# Patient Record
Sex: MALE | Race: WHITE | NOT HISPANIC OR LATINO | Employment: OTHER | URBAN - METROPOLITAN AREA
[De-identification: names, ages, dates, MRNs, and addresses within clinical notes are randomized per-mention and may not be internally consistent; named-entity substitution may affect disease eponyms.]

---

## 2017-02-22 ENCOUNTER — APPOINTMENT (OUTPATIENT)
Dept: LAB | Facility: CLINIC | Age: 77
End: 2017-02-22
Payer: MEDICARE

## 2017-02-22 DIAGNOSIS — D47.3 ESSENTIAL HEMORRHAGIC THROMBOCYTHEMIA (HCC): ICD-10-CM

## 2017-02-22 LAB
ALBUMIN SERPL BCP-MCNC: 3.8 G/DL (ref 3.5–5)
ALP SERPL-CCNC: 66 U/L (ref 46–116)
ALT SERPL W P-5'-P-CCNC: 18 U/L (ref 12–78)
ANION GAP SERPL CALCULATED.3IONS-SCNC: 6 MMOL/L (ref 4–13)
AST SERPL W P-5'-P-CCNC: 13 U/L (ref 5–45)
BASOPHILS # BLD AUTO: 0.1 THOUSANDS/ΜL (ref 0–0.1)
BASOPHILS NFR BLD AUTO: 2 % (ref 0–1)
BILIRUB SERPL-MCNC: 0.58 MG/DL (ref 0.2–1)
BUN SERPL-MCNC: 21 MG/DL (ref 5–25)
CALCIUM SERPL-MCNC: 9.1 MG/DL (ref 8.3–10.1)
CHLORIDE SERPL-SCNC: 104 MMOL/L (ref 100–108)
CO2 SERPL-SCNC: 28 MMOL/L (ref 21–32)
CREAT SERPL-MCNC: 0.95 MG/DL (ref 0.6–1.3)
EOSINOPHIL # BLD AUTO: 0.27 THOUSAND/ΜL (ref 0–0.61)
EOSINOPHIL NFR BLD AUTO: 5 % (ref 0–6)
ERYTHROCYTE [DISTWIDTH] IN BLOOD BY AUTOMATED COUNT: 13.4 % (ref 11.6–15.1)
GFR SERPL CREATININE-BSD FRML MDRD: >60 ML/MIN/1.73SQ M
GLUCOSE SERPL-MCNC: 166 MG/DL (ref 65–140)
HCT VFR BLD AUTO: 43 % (ref 36.5–49.3)
HGB BLD-MCNC: 14.8 G/DL (ref 12–17)
LDH SERPL-CCNC: 138 U/L (ref 81–234)
LYMPHOCYTES # BLD AUTO: 1.42 THOUSANDS/ΜL (ref 0.6–4.47)
LYMPHOCYTES NFR BLD AUTO: 24 % (ref 14–44)
MCH RBC QN AUTO: 37.5 PG (ref 26.8–34.3)
MCHC RBC AUTO-ENTMCNC: 34.4 G/DL (ref 31.4–37.4)
MCV RBC AUTO: 109 FL (ref 82–98)
MONOCYTES # BLD AUTO: 0.53 THOUSAND/ΜL (ref 0.17–1.22)
MONOCYTES NFR BLD AUTO: 9 % (ref 4–12)
NEUTROPHILS # BLD AUTO: 3.53 THOUSANDS/ΜL (ref 1.85–7.62)
NEUTS SEG NFR BLD AUTO: 60 % (ref 43–75)
NRBC BLD AUTO-RTO: 0 /100 WBCS
PLATELET # BLD AUTO: 335 THOUSANDS/UL (ref 149–390)
PMV BLD AUTO: 10.3 FL (ref 8.9–12.7)
POTASSIUM SERPL-SCNC: 4.4 MMOL/L (ref 3.5–5.3)
PROT SERPL-MCNC: 6.9 G/DL (ref 6.4–8.2)
RBC # BLD AUTO: 3.95 MILLION/UL (ref 3.88–5.62)
SODIUM SERPL-SCNC: 138 MMOL/L (ref 136–145)
WBC # BLD AUTO: 5.87 THOUSAND/UL (ref 4.31–10.16)

## 2017-02-22 PROCEDURE — 80053 COMPREHEN METABOLIC PANEL: CPT

## 2017-02-22 PROCEDURE — 36415 COLL VENOUS BLD VENIPUNCTURE: CPT

## 2017-02-22 PROCEDURE — 83615 LACTATE (LD) (LDH) ENZYME: CPT

## 2017-02-22 PROCEDURE — 85025 COMPLETE CBC W/AUTO DIFF WBC: CPT

## 2017-02-27 ENCOUNTER — GENERIC CONVERSION - ENCOUNTER (OUTPATIENT)
Dept: OTHER | Facility: OTHER | Age: 77
End: 2017-02-27

## 2017-02-27 ENCOUNTER — TRANSCRIBE ORDERS (OUTPATIENT)
Dept: LAB | Facility: CLINIC | Age: 77
End: 2017-02-27

## 2017-02-27 ENCOUNTER — APPOINTMENT (OUTPATIENT)
Dept: LAB | Facility: CLINIC | Age: 77
End: 2017-02-27
Payer: MEDICARE

## 2017-02-27 ENCOUNTER — ALLSCRIPTS OFFICE VISIT (OUTPATIENT)
Dept: OTHER | Facility: OTHER | Age: 77
End: 2017-02-27

## 2017-02-27 DIAGNOSIS — F32.A VASCULAR DEMENTIA WITH DEPRESSED MOOD (HCC): Primary | ICD-10-CM

## 2017-02-27 DIAGNOSIS — F01.50 VASCULAR DEMENTIA WITH DEPRESSED MOOD (HCC): Primary | ICD-10-CM

## 2017-02-27 LAB — TSH SERPL DL<=0.05 MIU/L-ACNC: 7.22 UIU/ML (ref 0.36–3.74)

## 2017-02-27 PROCEDURE — 84443 ASSAY THYROID STIM HORMONE: CPT

## 2017-02-27 PROCEDURE — 36415 COLL VENOUS BLD VENIPUNCTURE: CPT

## 2017-03-06 ENCOUNTER — APPOINTMENT (OUTPATIENT)
Dept: LAB | Facility: CLINIC | Age: 77
End: 2017-03-06
Payer: MEDICARE

## 2017-03-06 ENCOUNTER — TRANSCRIBE ORDERS (OUTPATIENT)
Dept: LAB | Facility: CLINIC | Age: 77
End: 2017-03-06

## 2017-03-06 DIAGNOSIS — Z13.29 SCREENING FOR THYROID DISORDER: Primary | ICD-10-CM

## 2017-03-06 DIAGNOSIS — E78.00 PURE HYPERCHOLESTEROLEMIA: ICD-10-CM

## 2017-03-06 LAB
CHOLEST SERPL-MCNC: 204 MG/DL (ref 50–200)
EST. AVERAGE GLUCOSE BLD GHB EST-MCNC: 105 MG/DL
HBA1C MFR BLD: 5.3 % (ref 4.2–6.3)
HDLC SERPL-MCNC: 55 MG/DL (ref 40–60)
LDLC SERPL CALC-MCNC: 118 MG/DL (ref 0–100)
TRIGL SERPL-MCNC: 157 MG/DL

## 2017-03-06 PROCEDURE — 36415 COLL VENOUS BLD VENIPUNCTURE: CPT

## 2017-03-06 PROCEDURE — 80061 LIPID PANEL: CPT

## 2017-03-06 PROCEDURE — 83036 HEMOGLOBIN GLYCOSYLATED A1C: CPT

## 2017-04-28 DIAGNOSIS — D47.3 ESSENTIAL HEMORRHAGIC THROMBOCYTHEMIA (HCC): ICD-10-CM

## 2017-05-15 ENCOUNTER — LAB (OUTPATIENT)
Dept: LAB | Facility: CLINIC | Age: 77
End: 2017-05-15
Payer: MEDICARE

## 2017-05-15 DIAGNOSIS — R94.6 NONSPECIFIC ABNORMAL RESULTS OF THYROID FUNCTION STUDY: Primary | ICD-10-CM

## 2017-05-15 DIAGNOSIS — D47.3 ESSENTIAL HEMORRHAGIC THROMBOCYTHEMIA (HCC): ICD-10-CM

## 2017-05-15 LAB
ALBUMIN SERPL BCP-MCNC: 3.7 G/DL (ref 3.5–5)
ALP SERPL-CCNC: 57 U/L (ref 46–116)
ALT SERPL W P-5'-P-CCNC: 23 U/L (ref 12–78)
ANION GAP SERPL CALCULATED.3IONS-SCNC: 6 MMOL/L (ref 4–13)
AST SERPL W P-5'-P-CCNC: 18 U/L (ref 5–45)
BASOPHILS # BLD AUTO: 0.07 THOUSANDS/ΜL (ref 0–0.1)
BASOPHILS NFR BLD AUTO: 1 % (ref 0–1)
BILIRUB SERPL-MCNC: 0.82 MG/DL (ref 0.2–1)
BUN SERPL-MCNC: 23 MG/DL (ref 5–25)
CALCIUM SERPL-MCNC: 9.4 MG/DL (ref 8.3–10.1)
CHLORIDE SERPL-SCNC: 107 MMOL/L (ref 100–108)
CO2 SERPL-SCNC: 29 MMOL/L (ref 21–32)
CREAT SERPL-MCNC: 0.89 MG/DL (ref 0.6–1.3)
EOSINOPHIL # BLD AUTO: 0.28 THOUSAND/ΜL (ref 0–0.61)
EOSINOPHIL NFR BLD AUTO: 4 % (ref 0–6)
ERYTHROCYTE [DISTWIDTH] IN BLOOD BY AUTOMATED COUNT: 13.6 % (ref 11.6–15.1)
GFR SERPL CREATININE-BSD FRML MDRD: >60 ML/MIN/1.73SQ M
GLUCOSE SERPL-MCNC: 134 MG/DL (ref 65–140)
HCT VFR BLD AUTO: 45.1 % (ref 36.5–49.3)
HGB BLD-MCNC: 15.4 G/DL (ref 12–17)
LDH SERPL-CCNC: 162 U/L (ref 81–234)
LYMPHOCYTES # BLD AUTO: 1.61 THOUSANDS/ΜL (ref 0.6–4.47)
LYMPHOCYTES NFR BLD AUTO: 25 % (ref 14–44)
MCH RBC QN AUTO: 36.4 PG (ref 26.8–34.3)
MCHC RBC AUTO-ENTMCNC: 34.1 G/DL (ref 31.4–37.4)
MCV RBC AUTO: 107 FL (ref 82–98)
MONOCYTES # BLD AUTO: 0.41 THOUSAND/ΜL (ref 0.17–1.22)
MONOCYTES NFR BLD AUTO: 6 % (ref 4–12)
NEUTROPHILS # BLD AUTO: 4.06 THOUSANDS/ΜL (ref 1.85–7.62)
NEUTS SEG NFR BLD AUTO: 64 % (ref 43–75)
NRBC BLD AUTO-RTO: 0 /100 WBCS
PLATELET # BLD AUTO: 367 THOUSANDS/UL (ref 149–390)
PMV BLD AUTO: 10.5 FL (ref 8.9–12.7)
POTASSIUM SERPL-SCNC: 4.2 MMOL/L (ref 3.5–5.3)
PROT SERPL-MCNC: 7 G/DL (ref 6.4–8.2)
RBC # BLD AUTO: 4.23 MILLION/UL (ref 3.88–5.62)
SODIUM SERPL-SCNC: 142 MMOL/L (ref 136–145)
T4 FREE SERPL-MCNC: 0.94 NG/DL (ref 0.76–1.46)
TSH SERPL DL<=0.05 MIU/L-ACNC: 2.72 UIU/ML (ref 0.36–3.74)
WBC # BLD AUTO: 6.45 THOUSAND/UL (ref 4.31–10.16)

## 2017-05-15 PROCEDURE — 83615 LACTATE (LD) (LDH) ENZYME: CPT

## 2017-05-15 PROCEDURE — 85025 COMPLETE CBC W/AUTO DIFF WBC: CPT

## 2017-05-15 PROCEDURE — 84443 ASSAY THYROID STIM HORMONE: CPT

## 2017-05-15 PROCEDURE — 84439 ASSAY OF FREE THYROXINE: CPT

## 2017-05-15 PROCEDURE — 36415 COLL VENOUS BLD VENIPUNCTURE: CPT

## 2017-05-15 PROCEDURE — 80053 COMPREHEN METABOLIC PANEL: CPT

## 2017-06-27 DIAGNOSIS — D47.3 ESSENTIAL HEMORRHAGIC THROMBOCYTHEMIA (HCC): ICD-10-CM

## 2017-07-24 ENCOUNTER — APPOINTMENT (OUTPATIENT)
Dept: LAB | Facility: CLINIC | Age: 77
End: 2017-07-24
Payer: MEDICARE

## 2017-07-24 DIAGNOSIS — D47.3 ESSENTIAL HEMORRHAGIC THROMBOCYTHEMIA (HCC): ICD-10-CM

## 2017-07-24 LAB
ALBUMIN SERPL BCP-MCNC: 3.6 G/DL (ref 3.5–5)
ALP SERPL-CCNC: 65 U/L (ref 46–116)
ALT SERPL W P-5'-P-CCNC: 18 U/L (ref 12–78)
ANION GAP SERPL CALCULATED.3IONS-SCNC: 6 MMOL/L (ref 4–13)
AST SERPL W P-5'-P-CCNC: 32 U/L (ref 5–45)
BASOPHILS # BLD AUTO: 0.09 THOUSANDS/ΜL (ref 0–0.1)
BASOPHILS NFR BLD AUTO: 1 % (ref 0–1)
BILIRUB SERPL-MCNC: 0.77 MG/DL (ref 0.2–1)
BUN SERPL-MCNC: 18 MG/DL (ref 5–25)
CALCIUM SERPL-MCNC: 9.1 MG/DL (ref 8.3–10.1)
CHLORIDE SERPL-SCNC: 109 MMOL/L (ref 100–108)
CO2 SERPL-SCNC: 28 MMOL/L (ref 21–32)
CREAT SERPL-MCNC: 0.81 MG/DL (ref 0.6–1.3)
EOSINOPHIL # BLD AUTO: 0.31 THOUSAND/ΜL (ref 0–0.61)
EOSINOPHIL NFR BLD AUTO: 4 % (ref 0–6)
ERYTHROCYTE [DISTWIDTH] IN BLOOD BY AUTOMATED COUNT: 13.8 % (ref 11.6–15.1)
GFR SERPL CREATININE-BSD FRML MDRD: 85 ML/MIN/1.73SQ M
GLUCOSE P FAST SERPL-MCNC: 93 MG/DL (ref 65–99)
HCT VFR BLD AUTO: 45.8 % (ref 36.5–49.3)
HGB BLD-MCNC: 15.4 G/DL (ref 12–17)
LDH SERPL-CCNC: 278 U/L (ref 81–234)
LYMPHOCYTES # BLD AUTO: 1.65 THOUSANDS/ΜL (ref 0.6–4.47)
LYMPHOCYTES NFR BLD AUTO: 21 % (ref 14–44)
MCH RBC QN AUTO: 35.7 PG (ref 26.8–34.3)
MCHC RBC AUTO-ENTMCNC: 33.6 G/DL (ref 31.4–37.4)
MCV RBC AUTO: 106 FL (ref 82–98)
MONOCYTES # BLD AUTO: 0.48 THOUSAND/ΜL (ref 0.17–1.22)
MONOCYTES NFR BLD AUTO: 6 % (ref 4–12)
NEUTROPHILS # BLD AUTO: 5.4 THOUSANDS/ΜL (ref 1.85–7.62)
NEUTS SEG NFR BLD AUTO: 68 % (ref 43–75)
NRBC BLD AUTO-RTO: 0 /100 WBCS
PLATELET # BLD AUTO: 406 THOUSANDS/UL (ref 149–390)
PMV BLD AUTO: 10.9 FL (ref 8.9–12.7)
POTASSIUM SERPL-SCNC: 4.7 MMOL/L (ref 3.5–5.3)
PROT SERPL-MCNC: 7 G/DL (ref 6.4–8.2)
RBC # BLD AUTO: 4.31 MILLION/UL (ref 3.88–5.62)
SODIUM SERPL-SCNC: 143 MMOL/L (ref 136–145)
WBC # BLD AUTO: 7.95 THOUSAND/UL (ref 4.31–10.16)

## 2017-07-24 PROCEDURE — 85025 COMPLETE CBC W/AUTO DIFF WBC: CPT

## 2017-07-24 PROCEDURE — 83615 LACTATE (LD) (LDH) ENZYME: CPT

## 2017-07-24 PROCEDURE — 80053 COMPREHEN METABOLIC PANEL: CPT

## 2017-07-24 PROCEDURE — 36415 COLL VENOUS BLD VENIPUNCTURE: CPT

## 2017-08-26 DIAGNOSIS — D47.3 ESSENTIAL HEMORRHAGIC THROMBOCYTHEMIA (HCC): ICD-10-CM

## 2017-10-10 ENCOUNTER — APPOINTMENT (OUTPATIENT)
Dept: LAB | Facility: CLINIC | Age: 77
End: 2017-10-10
Payer: MEDICARE

## 2017-10-10 DIAGNOSIS — D47.3 ESSENTIAL HEMORRHAGIC THROMBOCYTHEMIA (HCC): ICD-10-CM

## 2017-10-10 LAB
ALBUMIN SERPL BCP-MCNC: 3.4 G/DL (ref 3.5–5)
ALP SERPL-CCNC: 64 U/L (ref 46–116)
ALT SERPL W P-5'-P-CCNC: 19 U/L (ref 12–78)
ANION GAP SERPL CALCULATED.3IONS-SCNC: 8 MMOL/L (ref 4–13)
AST SERPL W P-5'-P-CCNC: 20 U/L (ref 5–45)
BASOPHILS # BLD AUTO: 0.06 THOUSANDS/ΜL (ref 0–0.1)
BASOPHILS NFR BLD AUTO: 1 % (ref 0–1)
BILIRUB SERPL-MCNC: 0.48 MG/DL (ref 0.2–1)
BUN SERPL-MCNC: 18 MG/DL (ref 5–25)
CALCIUM SERPL-MCNC: 8.7 MG/DL (ref 8.3–10.1)
CHLORIDE SERPL-SCNC: 107 MMOL/L (ref 100–108)
CO2 SERPL-SCNC: 27 MMOL/L (ref 21–32)
CREAT SERPL-MCNC: 0.75 MG/DL (ref 0.6–1.3)
EOSINOPHIL # BLD AUTO: 0.16 THOUSAND/ΜL (ref 0–0.61)
EOSINOPHIL NFR BLD AUTO: 3 % (ref 0–6)
ERYTHROCYTE [DISTWIDTH] IN BLOOD BY AUTOMATED COUNT: 14.6 % (ref 11.6–15.1)
GFR SERPL CREATININE-BSD FRML MDRD: 87 ML/MIN/1.73SQ M
GLUCOSE SERPL-MCNC: 92 MG/DL (ref 65–140)
HCT VFR BLD AUTO: 44.4 % (ref 36.5–49.3)
HGB BLD-MCNC: 15.5 G/DL (ref 12–17)
LDH SERPL-CCNC: 174 U/L (ref 81–234)
LYMPHOCYTES # BLD AUTO: 1.42 THOUSANDS/ΜL (ref 0.6–4.47)
LYMPHOCYTES NFR BLD AUTO: 26 % (ref 14–44)
MCH RBC QN AUTO: 35 PG (ref 26.8–34.3)
MCHC RBC AUTO-ENTMCNC: 34.9 G/DL (ref 31.4–37.4)
MCV RBC AUTO: 100 FL (ref 82–98)
MONOCYTES # BLD AUTO: 0.55 THOUSAND/ΜL (ref 0.17–1.22)
MONOCYTES NFR BLD AUTO: 10 % (ref 4–12)
NEUTROPHILS # BLD AUTO: 3.31 THOUSANDS/ΜL (ref 1.85–7.62)
NEUTS SEG NFR BLD AUTO: 60 % (ref 43–75)
NRBC BLD AUTO-RTO: 0 /100 WBCS
PLATELET # BLD AUTO: 384 THOUSANDS/UL (ref 149–390)
PMV BLD AUTO: 10.3 FL (ref 8.9–12.7)
POTASSIUM SERPL-SCNC: 4.3 MMOL/L (ref 3.5–5.3)
PROT SERPL-MCNC: 6.8 G/DL (ref 6.4–8.2)
RBC # BLD AUTO: 4.43 MILLION/UL (ref 3.88–5.62)
SODIUM SERPL-SCNC: 142 MMOL/L (ref 136–145)
WBC # BLD AUTO: 5.51 THOUSAND/UL (ref 4.31–10.16)

## 2017-10-10 PROCEDURE — 80053 COMPREHEN METABOLIC PANEL: CPT

## 2017-10-10 PROCEDURE — 85025 COMPLETE CBC W/AUTO DIFF WBC: CPT

## 2017-10-10 PROCEDURE — 83615 LACTATE (LD) (LDH) ENZYME: CPT

## 2017-10-10 PROCEDURE — 36415 COLL VENOUS BLD VENIPUNCTURE: CPT

## 2017-11-01 ENCOUNTER — ALLSCRIPTS OFFICE VISIT (OUTPATIENT)
Dept: OTHER | Facility: OTHER | Age: 77
End: 2017-11-01

## 2017-12-19 ENCOUNTER — APPOINTMENT (OUTPATIENT)
Dept: LAB | Facility: CLINIC | Age: 77
End: 2017-12-19
Payer: MEDICARE

## 2017-12-19 ENCOUNTER — TRANSCRIBE ORDERS (OUTPATIENT)
Dept: LAB | Facility: CLINIC | Age: 77
End: 2017-12-19

## 2017-12-19 DIAGNOSIS — D47.3 THROMBOCYTHEMIA, ESSENTIAL (HCC): Primary | ICD-10-CM

## 2017-12-19 LAB
ALBUMIN SERPL BCP-MCNC: 3.6 G/DL (ref 3.5–5)
ALP SERPL-CCNC: 73 U/L (ref 46–116)
ALT SERPL W P-5'-P-CCNC: 17 U/L (ref 12–78)
ANION GAP SERPL CALCULATED.3IONS-SCNC: 5 MMOL/L (ref 4–13)
AST SERPL W P-5'-P-CCNC: 15 U/L (ref 5–45)
BASOPHILS # BLD AUTO: 0.09 THOUSANDS/ΜL (ref 0–0.1)
BASOPHILS NFR BLD AUTO: 1 % (ref 0–1)
BILIRUB SERPL-MCNC: 0.69 MG/DL (ref 0.2–1)
BUN SERPL-MCNC: 18 MG/DL (ref 5–25)
CALCIUM SERPL-MCNC: 9.3 MG/DL (ref 8.3–10.1)
CHLORIDE SERPL-SCNC: 107 MMOL/L (ref 100–108)
CO2 SERPL-SCNC: 28 MMOL/L (ref 21–32)
CREAT SERPL-MCNC: 0.78 MG/DL (ref 0.6–1.3)
EOSINOPHIL # BLD AUTO: 0.28 THOUSAND/ΜL (ref 0–0.61)
EOSINOPHIL NFR BLD AUTO: 4 % (ref 0–6)
ERYTHROCYTE [DISTWIDTH] IN BLOOD BY AUTOMATED COUNT: 15 % (ref 11.6–15.1)
GFR SERPL CREATININE-BSD FRML MDRD: 86 ML/MIN/1.73SQ M
GLUCOSE P FAST SERPL-MCNC: 115 MG/DL (ref 65–99)
HCT VFR BLD AUTO: 44.7 % (ref 36.5–49.3)
HGB BLD-MCNC: 15.2 G/DL (ref 12–17)
LYMPHOCYTES # BLD AUTO: 1.43 THOUSANDS/ΜL (ref 0.6–4.47)
LYMPHOCYTES NFR BLD AUTO: 22 % (ref 14–44)
MCH RBC QN AUTO: 35.4 PG (ref 26.8–34.3)
MCHC RBC AUTO-ENTMCNC: 34 G/DL (ref 31.4–37.4)
MCV RBC AUTO: 104 FL (ref 82–98)
MONOCYTES # BLD AUTO: 0.6 THOUSAND/ΜL (ref 0.17–1.22)
MONOCYTES NFR BLD AUTO: 9 % (ref 4–12)
NEUTROPHILS # BLD AUTO: 4.1 THOUSANDS/ΜL (ref 1.85–7.62)
NEUTS SEG NFR BLD AUTO: 64 % (ref 43–75)
NRBC BLD AUTO-RTO: 0 /100 WBCS
PLATELET # BLD AUTO: 479 THOUSANDS/UL (ref 149–390)
PMV BLD AUTO: 10.4 FL (ref 8.9–12.7)
POTASSIUM SERPL-SCNC: 4.2 MMOL/L (ref 3.5–5.3)
PROT SERPL-MCNC: 7 G/DL (ref 6.4–8.2)
RBC # BLD AUTO: 4.29 MILLION/UL (ref 3.88–5.62)
SODIUM SERPL-SCNC: 140 MMOL/L (ref 136–145)
WBC # BLD AUTO: 6.52 THOUSAND/UL (ref 4.31–10.16)

## 2017-12-19 PROCEDURE — 80053 COMPREHEN METABOLIC PANEL: CPT

## 2017-12-19 PROCEDURE — 83615 LACTATE (LD) (LDH) ENZYME: CPT

## 2017-12-19 PROCEDURE — 85025 COMPLETE CBC W/AUTO DIFF WBC: CPT

## 2017-12-19 PROCEDURE — 83625 ASSAY OF LDH ENZYMES: CPT

## 2017-12-19 PROCEDURE — 36415 COLL VENOUS BLD VENIPUNCTURE: CPT

## 2017-12-21 LAB
LDH SERPL-CCNC: 148 IU/L (ref 121–224)
LDH1 CFR SERPL ELPH: 26 % (ref 17–32)
LDH2 CFR SERPL ELPH: 35 % (ref 25–40)
LDH3 CFR SERPL ELPH: 24 % (ref 17–27)
LDH4 CFR SERPL ELPH: 9 % (ref 5–13)
LDH5 CFR SERPL ELPH: 6 % (ref 4–20)

## 2017-12-22 ENCOUNTER — GENERIC CONVERSION - ENCOUNTER (OUTPATIENT)
Dept: OTHER | Facility: OTHER | Age: 77
End: 2017-12-22

## 2017-12-22 ENCOUNTER — APPOINTMENT (OUTPATIENT)
Dept: LAB | Facility: CLINIC | Age: 77
End: 2017-12-22
Payer: MEDICARE

## 2017-12-22 DIAGNOSIS — C61 MALIGNANT NEOPLASM OF PROSTATE (HCC): ICD-10-CM

## 2017-12-22 DIAGNOSIS — D47.3 ESSENTIAL HEMORRHAGIC THROMBOCYTHEMIA (HCC): ICD-10-CM

## 2017-12-22 LAB — PSA SERPL-MCNC: 0.4 NG/ML (ref 0–4)

## 2017-12-22 PROCEDURE — 84153 ASSAY OF PSA TOTAL: CPT

## 2017-12-22 PROCEDURE — 36415 COLL VENOUS BLD VENIPUNCTURE: CPT

## 2018-01-05 ENCOUNTER — ALLSCRIPTS OFFICE VISIT (OUTPATIENT)
Dept: OTHER | Facility: OTHER | Age: 78
End: 2018-01-05

## 2018-01-05 LAB
BILIRUB UR QL STRIP: NEGATIVE
CLARITY UR: NORMAL
COLOR UR: YELLOW
GLUCOSE (HISTORICAL): NORMAL
HGB UR QL STRIP.AUTO: NEGATIVE
KETONES UR STRIP-MCNC: NEGATIVE MG/DL
LEUKOCYTE ESTERASE UR QL STRIP: NEGATIVE
NITRITE UR QL STRIP: NEGATIVE
PH UR STRIP.AUTO: 5 [PH]
PROT UR STRIP-MCNC: NEGATIVE MG/DL
SP GR UR STRIP.AUTO: 1.02
UROBILINOGEN UR QL STRIP.AUTO: 0.2

## 2018-01-06 ENCOUNTER — HOSPITAL ENCOUNTER (EMERGENCY)
Facility: HOSPITAL | Age: 78
Discharge: HOME | End: 2018-01-06
Attending: EMERGENCY MEDICINE
Payer: MEDICARE

## 2018-01-06 ENCOUNTER — APPOINTMENT (EMERGENCY)
Dept: RADIOLOGY | Facility: HOSPITAL | Age: 78
End: 2018-01-06
Payer: MEDICARE

## 2018-01-06 VITALS — BODY MASS INDEX: 31.07 KG/M2 | WEIGHT: 217 LBS | HEIGHT: 70 IN

## 2018-01-06 DIAGNOSIS — S09.90XA CLOSED HEAD INJURY, INITIAL ENCOUNTER: Primary | ICD-10-CM

## 2018-01-06 DIAGNOSIS — S01.81XA LACERATION OF FOREHEAD, INITIAL ENCOUNTER: ICD-10-CM

## 2018-01-06 PROCEDURE — 70450 CT HEAD/BRAIN W/O DYE: CPT

## 2018-01-06 PROCEDURE — 99283 EMERGENCY DEPT VISIT LOW MDM: CPT

## 2018-01-06 RX ORDER — SERTRALINE HYDROCHLORIDE 100 MG/1
100 TABLET, FILM COATED ORAL
COMMUNITY
End: 2019-01-21 | Stop reason: ALTCHOICE

## 2018-01-06 RX ORDER — FOLIC ACID 1 MG/1
1 TABLET ORAL
COMMUNITY
End: 2022-03-11 | Stop reason: HOSPADM

## 2018-01-06 RX ORDER — LIDOCAINE HYDROCHLORIDE AND EPINEPHRINE 10; 10 MG/ML; UG/ML
5 INJECTION, SOLUTION INFILTRATION; PERINEURAL ONCE
Status: COMPLETED | OUTPATIENT
Start: 2018-01-06 | End: 2018-01-06

## 2018-01-06 RX ORDER — ATORVASTATIN CALCIUM 40 MG/1
40 TABLET, FILM COATED ORAL
COMMUNITY
End: 2019-01-21 | Stop reason: ALTCHOICE

## 2018-01-06 RX ORDER — LISINOPRIL 10 MG/1
10 TABLET ORAL
COMMUNITY

## 2018-01-06 RX ADMIN — LIDOCAINE HYDROCHLORIDE,EPINEPHRINE BITARTRATE 5 ML: 10; .01 INJECTION, SOLUTION INFILTRATION; PERINEURAL at 11:16

## 2018-01-06 NOTE — ED ATTENDING ATTESTATION
Rebecca Gonzalez MD, saw and evaluated the patient  I have discussed the patient with the resident/non-physician practitioner and agree with the resident's/non-physician practitioner's findings, Plan of Care, and MDM as documented in the resident's/non-physician practitioner's note, except where noted  All available labs and Radiology studies were reviewed  At this point I agree with the current assessment done in the Emergency Department  I have conducted an independent evaluation of this patient a history and physical is as follows:Fell at [employer] hit head on granite steps  No LOC  No other injury  Takes one ASA a day  1cm laceration forehead  GCS 15   Neuro non focal  Head CT negative, wound closed by resident under my direct supervision      Critical Care Time  CritCare Time    Procedures

## 2018-01-06 NOTE — ED PROVIDER NOTES
History  Chief Complaint   Patient presents with    Fall     Pt states had two large barrells in hand and slipped down 5 granite stairs while at [employer]  Pt denies LOC or blood thinners except ASA     76-year-old male presenting to the ER today status post fall  Patient was carrying to duffle bag down a flight of 5 stairs  Patient slipped on ice and fell forward onto his head  Did not lose consciousness and was able to ambulate afterward  Patient noticed a laceration above his nasal bridge and came to the ER for further evaluation and treatment  Currently patient denies headache, nausea, vomiting, visual changes, numbness tingling or weakness in the extremities  Takes aspirin daily  Assessment:  - Laceration  - closed head injury  - mechanical fall   Plan:  - CT head  - update tetanus  - closed  Laceration with sutures        History provided by:  Patient   used: No        Prior to Admission Medications   Prescriptions Last Dose Informant Patient Reported? Taking? Fluticasone Propionate HFA (FLOVENT HFA IN)   Yes No   Sig: Inhale   aspirin 1 mg/mL SUSP   Yes No   Sig: Take 325 mg by mouth   atorvastatin (LIPITOR) 40 mg tablet   Yes No   Sig: Take 40 mg by mouth   folic acid (FOLVITE) 1 mg tablet   Yes No   Sig: Take 1 mg by mouth   lisinopril (ZESTRIL) 10 mg tablet   Yes No   Sig: Take 10 mg by mouth   sertraline (ZOLOFT) 100 mg tablet   Yes No   Sig: Take 100 mg by mouth      Facility-Administered Medications: None       Past Medical History:   Diagnosis Date    Cancer ([facility] [address] )     prostate    Hypercholesterolemia     Hypertension     Stroke [facility])        Past Surgical History:   Procedure Laterality Date    PROSTATECTOMY      TOTAL SHOULDER REPLACEMENT         History reviewed  No pertinent family history  I have reviewed and agree with the history as documented      Social History   Substance Use Topics    Smoking status: Never Smoker    Smokeless tobacco: Never Used    Alcohol use No        Review of Systems   Constitutional: Negative for activity change, appetite change, chills, fatigue and fever  HENT: Negative  Eyes: Negative  Respiratory: Negative  Cardiovascular: Negative  Gastrointestinal: Negative for abdominal distention, abdominal pain, blood in stool, constipation, diarrhea, nausea and vomiting  Endocrine: Negative  Genitourinary: Negative for decreased urine volume, difficulty urinating, dysuria, enuresis, flank pain, frequency, hematuria, penile swelling, scrotal swelling, testicular pain and urgency  Musculoskeletal: Negative  Skin: Positive for wound  Allergic/Immunologic: Negative  Neurological: Negative  Hematological: Negative  Psychiatric/Behavioral: Negative  All other systems reviewed and are negative  Physical Exam  ED Triage Vitals [01/06/18 1037]   Temp Pulse Resp BP SpO2   -- -- -- -- --      Temp src Heart Rate Source Patient Position - Orthostatic VS BP Location FiO2 (%)   -- -- -- -- --      Pain Score       No Pain           Orthostatic Vital Signs  There were no vitals filed for this visit  Physical Exam   Constitutional: He is oriented to person, place, and time  He appears well-developed and well-nourished  HENT:   Head: Normocephalic and atraumatic  Right Ear: External ear normal    Left Ear: External ear normal    Nose: Nose normal    Mouth/Throat: Oropharynx is clear and moist    Eyes: Conjunctivae and EOM are normal  Pupils are equal, round, and reactive to light  Neck: Normal range of motion  Neck supple  No JVD present  No tracheal deviation present  No thyromegaly present  Cardiovascular: Normal rate, regular rhythm, normal heart sounds and intact distal pulses  Exam reveals no gallop and no friction rub  No murmur heard  Pulmonary/Chest: Effort normal and breath sounds normal  No stridor  No respiratory distress  He has no wheezes  He has no rales  He exhibits no tenderness     Abdominal: Soft  Bowel sounds are normal  He exhibits no distension and no mass  There is no tenderness  There is no rebound and no guarding  No hernia  Musculoskeletal: Normal range of motion  He exhibits no edema, tenderness or deformity  Lymphadenopathy:     He has no cervical adenopathy  Neurological: He is alert and oriented to person, place, and time  He has normal reflexes  He displays normal reflexes  No cranial nerve deficit  He exhibits normal muscle tone  Coordination normal    Skin: Skin is warm  No rash noted  No erythema  No pallor  Psychiatric: He has a normal mood and affect  His behavior is normal  Judgment and thought content normal    Nursing note and vitals reviewed  ED Medications  Medications   lidocaine-epinephrine (XYLOCAINE/EPINEPHRINE) 1 %-1:100,000 injection 5 mL (5 mL Infiltration Given 1/6/18 1116)       Diagnostic Studies  Results Reviewed     None                 CT head without contrast   Final Result by Sreedhar Garvey MD (01/06 1131)      No acute intracranial abnormality  Microangiopathic changes  Workstation performed: [id number]               Procedures  Lac Repair  Date/Time: 1/6/2018 11:39 AM  Performed by: Domenica Ferris  Authorized by: Wili Solomon   Consent: Verbal consent obtained  Body area: head/neck  Location details: nose  Foreign bodies: no foreign bodies  Tendon involvement: none  Nerve involvement: none  Vascular damage: no  Anesthesia: local infiltration    Anesthesia:  Local Anesthetic: lidocaine 1% with epinephrine    Sedation:  Patient sedated: no    Wound Dehiscence:  Superficial Wound Dehiscence: simple closure      Procedure Details:  Preparation: Patient was prepped and draped in the usual sterile fashion    Irrigation solution: saline  Irrigation method: syringe  Amount of cleaning: extensive  Debridement: none  Degree of undermining: none  Skin closure: 6-0 nylon  Number of sutures: 3  Technique: simple  Approximation: close  Approximation difficulty: simple  Patient tolerance: Patient tolerated the procedure well with no immediate complications            Phone Consults  ED Phone Contact    ED Course  ED Course            Identification of Seniors at [facility] Most Recent Value   (ISAR) Identification of Seniors at Risk   Before the illness or injury that brought you to the Emergency, did you need someone to help you on a regular basis? 0 Filed at: 01/06/2018 1042   In the last 24 hours, have you needed more help than usual?  0 Filed at: 01/06/2018 1042   Have you been hospitalized for one or more nights during the past 6 months? 1 Filed at: 01/06/2018 1042   In general, do you see well?  0 Filed at: 01/06/2018 1042   In general, do you have serious problems with your memory? 0 Filed at: 01/06/2018 1042   Do you take more than three different medications every day?   1 Filed at: 01/06/2018 1042   ISAR Score  2 Filed at: 01/06/2018 1042                          MDM  Number of Diagnoses or Management Options  Closed head injury, initial encounter: new and requires workup  Laceration of forehead, initial encounter: new and requires workup     Amount and/or Complexity of Data Reviewed  Clinical lab tests: ordered and reviewed  Tests in the radiology section of CPT®: ordered and reviewed  Tests in the medicine section of CPT®: reviewed and ordered  Decide to obtain previous medical records or to obtain history from someone other than the patient: yes  Independent visualization of images, tracings, or specimens: yes    Patient Progress  Patient progress: stable    CritCare Time    Disposition  Final diagnoses:   Closed head injury, initial encounter   Laceration of forehead, initial encounter     Time reflects when diagnosis was documented in both MDM as applicable and the Disposition within this note     Time User Action Codes Description Comment    1/6/2018 11:40 AM Anshul Glass Add [S09 90XA] Closed head injury, initial encounter     1/6/2018 11:41 AM Aretha Collins Can Add [S01 81XA] Laceration of forehead, initial encounter       ED Disposition     ED Disposition Condition Comment    Discharge  Ricardo Barrazalem discharge to home/self care  Condition at discharge: Good        Follow-up Information     Follow up With Specialties Details Why Contact Info    Naa Mckenzie MD Internal Medicine Schedule an appointment as soon as possible for a visit  [address] [place] [address]  [phone]          There are no discharge medications for this patient  No discharge procedures on file  ED Provider  Attending physically available and evaluated Ricardo Wade  I managed the patient along with the ED Attending      Electronically Signed by         Alia De Santiago DO  Resident  01/06/18 0838

## 2018-01-06 NOTE — DISCHARGE INSTRUCTIONS

## 2018-01-13 VITALS
TEMPERATURE: 97.1 F | BODY MASS INDEX: 34.4 KG/M2 | SYSTOLIC BLOOD PRESSURE: 138 MMHG | DIASTOLIC BLOOD PRESSURE: 70 MMHG | HEART RATE: 97 BPM | WEIGHT: 227 LBS | RESPIRATION RATE: 18 BRPM | OXYGEN SATURATION: 97 % | HEIGHT: 68 IN

## 2018-01-14 VITALS
HEART RATE: 99 BPM | SYSTOLIC BLOOD PRESSURE: 132 MMHG | BODY MASS INDEX: 34.06 KG/M2 | HEIGHT: 67 IN | WEIGHT: 217 LBS | RESPIRATION RATE: 16 BRPM | TEMPERATURE: 97.8 F | DIASTOLIC BLOOD PRESSURE: 76 MMHG | OXYGEN SATURATION: 96 %

## 2018-01-15 ENCOUNTER — HOSPITAL ENCOUNTER (EMERGENCY)
Facility: HOSPITAL | Age: 78
Discharge: HOME | End: 2018-01-15
Attending: EMERGENCY MEDICINE | Admitting: EMERGENCY MEDICINE
Payer: MEDICARE

## 2018-01-15 VITALS
OXYGEN SATURATION: 95 % | HEART RATE: 88 BPM | TEMPERATURE: 97.4 F | DIASTOLIC BLOOD PRESSURE: 65 MMHG | SYSTOLIC BLOOD PRESSURE: 133 MMHG | RESPIRATION RATE: 18 BRPM

## 2018-01-15 DIAGNOSIS — Z48.02 VISIT FOR SUTURE REMOVAL: Primary | ICD-10-CM

## 2018-01-15 PROCEDURE — 99281 EMR DPT VST MAYX REQ PHY/QHP: CPT

## 2018-01-15 NOTE — DISCHARGE INSTRUCTIONS
Stitches Removal   WHAT YOU NEED TO KNOW:   Stitches may need to be removed in 3 to 14 days depending on the location of your wound  Your healthcare provider will use sterile forceps or tweezers to  the knot of each stitch  He will cut the stitch with scissors and pull the stitch out  You may feel a slight tug as the stitch comes out  He may place small steristrips across your wound after the stitches have been removed  These pieces of tape will peel and fall of on their own  Do not pull them off  DISCHARGE INSTRUCTIONS:   Return to the emergency department if:   · Your wound splits open  · You suddenly cannot move your injured joint  · You have sudden numbness around your wound  · You see red streaks coming from your wound  Contact your healthcare provider if:   · You have a fever and chills  · Your wound is red, warm, swollen, or leaking pus  · There is a bad smell coming from your wound  · You have increased pain in the wound area  · You have questions or concerns about your condition or care  Care for your wound:   · Clean your wound as directed  Carefully wash your wound with soap and water  Pat the area dry with a clean towel  · Protect your wound  Your wound can swell, bleed, or split open if it is stretched or bumped  You may need to wear a bandage that supports your wound until it is completely healed  · Minimize your scar  Use sunblock if your wound is exposed to the sun  Apply it every day after the stitches are removed  This will help prevent skin discoloration  Follow up with your healthcare provider as directed: You may need to return in 3 to 5 days if the stitches are on your face  Stitches on your scalp need to be removed after 7 to 14 days  Stitches over joints may remain in place up to 14 days  Write down your questions so you remember to ask them during your visits     © 2017 [address] [employer] [employer] Information is for End User's use only and may not be sold, redistributed or otherwise used for commercial purposes  All illustrations and images included in CareNotes® are the copyrighted property of [employer]  or [employer]  The above information is an  only  It is not intended as medical advice for individual conditions or treatments  Talk to your doctor, nurse or pharmacist before following any medical regimen to see if it is safe and effective for you

## 2018-01-15 NOTE — ED PROVIDER NOTES
History  Chief Complaint   Patient presents with    Suture / Staple Removal     pt with 3 sutures in his forehead and needs the sutures removed      This is a 76 y o  old male who presents to the ED for evaluation of suture removal  Placed on 1/6  Healing well  No pain, swelling discharge  Midline scalp  Prior to Admission Medications   Prescriptions Last Dose Informant Patient Reported? Taking? Fluticasone Propionate HFA (FLOVENT HFA IN)   Yes No   Sig: Inhale   aspirin 1 mg/mL SUSP   Yes No   Sig: Take 325 mg by mouth   atorvastatin (LIPITOR) 40 mg tablet   Yes No   Sig: Take 40 mg by mouth   folic acid (FOLVITE) 1 mg tablet   Yes No   Sig: Take 1 mg by mouth   lisinopril (ZESTRIL) 10 mg tablet   Yes No   Sig: Take 10 mg by mouth   sertraline (ZOLOFT) 100 mg tablet   Yes No   Sig: Take 100 mg by mouth      Facility-Administered Medications: None     Past Medical History:   Diagnosis Date    Cancer ([facility] [address] )     prostate    Hypercholesterolemia     Hypertension     Stroke [facility])      Past Surgical History:   Procedure Laterality Date    PROSTATECTOMY      TOTAL SHOULDER REPLACEMENT       History reviewed  No pertinent family history  I have reviewed and agree with the history as documented  Social History   Substance Use Topics    Smoking status: Never Smoker    Smokeless tobacco: Never Used    Alcohol use No      Review of Systems   Constitutional: Negative for fatigue and fever  HENT: Negative for congestion and rhinorrhea  Respiratory: Negative for cough  Cardiovascular: Negative for chest pain  Gastrointestinal: Negative for abdominal pain, nausea and vomiting  Genitourinary: Negative for dysuria and frequency  Musculoskeletal: Negative for neck pain and neck stiffness  Neurological: Negative for dizziness and syncope  All other systems reviewed and are negative      Physical Exam  ED Triage Vitals [01/15/18 1610]   Temperature Pulse Respirations Blood Pressure SpO2   (!) 97 4 °F (36 3 °C) 88 18 133/65 95 %      Temp Source Heart Rate Source Patient Position - Orthostatic VS BP Location FiO2 (%)   Oral -- -- -- --      Pain Score       No Pain         Physical Exam   Constitutional: He is oriented to person, place, and time  He appears well-developed and well-nourished  No distress  HENT:   Head: Normocephalic  Well healing midline head laceration   Neck: Normal range of motion  Pulmonary/Chest: Effort normal  No stridor  No respiratory distress  Musculoskeletal: Normal range of motion  Neurological: He is alert and oriented to person, place, and time  Moving all extremities equally   Skin: Skin is warm and dry  No rash noted  He is not diaphoretic  Psychiatric: He has a normal mood and affect  Nursing note and vitals reviewed  ED Medications  Medications - No data to display    Diagnostic Studies  Results Reviewed     None        No orders to display     Procedures  Suture Removal  Date/Time: 1/15/2018 4:30 PM  Performed by: Monisha Ludwig  Authorized by: Pastor Farnsworth     Patient location:  ED  Niland protocol:     Patient identity confirmed:  Verbally with patient  Location:     Laterality:  Median    Location:  [address] location:  Forehead  Procedure details: Tools used:  Suture removal kit    Wound appearance:  No sign(s) of infection    Number of sutures removed:  3  Post-procedure details:     Post-removal:  No dressing applied    Patient tolerance of procedure: Tolerated well, no immediate complications      Phone Consults  ED Phone Contact    ED Course    A/P: This is a 76 y o  male who presents to the ED for evaluation of suture removal   No signs of infection  Sutures removed without incident  I personally discussed return precautions with this patient   I provided the patient with written discharge instructions and particularly highlighted specific areas of interest to this patient, including but not limited to: medications for symptom managment, follow up recommendations, and return precautions  Patient is in agreement with this plan as outlined above  MDM  CritCare Time    Disposition  Final diagnoses:   Visit for suture removal     Time reflects when diagnosis was documented in both MDM as applicable and the Disposition within this note     Time User Action Codes Description Comment    1/15/2018  4:29 PM Jim Hanna Add [Z48 02] Visit for suture removal       ED Disposition     ED Disposition Condition Comment    Discharge  Wilberto Tay discharge to home/self care  Condition at discharge: Stable        Follow-up Information     Follow up With Specialties Details Why Contact Info Additional Information    Jerri Loving MD Internal Medicine Schedule an appointment as soon as possible for a visit As needed [address] [place] [address] Emergency Department Emergency Medicine  As needed [address] [place] [address], [address] [place] [address], [facility]        Discharge Medication List as of 1/15/2018  4:30 PM      CONTINUE these medications which have NOT CHANGED    Details   aspirin 1 mg/mL SUSP Take 325 mg by mouth, Historical Med      atorvastatin (LIPITOR) 40 mg tablet Take 40 mg by mouth, Historical Med      Fluticasone Propionate HFA (FLOVENT HFA IN) Inhale, Historical Med      folic acid (FOLVITE) 1 mg tablet Take 1 mg by mouth, Historical Med      lisinopril (ZESTRIL) 10 mg tablet Take 10 mg by mouth, Historical Med      sertraline (ZOLOFT) 100 mg tablet Take 100 mg by mouth, Historical Med           No discharge procedures on file  ED Provider  Attending physically available and evaluated Wilberto Tay I managed the patient along with the ED Attending      Electronically Signed by         Joe Greenberg MD  01/16/18 Kathi Jauregui

## 2018-01-15 NOTE — ED ATTENDING ATTESTATION
Eugene Barahona DO, saw and evaluated the patient  I have discussed the patient with the resident/non-physician practitioner and agree with the resident's/non-physician practitioner's findings, Plan of Care, and MDM as documented in the resident's/non-physician practitioner's note, except where noted  All available labs and Radiology studies were reviewed  At this point I agree with the current assessment done in the Emergency Department  I have conducted an independent evaluation of this patient including a focused history and a physical exam     ED Note - Canelo Narayanan 76 y o  male MRN: [id number]  Unit/Bed#: [facility] Encounter: [id number]    History of Present Illness   HPI  Canelo Narayanan is a 76 y o  male who presents for suture removal from forehead  Wound healed  REVIEW OF SYSTEMS  See HPI for further details  12 systems reviewed and otherwise negative except as noted  Historical Information     PAST MEDICAL HISTORY  Past Medical History:   Diagnosis Date    Cancer [facility])     prostate    Hypercholesterolemia     Hypertension     Stroke [facility])        FAMILY HISTORY  History reviewed  No pertinent family history  SOCIAL HISTORY  Social History     Social History    Marital status: /Civil Union     Spouse name: N/A    Number of children: N/A    Years of education: N/A     Social History Main Topics    Smoking status: Never Smoker    Smokeless tobacco: Never Used    Alcohol use No    Drug use: No    Sexual activity: Not Asked     Other Topics Concern    None     Social History Narrative    None       SURGICAL HISTORY  Past Surgical History:   Procedure Laterality Date    PROSTATECTOMY      TOTAL SHOULDER REPLACEMENT       Meds/Allergies     CURRENT MEDICATIONS  No current facility-administered medications for this encounter       Current Outpatient Prescriptions:     aspirin 1 mg/mL SUSP, Take 325 mg by mouth, Disp: , Rfl:     atorvastatin (LIPITOR) 40 mg tablet, Take 40 mg by mouth, Disp: , Rfl:     Fluticasone Propionate HFA (FLOVENT HFA IN), Inhale, Disp: , Rfl:     folic acid (FOLVITE) 1 mg tablet, Take 1 mg by mouth, Disp: , Rfl:     lisinopril (ZESTRIL) 10 mg tablet, Take 10 mg by mouth, Disp: , Rfl:     sertraline (ZOLOFT) 100 mg tablet, Take 100 mg by mouth, Disp: , Rfl:     (Not in a hospital admission)    ALLERGIES  Allergies   Allergen Reactions    Sulfa Antibiotics Rash     Objective     PHYSICAL EXAM    VITAL SIGNS: Blood pressure 133/65, pulse 88, temperature (!) 97 4 °F (36 3 °C), temperature source Oral, resp  rate 18, SpO2 95 %  Constitutional:  Well developed, well nourished, no acute distress, non-toxic appearance     HENT:  Normocephalic/Atraumatic, forehead wound healed, no rhinorrhea, mucous membranes moist        ED COURSE and MDM:    Assessment/Plan   Assessment:  Dino Valentine is a 76 y o  male presents for suture removal     Plan:  Suture removal            Portions of the record may have been created with voice recognition software  Occasional wrong word or "sound a like" substitutions may have occurred due to the inherent limitations of voice recognition software       ED Provider  Electronically Signed by

## 2018-01-23 VITALS
BODY MASS INDEX: 34.12 KG/M2 | WEIGHT: 217.38 LBS | SYSTOLIC BLOOD PRESSURE: 154 MMHG | HEIGHT: 67 IN | DIASTOLIC BLOOD PRESSURE: 86 MMHG

## 2018-01-23 NOTE — MISCELLANEOUS
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)   3  Stroke syndrome (434 91) (I63 9)   4  Thrombocytosis (238 71) (D47 3)    Current Meds   1  ALPRAZolam 0 5 MG Oral Tablet; Therapy: [id number] to Recorded   2  Aspirin 325 MG Oral Tablet; Therapy: (Recorded:03Mar2015) to Recorded   3  Atorvastatin Calcium 40 MG Oral Tablet; Therapy: [id number] to Recorded   4  Folic Acid 1 MG Oral Tablet; Therapy: (Ioana Iha) to Recorded   5  Hydroxyurea 500 MG Oral Capsule (Hydrea); TAKE 1 CAPSULE TWICE DAILY; Therapy: 30GTT0755 to (Jun Antoine)  Requested for: [id number]; Last   Rx:[id number] Ordered   6  Lisinopril 10 MG Oral Tablet; Therapy: 97MGH0555 to Recorded   7  Multiple Vitamin TABS; Take 1 tablet daily Recorded   8  Omeprazole 20 MG Oral Capsule Delayed Release; Therapy: 56KUH9967 to Recorded   9  Sertraline HCl - 100 MG Oral Tablet; Therapy: 96DDA5750 to Recorded    Allergies    1  sulfa    Plan  Adenocarcinoma of prostate    · (1) PSA, DIAGNOSTIC (FOLLOW-UP); Status:Active - Retrospective Authorization;   Requested for:[id number];     Signatures   Electronically signed by : Ricki Flores, ; Dec 22 2017  1:55PM EST                       (Author)

## 2018-01-23 NOTE — MISCELLANEOUS
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) (I63 9)   4  Thrombocytosis (238 71) (D47 3)    Current Meds   1  ALPRAZolam 0 5 MG Oral Tablet; Therapy: [id number] to Recorded   2  Aspirin 325 MG Oral Tablet; Therapy: (Recorded:03Mar2015) to Recorded   3  Atorvastatin Calcium 40 MG Oral Tablet; Therapy: 26YTC4072 to Recorded   4  Folic Acid 1 MG Oral Tablet; Therapy: (476 1626) to Recorded   5  Hydroxyurea 500 MG Oral Capsule (Hydrea); TAKE 1 CAPSULE TWICE DAILY; Therapy: 94BGR4527 to (Francoise Saenz)  Requested for: 69KOC9824; Last   Rx:[id number] Ordered   6  Lisinopril 10 MG Oral Tablet; Therapy: 95QSX9254 to Recorded   7  Multiple Vitamin TABS; Take 1 tablet daily Recorded   8  Omeprazole 20 MG Oral Capsule Delayed Release; Therapy: 70BFY6593 to Recorded   9  Sertraline HCl - 100 MG Oral Tablet; Therapy: 26VFL2281 to Recorded    Allergies    1  sulfa    Plan  Adenocarcinoma of prostate    · (1) PSA, DIAGNOSTIC (FOLLOW-UP); Status:Active - Retrospective Authorization;   Requested for:[id number];     Signatures   Electronically signed by : Romeo Fuller, ; Dec 22 2017 12:27PM EST                       (Author)

## 2018-03-14 ENCOUNTER — APPOINTMENT (OUTPATIENT)
Dept: LAB | Facility: CLINIC | Age: 78
End: 2018-03-14
Payer: MEDICARE

## 2018-03-14 DIAGNOSIS — D47.3 ESSENTIAL HEMORRHAGIC THROMBOCYTHEMIA (HCC): ICD-10-CM

## 2018-03-14 LAB
ALBUMIN SERPL BCP-MCNC: 3.7 G/DL (ref 3.5–5)
ALP SERPL-CCNC: 67 U/L (ref 46–116)
ALT SERPL W P-5'-P-CCNC: 18 U/L (ref 12–78)
ANION GAP SERPL CALCULATED.3IONS-SCNC: 6 MMOL/L (ref 4–13)
AST SERPL W P-5'-P-CCNC: 15 U/L (ref 5–45)
BASOPHILS # BLD AUTO: 0.11 THOUSANDS/ΜL (ref 0–0.1)
BASOPHILS NFR BLD AUTO: 2 % (ref 0–1)
BILIRUB SERPL-MCNC: 0.64 MG/DL (ref 0.2–1)
BUN SERPL-MCNC: 19 MG/DL (ref 5–25)
CALCIUM SERPL-MCNC: 8.9 MG/DL (ref 8.3–10.1)
CHLORIDE SERPL-SCNC: 107 MMOL/L (ref 100–108)
CO2 SERPL-SCNC: 28 MMOL/L (ref 21–32)
CREAT SERPL-MCNC: 0.83 MG/DL (ref 0.6–1.3)
EOSINOPHIL # BLD AUTO: 0.37 THOUSAND/ΜL (ref 0–0.61)
EOSINOPHIL NFR BLD AUTO: 6 % (ref 0–6)
ERYTHROCYTE [DISTWIDTH] IN BLOOD BY AUTOMATED COUNT: 15.1 % (ref 11.6–15.1)
GFR SERPL CREATININE-BSD FRML MDRD: 84 ML/MIN/1.73SQ M
GLUCOSE SERPL-MCNC: 101 MG/DL (ref 65–140)
HCT VFR BLD AUTO: 45.7 % (ref 36.5–49.3)
HGB BLD-MCNC: 15.5 G/DL (ref 12–17)
LDH SERPL-CCNC: 159 U/L (ref 81–234)
LYMPHOCYTES # BLD AUTO: 1.85 THOUSANDS/ΜL (ref 0.6–4.47)
LYMPHOCYTES NFR BLD AUTO: 30 % (ref 14–44)
MCH RBC QN AUTO: 35 PG (ref 26.8–34.3)
MCHC RBC AUTO-ENTMCNC: 33.9 G/DL (ref 31.4–37.4)
MCV RBC AUTO: 103 FL (ref 82–98)
MONOCYTES # BLD AUTO: 0.57 THOUSAND/ΜL (ref 0.17–1.22)
MONOCYTES NFR BLD AUTO: 9 % (ref 4–12)
NEUTROPHILS # BLD AUTO: 3.37 THOUSANDS/ΜL (ref 1.85–7.62)
NEUTS SEG NFR BLD AUTO: 53 % (ref 43–75)
NRBC BLD AUTO-RTO: 0 /100 WBCS
PLATELET # BLD AUTO: 423 THOUSANDS/UL (ref 149–390)
PMV BLD AUTO: 10.1 FL (ref 8.9–12.7)
POTASSIUM SERPL-SCNC: 4.4 MMOL/L (ref 3.5–5.3)
PROT SERPL-MCNC: 7 G/DL (ref 6.4–8.2)
RBC # BLD AUTO: 4.43 MILLION/UL (ref 3.88–5.62)
SODIUM SERPL-SCNC: 141 MMOL/L (ref 136–145)
WBC # BLD AUTO: 6.28 THOUSAND/UL (ref 4.31–10.16)

## 2018-03-14 PROCEDURE — 83615 LACTATE (LD) (LDH) ENZYME: CPT

## 2018-03-14 PROCEDURE — 85025 COMPLETE CBC W/AUTO DIFF WBC: CPT

## 2018-03-14 PROCEDURE — 36415 COLL VENOUS BLD VENIPUNCTURE: CPT

## 2018-03-14 PROCEDURE — 80053 COMPREHEN METABOLIC PANEL: CPT

## 2018-03-19 ENCOUNTER — OFFICE VISIT (OUTPATIENT)
Dept: HEMATOLOGY ONCOLOGY | Facility: HOSPITAL | Age: 78
End: 2018-03-19
Payer: MEDICARE

## 2018-03-19 VITALS
RESPIRATION RATE: 16 BRPM | WEIGHT: 220 LBS | TEMPERATURE: 96.9 F | DIASTOLIC BLOOD PRESSURE: 82 MMHG | SYSTOLIC BLOOD PRESSURE: 128 MMHG | BODY MASS INDEX: 33.34 KG/M2 | HEIGHT: 68 IN | HEART RATE: 103 BPM

## 2018-03-19 DIAGNOSIS — D75.839 THROMBOCYTOSIS: Primary | ICD-10-CM

## 2018-03-19 PROCEDURE — 99214 OFFICE O/P EST MOD 30 MIN: CPT | Performed by: INTERNAL MEDICINE

## 2018-03-19 RX ORDER — MULTIVITAMIN
1 TABLET ORAL DAILY
COMMUNITY

## 2018-03-19 RX ORDER — OMEPRAZOLE 20 MG/1
CAPSULE, DELAYED RELEASE ORAL
COMMUNITY
Start: 2015-02-03 | End: 2019-01-21 | Stop reason: ALTCHOICE

## 2018-03-19 RX ORDER — CLONAZEPAM 0.5 MG/1
0.5 TABLET ORAL
COMMUNITY
End: 2022-03-11 | Stop reason: HOSPADM

## 2018-03-19 RX ORDER — ACETAMINOPHEN 500 MG
500 TABLET ORAL EVERY 4 HOURS
COMMUNITY
Start: 2017-11-17

## 2018-03-19 RX ORDER — DULOXETIN HYDROCHLORIDE 30 MG/1
30 CAPSULE, DELAYED RELEASE ORAL
COMMUNITY
Start: 2017-10-10 | End: 2019-01-21 | Stop reason: ALTCHOICE

## 2018-03-19 RX ORDER — DULOXETIN HYDROCHLORIDE 60 MG/1
60 CAPSULE, DELAYED RELEASE ORAL
COMMUNITY
Start: 2017-10-10 | End: 2018-10-10

## 2018-03-19 RX ORDER — HYDROXYUREA 500 MG/1
CAPSULE ORAL
COMMUNITY
Start: 2016-08-02 | End: 2018-05-22 | Stop reason: SDUPTHER

## 2018-03-19 NOTE — PROGRESS NOTES
Hematology/Oncology Outpatient Follow- up Note  Mikaela Riley 76 y o  male MRN: @ Encounter: [id number]        Date:  3/19/2018    Presenting Complaint/Diagnosis : Elevated platelet count with a DARIO-2 positive myeloproliferative disorder  Previous Hematologic/ Oncologic History:      Workup    Current Hematologic/ Oncologic Treatment:      Hydrea 500 mg 2 times a day  Interval History:      The patient returns for a follow up visit  He states he is doing well  He had seen me for an elevated platelet count after having a neurological event and was found to have an elevated platelet count  I checked for a JAK2 mutation and it was positive  The rest of his myeloproliferative work up was negative  I started him on Hydrea and he is currently on 2 times a day  His platelet count is now running in the normal range  Apart from mild fatigue He is asymptomatic  Denies any nausea, denies any vomiting denies any other complaints and his 14 point review of systems was negative  He does have pain in his shoulder where he had surgery  His platelet count had spiked around that time and is coming down  It is still below 450  Test Results:    Imaging: No results found      Labs:   Lab Results   Component Value Date    WBC 6 28 03/14/2018    HGB 15 5 03/14/2018    HCT 45 7 03/14/2018     (H) 03/14/2018     (H) 03/14/2018     Lab Results   Component Value Date     03/14/2018    K 4 4 03/14/2018     03/14/2018    CO2 28 03/14/2018    ANIONGAP 6 03/14/2018    BUN 19 03/14/2018    CREATININE 0 83 03/14/2018    GLUCOSE 101 03/14/2018    GLUF 115 (H) 12/19/2017    CALCIUM 8 9 03/14/2018    AST 15 03/14/2018    ALT 18 03/14/2018    ALKPHOS 67 03/14/2018    PROT 7 0 03/14/2018    BILITOT 0 64 03/14/2018    EGFR 84 03/14/2018             Lab Results   Component Value Date    PSA 0 4 12/22/2017    PSA 0 3 11/28/2016    PSA 0 4 11/23/2015             ROS: As stated in the history of present illness otherwise his 14 point review of systems today was negative  Active Problems: There is no problem list on file for this patient  Past Medical History:   Past Medical History:   Diagnosis Date    Cancer [facility])     prostate    Hypercholesterolemia     Hypertension     Stroke [facility])        Surgical History:   Past Surgical History:   Procedure Laterality Date    PROSTATECTOMY      TOTAL SHOULDER REPLACEMENT         Family History:  No family history on file  Cancer-related family history is not on file  Social History:   Social History     Social History    Marital status: /Civil Union     Spouse name: N/A    Number of children: N/A    Years of education: N/A     Occupational History    Not on file       Social History Main Topics    Smoking status: Never Smoker    Smokeless tobacco: Never Used    Alcohol use No    Drug use: No    Sexual activity: Not on file     Other Topics Concern    Not on file     Social History Narrative    No narrative on file       Current Medications:   Current Outpatient Prescriptions   Medication Sig Dispense Refill    acetaminophen (TYLENOL) 500 mg tablet Take 500 mg by mouth every 4 (four) hours      aspirin 81 MG tablet Take by mouth      atorvastatin (LIPITOR) 40 mg tablet Take 40 mg by mouth      clonazePAM (KlonoPIN) 0 5 mg tablet Take 0 5 mg by mouth      DULoxetine (CYMBALTA) 30 mg delayed release capsule Take 30 mg by mouth      DULoxetine (CYMBALTA) 60 mg delayed release capsule Take 60 mg by mouth      Fluticasone Propionate HFA (FLOVENT HFA IN) Inhale      folic acid (FOLVITE) 1 mg tablet Take 1 mg by mouth      hydroxyurea (HYDREA) 500 mg capsule 500 bid      lisinopril (ZESTRIL) 10 mg tablet Take 10 mg by mouth      Multiple Vitamin tablet Take 1 tablet by mouth daily      omeprazole (PriLOSEC) 20 mg delayed release capsule Take by mouth      sertraline (ZOLOFT) 100 mg tablet Take 100 mg by mouth       No current facility-administered medications for this visit  Allergies: Allergies   Allergen Reactions    Penicillins Rash    Sulfa Antibiotics Rash       Physical Exam:    Body surface area is 2 12 meters squared  Wt Readings from Last 3 Encounters:   03/19/18 99 8 kg (220 lb)   01/06/18 98 4 kg (217 lb)   01/05/18 98 6 kg (217 lb 6 oz)        Temp Readings from Last 3 Encounters:   03/19/18 (!) 96 9 °F (36 1 °C) (Tympanic)   01/15/18 (!) 97 4 °F (36 3 °C) (Oral)   11/01/17 97 8 °F (36 6 °C)        BP Readings from Last 3 Encounters:   03/19/18 128/82   01/15/18 133/65   01/05/18 154/86         Pulse Readings from Last 3 Encounters:   03/19/18 103   01/15/18 88   11/01/17 99       Physical Exam     Constitutional   General appearance: No acute distress, well appearing and well nourished  Eyes   Conjunctiva and lids: No swelling, erythema or discharge  Pupils and irises: Equal, round and reactive to light  Ears, Nose, Mouth, and Throat   External inspection of ears and nose: Normal     Nasal mucosa, septum, and turbinates: Normal without edema or erythema  Oropharynx: Normal with no erythema, edema, exudate or lesions  Pulmonary   Respiratory effort: No increased work of breathing or signs of respiratory distress  Auscultation of lungs: Clear to auscultation  Cardiovascular   Palpation of heart: Normal PMI, no thrills  Auscultation of heart: Normal rate and rhythm, normal S1 and S2, without murmurs  Examination of extremities for edema and/or varicosities: Normal     Carotid pulses: Normal     Abdomen   Abdomen: Non-tender, no masses  Liver and spleen: No hepatomegaly or splenomegaly  Lymphatic   Palpation of lymph nodes in neck: No lymphadenopathy  Musculoskeletal   Gait and station: Normal     Digits and nails: Normal without clubbing or cyanosis      Inspection/palpation of joints, bones, and muscles: Normal     Skin   Skin and subcutaneous tissue: Normal without rashes or lesions  Neurologic   Cranial nerves: Cranial nerves 2-12 intact  Sensation: No sensory loss  Psychiatric   Orientation to person, place, and time: Normal     Mood and affect: Normal         Assessment / Plan: This is a pleasant 49-year-old male who was in the hospital for a TIA  His symptoms resolved  His platelet count had been fluctuating but mostly running high  His hemoglobin was also slightly high on occasion  DARIO 2 mutation was positive  Since he had an event we started him on Hydrea once a day but his platelet count was running in the 600 range so I increased it to twice a day and then 3 times a day  Most recently he is on twice a day  His platelet count is now 423  I will See him back in 4 months  He'll get blood work every 2 months  The patient will continue to get physical therapy for his shoulder which is healing  Goals and Barriers:  Current Goal:  Prolong Survival from Thrombocytosis  Barriers: None  Patient's Capacity to Self Care:  Patient  able to self care

## 2018-05-18 ENCOUNTER — TELEPHONE (OUTPATIENT)
Dept: HEMATOLOGY ONCOLOGY | Facility: CLINIC | Age: 78
End: 2018-05-18

## 2018-05-18 NOTE — TELEPHONE ENCOUNTER
req a refill of the hydroxyrea sent to the [facility] pharmacy on file in [place]

## 2018-05-21 ENCOUNTER — TELEPHONE (OUTPATIENT)
Dept: HEMATOLOGY ONCOLOGY | Facility: CLINIC | Age: 78
End: 2018-05-21

## 2018-05-21 NOTE — TELEPHONE ENCOUNTER
Pt returned call, he stated that he has not had BW done since [facility] and he was planning to go next week but per Dannis Loop, pt should go today (5/21) because medication can not be filled until we have recent BW results  Pt was informed and stated that he would go to a [facility]'s lab first thing tomorrow morning, he also stated that he had one days worth left of Hydroxyrea

## 2018-05-22 ENCOUNTER — TELEPHONE (OUTPATIENT)
Dept: HEMATOLOGY ONCOLOGY | Facility: CLINIC | Age: 78
End: 2018-05-22

## 2018-05-22 ENCOUNTER — APPOINTMENT (OUTPATIENT)
Dept: LAB | Facility: CLINIC | Age: 78
End: 2018-05-22
Payer: MEDICARE

## 2018-05-22 DIAGNOSIS — Z15.89 JAK-2 GENE MUTATION: Primary | ICD-10-CM

## 2018-05-22 RX ORDER — HYDROXYUREA 500 MG/1
500 CAPSULE ORAL 2 TIMES DAILY
Qty: 60 CAPSULE | Refills: 6 | Status: SHIPPED | OUTPATIENT
Start: 2018-05-22 | End: 2019-01-10 | Stop reason: SDUPTHER

## 2018-09-10 ENCOUNTER — OFFICE VISIT (OUTPATIENT)
Dept: HEMATOLOGY ONCOLOGY | Facility: HOSPITAL | Age: 78
End: 2018-09-10
Payer: MEDICARE

## 2018-09-10 ENCOUNTER — APPOINTMENT (OUTPATIENT)
Dept: LAB | Facility: CLINIC | Age: 78
End: 2018-09-10
Payer: MEDICARE

## 2018-09-10 VITALS
TEMPERATURE: 96.9 F | OXYGEN SATURATION: 97 % | SYSTOLIC BLOOD PRESSURE: 172 MMHG | DIASTOLIC BLOOD PRESSURE: 92 MMHG | BODY MASS INDEX: 34.06 KG/M2 | HEART RATE: 84 BPM | HEIGHT: 67 IN | WEIGHT: 217 LBS | RESPIRATION RATE: 16 BRPM

## 2018-09-10 DIAGNOSIS — Z15.89 JAK-2 GENE MUTATION: Primary | ICD-10-CM

## 2018-09-10 DIAGNOSIS — D75.839 THROMBOCYTOSIS: ICD-10-CM

## 2018-09-10 PROCEDURE — 99214 OFFICE O/P EST MOD 30 MIN: CPT | Performed by: INTERNAL MEDICINE

## 2018-09-10 RX ORDER — BUPROPION HYDROCHLORIDE 150 MG/1
150 TABLET ORAL
COMMUNITY
Start: 2018-03-22 | End: 2019-01-21 | Stop reason: ALTCHOICE

## 2018-09-10 NOTE — PROGRESS NOTES
Hematology/Oncology Outpatient Follow- up Note  Audrey Casey [de-identified] y o  male MRN: @ Encounter: [id number]        Date:  9/10/2018    Presenting Complaint/Diagnosis : Elevated platelet count with a DARIO-2 positive myeloproliferative disorder  Previous Hematologic/ Oncologic History:    Workup    Current Hematologic/ Oncologic Treatment:    Hydrea 500 mg 2 times a day    Interval History:      The patient returns for a follow up visit  He states he is doing well  He had seen me for an elevated platelet count after having a neurological event and was found to have an elevated platelet count  I checked for a JAK2 mutation and it was positive  The rest of his myeloproliferative work up was negative  I started him on Hydrea and he is currently on 2 times a day  His platelet count is now running in the normal range  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 [place]  He is otherwise asymptomatic  Denies any nausea, denies any vomiting denies any other complaints and his 14 point review of systems was negative  His platelet count is still running below 500  Test Results:    Imaging: No results found      Labs:   Lab Results   Component Value Date    WBC 6 76 09/10/2018    HGB 16 2 09/10/2018    HCT 48 3 09/10/2018     (H) 09/10/2018     (H) 09/10/2018     Lab Results   Component Value Date     09/10/2018    K 4 2 09/10/2018     (H) 09/10/2018    CO2 26 09/10/2018    ANIONGAP 6 10/01/2015    BUN 17 09/10/2018    CREATININE 0 75 09/10/2018    GLUCOSE 103 10/01/2015    GLUF 95 09/10/2018    CALCIUM 9 0 09/10/2018    AST 18 09/10/2018    ALT 20 09/10/2018    ALKPHOS 62 09/10/2018    PROT 6 9 10/01/2015    BILITOT 0 70 10/01/2015    EGFR 87 09/10/2018         Lab Results   Component Value Date    PSA 0 4 12/22/2017    PSA 0 3 11/28/2016    PSA 0 4 11/23/2015         ROS: As stated in the history of present illness otherwise his 14 point review of systems today was negative  Active Problems: There is no problem list on file for this patient  Past Medical History:   Past Medical History:   Diagnosis Date    Cancer [facility])     prostate    Hypercholesterolemia     Hypertension     Stroke [facility])        Surgical History:   Past Surgical History:   Procedure Laterality Date    PROSTATECTOMY      TOTAL SHOULDER REPLACEMENT         Family History:  No family history on file  Cancer-related family history is not on file  Social History:   Social History     Social History    Marital status: /Civil Union     Spouse name: N/A    Number of children: N/A    Years of education: N/A     Occupational History    Not on file       Social History Main Topics    Smoking status: Never Smoker    Smokeless tobacco: Never Used    Alcohol use No    Drug use: No    Sexual activity: Not on file     Other Topics Concern    Not on file     Social History Narrative    No narrative on file       Current Medications:   Current Outpatient Prescriptions   Medication Sig Dispense Refill    acetaminophen (TYLENOL) 500 mg tablet Take 500 mg by mouth every 4 (four) hours      aspirin 81 MG tablet Take by mouth      atorvastatin (LIPITOR) 40 mg tablet Take 40 mg by mouth      buPROPion (WELLBUTRIN XL) 150 mg 24 hr tablet Take 150 mg by mouth      clonazePAM (KlonoPIN) 0 5 mg tablet Take 0 5 mg by mouth      DULoxetine (CYMBALTA) 30 mg delayed release capsule Take 30 mg by mouth      DULoxetine (CYMBALTA) 60 mg delayed release capsule Take 60 mg by mouth      Fluticasone Propionate HFA (FLOVENT HFA IN) Inhale      folic acid (FOLVITE) 1 mg tablet Take 1 mg by mouth      hydroxyurea (HYDREA) 500 mg capsule Take 1 capsule (500 mg total) by mouth 2 (two) times a day 60 capsule 6    lisinopril (ZESTRIL) 10 mg tablet Take 10 mg by mouth      Multiple Vitamin tablet Take 1 tablet by mouth daily      omeprazole (PriLOSEC) 20 mg delayed release capsule Take by mouth      sertraline (ZOLOFT) 100 mg tablet Take 100 mg by mouth       No current facility-administered medications for this visit  Allergies: Allergies   Allergen Reactions    Penicillins Rash    Sulfa Antibiotics Rash       Physical Exam:    Body surface area is 2 09 meters squared  Wt Readings from Last 3 Encounters:   09/10/18 98 4 kg (217 lb)   03/19/18 99 8 kg (220 lb)   01/06/18 98 4 kg (217 lb)        Temp Readings from Last 3 Encounters:   09/10/18 (!) 96 9 °F (36 1 °C) (Tympanic)   03/19/18 (!) 96 9 °F (36 1 °C) (Tympanic)   01/15/18 (!) 97 4 °F (36 3 °C) (Oral)        BP Readings from Last 3 Encounters:   09/10/18 (!) 172/92   03/19/18 128/82   01/15/18 133/65         Pulse Readings from Last 3 Encounters:   09/10/18 84   03/19/18 103   01/15/18 88       Physical Exam     Constitutional   General appearance: No acute distress, well appearing and well nourished  Eyes   Conjunctiva and lids: No swelling, erythema or discharge  Pupils and irises: Equal, round and reactive to light  Ears, Nose, Mouth, and Throat   External inspection of ears and nose: Normal     Nasal mucosa, septum, and turbinates: Normal without edema or erythema  Oropharynx: Normal with no erythema, edema, exudate or lesions  Pulmonary   Respiratory effort: No increased work of breathing or signs of respiratory distress  Auscultation of lungs: Clear to auscultation  Cardiovascular   Palpation of heart: Normal PMI, no thrills  Auscultation of heart: Normal rate and rhythm, normal S1 and S2, without murmurs  Examination of extremities for edema and/or varicosities: Normal     Carotid pulses: Normal     Abdomen   Abdomen: Non-tender, no masses  Liver and spleen: No hepatomegaly or splenomegaly  Lymphatic   Palpation of lymph nodes in neck: No lymphadenopathy  Musculoskeletal   Gait and station: Normal     Digits and nails: Normal without clubbing or cyanosis      Inspection/palpation of joints, bones, and muscles: Normal     Skin   Skin and subcutaneous tissue: Normal without rashes or lesions  Neurologic   Cranial nerves: Cranial nerves 2-12 intact  Sensation: No sensory loss  Psychiatric   Orientation to person, place, and time: Normal     Mood and affect: Normal         Assessment / Plan: This is a pleasant 58-year-old male who was in the hospital for a TIA  His symptoms resolved  His platelet count had been fluctuating but mostly running high  His hemoglobin was also slightly high on occasion  DARIO 2 mutation was positive  Since he had an event we started him on Hydrea once a day but his platelet count was running in the 600 range so I increased it to twice a day and then 3 times a day  Most recently he is on twice a day  His platelet count is now 495  I will See him back in 4 months  He'll get blood work every 2 months  Goals and Barriers:  Current Goal:  Prolong Survival from Thrombocytosis  Barriers: None  Patient's Capacity to Self Care:  Patient able to self care  Portions of the record may have been created with voice recognition software   Occasional wrong word or "sound a like" substitutions may have occurred due to the inherent limitations of voice recognition software   Read the chart carefully and recognize, using context, where substitutions have occurred

## 2019-01-05 DIAGNOSIS — C61 MALIGNANT NEOPLASM OF PROSTATE (HCC): ICD-10-CM

## 2019-01-10 DIAGNOSIS — Z15.89 JAK-2 GENE MUTATION: ICD-10-CM

## 2019-01-10 RX ORDER — HYDROXYUREA 500 MG/1
CAPSULE ORAL
Qty: 60 CAPSULE | Refills: 4 | Status: SHIPPED | OUTPATIENT
Start: 2019-01-10 | End: 2019-05-08 | Stop reason: SDUPTHER

## 2019-01-21 ENCOUNTER — OFFICE VISIT (OUTPATIENT)
Dept: HEMATOLOGY ONCOLOGY | Facility: HOSPITAL | Age: 79
End: 2019-01-21
Payer: COMMERCIAL

## 2019-01-21 ENCOUNTER — APPOINTMENT (OUTPATIENT)
Dept: LAB | Facility: CLINIC | Age: 79
End: 2019-01-21
Payer: COMMERCIAL

## 2019-01-21 VITALS
BODY MASS INDEX: 34.71 KG/M2 | HEART RATE: 80 BPM | TEMPERATURE: 95.3 F | RESPIRATION RATE: 18 BRPM | HEIGHT: 68 IN | DIASTOLIC BLOOD PRESSURE: 68 MMHG | SYSTOLIC BLOOD PRESSURE: 110 MMHG | WEIGHT: 229 LBS

## 2019-01-21 DIAGNOSIS — D75.839 THROMBOCYTOSIS: ICD-10-CM

## 2019-01-21 DIAGNOSIS — Z15.89 JAK-2 GENE MUTATION: Primary | ICD-10-CM

## 2019-01-21 PROCEDURE — 99214 OFFICE O/P EST MOD 30 MIN: CPT | Performed by: INTERNAL MEDICINE

## 2019-01-21 RX ORDER — ACETAMINOPHEN 500 MG
500 TABLET ORAL EVERY 4 HOURS PRN
COMMUNITY
Start: 2017-11-17 | End: 2019-01-21 | Stop reason: SDUPTHER

## 2019-01-21 RX ORDER — CLONAZEPAM 0.5 MG/1
0.5 TABLET ORAL DAILY PRN
COMMUNITY
Start: 2018-11-27 | End: 2019-01-21 | Stop reason: SDUPTHER

## 2019-01-21 RX ORDER — FLUOROURACIL 50 MG/G
CREAM TOPICAL
Refills: 0 | COMMUNITY
Start: 2018-12-13

## 2019-01-21 RX ORDER — PAROXETINE 10 MG/1
10 TABLET, FILM COATED ORAL
COMMUNITY
Start: 2019-01-09 | End: 2022-03-11

## 2019-01-21 NOTE — PROGRESS NOTES
Hematology/Oncology Outpatient Follow- up Note  Kirkwood Dandy [de-identified] y o  male MRN: @ Encounter: [id number]        Date:  1/21/2019    Presenting Complaint/Diagnosis :   Elevated platelet count with a DARIO-2 positive myeloproliferative disorder  Previous Hematologic/ Oncologic History:    Workup    Current Hematologic/ Oncologic Treatment:    Hydrea 500 mg 2 times a day    Interval History:      The patient returns for a follow up visit  He states he is doing well  He had seen me for an elevated platelet count after having a neurological event and was found to have an elevated platelet count  I checked for a JAK2 mutation and it was positive  The rest of his myeloproliferative work up was negative  I started him on Hydrea and he is currently on 2 times a day  His platelet count is still running under 500  He is asymptomatic  Denies any nausea denies any vomiting there is any diarrhea  The rest of his 14 point review of systems today was negative  Test Results:    Imaging: No results found  Labs:   Lab Results   Component Value Date    WBC 7 07 01/21/2019    HGB 15 8 01/21/2019    HCT 46 1 01/21/2019     (H) 01/21/2019     (H) 01/21/2019     Lab Results   Component Value Date     10/01/2015    K 4 2 01/21/2019     01/21/2019    CO2 27 01/21/2019    ANIONGAP 6 10/01/2015    BUN 20 01/21/2019    CREATININE 0 85 01/21/2019    GLUCOSE 103 10/01/2015    GLUF 102 (H) 01/21/2019    CALCIUM 9 0 01/21/2019    AST 20 01/21/2019    ALT 21 01/21/2019    ALKPHOS 58 01/21/2019    PROT 6 9 10/01/2015    BILITOT 0 70 10/01/2015    EGFR 82 01/21/2019         Lab Results   Component Value Date    PSA 0 4 12/22/2017    PSA 0 3 11/28/2016    PSA 0 4 11/23/2015         ROS: As stated in the history of present illness otherwise his 14 point review of systems today was negative  Active Problems: There is no problem list on file for this patient        Past Medical History:   Past Medical History: Diagnosis Date    Cancer [facility])     prostate    Hypercholesterolemia     Hypertension     Stroke [facility])        Surgical History:   Past Surgical History:   Procedure Laterality Date    PROSTATECTOMY      TOTAL SHOULDER REPLACEMENT         Family History:  Noncontributory    Cancer-related family history is not on file  Social History:   Social History     Social History    Marital status: /Civil Union     Spouse name: N/A    Number of children: N/A    Years of education: N/A     Occupational History    Not on file  Social History Main Topics    Smoking status: Never Smoker    Smokeless tobacco: Never Used    Alcohol use No    Drug use: No    Sexual activity: Not on file     Other Topics Concern    Not on file     Social History Narrative    No narrative on file       Current Medications:   Current Outpatient Prescriptions   Medication Sig Dispense Refill    acetaminophen (TYLENOL) 500 mg tablet Take 500 mg by mouth every 4 (four) hours      aspirin 81 MG tablet Take by mouth      clonazePAM (KlonoPIN) 0 5 mg tablet Take 0 5 mg by mouth      fluorouracil (EFUDEX) 5 % cream APPLY TO THE SCALP TWICE A DAY X2 WEEKS  0    folic acid (FOLVITE) 1 mg tablet Take 1 mg by mouth      hydroxyurea (HYDREA) 500 mg capsule TAKE ONE CAPSULE TWICE A DAY 60 capsule 4    lisinopril (ZESTRIL) 10 mg tablet Take 10 mg by mouth      Multiple Vitamin tablet Take 1 tablet by mouth daily      PARoxetine (PAXIL) 10 mg tablet Take 10 mg by mouth      DULoxetine (CYMBALTA) 60 mg delayed release capsule Take 60 mg by mouth       No current facility-administered medications for this visit  Allergies: Allergies   Allergen Reactions    Penicillins Rash    Sulfa Antibiotics Rash       Physical Exam:    Body surface area is 2 16 meters squared      Wt Readings from Last 3 Encounters:   01/21/19 104 kg (229 lb)   09/10/18 98 4 kg (217 lb)   03/19/18 99 8 kg (220 lb)        Temp Readings from Last 3 Encounters:   01/21/19 (!) 95 3 °F (35 2 °C) (Tympanic)   09/10/18 (!) 96 9 °F (36 1 °C) (Tympanic)   03/19/18 (!) 96 9 °F (36 1 °C) (Tympanic)        BP Readings from Last 3 Encounters:   01/21/19 110/68   09/10/18 (!) 172/92   03/19/18 128/82         Pulse Readings from Last 3 Encounters:   01/21/19 80   09/10/18 84   03/19/18 103         Physical Exam     Constitutional   General appearance: No acute distress, well appearing and well nourished  Eyes   Conjunctiva and lids: No swelling, erythema or discharge  Pupils and irises: Equal, round and reactive to light  Ears, Nose, Mouth, and Throat   External inspection of ears and nose: Normal     Nasal mucosa, septum, and turbinates: Normal without edema or erythema  Oropharynx: Normal with no erythema, edema, exudate or lesions  Pulmonary   Respiratory effort: No increased work of breathing or signs of respiratory distress  Auscultation of lungs: Clear to auscultation  Cardiovascular   Palpation of heart: Normal PMI, no thrills  Auscultation of heart: Normal rate and rhythm, normal S1 and S2, without murmurs  Examination of extremities for edema and/or varicosities: Normal     Carotid pulses: Normal     Abdomen   Abdomen: Non-tender, no masses  Liver and spleen: No hepatomegaly or splenomegaly  Lymphatic   Palpation of lymph nodes in neck: No lymphadenopathy  Musculoskeletal   Gait and station: Normal     Digits and nails: Normal without clubbing or cyanosis  Inspection/palpation of joints, bones, and muscles: Normal     Skin   Skin and subcutaneous tissue: Normal without rashes or lesions  Neurologic   Cranial nerves: Cranial nerves 2-12 intact  Sensation: No sensory loss  Psychiatric   Orientation to person, place, and time: Normal     Mood and affect: Normal         Assessment / Plan: This is a pleasant 57-year-old male who was in the hospital for a TIA  His symptoms resolved   His platelet count had been fluctuating but mostly running high  His hemoglobin was also slightly high on occasion  DARIO 2 mutation was positive  Since he had an event we started him on Hydrea once a day but his platelet count was running in the 600 range so I increased it to twice a day and then 3 times a day  Most recently he is on twice a day  His platelet count is now 424  I will See him back in 4 months  He'll get blood work every 2 months  Goals and Barriers:  Current Goal:  Prolong Survival from Thrombocytosis  Barriers: None  Patient's Capacity to Self Care:  Patient able to self care  Portions of the record may have been created with voice recognition software   Occasional wrong word or "sound a like" substitutions may have occurred due to the inherent limitations of voice recognition software   Read the chart carefully and recognize, using context, where substitutions have occurred

## 2019-04-22 DIAGNOSIS — Z15.89 JAK-2 GENE MUTATION: ICD-10-CM

## 2019-04-22 RX ORDER — HYDROXYUREA 500 MG/1
CAPSULE ORAL
Qty: 60 CAPSULE | Refills: 3 | OUTPATIENT
Start: 2019-04-22

## 2019-04-26 DIAGNOSIS — Z15.89 JAK-2 GENE MUTATION: ICD-10-CM

## 2019-04-26 RX ORDER — HYDROXYUREA 500 MG/1
CAPSULE ORAL
Qty: 60 CAPSULE | Refills: 2 | OUTPATIENT
Start: 2019-04-26

## 2019-05-08 ENCOUNTER — TELEPHONE (OUTPATIENT)
Dept: HEMATOLOGY ONCOLOGY | Facility: CLINIC | Age: 79
End: 2019-05-08

## 2019-05-08 DIAGNOSIS — Z15.89 JAK-2 GENE MUTATION: ICD-10-CM

## 2019-05-08 RX ORDER — HYDROXYUREA 500 MG/1
500 CAPSULE ORAL 2 TIMES DAILY
Qty: 60 CAPSULE | Refills: 4 | Status: SHIPPED | OUTPATIENT
Start: 2019-05-08 | End: 2019-09-17 | Stop reason: SDUPTHER

## 2019-05-16 ENCOUNTER — APPOINTMENT (OUTPATIENT)
Dept: LAB | Facility: CLINIC | Age: 79
End: 2019-05-16
Payer: COMMERCIAL

## 2019-05-16 DIAGNOSIS — Z15.89 JAK-2 GENE MUTATION: ICD-10-CM

## 2019-05-16 DIAGNOSIS — D75.839 THROMBOCYTOSIS: ICD-10-CM

## 2019-05-16 DIAGNOSIS — C61 MALIGNANT NEOPLASM OF PROSTATE (HCC): ICD-10-CM

## 2019-05-16 LAB — PSA SERPL-MCNC: 0.4 NG/ML (ref 0–4)

## 2019-05-16 PROCEDURE — 84153 ASSAY OF PSA TOTAL: CPT

## 2019-05-17 ENCOUNTER — TELEPHONE (OUTPATIENT)
Dept: UROLOGY | Facility: MEDICAL CENTER | Age: 79
End: 2019-05-17

## 2019-05-22 ENCOUNTER — OFFICE VISIT (OUTPATIENT)
Dept: HEMATOLOGY ONCOLOGY | Facility: HOSPITAL | Age: 79
End: 2019-05-22
Payer: COMMERCIAL

## 2019-05-22 VITALS
OXYGEN SATURATION: 95 % | DIASTOLIC BLOOD PRESSURE: 78 MMHG | BODY MASS INDEX: 34.84 KG/M2 | WEIGHT: 222 LBS | HEIGHT: 67 IN | HEART RATE: 92 BPM | SYSTOLIC BLOOD PRESSURE: 136 MMHG | TEMPERATURE: 96 F | RESPIRATION RATE: 16 BRPM

## 2019-05-22 DIAGNOSIS — Z15.89 JAK-2 GENE MUTATION: Primary | ICD-10-CM

## 2019-05-22 DIAGNOSIS — D75.839 THROMBOCYTOSIS: ICD-10-CM

## 2019-05-22 DIAGNOSIS — D47.3 THROMBOCYTHEMIA, ESSENTIAL (HCC): Primary | ICD-10-CM

## 2019-05-22 PROCEDURE — 99214 OFFICE O/P EST MOD 30 MIN: CPT | Performed by: INTERNAL MEDICINE

## 2019-09-17 DIAGNOSIS — Z15.89 JAK-2 GENE MUTATION: ICD-10-CM

## 2019-09-17 RX ORDER — HYDROXYUREA 500 MG/1
CAPSULE ORAL
Qty: 180 CAPSULE | Refills: 1 | Status: SHIPPED | OUTPATIENT
Start: 2019-09-17 | End: 2020-04-20 | Stop reason: SDUPTHER

## 2019-10-01 ENCOUNTER — TELEPHONE (OUTPATIENT)
Dept: HEMATOLOGY ONCOLOGY | Facility: CLINIC | Age: 79
End: 2019-10-01

## 2019-10-01 ENCOUNTER — APPOINTMENT (OUTPATIENT)
Dept: LAB | Facility: CLINIC | Age: 79
End: 2019-10-01
Payer: COMMERCIAL

## 2019-10-01 LAB
ALBUMIN SERPL BCP-MCNC: 4.1 G/DL (ref 3.5–5)
ALP SERPL-CCNC: 73 U/L (ref 46–116)
ALT SERPL W P-5'-P-CCNC: 21 U/L (ref 12–78)
ANION GAP SERPL CALCULATED.3IONS-SCNC: 7 MMOL/L (ref 4–13)
AST SERPL W P-5'-P-CCNC: 17 U/L (ref 5–45)
BASOPHILS # BLD AUTO: 0.09 THOUSANDS/ΜL (ref 0–0.1)
BASOPHILS NFR BLD AUTO: 1 % (ref 0–1)
BILIRUB SERPL-MCNC: 0.64 MG/DL (ref 0.2–1)
BUN SERPL-MCNC: 19 MG/DL (ref 5–25)
CALCIUM SERPL-MCNC: 9.8 MG/DL (ref 8.3–10.1)
CHLORIDE SERPL-SCNC: 108 MMOL/L (ref 100–108)
CO2 SERPL-SCNC: 27 MMOL/L (ref 21–32)
CREAT SERPL-MCNC: 0.86 MG/DL (ref 0.6–1.3)
EOSINOPHIL # BLD AUTO: 0.26 THOUSAND/ΜL (ref 0–0.61)
EOSINOPHIL NFR BLD AUTO: 4 % (ref 0–6)
ERYTHROCYTE [DISTWIDTH] IN BLOOD BY AUTOMATED COUNT: 14.1 % (ref 11.6–15.1)
GFR SERPL CREATININE-BSD FRML MDRD: 81 ML/MIN/1.73SQ M
GLUCOSE SERPL-MCNC: 129 MG/DL (ref 65–140)
HCT VFR BLD AUTO: 45.3 % (ref 36.5–49.3)
HGB BLD-MCNC: 15.3 G/DL (ref 12–17)
IMM GRANULOCYTES # BLD AUTO: 0.03 THOUSAND/UL (ref 0–0.2)
IMM GRANULOCYTES NFR BLD AUTO: 0 % (ref 0–2)
LYMPHOCYTES # BLD AUTO: 1.53 THOUSANDS/ΜL (ref 0.6–4.47)
LYMPHOCYTES NFR BLD AUTO: 22 % (ref 14–44)
MCH RBC QN AUTO: 38.1 PG (ref 26.8–34.3)
MCHC RBC AUTO-ENTMCNC: 33.8 G/DL (ref 31.4–37.4)
MCV RBC AUTO: 113 FL (ref 82–98)
MONOCYTES # BLD AUTO: 0.53 THOUSAND/ΜL (ref 0.17–1.22)
MONOCYTES NFR BLD AUTO: 8 % (ref 4–12)
NEUTROPHILS # BLD AUTO: 4.48 THOUSANDS/ΜL (ref 1.85–7.62)
NEUTS SEG NFR BLD AUTO: 65 % (ref 43–75)
NRBC BLD AUTO-RTO: 0 /100 WBCS
PLATELET # BLD AUTO: 390 THOUSANDS/UL (ref 149–390)
PMV BLD AUTO: 10 FL (ref 8.9–12.7)
POTASSIUM SERPL-SCNC: 4.2 MMOL/L (ref 3.5–5.3)
PROT SERPL-MCNC: 7.4 G/DL (ref 6.4–8.2)
RBC # BLD AUTO: 4.02 MILLION/UL (ref 3.88–5.62)
SODIUM SERPL-SCNC: 142 MMOL/L (ref 136–145)
WBC # BLD AUTO: 6.92 THOUSAND/UL (ref 4.31–10.16)

## 2019-10-01 PROCEDURE — 36415 COLL VENOUS BLD VENIPUNCTURE: CPT | Performed by: INTERNAL MEDICINE

## 2019-10-01 PROCEDURE — 85025 COMPLETE CBC W/AUTO DIFF WBC: CPT | Performed by: INTERNAL MEDICINE

## 2019-10-01 PROCEDURE — 80053 COMPREHEN METABOLIC PANEL: CPT | Performed by: INTERNAL MEDICINE

## 2019-10-01 NOTE — TELEPHONE ENCOUNTER
Patient called and R/S his appt due to a  he has to attend  Patient new appt is 10/7/19 at 1:00 pm with Bruno Santiago NP at the [facility] [place] [facility]

## 2019-10-07 ENCOUNTER — OFFICE VISIT (OUTPATIENT)
Dept: HEMATOLOGY ONCOLOGY | Facility: HOSPITAL | Age: 79
End: 2019-10-07
Payer: COMMERCIAL

## 2019-10-07 VITALS
TEMPERATURE: 96 F | HEART RATE: 83 BPM | OXYGEN SATURATION: 96 % | BODY MASS INDEX: 33.9 KG/M2 | DIASTOLIC BLOOD PRESSURE: 78 MMHG | RESPIRATION RATE: 16 BRPM | WEIGHT: 216 LBS | HEIGHT: 67 IN | SYSTOLIC BLOOD PRESSURE: 132 MMHG

## 2019-10-07 DIAGNOSIS — D47.3 THROMBOCYTHEMIA, ESSENTIAL (HCC): ICD-10-CM

## 2019-10-07 DIAGNOSIS — Z15.89 JAK-2 GENE MUTATION: Primary | ICD-10-CM

## 2019-10-07 PROCEDURE — 99213 OFFICE O/P EST LOW 20 MIN: CPT | Performed by: NURSE PRACTITIONER

## 2019-10-07 RX ORDER — ALPRAZOLAM 0.25 MG/1
TABLET ORAL
COMMUNITY
Start: 2019-10-06

## 2019-10-07 NOTE — PROGRESS NOTES
Hematology/Oncology Outpatient Follow- up Note  Donovan Live 78 y o  male MRN: @ Encounter: [id number]        Date:  10/7/2019    Presenting Complaint/Diagnosis :  Elevated platelet count with a DARIO-2 positive myeloproliferative disorder      Previous Hematologic/ Oncologic History:    Workup    Current Hematologic/ Oncologic Treatment:    Hydrea 500 mg twice a day    Interval History:    The patient returns for follow-up visit  He is currently on Hydrea 500 twice daily and is tolerating it very well with no side effects  His platelet count is responding nicely, his platelets now are 556  He really denies any complaints today  Denies chest pain, shortness of breath, nausea, vomiting, diarrhea, bleeding/bruising, and fevers/infection  The remainder of his blood counts are within acceptable limits  Test Results:    Imaging: No results found  Labs:   Lab Results   Component Value Date    WBC 6 92 10/01/2019    HGB 15 3 10/01/2019    HCT 45 3 10/01/2019     (H) 10/01/2019     10/01/2019     Lab Results   Component Value Date     10/01/2015    K 4 2 10/01/2019     10/01/2019    CO2 27 10/01/2019    ANIONGAP 6 10/01/2015    BUN 19 10/01/2019    CREATININE 0 86 10/01/2019    GLUCOSE 103 10/01/2015    GLUF 102 (H) 01/21/2019    CALCIUM 9 8 10/01/2019    AST 17 10/01/2019    ALT 21 10/01/2019    ALKPHOS 73 10/01/2019    PROT 6 9 10/01/2015    BILITOT 0 70 10/01/2015    EGFR 81 10/01/2019         No results found for: SPEP, UPEP    Lab Results   Component Value Date    PSA 0 4 05/16/2019    PSA 0 4 12/22/2017    PSA 0 3 11/28/2016       No results found for: CEA    No results found for:     No results found for: AFP    No results found for: IRON, TIBC, FERRITIN    No results found for: [id number]      ROS: As stated in the history of present illness otherwise his 14 point review of systems today was negative  Active Problems:  There is no problem list on file for this patient  Past Medical History:   Past Medical History:   Diagnosis Date    Cancer [facility])     prostate    Hypercholesterolemia     Hypertension     Stroke [facility])        Surgical History:   Past Surgical History:   Procedure Laterality Date    PROSTATECTOMY      TOTAL SHOULDER REPLACEMENT         Family History:  No family history on file  Cancer-related family history is not on file      Social History:   Social History     Socioeconomic History    Marital status: /Civil Union     Spouse name: Not on file    Number of children: Not on file    Years of education: Not on file    Highest education level: Not on file   Occupational History    Not on file   Social Needs    Financial resource strain: Not on file    Food insecurity:     Worry: Not on file     Inability: Not on file    Transportation needs:     Medical: Not on file     Non-medical: Not on file   Tobacco Use    Smoking status: Never Smoker    Smokeless tobacco: Never Used   Substance and Sexual Activity    Alcohol use: No    Drug use: No    Sexual activity: Not on file   Lifestyle    Physical activity:     Days per week: Not on file     Minutes per session: Not on file    Stress: Not on file   Relationships    Social connections:     Talks on phone: Not on file     Gets together: Not on file     Attends Religion service: Not on file     Active member of club or organization: Not on file     Attends meetings of clubs or organizations: Not on file     Relationship status: Not on file    Intimate partner violence:     Fear of current or ex partner: Not on file     Emotionally abused: Not on file     Physically abused: Not on file     Forced sexual activity: Not on file   Other Topics Concern    Not on file   Social History Narrative    Not on file       Current Medications:   Current Outpatient Medications   Medication Sig Dispense Refill    acetaminophen (TYLENOL) 500 mg tablet Take 500 mg by mouth every 4 (four) hours  ALPRAZolam (XANAX) 0 25 mg tablet       aspirin 81 MG tablet Take by mouth      folic acid (FOLVITE) 1 mg tablet Take 1 mg by mouth      hydroxyurea (HYDREA) 500 mg capsule TAKE 1 CAPSULE BY MOUTH TWICE A  capsule 1    lisinopril (ZESTRIL) 10 mg tablet Take 10 mg by mouth      Multiple Vitamin tablet Take 1 tablet by mouth daily      PARoxetine (PAXIL) 10 mg tablet Take 10 mg by mouth      clonazePAM (KlonoPIN) 0 5 mg tablet Take 0 5 mg by mouth      DULoxetine (CYMBALTA) 60 mg delayed release capsule Take 60 mg by mouth      fluorouracil (EFUDEX) 5 % cream APPLY TO THE SCALP TWICE A DAY X2 WEEKS  0     No current facility-administered medications for this visit  Allergies: Allergies   Allergen Reactions    Penicillins Rash    Sulfa Antibiotics Rash       Physical Exam:    Body surface area is 2 09 meters squared  Wt Readings from Last 3 Encounters:   10/07/19 98 kg (216 lb)   05/22/19 101 kg (222 lb)   01/21/19 104 kg (229 lb)        Temp Readings from Last 3 Encounters:   10/07/19 (!) 96 °F (35 6 °C) (Tympanic)   05/22/19 (!) 96 °F (35 6 °C) (Tympanic)   01/21/19 (!) 95 3 °F (35 2 °C) (Tympanic)        BP Readings from Last 3 Encounters:   10/07/19 132/78   05/22/19 136/78   01/21/19 110/68         Pulse Readings from Last 3 Encounters:   10/07/19 83   05/22/19 92   01/21/19 80     @LASTSAO2(3)@      Physical Exam     Constitutional   General appearance: No acute distress, well appearing and well nourished  Eyes   Conjunctiva and lids: No swelling, erythema or discharge  Pupils and irises: Equal, round and reactive to light  Ears, Nose, Mouth, and Throat   External inspection of ears and nose: Normal     Nasal mucosa, septum, and turbinates: Normal without edema or erythema  Oropharynx: Normal with no erythema, edema, exudate or lesions  Pulmonary   Respiratory effort: No increased work of breathing or signs of respiratory distress      Auscultation of lungs: Clear to auscultation  Cardiovascular   Palpation of heart: Normal PMI, no thrills  Auscultation of heart: Normal rate and rhythm, normal S1 and S2, without murmurs  Examination of extremities for edema and/or varicosities: Normal     Carotid pulses: Normal     Abdomen   Abdomen: Non-tender, no masses  Liver and spleen: No hepatomegaly or splenomegaly  Lymphatic   Palpation of lymph nodes in neck: No lymphadenopathy  Musculoskeletal   Gait and station: Normal     Digits and nails: Normal without clubbing or cyanosis  Inspection/palpation of joints, bones, and muscles: Normal     Skin   Skin and subcutaneous tissue: Normal without rashes or lesions  Neurologic   Cranial nerves: Cranial nerves 2-12 intact  Sensation: No sensory loss  Psychiatric   Orientation to person, place, and time: Normal     Mood and affect: Normal         Assessment / Plan:    The patient is an 78-year-old male with JAK2 positive thrombocytosis  He was initially hospitalized for a TIA  His symptoms are resolved but his platelet count continue fluctuating mostly running high also with a slightly high hemoglobin on occasion  Myeloproliferative testing was completed and found to have a positive JAK2 mutation  He was then started on Hydrea  He is now on Hydrea 500 mg p o  B i d  And tolerating it very well  His platelet count has now come down to 390  We will plan to continue him on Hydrea 500 twice daily  Will see him back in 4 months with a CBC and CMP  The patient is in agreement with the plan  Goals and Barriers:  Current Goal:  Prolong Survival from essential thrombocytosis  Barriers: None  Patient's Capacity to Self Care:  Patient able to self care    ECOG 0    Portions of the record may have been created with voice recognition software   Occasional wrong word or "sound a like" substitutions may have occurred due to the inherent limitations of voice recognition software   Read the chart carefully and recognize, using context, where substitutions have occurred

## 2020-02-07 ENCOUNTER — TRANSCRIBE ORDERS (OUTPATIENT)
Dept: LAB | Facility: CLINIC | Age: 80
End: 2020-02-07

## 2020-02-07 ENCOUNTER — APPOINTMENT (OUTPATIENT)
Dept: LAB | Facility: CLINIC | Age: 80
End: 2020-02-07
Payer: COMMERCIAL

## 2020-02-07 DIAGNOSIS — Z15.89 JAK-2 GENE MUTATION: ICD-10-CM

## 2020-02-07 DIAGNOSIS — E78.00 PURE HYPERCHOLESTEROLEMIA: Primary | ICD-10-CM

## 2020-02-07 DIAGNOSIS — Z15.89 JAK-2 GENE MUTATION: Primary | ICD-10-CM

## 2020-02-07 DIAGNOSIS — E78.00 PURE HYPERCHOLESTEROLEMIA: ICD-10-CM

## 2020-02-07 DIAGNOSIS — D47.3 THROMBOCYTHEMIA, ESSENTIAL (HCC): ICD-10-CM

## 2020-02-07 LAB
CHOLEST SERPL-MCNC: 226 MG/DL (ref 50–200)
HDLC SERPL-MCNC: 48 MG/DL
LDLC SERPL CALC-MCNC: 142 MG/DL (ref 0–100)
NONHDLC SERPL-MCNC: 178 MG/DL
TRIGL SERPL-MCNC: 179 MG/DL

## 2020-02-07 PROCEDURE — 80061 LIPID PANEL: CPT

## 2020-02-10 ENCOUNTER — OFFICE VISIT (OUTPATIENT)
Dept: HEMATOLOGY ONCOLOGY | Facility: HOSPITAL | Age: 80
End: 2020-02-10
Payer: COMMERCIAL

## 2020-02-10 VITALS
HEIGHT: 67 IN | RESPIRATION RATE: 18 BRPM | DIASTOLIC BLOOD PRESSURE: 78 MMHG | HEART RATE: 93 BPM | SYSTOLIC BLOOD PRESSURE: 124 MMHG | OXYGEN SATURATION: 93 % | BODY MASS INDEX: 35.22 KG/M2 | WEIGHT: 224.4 LBS | TEMPERATURE: 97.9 F

## 2020-02-10 DIAGNOSIS — Z15.89 JAK-2 GENE MUTATION: Primary | ICD-10-CM

## 2020-02-10 DIAGNOSIS — D75.839 THROMBOCYTOSIS: ICD-10-CM

## 2020-02-10 PROCEDURE — 99214 OFFICE O/P EST MOD 30 MIN: CPT | Performed by: NURSE PRACTITIONER

## 2020-02-10 NOTE — PROGRESS NOTES
Hematology/Oncology Outpatient Follow- up Note  Shilpi Noe 78 y o  male MRN: @ Encounter: [id number]        Date:  2/10/2020    Presenting Complaint/Diagnosis : Elevated platelet count with a DARIO-2 positive myeloproliferative disorder      Previous Hematologic/ Oncologic History:    Workup    Current Hematologic/ Oncologic Treatment:    Hydrea 500 mg twice a day  Baby aspirin daily    Interval History:    The patient returns for follow up visit  Overall he is doing well   He is somewhat stressed as his 79-year-old daughter just had a MI and he is now taking care of her  Otherwise denies chest pain , shortness of breath, nausea, vomiting , diarrhea , bleeding/bruising , and fevers/infection  Platelets have increased from 390 to 444  Otherwise his blood counts remain within normal range  Test Results:    Imaging: No results found  Labs:   Lab Results   Component Value Date    WBC 7 32 02/07/2020    HGB 15 8 02/07/2020    HCT 46 5 02/07/2020     (H) 02/07/2020     (H) 02/07/2020     Lab Results   Component Value Date     10/01/2015    K 4 3 02/07/2020     02/07/2020    CO2 27 02/07/2020    ANIONGAP 6 10/01/2015    BUN 21 02/07/2020    CREATININE 0 95 02/07/2020    GLUCOSE 103 10/01/2015    GLUF 96 02/07/2020    CALCIUM 9 7 02/07/2020    AST 16 02/07/2020    ALT 22 02/07/2020    ALKPHOS 63 02/07/2020    PROT 6 9 10/01/2015    BILITOT 0 70 10/01/2015    EGFR 75 02/07/2020         No results found for: SPEP, UPEP    Lab Results   Component Value Date    PSA 0 4 05/16/2019    PSA 0 4 12/22/2017    PSA 0 3 11/28/2016       No results found for: CEA    No results found for:     No results found for: AFP    No results found for: IRON, TIBC, FERRITIN    No results found for: [id number]      ROS: As stated in the history of present illness otherwise his 14 point review of systems today was negative  Active Problems:  There is no problem list on file for this patient  Past Medical History:   Past Medical History:   Diagnosis Date    Cancer [facility])     prostate    Hypercholesterolemia     Hypertension     Stroke [facility])        Surgical History:   Past Surgical History:   Procedure Laterality Date    PROSTATECTOMY      TOTAL SHOULDER REPLACEMENT         Family History:  No family history on file  Cancer-related family history is not on file      Social History:   Social History     Socioeconomic History    Marital status: /Civil Union     Spouse name: Not on file    Number of children: Not on file    Years of education: Not on file    Highest education level: Not on file   Occupational History    Not on file   Social Needs    Financial resource strain: Not on file    Food insecurity:     Worry: Not on file     Inability: Not on file    Transportation needs:     Medical: Not on file     Non-medical: Not on file   Tobacco Use    Smoking status: Never Smoker    Smokeless tobacco: Never Used   Substance and Sexual Activity    Alcohol use: No    Drug use: No    Sexual activity: Not on file   Lifestyle    Physical activity:     Days per week: Not on file     Minutes per session: Not on file    Stress: Not on file   Relationships    Social connections:     Talks on phone: Not on file     Gets together: Not on file     Attends Confucianism service: Not on file     Active member of club or organization: Not on file     Attends meetings of clubs or organizations: Not on file     Relationship status: Not on file    Intimate partner violence:     Fear of current or ex partner: Not on file     Emotionally abused: Not on file     Physically abused: Not on file     Forced sexual activity: Not on file   Other Topics Concern    Not on file   Social History Narrative    Not on file       Current Medications:   Current Outpatient Medications   Medication Sig Dispense Refill    acetaminophen (TYLENOL) 500 mg tablet Take 500 mg by mouth every 4 (four) hours  ALPRAZolam (XANAX) 0 25 mg tablet       aspirin 81 MG tablet Take by mouth      clonazePAM (KlonoPIN) 0 5 mg tablet Take 0 5 mg by mouth      fluorouracil (EFUDEX) 5 % cream APPLY TO THE SCALP TWICE A DAY X2 WEEKS  0    folic acid (FOLVITE) 1 mg tablet Take 1 mg by mouth      hydroxyurea (HYDREA) 500 mg capsule TAKE 1 CAPSULE BY MOUTH TWICE A  capsule 1    lisinopril (ZESTRIL) 10 mg tablet Take 10 mg by mouth      Multiple Vitamin tablet Take 1 tablet by mouth daily      PARoxetine (PAXIL) 10 mg tablet Take 10 mg by mouth       No current facility-administered medications for this visit  Allergies: Allergies   Allergen Reactions    Penicillins Rash    Sulfa Antibiotics Rash       Physical Exam:    There is no height or weight on file to calculate BSA  Wt Readings from Last 3 Encounters:   10/07/19 98 kg (216 lb)   05/22/19 101 kg (222 lb)   01/21/19 104 kg (229 lb)        Temp Readings from Last 3 Encounters:   10/07/19 (!) 96 °F (35 6 °C) (Tympanic)   05/22/19 (!) 96 °F (35 6 °C) (Tympanic)   01/21/19 (!) 95 3 °F (35 2 °C) (Tympanic)        BP Readings from Last 3 Encounters:   10/07/19 132/78   05/22/19 136/78   01/21/19 110/68         Pulse Readings from Last 3 Encounters:   10/07/19 83   05/22/19 92   01/21/19 80     @LASTSAO2(3)@      Physical Exam     Constitutional   General appearance: No acute distress, well appearing and well nourished  Eyes   Conjunctiva and lids: No swelling, erythema or discharge  Pupils and irises: Equal, round and reactive to light  Ears, Nose, Mouth, and Throat   External inspection of ears and nose: Normal     Nasal mucosa, septum, and turbinates: Normal without edema or erythema  Oropharynx: Normal with no erythema, edema, exudate or lesions  Pulmonary   Respiratory effort: No increased work of breathing or signs of respiratory distress  Auscultation of lungs: Clear to auscultation      Cardiovascular   Palpation of heart: Normal PMI, no thrills  Auscultation of heart: Normal rate and rhythm, normal S1 and S2, without murmurs  Examination of extremities for edema and/or varicosities: Normal     Carotid pulses: Normal     Abdomen   Abdomen: Non-tender, no masses  Liver and spleen: No hepatomegaly or splenomegaly  Lymphatic   Palpation of lymph nodes in neck: No lymphadenopathy  Musculoskeletal   Gait and station: Normal     Digits and nails: Normal without clubbing or cyanosis  Inspection/palpation of joints, bones, and muscles: Normal     Skin   Skin and subcutaneous tissue: Normal without rashes or lesions  Neurologic   Cranial nerves: Cranial nerves 2-12 intact  Sensation: No sensory loss  Psychiatric   Orientation to person, place, and time: Normal     Mood and affect: Normal         Assessment / Plan:    The patient is an 66-year-old male with JAK2 positive thrombocytosis  He was initially hospitalized for a TIA  His symptoms are resolved but his platelet count continue fluctuating mostly running high also with a slightly high hemoglobin on occasion  Myeloproliferative testing was completed and found to have a positive JAK2 mutation  He was then started on Hydrea  He is now on Hydrea 500 mg p o  B i d  And tolerating it very well  He continues to take his daily baby aspirin  His platelet count is 677  We will plan to continue him on Hydrea 500 twice daily with his daily baby aspirin  Will see him back in 4 months with a CBC and CMP  If his platelets increased to the 500-600 range we will plan to increase his Hydrea  The patient is in agreement with the plan and will call with any questions or concerns  Goals and Barriers:  Current Goal:  Prolong Survival from JAK2 positive thrombocytosis  Barriers: None  Patient's Capacity to Self Care:  Patient able to self care      Portions of the record may have been created with voice recognition software   Occasional wrong word or "sound a like" substitutions may have occurred due to the inherent limitations of voice recognition software   Read the chart carefully and recognize, using context, where substitutions have occurred

## 2020-04-20 DIAGNOSIS — Z15.89 JAK-2 GENE MUTATION: ICD-10-CM

## 2020-04-20 RX ORDER — HYDROXYUREA 500 MG/1
500 CAPSULE ORAL 2 TIMES DAILY
Qty: 180 CAPSULE | Refills: 3 | Status: SHIPPED | OUTPATIENT
Start: 2020-04-20 | End: 2021-04-05 | Stop reason: SDUPTHER

## 2020-06-05 ENCOUNTER — APPOINTMENT (OUTPATIENT)
Dept: LAB | Facility: HOSPITAL | Age: 80
End: 2020-06-05
Payer: COMMERCIAL

## 2020-06-05 DIAGNOSIS — D75.839 THROMBOCYTOSIS: ICD-10-CM

## 2020-06-05 DIAGNOSIS — Z15.89 JAK-2 GENE MUTATION: ICD-10-CM

## 2020-06-05 LAB
ALBUMIN SERPL BCP-MCNC: 3.9 G/DL (ref 3.5–5)
ALP SERPL-CCNC: 63 U/L (ref 46–116)
ALT SERPL W P-5'-P-CCNC: 42 U/L (ref 12–78)
ANION GAP SERPL CALCULATED.3IONS-SCNC: 7 MMOL/L (ref 4–13)
AST SERPL W P-5'-P-CCNC: 24 U/L (ref 5–45)
BASOPHILS # BLD AUTO: 0.14 THOUSANDS/ΜL (ref 0–0.1)
BASOPHILS NFR BLD AUTO: 2 % (ref 0–1)
BILIRUB SERPL-MCNC: 0.94 MG/DL (ref 0.2–1)
BUN SERPL-MCNC: 22 MG/DL (ref 5–25)
CALCIUM SERPL-MCNC: 9.3 MG/DL (ref 8.3–10.1)
CHLORIDE SERPL-SCNC: 106 MMOL/L (ref 100–108)
CO2 SERPL-SCNC: 25 MMOL/L (ref 21–32)
CREAT SERPL-MCNC: 0.92 MG/DL (ref 0.6–1.3)
EOSINOPHIL # BLD AUTO: 0.17 THOUSAND/ΜL (ref 0–0.61)
EOSINOPHIL NFR BLD AUTO: 2 % (ref 0–6)
ERYTHROCYTE [DISTWIDTH] IN BLOOD BY AUTOMATED COUNT: 14 % (ref 11.6–15.1)
GFR SERPL CREATININE-BSD FRML MDRD: 78 ML/MIN/1.73SQ M
GLUCOSE P FAST SERPL-MCNC: 112 MG/DL (ref 65–99)
HCT VFR BLD AUTO: 42 % (ref 36.5–49.3)
HGB BLD-MCNC: 14.6 G/DL (ref 12–17)
IMM GRANULOCYTES # BLD AUTO: 0.03 THOUSAND/UL (ref 0–0.2)
IMM GRANULOCYTES NFR BLD AUTO: 0 % (ref 0–2)
LYMPHOCYTES # BLD AUTO: 1.52 THOUSANDS/ΜL (ref 0.6–4.47)
LYMPHOCYTES NFR BLD AUTO: 19 % (ref 14–44)
MCH RBC QN AUTO: 38.5 PG (ref 26.8–34.3)
MCHC RBC AUTO-ENTMCNC: 34.8 G/DL (ref 31.4–37.4)
MCV RBC AUTO: 111 FL (ref 82–98)
MONOCYTES # BLD AUTO: 0.76 THOUSAND/ΜL (ref 0.17–1.22)
MONOCYTES NFR BLD AUTO: 10 % (ref 4–12)
NEUTROPHILS # BLD AUTO: 5.23 THOUSANDS/ΜL (ref 1.85–7.62)
NEUTS SEG NFR BLD AUTO: 67 % (ref 43–75)
NRBC BLD AUTO-RTO: 0 /100 WBCS
PLATELET # BLD AUTO: 393 THOUSANDS/UL (ref 149–390)
PMV BLD AUTO: 9.8 FL (ref 8.9–12.7)
POTASSIUM SERPL-SCNC: 4.3 MMOL/L (ref 3.5–5.3)
PROT SERPL-MCNC: 7.1 G/DL (ref 6.4–8.2)
RBC # BLD AUTO: 3.79 MILLION/UL (ref 3.88–5.62)
SODIUM SERPL-SCNC: 138 MMOL/L (ref 136–145)
WBC # BLD AUTO: 7.85 THOUSAND/UL (ref 4.31–10.16)

## 2020-06-05 PROCEDURE — 80053 COMPREHEN METABOLIC PANEL: CPT

## 2020-06-05 PROCEDURE — 36415 COLL VENOUS BLD VENIPUNCTURE: CPT

## 2020-06-05 PROCEDURE — 85025 COMPLETE CBC W/AUTO DIFF WBC: CPT

## 2020-06-09 ENCOUNTER — TELEPHONE (OUTPATIENT)
Dept: HEMATOLOGY ONCOLOGY | Facility: CLINIC | Age: 80
End: 2020-06-09

## 2020-06-10 ENCOUNTER — DOCUMENTATION (OUTPATIENT)
Dept: OTHER | Facility: HOSPITAL | Age: 80
End: 2020-06-10

## 2020-06-10 ENCOUNTER — OFFICE VISIT (OUTPATIENT)
Dept: HEMATOLOGY ONCOLOGY | Facility: HOSPITAL | Age: 80
End: 2020-06-10
Payer: COMMERCIAL

## 2020-06-10 VITALS
BODY MASS INDEX: 35.79 KG/M2 | WEIGHT: 228 LBS | SYSTOLIC BLOOD PRESSURE: 116 MMHG | DIASTOLIC BLOOD PRESSURE: 72 MMHG | OXYGEN SATURATION: 95 % | RESPIRATION RATE: 16 BRPM | HEIGHT: 67 IN | TEMPERATURE: 96.1 F | HEART RATE: 94 BPM

## 2020-06-10 DIAGNOSIS — Z15.89 JAK-2 GENE MUTATION: ICD-10-CM

## 2020-06-10 DIAGNOSIS — D75.839 THROMBOCYTOSIS: Primary | ICD-10-CM

## 2020-06-10 PROCEDURE — 99214 OFFICE O/P EST MOD 30 MIN: CPT | Performed by: INTERNAL MEDICINE

## 2020-07-13 ENCOUNTER — HOSPITAL ENCOUNTER (OUTPATIENT)
Dept: HOSPITAL 53 - M WUC | Age: 80
End: 2020-07-13
Attending: NURSE PRACTITIONER
Payer: MEDICARE

## 2020-07-13 DIAGNOSIS — S23.41XA: Primary | ICD-10-CM
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
## 2020-07-13 NOTE — REP
REASON:  Rib "sprain".

 

The accompanying frontal view of the chest is within normal limits.  There are no

priors for comparison.  The heart size is borderline.

 

Four views of the right ribs shows a possible fracture of the distal aspect of

the 7th rib.  This is in the possible category since it is suggested on one view

only.

 

IMPRESSION:

 

Possible 7th rib fractures as described above.

 

 

Electronically Signed by

Sorin Juan DO 07/13/2020 05:14 P

## 2020-10-12 ENCOUNTER — TELEPHONE (OUTPATIENT)
Dept: HEMATOLOGY ONCOLOGY | Facility: HOSPITAL | Age: 80
End: 2020-10-12

## 2020-10-13 ENCOUNTER — TELEPHONE (OUTPATIENT)
Dept: HEMATOLOGY ONCOLOGY | Facility: CLINIC | Age: 80
End: 2020-10-13

## 2020-10-14 ENCOUNTER — TELEPHONE (OUTPATIENT)
Dept: HEMATOLOGY ONCOLOGY | Facility: HOSPITAL | Age: 80
End: 2020-10-14

## 2020-10-27 ENCOUNTER — LAB (OUTPATIENT)
Dept: LAB | Facility: CLINIC | Age: 80
End: 2020-10-27
Payer: COMMERCIAL

## 2020-10-27 DIAGNOSIS — D75.839 THROMBOCYTOSIS: ICD-10-CM

## 2020-10-27 DIAGNOSIS — Z15.89 JAK-2 GENE MUTATION: ICD-10-CM

## 2020-10-27 LAB
ALBUMIN SERPL BCP-MCNC: 3.7 G/DL (ref 3.5–5)
ALP SERPL-CCNC: 73 U/L (ref 46–116)
ALT SERPL W P-5'-P-CCNC: 19 U/L (ref 12–78)
ANION GAP SERPL CALCULATED.3IONS-SCNC: 7 MMOL/L (ref 4–13)
AST SERPL W P-5'-P-CCNC: 19 U/L (ref 5–45)
BASOPHILS # BLD AUTO: 0.13 THOUSANDS/ΜL (ref 0–0.1)
BASOPHILS NFR BLD AUTO: 2 % (ref 0–1)
BILIRUB SERPL-MCNC: 0.95 MG/DL (ref 0.2–1)
BUN SERPL-MCNC: 15 MG/DL (ref 5–25)
CALCIUM SERPL-MCNC: 8.5 MG/DL (ref 8.3–10.1)
CHLORIDE SERPL-SCNC: 108 MMOL/L (ref 100–108)
CO2 SERPL-SCNC: 25 MMOL/L (ref 21–32)
CREAT SERPL-MCNC: 0.97 MG/DL (ref 0.6–1.3)
EOSINOPHIL # BLD AUTO: 0.32 THOUSAND/ΜL (ref 0–0.61)
EOSINOPHIL NFR BLD AUTO: 4 % (ref 0–6)
ERYTHROCYTE [DISTWIDTH] IN BLOOD BY AUTOMATED COUNT: 13.9 % (ref 11.6–15.1)
GFR SERPL CREATININE-BSD FRML MDRD: 72 ML/MIN/1.73SQ M
GLUCOSE SERPL-MCNC: 168 MG/DL (ref 65–140)
HCT VFR BLD AUTO: 45.7 % (ref 36.5–49.3)
HGB BLD-MCNC: 15.9 G/DL (ref 12–17)
IMM GRANULOCYTES # BLD AUTO: 0.04 THOUSAND/UL (ref 0–0.2)
IMM GRANULOCYTES NFR BLD AUTO: 1 % (ref 0–2)
LYMPHOCYTES # BLD AUTO: 1.72 THOUSANDS/ΜL (ref 0.6–4.47)
LYMPHOCYTES NFR BLD AUTO: 20 % (ref 14–44)
MCH RBC QN AUTO: 37.4 PG (ref 26.8–34.3)
MCHC RBC AUTO-ENTMCNC: 34.8 G/DL (ref 31.4–37.4)
MCV RBC AUTO: 108 FL (ref 82–98)
MONOCYTES # BLD AUTO: 0.71 THOUSAND/ΜL (ref 0.17–1.22)
MONOCYTES NFR BLD AUTO: 8 % (ref 4–12)
NEUTROPHILS # BLD AUTO: 5.66 THOUSANDS/ΜL (ref 1.85–7.62)
NEUTS SEG NFR BLD AUTO: 65 % (ref 43–75)
NRBC BLD AUTO-RTO: 0 /100 WBCS
PLATELET # BLD AUTO: 456 THOUSANDS/UL (ref 149–390)
PMV BLD AUTO: 10 FL (ref 8.9–12.7)
POTASSIUM SERPL-SCNC: 4.1 MMOL/L (ref 3.5–5.3)
PROT SERPL-MCNC: 6.7 G/DL (ref 6.4–8.2)
RBC # BLD AUTO: 4.25 MILLION/UL (ref 3.88–5.62)
SODIUM SERPL-SCNC: 140 MMOL/L (ref 136–145)
WBC # BLD AUTO: 8.58 THOUSAND/UL (ref 4.31–10.16)

## 2020-10-27 PROCEDURE — 80053 COMPREHEN METABOLIC PANEL: CPT

## 2020-10-27 PROCEDURE — 85025 COMPLETE CBC W/AUTO DIFF WBC: CPT

## 2020-10-27 PROCEDURE — 36415 COLL VENOUS BLD VENIPUNCTURE: CPT

## 2020-11-10 ENCOUNTER — TELEPHONE (OUTPATIENT)
Dept: HEMATOLOGY ONCOLOGY | Facility: MEDICAL CENTER | Age: 80
End: 2020-11-10

## 2020-11-16 ENCOUNTER — OFFICE VISIT (OUTPATIENT)
Dept: HEMATOLOGY ONCOLOGY | Facility: HOSPITAL | Age: 80
End: 2020-11-16
Payer: COMMERCIAL

## 2020-11-16 VITALS
WEIGHT: 222 LBS | HEART RATE: 86 BPM | TEMPERATURE: 98 F | RESPIRATION RATE: 16 BRPM | HEIGHT: 67 IN | DIASTOLIC BLOOD PRESSURE: 78 MMHG | BODY MASS INDEX: 34.84 KG/M2 | OXYGEN SATURATION: 94 % | SYSTOLIC BLOOD PRESSURE: 130 MMHG

## 2020-11-16 DIAGNOSIS — Z15.89 JAK-2 GENE MUTATION: Primary | ICD-10-CM

## 2020-11-16 DIAGNOSIS — D75.839 THROMBOCYTOSIS: ICD-10-CM

## 2020-11-16 PROCEDURE — 99214 OFFICE O/P EST MOD 30 MIN: CPT | Performed by: INTERNAL MEDICINE

## 2021-02-12 DIAGNOSIS — Z23 ENCOUNTER FOR IMMUNIZATION: ICD-10-CM

## 2021-03-16 ENCOUNTER — TELEPHONE (OUTPATIENT)
Dept: HEMATOLOGY ONCOLOGY | Facility: CLINIC | Age: 81
End: 2021-03-16

## 2021-03-16 NOTE — TELEPHONE ENCOUNTER
I called and left a message for the patient to let us know if they had labs drawn  If not, we will have to reschedule their appointment

## 2021-03-17 ENCOUNTER — TELEPHONE (OUTPATIENT)
Dept: HEMATOLOGY ONCOLOGY | Facility: MEDICAL CENTER | Age: 81
End: 2021-03-17

## 2021-04-01 ENCOUNTER — APPOINTMENT (OUTPATIENT)
Dept: LAB | Facility: CLINIC | Age: 81
End: 2021-04-01
Payer: COMMERCIAL

## 2021-04-01 DIAGNOSIS — Z15.89 JAK-2 GENE MUTATION: ICD-10-CM

## 2021-04-01 DIAGNOSIS — D75.839 THROMBOCYTOSIS: ICD-10-CM

## 2021-04-01 LAB
ALBUMIN SERPL BCP-MCNC: 3.7 G/DL (ref 3.5–5)
ALP SERPL-CCNC: 78 U/L (ref 46–116)
ALT SERPL W P-5'-P-CCNC: 18 U/L (ref 12–78)
ANION GAP SERPL CALCULATED.3IONS-SCNC: 5 MMOL/L (ref 4–13)
AST SERPL W P-5'-P-CCNC: 18 U/L (ref 5–45)
BASOPHILS # BLD AUTO: 0.12 THOUSANDS/ΜL (ref 0–0.1)
BASOPHILS NFR BLD AUTO: 1 % (ref 0–1)
BILIRUB SERPL-MCNC: 0.86 MG/DL (ref 0.2–1)
BUN SERPL-MCNC: 23 MG/DL (ref 5–25)
CALCIUM SERPL-MCNC: 9.5 MG/DL (ref 8.3–10.1)
CHLORIDE SERPL-SCNC: 108 MMOL/L (ref 100–108)
CO2 SERPL-SCNC: 27 MMOL/L (ref 21–32)
CREAT SERPL-MCNC: 1.11 MG/DL (ref 0.6–1.3)
EOSINOPHIL # BLD AUTO: 0.41 THOUSAND/ΜL (ref 0–0.61)
EOSINOPHIL NFR BLD AUTO: 5 % (ref 0–6)
ERYTHROCYTE [DISTWIDTH] IN BLOOD BY AUTOMATED COUNT: 15.3 % (ref 11.6–15.1)
GFR SERPL CREATININE-BSD FRML MDRD: 62 ML/MIN/1.73SQ M
GLUCOSE SERPL-MCNC: 184 MG/DL (ref 65–140)
HCT VFR BLD AUTO: 52.6 % (ref 36.5–49.3)
HGB BLD-MCNC: 17.3 G/DL (ref 12–17)
IMM GRANULOCYTES # BLD AUTO: 0.05 THOUSAND/UL (ref 0–0.2)
IMM GRANULOCYTES NFR BLD AUTO: 1 % (ref 0–2)
LYMPHOCYTES # BLD AUTO: 1.49 THOUSANDS/ΜL (ref 0.6–4.47)
LYMPHOCYTES NFR BLD AUTO: 18 % (ref 14–44)
MCH RBC QN AUTO: 32.6 PG (ref 26.8–34.3)
MCHC RBC AUTO-ENTMCNC: 32.9 G/DL (ref 31.4–37.4)
MCV RBC AUTO: 99 FL (ref 82–98)
MONOCYTES # BLD AUTO: 0.56 THOUSAND/ΜL (ref 0.17–1.22)
MONOCYTES NFR BLD AUTO: 7 % (ref 4–12)
NEUTROPHILS # BLD AUTO: 5.68 THOUSANDS/ΜL (ref 1.85–7.62)
NEUTS SEG NFR BLD AUTO: 68 % (ref 43–75)
NRBC BLD AUTO-RTO: 0 /100 WBCS
PLATELET # BLD AUTO: 482 THOUSANDS/UL (ref 149–390)
PMV BLD AUTO: 10 FL (ref 8.9–12.7)
POTASSIUM SERPL-SCNC: 4.5 MMOL/L (ref 3.5–5.3)
PROT SERPL-MCNC: 7.3 G/DL (ref 6.4–8.2)
RBC # BLD AUTO: 5.31 MILLION/UL (ref 3.88–5.62)
SODIUM SERPL-SCNC: 140 MMOL/L (ref 136–145)
WBC # BLD AUTO: 8.31 THOUSAND/UL (ref 4.31–10.16)

## 2021-04-01 PROCEDURE — 36415 COLL VENOUS BLD VENIPUNCTURE: CPT

## 2021-04-01 PROCEDURE — 80053 COMPREHEN METABOLIC PANEL: CPT

## 2021-04-01 PROCEDURE — 85025 COMPLETE CBC W/AUTO DIFF WBC: CPT

## 2021-04-05 ENCOUNTER — OFFICE VISIT (OUTPATIENT)
Dept: HEMATOLOGY ONCOLOGY | Facility: HOSPITAL | Age: 81
End: 2021-04-05
Payer: COMMERCIAL

## 2021-04-05 VITALS
BODY MASS INDEX: 35 KG/M2 | HEIGHT: 67 IN | TEMPERATURE: 97.5 F | WEIGHT: 223 LBS | SYSTOLIC BLOOD PRESSURE: 124 MMHG | HEART RATE: 90 BPM | DIASTOLIC BLOOD PRESSURE: 68 MMHG | RESPIRATION RATE: 16 BRPM | OXYGEN SATURATION: 95 %

## 2021-04-05 DIAGNOSIS — Z15.89 JAK-2 GENE MUTATION: ICD-10-CM

## 2021-04-05 DIAGNOSIS — Z15.89 JAK-2 GENE MUTATION: Primary | ICD-10-CM

## 2021-04-05 DIAGNOSIS — D47.3 THROMBOCYTHEMIA, ESSENTIAL (HCC): ICD-10-CM

## 2021-04-05 PROCEDURE — 99214 OFFICE O/P EST MOD 30 MIN: CPT | Performed by: INTERNAL MEDICINE

## 2021-04-05 RX ORDER — CLINDAMYCIN HYDROCHLORIDE 150 MG/1
CAPSULE ORAL
COMMUNITY
Start: 2021-03-08 | End: 2022-03-11 | Stop reason: HOSPADM

## 2021-04-05 RX ORDER — HYDROXYUREA 500 MG/1
500 CAPSULE ORAL DAILY
Qty: 75 CAPSULE | Refills: 11 | Status: SHIPPED | OUTPATIENT
Start: 2021-04-05 | End: 2021-04-21

## 2021-04-05 NOTE — PROGRESS NOTES
Hematology/Oncology Outpatient Follow- up Note  Kirkwood Dandy 78 y o  male MRN: @ Encounter: [id number]        Date:  4/5/2021    Presenting Complaint/Diagnosis : Elevated platelet count with a DARIO-2 positive myeloproliferative disorder      Previous Hematologic/ Oncologic History:     workup    Current Hematologic/ Oncologic Treatment:     Hydrea 500 mg twice a day   baby aspirin daily    Interval History:     the patient returns for follow-up visit  He states he is doing well  His most recent blood work  Shows a white count of 8 31 with a hemoglobin of 17 3 and a platelet count of 560  MCV was  Elevated at 99  He is taking Hydrea twice a day so we will switch him to Hydrea 3 times a day alternating with 2 times a day  The patient is in agreement with this  Denies any nausea denies any vomiting denies any diarrhea  Otherwise is at baseline  The rest of his 14 point review of systems today was negative  Is taking his baby aspirin  Test Results:    Imaging: No results found  Labs:   Lab Results   Component Value Date    WBC 8 31 04/01/2021    HGB 17 3 (H) 04/01/2021    HCT 52 6 (H) 04/01/2021    MCV 99 (H) 04/01/2021     (H) 04/01/2021     Lab Results   Component Value Date     10/01/2015    K 4 5 04/01/2021     04/01/2021    CO2 27 04/01/2021    ANIONGAP 6 10/01/2015    BUN 23 04/01/2021    CREATININE 1 11 04/01/2021    GLUCOSE 103 10/01/2015    GLUF 112 (H) 06/05/2020    CALCIUM 9 5 04/01/2021    AST 18 04/01/2021    ALT 18 04/01/2021    ALKPHOS 78 04/01/2021    PROT 6 9 10/01/2015    BILITOT 0 70 10/01/2015    EGFR 62 04/01/2021       Lab Results   Component Value Date    PSA 0 4 05/16/2019    PSA 0 4 12/22/2017    PSA 0 3 11/28/2016     ROS: As stated in the history of present illness otherwise his 14 point review of systems today was negative  Active Problems: There is no problem list on file for this patient        Past Medical History:   Past Medical History: Diagnosis Date    Cancer [facility])     prostate    Hypercholesterolemia     Hypertension     Stroke [facility])        Surgical History:   Past Surgical History:   Procedure Laterality Date    PROSTATECTOMY      TOTAL SHOULDER REPLACEMENT         Family History:  No family history on file  Cancer-related family history is not on file      Social History:   Social History     Socioeconomic History    Marital status: /Civil Union     Spouse name: Not on file    Number of children: Not on file    Years of education: Not on file    Highest education level: Not on file   Occupational History    Not on file   Social Needs    Financial resource strain: Not on file    Food insecurity     Worry: Not on file     Inability: Not on file   [employer] needs     Medical: Not on file     Non-medical: Not on file   Tobacco Use    Smoking status: Never Smoker    Smokeless tobacco: Never Used   Substance and Sexual Activity    Alcohol use: No    Drug use: No    Sexual activity: Not on file   Lifestyle    Physical activity     Days per week: Not on file     Minutes per session: Not on file    Stress: Not on file   Relationships    Social connections     Talks on phone: Not on file     Gets together: Not on file     Attends Jehovah's witness service: Not on file     Active member of club or organization: Not on file     Attends meetings of clubs or organizations: Not on file     Relationship status: Not on file    Intimate partner violence     Fear of current or ex partner: Not on file     Emotionally abused: Not on file     Physically abused: Not on file     Forced sexual activity: Not on file   Other Topics Concern    Not on file   Social History Narrative    Not on file       Current Medications:   Current Outpatient Medications   Medication Sig Dispense Refill    acetaminophen (TYLENOL) 500 mg tablet Take 500 mg by mouth every 4 (four) hours      ALPRAZolam (XANAX) 0 25 mg tablet       aspirin 81 MG tablet Take by mouth      clindamycin (CLEOCIN) 150 mg capsule TAKE 4 CAPSULE BY ORAL ROUTE 1 HOUR PRIOR TO SURGERY      clonazePAM (KlonoPIN) 0 5 mg tablet Take 0 5 mg by mouth      fluorouracil (EFUDEX) 5 % cream APPLY TO THE SCALP TWICE A DAY X2 WEEKS  0    folic acid (FOLVITE) 1 mg tablet Take 1 mg by mouth      hydroxyurea (HYDREA) 500 mg capsule Take 1 capsule (500 mg total) by mouth 2 (two) times a day 180 capsule 3    lisinopril (ZESTRIL) 10 mg tablet Take 10 mg by mouth      Multiple Vitamin tablet Take 1 tablet by mouth daily      PARoxetine (PAXIL) 10 mg tablet Take 10 mg by mouth       No current facility-administered medications for this visit  Allergies: Allergies   Allergen Reactions    Penicillins Rash    Sulfa Antibiotics Rash       Physical Exam:    Body surface area is 2 12 meters squared  Wt Readings from Last 3 Encounters:   04/05/21 101 kg (223 lb)   11/16/20 101 kg (222 lb)   06/10/20 103 kg (228 lb)        Temp Readings from Last 3 Encounters:   04/05/21 97 5 °F (36 4 °C) (Temporal)   11/16/20 98 °F (36 7 °C) (Temporal)   06/10/20 (!) 96 1 °F (35 6 °C) (Tympanic)        BP Readings from Last 3 Encounters:   04/05/21 124/68   11/16/20 130/78   06/10/20 116/72         Pulse Readings from Last 3 Encounters:   04/05/21 90   11/16/20 86   06/10/20 94         Physical Exam     Constitutional   General appearance: No acute distress, well appearing and well nourished  Eyes   Conjunctiva and lids: No swelling, erythema or discharge  Pupils and irises: Equal, round and reactive to light  Ears, Nose, Mouth, and Throat   External inspection of ears and nose: Normal     Nasal mucosa, septum, and turbinates: Normal without edema or erythema  Oropharynx: Normal with no erythema, edema, exudate or lesions  Pulmonary   Respiratory effort: No increased work of breathing or signs of respiratory distress  Auscultation of lungs: Clear to auscultation      Cardiovascular   Palpation of heart: Normal PMI, no thrills  Auscultation of heart: Normal rate and rhythm, normal S1 and S2, without murmurs  Examination of extremities for edema and/or varicosities: Normal     Carotid pulses: Normal     Abdomen   Abdomen: Non-tender, no masses  Liver and spleen: No hepatomegaly or splenomegaly  Lymphatic   Palpation of lymph nodes in neck: No lymphadenopathy  Musculoskeletal   Gait and station: Normal     Digits and nails: Normal without clubbing or cyanosis  Inspection/palpation of joints, bones, and muscles: Normal     Skin   Skin and subcutaneous tissue: Normal without rashes or lesions  Neurologic   Cranial nerves: Cranial nerves 2-12 intact  Sensation: No sensory loss  Psychiatric   Orientation to person, place, and time: Normal     Mood and affect: Normal         Assessment / Plan:    The patient is an 58-year-old male with JAK2 positive thrombocytosis   He was initially hospitalized for a TIA   His symptoms are resolved but his platelet count continue fluctuating mostly running high also with a slightly high hemoglobin on occasion   Myeloproliferative testing was completed and found to have a positive JAK2 mutation   He was then started on Hydrea   He is now on Hydrea 500 mg p o  B i d  And tolerating it very well   He continues to take his daily baby aspirin  His hemoglobin has now risen and his hematocrit is above 50  I will change him to Hydrea 500 mg t i d  alternating with b i d     We will send a new prescription  I will see him back in 2 months with repeat blood work  Until then if he has any questions he will call our office  Goals and Barriers:  Current Goal:  Prolong Survival from   JAK2 positive essential thrombocytosis  Barriers: None  Patient's Capacity to Self Care:  Patient able to self care      Portions of the record may have been created with voice recognition software   Occasional wrong word or "sound a like" substitutions may have occurred due to the inherent limitations of voice recognition software   Read the chart carefully and recognize, using context, where substitutions have occurred

## 2021-04-21 DIAGNOSIS — Z15.89 JAK-2 GENE MUTATION: ICD-10-CM

## 2021-04-21 RX ORDER — HYDROXYUREA 500 MG/1
CAPSULE ORAL
Qty: 180 CAPSULE | Refills: 3 | Status: SHIPPED | OUTPATIENT
Start: 2021-04-21 | End: 2022-05-23

## 2021-06-02 ENCOUNTER — TELEPHONE (OUTPATIENT)
Dept: HEMATOLOGY ONCOLOGY | Facility: CLINIC | Age: 81
End: 2021-06-02

## 2021-06-02 NOTE — TELEPHONE ENCOUNTER
Appointment Cancellation Or Reschedule     Person calling in Patient     Provider Dr Linda Neal    Office Visit Date and Time 6/21 1:40 pm    [facility]    Did patient want to reschedule their office appointment? If so, when was it scheduled to? Yes 6/9 2:20 pm   Is this patient calling to reschedule an infusion appointment? no   Is this patient a Chemo patient? no   When is their next infusion visit? N/a    Reason for Cancellation or Reschedule Business trip      If the patient is a treatment patient, please route this to the office nurse  If the patient is not on treatment, please route to the office MA

## 2021-06-04 ENCOUNTER — APPOINTMENT (OUTPATIENT)
Dept: LAB | Facility: CLINIC | Age: 81
End: 2021-06-04
Payer: COMMERCIAL

## 2021-06-04 LAB
ALBUMIN SERPL BCP-MCNC: 3.7 G/DL (ref 3.5–5)
ALP SERPL-CCNC: 67 U/L (ref 46–116)
ALT SERPL W P-5'-P-CCNC: 28 U/L (ref 12–78)
ANION GAP SERPL CALCULATED.3IONS-SCNC: 3 MMOL/L (ref 4–13)
AST SERPL W P-5'-P-CCNC: 17 U/L (ref 5–45)
BASOPHILS # BLD AUTO: 0.06 THOUSANDS/ΜL (ref 0–0.1)
BASOPHILS NFR BLD AUTO: 1 % (ref 0–1)
BILIRUB SERPL-MCNC: 1.1 MG/DL (ref 0.2–1)
BUN SERPL-MCNC: 18 MG/DL (ref 5–25)
CALCIUM SERPL-MCNC: 9.2 MG/DL (ref 8.3–10.1)
CHLORIDE SERPL-SCNC: 113 MMOL/L (ref 100–108)
CO2 SERPL-SCNC: 27 MMOL/L (ref 21–32)
CREAT SERPL-MCNC: 0.8 MG/DL (ref 0.6–1.3)
EOSINOPHIL # BLD AUTO: 0.21 THOUSAND/ΜL (ref 0–0.61)
EOSINOPHIL NFR BLD AUTO: 3 % (ref 0–6)
ERYTHROCYTE [DISTWIDTH] IN BLOOD BY AUTOMATED COUNT: 19.8 % (ref 11.6–15.1)
GFR SERPL CREATININE-BSD FRML MDRD: 83 ML/MIN/1.73SQ M
GLUCOSE SERPL-MCNC: 103 MG/DL (ref 65–140)
HCT VFR BLD AUTO: 43 % (ref 36.5–49.3)
HGB BLD-MCNC: 14.8 G/DL (ref 12–17)
IMM GRANULOCYTES # BLD AUTO: 0.02 THOUSAND/UL (ref 0–0.2)
IMM GRANULOCYTES NFR BLD AUTO: 0 % (ref 0–2)
LYMPHOCYTES # BLD AUTO: 1.61 THOUSANDS/ΜL (ref 0.6–4.47)
LYMPHOCYTES NFR BLD AUTO: 23 % (ref 14–44)
MCH RBC QN AUTO: 35.7 PG (ref 26.8–34.3)
MCHC RBC AUTO-ENTMCNC: 34.4 G/DL (ref 31.4–37.4)
MCV RBC AUTO: 104 FL (ref 82–98)
MONOCYTES # BLD AUTO: 0.53 THOUSAND/ΜL (ref 0.17–1.22)
MONOCYTES NFR BLD AUTO: 8 % (ref 4–12)
NEUTROPHILS # BLD AUTO: 4.54 THOUSANDS/ΜL (ref 1.85–7.62)
NEUTS SEG NFR BLD AUTO: 65 % (ref 43–75)
NRBC BLD AUTO-RTO: 0 /100 WBCS
PLATELET # BLD AUTO: 307 THOUSANDS/UL (ref 149–390)
PMV BLD AUTO: 10.1 FL (ref 8.9–12.7)
POTASSIUM SERPL-SCNC: 4.2 MMOL/L (ref 3.5–5.3)
PROT SERPL-MCNC: 6.7 G/DL (ref 6.4–8.2)
RBC # BLD AUTO: 4.15 MILLION/UL (ref 3.88–5.62)
SODIUM SERPL-SCNC: 143 MMOL/L (ref 136–145)
WBC # BLD AUTO: 6.97 THOUSAND/UL (ref 4.31–10.16)

## 2021-06-04 PROCEDURE — 36415 COLL VENOUS BLD VENIPUNCTURE: CPT | Performed by: INTERNAL MEDICINE

## 2021-06-04 PROCEDURE — 80053 COMPREHEN METABOLIC PANEL: CPT | Performed by: INTERNAL MEDICINE

## 2021-06-04 PROCEDURE — 85025 COMPLETE CBC W/AUTO DIFF WBC: CPT | Performed by: INTERNAL MEDICINE

## 2021-06-09 ENCOUNTER — OFFICE VISIT (OUTPATIENT)
Dept: HEMATOLOGY ONCOLOGY | Facility: HOSPITAL | Age: 81
End: 2021-06-09
Payer: COMMERCIAL

## 2021-06-09 VITALS
BODY MASS INDEX: 35 KG/M2 | RESPIRATION RATE: 16 BRPM | TEMPERATURE: 98.2 F | HEART RATE: 86 BPM | OXYGEN SATURATION: 95 % | SYSTOLIC BLOOD PRESSURE: 126 MMHG | WEIGHT: 223 LBS | DIASTOLIC BLOOD PRESSURE: 64 MMHG | HEIGHT: 67 IN

## 2021-06-09 DIAGNOSIS — D75.839 THROMBOCYTOSIS: ICD-10-CM

## 2021-06-09 DIAGNOSIS — Z15.89 JAK-2 GENE MUTATION: ICD-10-CM

## 2021-06-09 DIAGNOSIS — D47.3 THROMBOCYTHEMIA, ESSENTIAL (HCC): Primary | ICD-10-CM

## 2021-06-09 PROCEDURE — 99214 OFFICE O/P EST MOD 30 MIN: CPT | Performed by: INTERNAL MEDICINE

## 2021-06-09 NOTE — PROGRESS NOTES
Hematology/Oncology Outpatient Follow- up Note  Jimmy Rutledge 78 y o  male MRN: @ Encounter: [id number]        Date:  6/9/2021    Presenting Complaint/Diagnosis : Elevated platelet count with a DARIO-2 positive myeloproliferative disorder      Previous Hematologic/ Oncologic History:       Workup    Current Hematologic/ Oncologic Treatment:    Hydrea 500 mg  3 times a day    Baby aspirin daily    Interval History:     the patient returns for follow-up visit  He is doing well  His blood work has improved significantly on the 3 times a day dosing of Hydrea  Hemoglobin is now normal  At 14 8 with a normal white count of 6 97 and platelet count of 3 of 7  Patient himself is doing well  Really denies any new complaints  Denies any nausea denies any vomiting denies any diarrhea  Overall is doing well  The rest of his 14 point review of systems today was negative  Test Results:    Imaging: No results found  Labs:   Lab Results   Component Value Date    WBC 6 97 06/04/2021    HGB 14 8 06/04/2021    HCT 43 0 06/04/2021     (H) 06/04/2021     06/04/2021     Lab Results   Component Value Date     10/01/2015    K 4 2 06/04/2021     (H) 06/04/2021    CO2 27 06/04/2021    ANIONGAP 6 10/01/2015    BUN 18 06/04/2021    CREATININE 0 80 06/04/2021    GLUCOSE 103 10/01/2015    GLUF 112 (H) 06/05/2020    CALCIUM 9 2 06/04/2021    AST 17 06/04/2021    ALT 28 06/04/2021    ALKPHOS 67 06/04/2021    PROT 6 9 10/01/2015    BILITOT 0 70 10/01/2015    EGFR 83 06/04/2021       Lab Results   Component Value Date    PSA 0 4 05/16/2019    PSA 0 4 12/22/2017    PSA 0 3 11/28/2016     ROS: As stated in the history of present illness otherwise his 14 point review of systems today was negative  Active Problems: There is no problem list on file for this patient        Past Medical History:   Past Medical History:   Diagnosis Date    Cancer [facility])     prostate    Hypercholesterolemia     Hypertension  Stroke [facility])        Surgical History:   Past Surgical History:   Procedure Laterality Date    PROSTATECTOMY      TOTAL SHOULDER REPLACEMENT         Family History:  No family history on file  Cancer-related family history is not on file      Social History:   Social History     Socioeconomic History    Marital status: /Civil Union     Spouse name: Not on file    Number of children: Not on file    Years of education: Not on file    Highest education level: Not on file   Occupational History    Not on file   Social Needs    Financial resource strain: Not on file    Food insecurity     Worry: Not on file     Inability: Not on file   [employer] needs     Medical: Not on file     Non-medical: Not on file   Tobacco Use    Smoking status: Never Smoker    Smokeless tobacco: Never Used   Substance and Sexual Activity    Alcohol use: No    Drug use: No    Sexual activity: Not on file   Lifestyle    Physical activity     Days per week: Not on file     Minutes per session: Not on file    Stress: Not on file   Relationships    Social connections     Talks on phone: Not on file     Gets together: Not on file     Attends Cheondoism service: Not on file     Active member of club or organization: Not on file     Attends meetings of clubs or organizations: Not on file     Relationship status: Not on file    Intimate partner violence     Fear of current or ex partner: Not on file     Emotionally abused: Not on file     Physically abused: Not on file     Forced sexual activity: Not on file   Other Topics Concern    Not on file   Social History Narrative    Not on file       Current Medications:   Current Outpatient Medications   Medication Sig Dispense Refill    acetaminophen (TYLENOL) 500 mg tablet Take 500 mg by mouth every 4 (four) hours      ALPRAZolam (XANAX) 0 25 mg tablet       aspirin 81 MG tablet Take by mouth      clindamycin (CLEOCIN) 150 mg capsule TAKE 4 CAPSULE BY ORAL ROUTE 1 HOUR PRIOR TO SURGERY      clonazePAM (KlonoPIN) 0 5 mg tablet Take 0 5 mg by mouth      fluorouracil (EFUDEX) 5 % cream APPLY TO THE SCALP TWICE A DAY X2 WEEKS  0    folic acid (FOLVITE) 1 mg tablet Take 1 mg by mouth      hydroxyurea (HYDREA) 500 mg capsule TAKE 1 CAPSULE BY MOUTH TWICE A  capsule 3    lisinopril (ZESTRIL) 10 mg tablet Take 10 mg by mouth      Multiple Vitamin tablet Take 1 tablet by mouth daily      PARoxetine (PAXIL) 10 mg tablet Take 10 mg by mouth       No current facility-administered medications for this visit  Allergies: Allergies   Allergen Reactions    Penicillins Rash    Sulfa Antibiotics Rash     Other reaction(s): rash       Physical Exam:    Body surface area is 2 12 meters squared  Wt Readings from Last 3 Encounters:   06/09/21 101 kg (223 lb)   04/05/21 101 kg (223 lb)   11/16/20 101 kg (222 lb)        Temp Readings from Last 3 Encounters:   06/09/21 98 2 °F (36 8 °C) (Temporal)   04/05/21 97 5 °F (36 4 °C) (Temporal)   11/16/20 98 °F (36 7 °C) (Temporal)        BP Readings from Last 3 Encounters:   06/09/21 126/64   04/05/21 124/68   11/16/20 130/78         Pulse Readings from Last 3 Encounters:   06/09/21 86   04/05/21 90   11/16/20 86        Physical Exam     Constitutional   General appearance: No acute distress, well appearing and well nourished  Eyes   Conjunctiva and lids: No swelling, erythema or discharge  Pupils and irises: Equal, round and reactive to light  Ears, Nose, Mouth, and Throat   External inspection of ears and nose: Normal     Nasal mucosa, septum, and turbinates: Normal without edema or erythema  Oropharynx: Normal with no erythema, edema, exudate or lesions  Pulmonary   Respiratory effort: No increased work of breathing or signs of respiratory distress  Auscultation of lungs: Clear to auscultation  Cardiovascular   Palpation of heart: Normal PMI, no thrills      Auscultation of heart: Normal rate and rhythm, normal S1 and S2, without murmurs  Examination of extremities for edema and/or varicosities: Normal     Carotid pulses: Normal     Abdomen   Abdomen: Non-tender, no masses  Liver and spleen: No hepatomegaly or splenomegaly  Lymphatic   Palpation of lymph nodes in neck: No lymphadenopathy  Musculoskeletal   Gait and station: Normal     Digits and nails: Normal without clubbing or cyanosis  Inspection/palpation of joints, bones, and muscles: Normal     Skin   Skin and subcutaneous tissue: Normal without rashes or lesions  Neurologic   Cranial nerves: Cranial nerves 2-12 intact  Sensation: No sensory loss  Psychiatric   Orientation to person, place, and time: Normal     Mood and affect: Normal         Assessment / Plan:      The patient is an 42-year-old male with JAK2 positive thrombocytosis   He was initially hospitalized for a TIA  At his last visit I increased his Hydrea to 3 times a day because his hemoglobin had gone up to 17 3 and his platelet counts 42  This has brought his counts down nicely where his white count is now 6 97 with a hemoglobin of 14 8 and platelet count 3 of 7  This point I advised him to stay on 3 times a day  I will see him back in 2 months with repeat blood work  Until then if he has any questions he will call our office  If he is away in [place] at a summer home he will get blood work as soon as he comes back and see me  Goals and Barriers:  Current Goal:  Prolong Survival from   Essential thrombocytosis  Barriers: None  Patient's Capacity to Self Care:  Patient able to self care  Portions of the record may have been created with voice recognition software   Occasional wrong word or "sound a like" substitutions may have occurred due to the inherent limitations of voice recognition software   Read the chart carefully and recognize, using context, where substitutions have occurred

## 2021-08-14 ENCOUNTER — OFFICE VISIT (OUTPATIENT)
Dept: URGENT CARE | Facility: CLINIC | Age: 81
End: 2021-08-14
Payer: COMMERCIAL

## 2021-08-14 VITALS
HEIGHT: 68 IN | BODY MASS INDEX: 32.58 KG/M2 | OXYGEN SATURATION: 95 % | DIASTOLIC BLOOD PRESSURE: 74 MMHG | RESPIRATION RATE: 16 BRPM | HEART RATE: 82 BPM | WEIGHT: 215 LBS | SYSTOLIC BLOOD PRESSURE: 156 MMHG | TEMPERATURE: 96.4 F

## 2021-08-14 DIAGNOSIS — M54.50 ACUTE LEFT-SIDED LOW BACK PAIN, UNSPECIFIED WHETHER SCIATICA PRESENT: Primary | ICD-10-CM

## 2021-08-14 DIAGNOSIS — M53.3 SACROILIAC PAIN: ICD-10-CM

## 2021-08-14 PROCEDURE — 96372 THER/PROPH/DIAG INJ SC/IM: CPT | Performed by: PHYSICIAN ASSISTANT

## 2021-08-14 PROCEDURE — 99213 OFFICE O/P EST LOW 20 MIN: CPT | Performed by: PHYSICIAN ASSISTANT

## 2021-08-14 PROCEDURE — S9083 URGENT CARE CENTER GLOBAL: HCPCS | Performed by: PHYSICIAN ASSISTANT

## 2021-08-14 RX ORDER — METHYLPREDNISOLONE SODIUM SUCCINATE 40 MG/ML
40 INJECTION, POWDER, LYOPHILIZED, FOR SOLUTION INTRAMUSCULAR; INTRAVENOUS ONCE
Status: COMPLETED | OUTPATIENT
Start: 2021-08-14 | End: 2021-08-14

## 2021-08-14 RX ADMIN — METHYLPREDNISOLONE SODIUM SUCCINATE 40 MG: 40 INJECTION, POWDER, LYOPHILIZED, FOR SOLUTION INTRAMUSCULAR; INTRAVENOUS at 16:18

## 2021-08-14 NOTE — PATIENT INSTRUCTIONS
Cane to ambulate to help offload pressure on hip, low back  Salonpaz patches as needed for pain control  May continue Tylenol as directed  You were given an injection of a prednisone today to help tried to decrease pain and swelling  Please call family provider as soon as possible to arrange follow-up for the next 3-5 days  If significant worsening of pain, saddle anesthesia, loss of bowel or bladder control please proceed to emergency room immediately for further evaluation

## 2021-08-14 NOTE — PROGRESS NOTES
[facility]    NAME: Phuc Alexis is a 78 y o  male  : 1940    MRN: [id number]  DATE: 2021  TIME: 4:35 PM    Assessment and Plan   Acute left-sided low back pain, unspecified whether sciatica present [M54 5]  1  Acute left-sided low back pain, unspecified whether sciatica present     2  Sacroiliac pain  methylPREDNISolone sodium succinate (Solu-MEDROL) injection 40 mg       Nurse administered methylprednisone injection IM  X-ray not indicated at this time  Patient expresses understanding  Patient Instructions   Patient Instructions   Manderson to ambulate to help offload pressure on hip, low back  Salonpaz patches as needed for pain control  May continue Tylenol as directed  You were given an injection of a prednisone today to help tried to decrease pain and swelling  Please call family provider as soon as possible to arrange follow-up for the next 3-5 days  If significant worsening of pain, saddle anesthesia, loss of bowel or bladder control please proceed to emergency room immediately for further evaluation  Chief Complaint     Chief Complaint   Patient presents with    Hip Pain     Pt c/o sharp left lower back pain for the past week  Denies injury but patient states he was doing a lot of standing and cooking last Saturday  Pain is worse with sitting  Pt has been taking Tylenol for symptoms  History of Present Illness   Phuc Alexis presents to the clinic c/o    75-year-old male comes in with left low back pain that started about a week ago  He had been working at a [employer] and doing a lot of standing  By the time he got home he was having a lot of discomfort  Localized in the left upper buttocks region  Nonradiating  No loss of bowel or bladder control  No saddle anesthesia  Standing on left leg, weight-bearing and walking make pain worse  More comfortable seated  He has been taking Tylenol 500 milligrams every 8 hours    He has use some ice  No unusual weight loss  No diaphoresis, fever or chills  No abdominal pain or change in appetite  Hip Pain   Pertinent negatives include no numbness  Review of Systems   Review of Systems   Constitutional: Positive for activity change  Negative for appetite change, chills and fever  Respiratory: Negative  Cardiovascular: Negative  Gastrointestinal: Negative for abdominal pain  Genitourinary: Negative for difficulty urinating  Musculoskeletal: Positive for back pain and gait problem  Skin: Negative for color change and rash  Neurological: Negative for weakness and numbness         Current Medications     Long-Term Medications   Medication Sig Dispense Refill    ALPRAZolam (XANAX) 0 25 mg tablet       clonazePAM (KlonoPIN) 0 5 mg tablet Take 0 5 mg by mouth      fluorouracil (EFUDEX) 5 % cream APPLY TO THE SCALP TWICE A DAY X2 WEEKS  0    folic acid (FOLVITE) 1 mg tablet Take 1 mg by mouth      hydroxyurea (HYDREA) 500 mg capsule TAKE 1 CAPSULE BY MOUTH TWICE A  capsule 3    lisinopril (ZESTRIL) 10 mg tablet Take 10 mg by mouth      Multiple Vitamin tablet Take 1 tablet by mouth daily      PARoxetine (PAXIL) 10 mg tablet Take 10 mg by mouth      [DISCONTINUED] DULoxetine (CYMBALTA) 30 mg delayed release capsule Take 30 mg by mouth         Current Allergies     Allergies as of 08/14/2021 - Reviewed 08/14/2021   Allergen Reaction Noted    Penicillins Rash     Sulfa antibiotics Rash 04/27/2015          The following portions of the patient's history were reviewed and updated as appropriate: allergies, current medications, past family history, past medical history, past social history, past surgical history and problem list   Past Medical History:   Diagnosis Date    Cancer ([facility] [address] )     prostate    Hypercholesterolemia     Hypertension     Stroke [facility])      Past Surgical History:   Procedure Laterality Date    PROSTATECTOMY      TOTAL SHOULDER REPLACEMENT       History reviewed  No pertinent family history  Objective   /74   Pulse 82   Temp (!) 96 4 °F (35 8 °C) (Tympanic)   Resp 16   Ht 5' 7 5" (1 715 m)   Wt 97 5 kg (215 lb)   SpO2 95%   BMI 33 18 kg/m²   No LMP for male patient  Physical Exam     Physical Exam  Vitals and nursing note reviewed  Constitutional:       General: He is not in acute distress  Appearance: Normal appearance  He is well-developed  He is not ill-appearing, toxic-appearing or diaphoretic  Comments: Elderly male sitting in exam room chair in no acute distress  Pleasant  Does have some increased pain standing up, transferring from chair to standing  Antalgic gait  Cardiovascular:      Rate and Rhythm: Normal rate and regular rhythm  Pulmonary:      Effort: Pulmonary effort is normal    Musculoskeletal:         General: Tenderness present  Comments: TTP left SI joint area  Increased pain with back extension but not flexion  Increased pain with left lateral bend  Increased pain with single leg stand left leg  No pain with internal external rotation left hip versus right  Skin:     General: Skin is warm and dry  Comments: No acute rashes on skin   Of back or buttocks region  Neurological:      Mental Status: He is alert and oriented to person, place, and time  Sensory: No sensory deficit  Motor: No weakness  Coordination: Coordination normal       Comments: No sensory motor changes left leg versus right     Psychiatric:         Mood and Affect: Mood normal          Behavior: Behavior normal

## 2021-09-02 ENCOUNTER — TELEPHONE (OUTPATIENT)
Dept: HEMATOLOGY ONCOLOGY | Facility: CLINIC | Age: 81
End: 2021-09-02

## 2021-09-02 NOTE — TELEPHONE ENCOUNTER
Scheduling Appointment     Who Is Calling to Schedule Patient    Doctor Dr Winter Michaud   Date and Time 09/08/2021         Patient verbalized understanding    yes

## 2021-09-07 ENCOUNTER — APPOINTMENT (OUTPATIENT)
Dept: LAB | Facility: CLINIC | Age: 81
End: 2021-09-07
Payer: COMMERCIAL

## 2021-09-07 LAB
ALBUMIN SERPL BCP-MCNC: 3.1 G/DL (ref 3.5–5)
ALP SERPL-CCNC: 71 U/L (ref 46–116)
ALT SERPL W P-5'-P-CCNC: 16 U/L (ref 12–78)
ANION GAP SERPL CALCULATED.3IONS-SCNC: 2 MMOL/L (ref 4–13)
AST SERPL W P-5'-P-CCNC: 21 U/L (ref 5–45)
BASOPHILS # BLD AUTO: 0.08 THOUSANDS/ΜL (ref 0–0.1)
BASOPHILS NFR BLD AUTO: 1 % (ref 0–1)
BILIRUB SERPL-MCNC: 0.32 MG/DL (ref 0.2–1)
BUN SERPL-MCNC: 21 MG/DL (ref 5–25)
CALCIUM ALBUM COR SERPL-MCNC: 9.9 MG/DL (ref 8.3–10.1)
CALCIUM SERPL-MCNC: 9.2 MG/DL (ref 8.3–10.1)
CHLORIDE SERPL-SCNC: 112 MMOL/L (ref 100–108)
CO2 SERPL-SCNC: 27 MMOL/L (ref 21–32)
CREAT SERPL-MCNC: 0.83 MG/DL (ref 0.6–1.3)
EOSINOPHIL # BLD AUTO: 0.26 THOUSAND/ΜL (ref 0–0.61)
EOSINOPHIL NFR BLD AUTO: 5 % (ref 0–6)
ERYTHROCYTE [DISTWIDTH] IN BLOOD BY AUTOMATED COUNT: 14.7 % (ref 11.6–15.1)
GFR SERPL CREATININE-BSD FRML MDRD: 81 ML/MIN/1.73SQ M
GLUCOSE P FAST SERPL-MCNC: 92 MG/DL (ref 65–99)
HCT VFR BLD AUTO: 41.1 % (ref 36.5–49.3)
HGB BLD-MCNC: 14 G/DL (ref 12–17)
IMM GRANULOCYTES # BLD AUTO: 0.01 THOUSAND/UL (ref 0–0.2)
IMM GRANULOCYTES NFR BLD AUTO: 0 % (ref 0–2)
LYMPHOCYTES # BLD AUTO: 1.36 THOUSANDS/ΜL (ref 0.6–4.47)
LYMPHOCYTES NFR BLD AUTO: 24 % (ref 14–44)
MCH RBC QN AUTO: 39.3 PG (ref 26.8–34.3)
MCHC RBC AUTO-ENTMCNC: 34.1 G/DL (ref 31.4–37.4)
MCV RBC AUTO: 115 FL (ref 82–98)
MONOCYTES # BLD AUTO: 0.57 THOUSAND/ΜL (ref 0.17–1.22)
MONOCYTES NFR BLD AUTO: 10 % (ref 4–12)
NEUTROPHILS # BLD AUTO: 3.51 THOUSANDS/ΜL (ref 1.85–7.62)
NEUTS SEG NFR BLD AUTO: 60 % (ref 43–75)
NRBC BLD AUTO-RTO: 0 /100 WBCS
PLATELET # BLD AUTO: 352 THOUSANDS/UL (ref 149–390)
PMV BLD AUTO: 10.2 FL (ref 8.9–12.7)
POTASSIUM SERPL-SCNC: 4.3 MMOL/L (ref 3.5–5.3)
PROT SERPL-MCNC: 6.6 G/DL (ref 6.4–8.2)
RBC # BLD AUTO: 3.56 MILLION/UL (ref 3.88–5.62)
SODIUM SERPL-SCNC: 141 MMOL/L (ref 136–145)
WBC # BLD AUTO: 5.79 THOUSAND/UL (ref 4.31–10.16)

## 2021-09-07 PROCEDURE — 36415 COLL VENOUS BLD VENIPUNCTURE: CPT | Performed by: INTERNAL MEDICINE

## 2021-09-07 PROCEDURE — 85025 COMPLETE CBC W/AUTO DIFF WBC: CPT | Performed by: INTERNAL MEDICINE

## 2021-09-07 PROCEDURE — 80053 COMPREHEN METABOLIC PANEL: CPT | Performed by: INTERNAL MEDICINE

## 2021-09-08 ENCOUNTER — DOCUMENTATION (OUTPATIENT)
Dept: OTHER | Facility: HOSPITAL | Age: 81
End: 2021-09-08

## 2021-09-08 ENCOUNTER — OFFICE VISIT (OUTPATIENT)
Dept: HEMATOLOGY ONCOLOGY | Facility: CLINIC | Age: 81
End: 2021-09-08
Payer: COMMERCIAL

## 2021-09-08 VITALS
TEMPERATURE: 97.5 F | SYSTOLIC BLOOD PRESSURE: 152 MMHG | RESPIRATION RATE: 16 BRPM | HEIGHT: 68 IN | BODY MASS INDEX: 32.28 KG/M2 | WEIGHT: 213 LBS | DIASTOLIC BLOOD PRESSURE: 82 MMHG | OXYGEN SATURATION: 94 % | HEART RATE: 84 BPM

## 2021-09-08 DIAGNOSIS — Z15.89 JAK-2 GENE MUTATION: ICD-10-CM

## 2021-09-08 DIAGNOSIS — D75.839 THROMBOCYTOSIS: Primary | ICD-10-CM

## 2021-09-08 DIAGNOSIS — D47.3 THROMBOCYTHEMIA, ESSENTIAL (HCC): ICD-10-CM

## 2021-09-08 PROCEDURE — 99214 OFFICE O/P EST MOD 30 MIN: CPT | Performed by: INTERNAL MEDICINE

## 2021-09-08 NOTE — PROGRESS NOTES
Hematology/Oncology Outpatient Follow- up Note  Damir Mora 78 y o  male MRN: @ Encounter: [id number]        Date:  9/8/2021    Presenting Complaint/Diagnosis : Elevated platelet count with a DARIO-2 positive myeloproliferative disorder      Previous Hematologic/ Oncologic History:    Workup    Current Hematologic/ Oncologic Treatment:      Hydrea 500 mg  3 times a day     Baby aspirin daily    Interval History:      The patient returns for follow-up visit  He is doing well  His blood work is now significantly improved and platelet count is running within normal limits  The patient himself is doing well  [place]  Just got back and is now 1 the [place] for his granddaughter's wedding  Denies any nausea denies any vomiting denies any diarrhea  The rest of his 14 point review of systems today was negative  Test Results:    Imaging: No results found  Labs:   Lab Results   Component Value Date    WBC 5 79 09/07/2021    HGB 14 0 09/07/2021    HCT 41 1 09/07/2021     (H) 09/07/2021     09/07/2021     Lab Results   Component Value Date     10/01/2015    K 4 3 09/07/2021     (H) 09/07/2021    CO2 27 09/07/2021    ANIONGAP 6 10/01/2015    BUN 21 09/07/2021    CREATININE 0 83 09/07/2021    GLUCOSE 103 10/01/2015    GLUF 92 09/07/2021    CALCIUM 9 2 09/07/2021    CORRECTEDCA 9 9 09/07/2021    AST 21 09/07/2021    ALT 16 09/07/2021    ALKPHOS 71 09/07/2021    PROT 6 9 10/01/2015    BILITOT 0 70 10/01/2015    EGFR 81 09/07/2021       Lab Results   Component Value Date    PSA 0 4 05/16/2019    PSA 0 4 12/22/2017    PSA 0 3 11/28/2016     ROS: As stated in the history of present illness otherwise his 14 point review of systems today was negative  Active Problems: There is no problem list on file for this patient        Past Medical History:   Past Medical History:   Diagnosis Date    Cancer [facility])     prostate    Hypercholesterolemia     Hypertension     Stroke [facility])        Surgical History:   Past Surgical History:   Procedure Laterality Date    PROSTATECTOMY      TOTAL SHOULDER REPLACEMENT         Family History:  No family history on file  Cancer-related family history is not on file  Social History:   Social History     Socioeconomic History    Marital status: /Civil Union     Spouse name: Not on file    Number of children: Not on file    Years of education: Not on file    Highest education level: Not on file   Occupational History    Not on file   Tobacco Use    Smoking status: Never Smoker    Smokeless tobacco: Never Used   Substance and Sexual Activity    Alcohol use: No    Drug use: No    Sexual activity: Not on file   Other Topics Concern    Not on file   Social History Narrative    Not on file     Social Determinants of Health     Financial Resource Strain:     Difficulty of Paying Living Expenses:    Food Insecurity:     Worried About Running Out of Food in the Last Year:     [address] in the Last Year:    Transportation Needs:     Lack of Transportation (Medical):      Lack of Transportation (Non-Medical):    Physical Activity:     Days of Exercise per Week:     Minutes of Exercise per Session:    Stress:     Feeling of Stress :    Social Connections:     Frequency of Communication with Friends and Family:     Frequency of Social Gatherings with Friends and Family:     Attends Muslim Services:     Active Member of Clubs or Organizations:     Attends Club or Organization Meetings:     Marital Status:    Intimate Partner Violence:     Fear of Current or Ex-Partner:     Emotionally Abused:     Physically Abused:     Sexually Abused:        Current Medications:   Current Outpatient Medications   Medication Sig Dispense Refill    acetaminophen (TYLENOL) 500 mg tablet Take 500 mg by mouth every 4 (four) hours      ALPRAZolam (XANAX) 0 25 mg tablet       aspirin 81 MG tablet Take by mouth  clindamycin (CLEOCIN) 150 mg capsule TAKE 4 CAPSULE BY ORAL ROUTE 1 HOUR PRIOR TO SURGERY      clonazePAM (KlonoPIN) 0 5 mg tablet Take 0 5 mg by mouth      fluorouracil (EFUDEX) 5 % cream APPLY TO THE SCALP TWICE A DAY X2 WEEKS  0    folic acid (FOLVITE) 1 mg tablet Take 1 mg by mouth      hydroxyurea (HYDREA) 500 mg capsule TAKE 1 CAPSULE BY MOUTH TWICE A  capsule 3    lisinopril (ZESTRIL) 10 mg tablet Take 10 mg by mouth      Multiple Vitamin tablet Take 1 tablet by mouth daily      PARoxetine (PAXIL) 10 mg tablet Take 10 mg by mouth       No current facility-administered medications for this visit  Allergies: Allergies   Allergen Reactions    Penicillins Rash    Sulfa Antibiotics Rash     Other reaction(s): rash       Physical Exam:    Body surface area is 2 09 meters squared  Wt Readings from Last 3 Encounters:   09/08/21 96 6 kg (213 lb)   08/14/21 97 5 kg (215 lb)   06/09/21 101 kg (223 lb)        Temp Readings from Last 3 Encounters:   09/08/21 97 5 °F (36 4 °C) (Temporal)   08/14/21 (!) 96 4 °F (35 8 °C) (Tympanic)   06/09/21 98 2 °F (36 8 °C) (Temporal)        BP Readings from Last 3 Encounters:   09/08/21 152/82   08/14/21 156/74   06/09/21 126/64         Pulse Readings from Last 3 Encounters:   09/08/21 84   08/14/21 82   06/09/21 86        Physical Exam     Constitutional   General appearance: No acute distress, well appearing and well nourished  Eyes   Conjunctiva and lids: No swelling, erythema or discharge  Pupils and irises: Equal, round and reactive to light  Ears, Nose, Mouth, and Throat   External inspection of ears and nose: Normal     Nasal mucosa, septum, and turbinates: Normal without edema or erythema  Oropharynx: Normal with no erythema, edema, exudate or lesions  Pulmonary   Respiratory effort: No increased work of breathing or signs of respiratory distress  Auscultation of lungs: Clear to auscultation      Cardiovascular   Palpation of heart: Normal PMI, no thrills  Auscultation of heart: Normal rate and rhythm, normal S1 and S2, without murmurs  Examination of extremities for edema and/or varicosities: Normal     Carotid pulses: Normal     Abdomen   Abdomen: Non-tender, no masses  Liver and spleen: No hepatomegaly or splenomegaly  Lymphatic   Palpation of lymph nodes in neck: No lymphadenopathy  Musculoskeletal   Gait and station: Normal     Digits and nails: Normal without clubbing or cyanosis  Inspection/palpation of joints, bones, and muscles: Normal     Skin   Skin and subcutaneous tissue: Normal without rashes or lesions  Neurologic   Cranial nerves: Cranial nerves 2-12 intact  Sensation: No sensory loss  Psychiatric   Orientation to person, place, and time: Normal     Mood and affect: Normal         Assessment / Plan:      The patient is an 39-year-old male with JAK2 positive thrombocytosis   He was initially hospitalized for a TIA  He was started on Hydrea  He takes 500 mg 3 times a day  Most recent blood work shows a white count of 5 79 with a hemoglobin of 14 and platelet count of 556  CMP was within acceptable limits  He will stay on his current dose  I will see him back in 2 months  Until then if he has any questions he will call our office  Goals and Barriers:  Current Goal:  Prolong Survival from   Thrombocytosis  Barriers: None  Patient's Capacity to Self Care:  Patient able to self care  Portions of the record may have been created with voice recognition software   Occasional wrong word or "sound a like" substitutions may have occurred due to the inherent limitations of voice recognition software   Read the chart carefully and recognize, using context, where substitutions have occurred

## 2021-11-03 ENCOUNTER — TELEPHONE (OUTPATIENT)
Dept: HEMATOLOGY ONCOLOGY | Facility: HOSPITAL | Age: 81
End: 2021-11-03

## 2021-12-16 ENCOUNTER — APPOINTMENT (OUTPATIENT)
Dept: LAB | Facility: CLINIC | Age: 81
End: 2021-12-16
Payer: COMMERCIAL

## 2021-12-16 DIAGNOSIS — I10 ESSENTIAL HYPERTENSION, BENIGN: ICD-10-CM

## 2021-12-16 DIAGNOSIS — Z79.899 ENCOUNTER FOR LONG-TERM (CURRENT) USE OF OTHER MEDICATIONS: ICD-10-CM

## 2021-12-16 DIAGNOSIS — R41.3 MEMORY LOSS: ICD-10-CM

## 2021-12-16 LAB
TSH SERPL DL<=0.05 MIU/L-ACNC: 3.3 UIU/ML (ref 0.36–3.74)
VIT B12 SERPL-MCNC: 241 PG/ML (ref 100–900)

## 2021-12-16 PROCEDURE — 84443 ASSAY THYROID STIM HORMONE: CPT

## 2021-12-16 PROCEDURE — 82607 VITAMIN B-12: CPT

## 2022-03-03 ENCOUNTER — APPOINTMENT (OUTPATIENT)
Dept: LAB | Facility: CLINIC | Age: 82
End: 2022-03-03
Payer: COMMERCIAL

## 2022-03-03 DIAGNOSIS — C61 MALIGNANT NEOPLASM OF PROSTATE (HCC): ICD-10-CM

## 2022-03-03 LAB — PSA SERPL-MCNC: 0.7 NG/ML (ref 0–4)

## 2022-03-03 PROCEDURE — 84153 ASSAY OF PSA TOTAL: CPT

## 2022-03-11 ENCOUNTER — OFFICE VISIT (OUTPATIENT)
Dept: UROLOGY | Facility: MEDICAL CENTER | Age: 82
End: 2022-03-11
Payer: COMMERCIAL

## 2022-03-11 VITALS
DIASTOLIC BLOOD PRESSURE: 68 MMHG | HEART RATE: 90 BPM | WEIGHT: 214.8 LBS | SYSTOLIC BLOOD PRESSURE: 108 MMHG | BODY MASS INDEX: 32.55 KG/M2 | HEIGHT: 68 IN

## 2022-03-11 DIAGNOSIS — N39.41 URGE INCONTINENCE: ICD-10-CM

## 2022-03-11 DIAGNOSIS — R35.1 NOCTURIA: ICD-10-CM

## 2022-03-11 DIAGNOSIS — C61 PROSTATE CANCER (HCC): Primary | ICD-10-CM

## 2022-03-11 PROBLEM — N32.0 BLADDER NECK CONTRACTURE: Status: ACTIVE | Noted: 2019-03-14

## 2022-03-11 PROBLEM — D69.1 PLATELET DISORDER (HCC): Status: ACTIVE | Noted: 2022-03-11

## 2022-03-11 PROBLEM — C44.319 BASAL CELL CARCINOMA OF SKIN OF OTHER PARTS OF FACE: Status: ACTIVE | Noted: 2022-03-11

## 2022-03-11 LAB
POST-VOID RESIDUAL VOLUME, ML POC: 143 ML
SL AMB  POCT GLUCOSE, UA: NORMAL
SL AMB LEUKOCYTE ESTERASE,UA: NORMAL
SL AMB POCT BILIRUBIN,UA: NORMAL
SL AMB POCT BLOOD,UA: NORMAL
SL AMB POCT CLARITY,UA: CLEAR
SL AMB POCT COLOR,UA: YELLOW
SL AMB POCT KETONES,UA: NORMAL
SL AMB POCT NITRITE,UA: NORMAL
SL AMB POCT PH,UA: 5.5
SL AMB POCT SPECIFIC GRAVITY,UA: 1.02
SL AMB POCT URINE PROTEIN: NORMAL
SL AMB POCT UROBILINOGEN: 0.2

## 2022-03-11 PROCEDURE — 99214 OFFICE O/P EST MOD 30 MIN: CPT | Performed by: UROLOGY

## 2022-03-11 PROCEDURE — 51798 US URINE CAPACITY MEASURE: CPT | Performed by: UROLOGY

## 2022-03-11 PROCEDURE — 81003 URINALYSIS AUTO W/O SCOPE: CPT | Performed by: UROLOGY

## 2022-03-11 NOTE — PATIENT INSTRUCTIONS
Time voiding, try to urinate at least every 2 hours or so  You will know within a month or so if it makes a difference    We can always consider medicines after that if the leakage gets worse

## 2022-03-11 NOTE — PROGRESS NOTES
HISTORY:    1  Increasing urgency, frequency, and incontinence over the past year or so  Near daily occurrence, small amounts of he does not get there in time  He wears pad underwear, that seems to take care of it for him  Nocturia every 1 5-2 hours  2  Prostate cancer, radical prostatectomy long ago, probably 2004  No radiation  PSA rising slowly, 0 7 in March 2022  0 4 in May 2019   0 4 in 2017   0 3 in 2016         ASSESSMENT / PLAN:    1  Says he will usually void 3-4 hours apart during the daytime, and then the urgency comes on quite rapidly  2  We discussed timed voiding, he will try to urinate more no more than 2 hours apart and see if that helps  3   mL today, that is borderline if we were to consider taking an overactive bladder medicine  4  Follow-up six months or sooner if needed to discuss how the voiding pattern is going and how much leakage she has  The following portions of the patient's history were reviewed and updated as appropriate: allergies, current medications, past family history, past medical history, past social history, past surgical history and problem list     Review of Systems   All other systems reviewed and are negative  Objective:     Physical Exam  Constitutional:       General: He is not in acute distress  Appearance: He is well-developed  He is not diaphoretic  HENT:      Head: Normocephalic and atraumatic  Eyes:      General: No scleral icterus  Pulmonary:      Effort: Pulmonary effort is normal    Genitourinary:     Comments: Penis testes normal  Skin:     Coloration: Skin is not pale  Neurological:      Mental Status: He is alert and oriented to person, place, and time  Psychiatric:         Behavior: Behavior normal          Thought Content:  Thought content normal          Judgment: Judgment normal            0   Lab Value Date/Time    PSA 0 7 03/03/2022 1330    PSA 0 4 05/16/2019 1114    PSA 0 4 12/22/2017 1232    PSA 0 3 11/28/2016 1109    PSA 0 4 11/23/2015 1127    PSA 0 3 11/25/2014 0854   ]  BUN   Date Value Ref Range Status   03/03/2022 19 5 - 25 mg/dL Final   10/01/2015 24 5 - 25 mg/dL Final     Creatinine   Date Value Ref Range Status   03/03/2022 1 08 0 60 - 1 30 mg/dL Final     Comment:     Standardized to IDMS reference method   10/01/2015 0 81 0 60 - 1 30 mg/dL Final     Comment:     Standardized to IDMS reference method     No components found for: CBC      Patient Active Problem List   Diagnosis    Prostate cancer ([facility] )    Basal cell carcinoma of skin of other parts of face    Bladder neck contracture    Cerebrovascular accident (CVA) ([place] [address] )    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[address] )    Urge incontinence    Nocturia        Diagnoses and all orders for this visit:    Prostate cancer ([place] [address] )  -     POCT urine dip auto non-scope  -     POCT Measure PVR  -     PSA Total, Diagnostic; Future    Nocturia  -     POCT urine dip auto non-scope  -     POCT Measure PVR    Urge incontinence           Patient ID: Marilyn Moss is a 78 y o  male        Current Outpatient Medications:     acetaminophen (TYLENOL) 500 mg tablet, Take 500 mg by mouth every 4 (four) hours, Disp: , Rfl:     ALPRAZolam (XANAX) 0 25 mg tablet, , Disp: , Rfl:     aspirin 81 MG tablet, Take by mouth, Disp: , Rfl:     hydroxyurea (HYDREA) 500 mg capsule, TAKE 1 CAPSULE BY MOUTH TWICE A DAY (Patient taking differently: Taking 3 capsules a day ), Disp: 180 capsule, Rfl: 3    lisinopril (ZESTRIL) 10 mg tablet, Take 10 mg by mouth, Disp: , Rfl:     Multiple Vitamin tablet, Take 1 tablet by mouth daily, Disp: , Rfl:     PARoxetine (PAXIL) 10 mg tablet, Take 10 mg by mouth, Disp: , Rfl:     fluorouracil (EFUDEX) 5 % cream, APPLY TO THE SCALP TWICE A DAY X2 WEEKS (Patient not taking: Reported on 3/11/2022), Disp: , Rfl: 0    Past Medical History:   Diagnosis Date    Cancer ([facility] )     prostate    Hypercholesterolemia     Hypertension     Stroke [facility])        Past Surgical History:   Procedure Laterality Date    PROSTATECTOMY      TOTAL SHOULDER REPLACEMENT         Social History

## 2022-03-22 ENCOUNTER — DOCUMENTATION (OUTPATIENT)
Dept: OTHER | Facility: HOSPITAL | Age: 82
End: 2022-03-22

## 2022-03-22 NOTE — PROGRESS NOTES
I called and left message on machine notifying patient of closure of the [employer] trial   Also notified that survival follow up is not needed after 29 March 2022

## 2022-05-23 DIAGNOSIS — Z15.89 JAK-2 GENE MUTATION: ICD-10-CM

## 2022-05-23 RX ORDER — HYDROXYUREA 500 MG/1
CAPSULE ORAL
Qty: 270 CAPSULE | Refills: 3 | Status: SHIPPED | OUTPATIENT
Start: 2022-05-23

## 2022-08-09 ENCOUNTER — APPOINTMENT (OUTPATIENT)
Dept: LAB | Facility: CLINIC | Age: 82
End: 2022-08-09
Payer: COMMERCIAL

## 2022-08-09 DIAGNOSIS — Z15.89 JAK-2 GENE MUTATION: ICD-10-CM

## 2022-08-09 DIAGNOSIS — E78.00 PURE HYPERCHOLESTEROLEMIA: ICD-10-CM

## 2022-08-09 DIAGNOSIS — C61 PROSTATE CANCER (HCC): ICD-10-CM

## 2022-08-09 DIAGNOSIS — Z79.899 ENCOUNTER FOR LONG-TERM (CURRENT) USE OF OTHER MEDICATIONS: ICD-10-CM

## 2022-08-09 DIAGNOSIS — D75.839 THROMBOCYTOSIS: ICD-10-CM

## 2022-08-09 DIAGNOSIS — I10 ESSENTIAL HYPERTENSION, MALIGNANT: ICD-10-CM

## 2022-08-09 DIAGNOSIS — D47.3 THROMBOCYTHEMIA, ESSENTIAL (HCC): ICD-10-CM

## 2022-08-09 LAB
CHOLEST SERPL-MCNC: 213 MG/DL
HDLC SERPL-MCNC: 44 MG/DL
LDH SERPL-CCNC: 196 U/L (ref 81–234)
LDLC SERPL CALC-MCNC: 142 MG/DL (ref 0–100)
NONHDLC SERPL-MCNC: 169 MG/DL
PSA SERPL-MCNC: 0.6 NG/ML (ref 0–4)
TRIGL SERPL-MCNC: 135 MG/DL

## 2022-08-09 PROCEDURE — 83615 LACTATE (LD) (LDH) ENZYME: CPT

## 2022-08-09 PROCEDURE — 84153 ASSAY OF PSA TOTAL: CPT

## 2022-08-09 PROCEDURE — 80061 LIPID PANEL: CPT

## 2022-09-19 ENCOUNTER — OFFICE VISIT (OUTPATIENT)
Dept: UROLOGY | Facility: MEDICAL CENTER | Age: 82
End: 2022-09-19
Payer: COMMERCIAL

## 2022-09-19 VITALS
DIASTOLIC BLOOD PRESSURE: 80 MMHG | WEIGHT: 203 LBS | HEIGHT: 68 IN | HEART RATE: 93 BPM | BODY MASS INDEX: 30.77 KG/M2 | SYSTOLIC BLOOD PRESSURE: 130 MMHG | OXYGEN SATURATION: 96 %

## 2022-09-19 DIAGNOSIS — R39.15 URGENCY OF URINATION: ICD-10-CM

## 2022-09-19 DIAGNOSIS — C61 PROSTATE CANCER (HCC): Primary | ICD-10-CM

## 2022-09-19 LAB — POST-VOID RESIDUAL VOLUME, ML POC: 78 ML

## 2022-09-19 PROCEDURE — 99213 OFFICE O/P EST LOW 20 MIN: CPT | Performed by: UROLOGY

## 2022-09-19 PROCEDURE — 51798 US URINE CAPACITY MEASURE: CPT | Performed by: UROLOGY

## 2022-09-19 RX ORDER — PAROXETINE HYDROCHLORIDE 40 MG/1
40 TABLET, FILM COATED ORAL DAILY
COMMUNITY
Start: 2022-09-13

## 2022-09-19 NOTE — PROGRESS NOTES
HISTORY:    Increasing urgency, frequency, and incontinence over the past year or so  Near daily occurrence, small amounts of he does not get there in time  He wears pad underwear, that seems to take care of it for him      Nocturia every 1 5-2 hours      2  Prostate cancer, radical prostatectomy long ago, probably 2004  No radiation        PSA rising slowly, 0 6 in Aug 2022  0 7 in March 2022  0 4 in May 2019   0 4 in 2017   0 3 in 2016         ASSESSMENT / PLAN:    Patient is comfortable voiding status as is, he knows the option of an overactive bladder medicine    Likewise, he feels PSA is fairly stable and does not feel the need for any imaging or adjuvant therapy    Follow-up one year    The following portions of the patient's history were reviewed and updated as appropriate: allergies, current medications, past family history, past medical history, past social history, past surgical history and problem list     Review of Systems   All other systems reviewed and are negative  Objective:     Physical Exam  Constitutional:       General: He is not in acute distress  Appearance: He is well-developed  He is not diaphoretic  HENT:      Head: Normocephalic and atraumatic  Eyes:      General: No scleral icterus  Pulmonary:      Effort: Pulmonary effort is normal    Skin:     Coloration: Skin is not pale  Neurological:      Mental Status: He is alert and oriented to person, place, and time  Psychiatric:         Behavior: Behavior normal          Thought Content:  Thought content normal          Judgment: Judgment normal            0   Lab Value Date/Time    PSA 0 6 08/09/2022 1120    PSA 0 7 03/03/2022 1330    PSA 0 4 05/16/2019 1114    PSA 0 3 11/28/2016 1109    PSA 0 4 11/23/2015 1127    PSA 0 3 11/25/2014 0854   ]  BUN   Date Value Ref Range Status   08/09/2022 23 5 - 25 mg/dL Final   10/01/2015 24 5 - 25 mg/dL Final     Creatinine   Date Value Ref Range Status   08/09/2022 0 98 0 60 - 1 30 mg/dL Final     Comment:     Standardized to IDMS reference method   10/01/2015 0 81 0 60 - 1 30 mg/dL Final     Comment:     Standardized to IDMS reference method     No components found for: CBC      Patient Active Problem List   Diagnosis    Prostate cancer ([place] [address] )    Basal cell carcinoma of skin of other parts of face    Bladder neck contracture    Cerebrovascular accident (CVA) ([place] [address] )    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[address] )    Urge incontinence    Nocturia        Diagnoses and all orders for this visit:    Prostate cancer ([place] [address] )  -     POCT Measure PVR  -     PSA Total, Diagnostic; Future    Urgency of urination    Other orders  -     PARoxetine (PAXIL) 40 MG tablet; Take 40 mg by mouth daily           Patient ID: Gary Joel is a 78 y o  male        Current Outpatient Medications:     acetaminophen (TYLENOL) 500 mg tablet, Take 500 mg by mouth every 4 (four) hours, Disp: , Rfl:     ALPRAZolam (XANAX) 0 25 mg tablet, , Disp: , Rfl:     aspirin 81 MG tablet, Take by mouth, Disp: , Rfl:     cyanocobalamin (VITAMIN B-12) 500 MCG tablet, Take 1 tablet by mouth daily, Disp: , Rfl:     fluorouracil (EFUDEX) 5 % cream, APPLY TO THE SCALP TWICE A DAY X2 WEEKS, Disp: , Rfl: 0    hydroxyurea (HYDREA) 500 mg capsule, TAKE 1 CAPSULE (500 MG TOTAL) BY MOUTH DAILY THREE TIMES A DAY ALTERNATING WITH TWO TIMES A DAY, Disp: 270 capsule, Rfl: 3    lisinopril (ZESTRIL) 10 mg tablet, Take 10 mg by mouth, Disp: , Rfl:     Multiple Vitamin tablet, Take 1 tablet by mouth daily, Disp: , Rfl:     PARoxetine (PAXIL) 40 MG tablet, Take 40 mg by mouth daily, Disp: , Rfl:     Past Medical History:   Diagnosis Date    Cancer ([place] [address] )     prostate    Hypercholesterolemia     Hypertension     Stroke [facility])        Past Surgical History:   Procedure Laterality Date    PROSTATECTOMY      TOTAL SHOULDER REPLACEMENT         Social History

## 2022-11-20 ENCOUNTER — APPOINTMENT (EMERGENCY)
Dept: RADIOLOGY | Facility: HOSPITAL | Age: 82
End: 2022-11-20

## 2022-11-20 ENCOUNTER — HOSPITAL ENCOUNTER (EMERGENCY)
Facility: HOSPITAL | Age: 82
Discharge: HOME | End: 2022-11-20
Attending: EMERGENCY MEDICINE

## 2022-11-20 VITALS
WEIGHT: 205 LBS | RESPIRATION RATE: 18 BRPM | HEART RATE: 83 BPM | DIASTOLIC BLOOD PRESSURE: 74 MMHG | TEMPERATURE: 97.7 F | SYSTOLIC BLOOD PRESSURE: 162 MMHG | HEIGHT: 67 IN | OXYGEN SATURATION: 97 % | BODY MASS INDEX: 32.18 KG/M2

## 2022-11-20 DIAGNOSIS — J18.9 PNEUMONIA: Primary | ICD-10-CM

## 2022-11-20 LAB
FLUAV RNA RESP QL NAA+PROBE: NEGATIVE
FLUBV RNA RESP QL NAA+PROBE: NEGATIVE
RSV RNA RESP QL NAA+PROBE: NEGATIVE
SARS-COV-2 RNA RESP QL NAA+PROBE: NEGATIVE

## 2022-11-20 RX ORDER — DOXYCYCLINE HYCLATE 100 MG/1
100 CAPSULE ORAL 2 TIMES DAILY
Qty: 14 CAPSULE | Refills: 0 | Status: SHIPPED | OUTPATIENT
Start: 2022-11-20 | End: 2022-11-27

## 2022-11-20 RX ORDER — DOXYCYCLINE HYCLATE 100 MG/1
100 CAPSULE ORAL ONCE
Status: COMPLETED | OUTPATIENT
Start: 2022-11-20 | End: 2022-11-20

## 2022-11-20 RX ADMIN — DOXYCYCLINE 100 MG: 100 CAPSULE ORAL at 16:13

## 2022-11-21 NOTE — ED PROVIDER NOTES
History  Chief Complaint   Patient presents with   • Cough     Patient complaint of cough x 5 days      27-year-old male presents for evaluation of cough that started 5 days ago  Denies associated chest pain, shortness of breath  Wife at home with similar symptoms  Denies fever  No medication prior to arrival   No specific alleviating or aggravating factors  Prior to Admission Medications   Prescriptions Last Dose Informant Patient Reported? Taking? ALPRAZolam (XANAX) 0 25 mg tablet   Yes No   Multiple Vitamin tablet   Yes No   Sig: Take 1 tablet by mouth daily   PARoxetine (PAXIL) 40 MG tablet   Yes No   Sig: Take 40 mg by mouth daily   acetaminophen (TYLENOL) 500 mg tablet   Yes No   Sig: Take 500 mg by mouth every 4 (four) hours   aspirin 81 MG tablet   Yes No   Sig: Take by mouth   cyanocobalamin (VITAMIN B-12) 500 MCG tablet   Yes No   Sig: Take 1 tablet by mouth daily   fluorouracil (EFUDEX) 5 % cream   Yes No   Sig: APPLY TO THE SCALP TWICE A DAY X2 WEEKS   hydroxyurea (HYDREA) 500 mg capsule   No No   Sig: TAKE 1 CAPSULE (500 MG TOTAL) BY MOUTH DAILY THREE TIMES A DAY ALTERNATING WITH TWO TIMES A DAY   lisinopril (ZESTRIL) 10 mg tablet   Yes No   Sig: Take 10 mg by mouth      Facility-Administered Medications: None       Past Medical History:   Diagnosis Date   • Cancer ([facility] [address] )     prostate   • Hypercholesterolemia    • Hypertension    • Stroke [facility])        Past Surgical History:   Procedure Laterality Date   • PROSTATECTOMY     • TOTAL SHOULDER REPLACEMENT         History reviewed  No pertinent family history  I have reviewed and agree with the history as documented      E-Cigarette/Vaping   • E-Cigarette Use Never User      E-Cigarette/Vaping Substances   • Nicotine No    • THC No    • CBD No    • Flavoring No    • Other No    • Unknown No      Social History     Tobacco Use   • Smoking status: Never   • Smokeless tobacco: Never   Vaping Use   • Vaping Use: Never used   Substance Use Topics • Alcohol use: No   • Drug use: No       Review of Systems   Constitutional: Negative for fever  Respiratory: Positive for cough  All other systems reviewed and are negative  Physical Exam  Physical Exam  Vitals and nursing note reviewed  Constitutional:       General: He is not in acute distress  Appearance: He is well-developed  HENT:      Head: Normocephalic and atraumatic  Right Ear: External ear normal       Left Ear: External ear normal       Nose: Nose normal    Eyes:      General: No scleral icterus  Pulmonary:      Effort: Pulmonary effort is normal  No respiratory distress  Breath sounds: No wheezing  Abdominal:      General: There is no distension  Palpations: Abdomen is soft  Musculoskeletal:         General: No deformity  Normal range of motion  Cervical back: Normal range of motion and neck supple  Skin:     General: Skin is warm  Findings: No rash  Neurological:      General: No focal deficit present  Mental Status: He is alert        Gait: Gait normal    Psychiatric:         Mood and Affect: Mood normal          Vital Signs  ED Triage Vitals [11/20/22 1521]   Temperature Pulse Respirations Blood Pressure SpO2   97 7 °F (36 5 °C) 83 18 (!) 207/92 97 %      Temp src Heart Rate Source Patient Position - Orthostatic VS BP Location FiO2 (%)   -- -- -- -- --      Pain Score       --           Vitals:    11/20/22 1521 11/20/22 1609   BP: (!) 207/92 162/74   Pulse: 83          Visual Acuity      ED Medications  Medications   doxycycline hyclate (VIBRAMYCIN) capsule 100 mg (100 mg Oral Given 11/20/22 1613)       Diagnostic Studies  Results Reviewed     Procedure Component Value Units Date/Time    COVID[id number]  (Normal) Collected: 11/20/22 1548    Lab Status: Final result Specimen: Nares from Nose Updated: 11/20/22 1638     SARS-CoV-2 Negative     INFLUENZA A PCR Negative     INFLUENZA B PCR Negative     RSV PCR Negative    Narrative:      FOR PEDIATRIC PATIENTS - copy/paste COVID Guidelines URL to browser: [email or web address]    SARS-CoV-2 assay is a Nucleic Acid Amplification assay intended for the  qualitative detection of nucleic acid from SARS-CoV-2 in nasopharyngeal  swabs  Results are for the presumptive identification of SARS-CoV-2 RNA  Positive results are indicative of infection with SARS-CoV-2, the virus  causing COVID-19, but do not rule out bacterial infection or co-infection  with other viruses  Laboratories within the [place] and its  territories are required to report all positive results to the appropriate  public health authorities  Negative results do not preclude SARS-CoV-2  infection and should not be used as the sole basis for treatment or other  patient management decisions  Negative results must be combined with  clinical observations, patient history, and epidemiological information  This test has not been FDA cleared or approved  This test has been authorized by FDA under an Emergency Use Authorization  (EUA)  This test is only authorized for the duration of time the  declaration that circumstances exist justifying the authorization of the  emergency use of an in vitro diagnostic tests for detection of SARS-CoV-2  virus and/or diagnosis of COVID-19 infection under section 564(b)(1) of  the Act, 21 U  S C  260GFD-8(S)(6), unless the authorization is terminated  or revoked sooner  The test has been validated but independent review by FDA  and CLIA is pending  Test performed using [employer] GeneXpert: This RT-PCR assay targets N2,  a region unique to SARS-CoV-2  A conserved region in the E-gene was chosen  for pan-Sarbecovirus detection which includes SARS-CoV-2  According to CMS-2020-01-R, this platform meets the definition of high-throughput technology                   XR chest 2 views    (Results Pending) Procedures  Procedures         ED Course                               SBIRT 20yo+    Flowsheet Row Most Recent Value   SBIRT (25 yo +)    In order to provide better care to our patients, we are screening all of our patients for alcohol and drug use  Would it be okay to ask you these screening questions? Yes Filed at: 11/20/2022 1548   Initial Alcohol Screen: US AUDIT-C     1  How often do you have a drink containing alcohol? 0 Filed at: 11/20/2022 1548   2  How many drinks containing alcohol do you have on a typical day you are drinking? 0 Filed at: 11/20/2022 1548   3a  Male UNDER 65: How often do you have five or more drinks on one occasion? 0 Filed at: 11/20/2022 1548   3b  FEMALE Any Age, or MALE 65+: How often do you have 4 or more drinks on one occassion? 0 Filed at: 11/20/2022 1548   Audit-C Score 0 Filed at: 11/20/2022 1548   MARISSA: How many times in the past year have you    Used an illegal drug or used a prescription medication for non-medical reasons? Never Filed at: 11/20/2022 1548                    MDM  Number of Diagnoses or Management Options  Pneumonia: new and requires workup  Diagnosis management comments: 78-year-old male presenting with persistent cough  Chest x-ray with hazy opacity  Will treat with abx  FU PCP  RTED discussed          Amount and/or Complexity of Data Reviewed  Tests in the radiology section of CPT®: ordered and reviewed  Tests in the medicine section of CPT®: reviewed and ordered  Decide to obtain previous medical records or to obtain history from someone other than the patient: yes  Review and summarize past medical records: yes        Disposition  Final diagnoses:   Pneumonia     Time reflects when diagnosis was documented in both MDM as applicable and the Disposition within this note     Time User Action Codes Description Comment    11/20/2022  4:09 PM Heather Haro Add [J18 9] Pneumonia       ED Disposition     ED Disposition   Discharge    Condition   Stable Date/Time   Sun Nov 20, 2022  4:09 PM    Comment   Juliette Yoder discharge to home/self care  Follow-up Information     Follow up With Specialties Details Why Contact Info Additional Information    Tara Mcnamara MD Internal Medicine In 3 days  [facility] Emergency [facility] Emergency Medicine  If symptoms worsen [address] [address]  [address] [facility] Emergency Department, [address] [place], [place], [address]          Discharge Medication List as of 11/20/2022  4:10 PM      START taking these medications    Details   doxycycline hyclate (VIBRAMYCIN) 100 mg capsule Take 1 capsule (100 mg total) by mouth 2 (two) times a day for 7 days, Starting Sun 11/20/2022, Until Sun 11/27/2022, Normal         CONTINUE these medications which have NOT CHANGED    Details   acetaminophen (TYLENOL) 500 mg tablet Take 500 mg by mouth every 4 (four) hours, Starting Fri 11/17/2017, Historical Med      ALPRAZolam (XANAX) 0 25 mg tablet Starting Sun 10/6/2019, Historical Med      aspirin 81 MG tablet Take by mouth, Historical Med      cyanocobalamin (VITAMIN B-12) 500 MCG tablet Take 1 tablet by mouth daily, Starting Tue 12/21/2021, Historical Med      fluorouracil (EFUDEX) 5 % cream APPLY TO THE SCALP TWICE A DAY X2 WEEKS, Historical Med      hydroxyurea (HYDREA) 500 mg capsule TAKE 1 CAPSULE (500 MG TOTAL) BY MOUTH DAILY THREE TIMES A DAY ALTERNATING WITH TWO TIMES A DAY, Normal      lisinopril (ZESTRIL) 10 mg tablet Take 10 mg by mouth, Historical Med      Multiple Vitamin tablet Take 1 tablet by mouth daily, Historical Med      PARoxetine (PAXIL) 40 MG tablet Take 40 mg by mouth daily, Starting Tue 9/13/2022, Historical Med             No discharge procedures on file      PDMP Review     None          ED Provider  Electronically Signed by Dianna Orta,   11/20/22 2125

## 2023-02-17 ENCOUNTER — TELEPHONE (OUTPATIENT)
Dept: HEMATOLOGY ONCOLOGY | Facility: CLINIC | Age: 83
End: 2023-02-17

## 2023-02-17 NOTE — TELEPHONE ENCOUNTER
Scheduling Appointment SEND TO POOLS    Person calling in Spouse     If other than patient calling, are they listed on the communication consent form? Yes   Doctor Dr Chloé Marin   Appointment date and time 02/24 at 9:40am   Reason for scheduling appointment Follow up   Patient verbalized understanding?   Yes   No show policy reviewed with patient Yes

## 2023-02-24 ENCOUNTER — OFFICE VISIT (OUTPATIENT)
Dept: HEMATOLOGY ONCOLOGY | Facility: HOSPITAL | Age: 83
End: 2023-02-24

## 2023-02-24 VITALS
BODY MASS INDEX: 32.8 KG/M2 | SYSTOLIC BLOOD PRESSURE: 120 MMHG | HEART RATE: 98 BPM | DIASTOLIC BLOOD PRESSURE: 76 MMHG | WEIGHT: 209 LBS | OXYGEN SATURATION: 98 % | RESPIRATION RATE: 14 BRPM | HEIGHT: 67 IN | TEMPERATURE: 98 F

## 2023-02-24 DIAGNOSIS — D47.3 THROMBOCYTHEMIA, ESSENTIAL (HCC): ICD-10-CM

## 2023-02-24 DIAGNOSIS — D75.839 THROMBOCYTOSIS: ICD-10-CM

## 2023-02-24 DIAGNOSIS — Z15.89 JAK-2 GENE MUTATION: Primary | ICD-10-CM

## 2023-02-24 DIAGNOSIS — C61 PROSTATE CANCER (HCC): ICD-10-CM

## 2023-02-24 RX ORDER — PRAMIPEXOLE DIHYDROCHLORIDE 0.25 MG/1
0.25 TABLET ORAL 2 TIMES DAILY
COMMUNITY
Start: 2022-11-29

## 2023-02-24 RX ORDER — APIXABAN 5 MG/1
5 TABLET, FILM COATED ORAL 2 TIMES DAILY
COMMUNITY
Start: 2023-02-16

## 2023-02-24 NOTE — PROGRESS NOTES
Hematology/Oncology Outpatient Follow- up Note  Rosalino Quiroz 78 y o  male MRN: @ Encounter: [id number]        Date:  2/24/2023    Presenting Complaint/Diagnosis :     Elevated platelet count with a DARIO-2 positive myeloproliferative disorder      Previous Hematologic/ Oncologic History:      Work-up    Current Hematologic/ Oncologic Treatment:      Hydrea 500 mg 3 times daily  Patient is now on Eliquis because of recent DVT and PE  Interval History:      Patient returns for follow-up visit  His white count is running in an acceptable range as is his platelet count  He was admitted to [facility] with a DVT and PE  He is on Eliquis  He is doing well on this  Denies any nausea denies any vomiting denies any diarrhea  Overall is otherwise at baseline  At this point he was told he needs lifelong anticoagulation  I think this is reasonable with his essential thrombocytosis/myeloproliferative disorder considering he had extensive clotting  The rest of his 14 point review of systems today was negative  This could be readdressed but I explained to him he needs at least 6 months of anticoagulation  Test Results:    Imaging: No results found      Labs:   Lab Results   Component Value Date    WBC 10 02 08/09/2022    HGB 14 9 08/09/2022    HCT 43 6 08/09/2022     (H) 08/09/2022     (H) 08/09/2022     Lab Results   Component Value Date     10/01/2015    K 4 9 08/09/2022     08/09/2022    CO2 26 08/09/2022    ANIONGAP 6 10/01/2015    BUN 23 08/09/2022    CREATININE 0 98 08/09/2022    GLUCOSE 103 10/01/2015    GLUF 189 (H) 08/09/2022    CALCIUM 9 3 08/09/2022    CORRECTEDCA 9 6 03/03/2022    AST 18 08/09/2022    ALT 15 08/09/2022    ALKPHOS 61 08/09/2022    PROT 6 9 10/01/2015    BILITOT 0 70 10/01/2015    EGFR 70 08/09/2022       Lab Results   Component Value Date    PSA 0 6 08/09/2022    PSA 0 7 03/03/2022    PSA 0 4 05/16/2019     Lab Results   Component Value Date VDXGDZDI56 241 12/16/2021         ROS: As stated in the history of present illness otherwise his 14 point review of systems today was negative  Active Problems:   Patient Active Problem List   Diagnosis   • Prostate cancer ([facility] )   • Basal cell carcinoma of skin of other parts of face   • Bladder neck contracture   • Cerebrovascular accident (CVA) ([facility] )   • 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[facility] )   • Urge incontinence   • Nocturia       Past Medical History:   Past Medical History:   Diagnosis Date   • Cancer ([place] [address] )     prostate   • Hypercholesterolemia    • Hypertension    • Stroke [facility]        Surgical History:   Past Surgical History:   Procedure Laterality Date   • PROSTATECTOMY     • TOTAL SHOULDER REPLACEMENT         Family History:  No family history on file  Cancer-related family history is not on file      Social History:   Social History     Socioeconomic History   • Marital status: /Civil Union     Spouse name: Not on file   • Number of children: Not on file   • Years of education: Not on file   • Highest education level: Not on file   Occupational History   • Not on file   Tobacco Use   • Smoking status: Never   • Smokeless tobacco: Never   Vaping Use   • Vaping Use: Never used   Substance and Sexual Activity   • Alcohol use: No   • Drug use: No   • Sexual activity: Not on file   Other Topics Concern   • Not on file   Social History Narrative   • Not on file     Social Determinants of Health     Financial Resource Strain: Not on file   Food Insecurity: Not on file   Transportation Needs: Not on file   Physical Activity: Not on file   Stress: Not on file   Social Connections: Not on file   Intimate Partner Violence: Not on file   Housing Stability: Not on file       Current Medications:   Current Outpatient Medications   Medication Sig Dispense Refill   • acetaminophen (TYLENOL) 500 mg tablet Take 500 mg by mouth every 4 (four) hours     • ALPRAZolam (XANAX) 0 25 mg tablet      • aspirin 81 MG tablet Take by mouth     • cyanocobalamin (VITAMIN B-12) 500 MCG tablet Take 1 tablet by mouth daily     • fluorouracil (EFUDEX) 5 % cream APPLY TO THE SCALP TWICE A DAY X2 WEEKS  0   • hydroxyurea (HYDREA) 500 mg capsule TAKE 1 CAPSULE (500 MG TOTAL) BY MOUTH DAILY THREE TIMES A DAY ALTERNATING WITH TWO TIMES A  capsule 3   • lisinopril (ZESTRIL) 10 mg tablet Take 10 mg by mouth     • Multiple Vitamin tablet Take 1 tablet by mouth daily     • PARoxetine (PAXIL) 40 MG tablet Take 40 mg by mouth daily       No current facility-administered medications for this visit  Allergies: Allergies   Allergen Reactions   • Penicillins Rash   • Sulfa Antibiotics Rash     Other reaction(s): rash       Physical Exam:    Body surface area is 2 06 meters squared  Wt Readings from Last 3 Encounters:   02/24/23 94 8 kg (209 lb)   11/20/22 93 kg (205 lb)   09/19/22 92 1 kg (203 lb)        Temp Readings from Last 3 Encounters:   02/24/23 98 °F (36 7 °C) (Temporal)   11/20/22 97 7 °F (36 5 °C)   09/08/21 97 5 °F (36 4 °C) (Temporal)        BP Readings from Last 3 Encounters:   02/24/23 120/76   11/20/22 162/74   09/19/22 130/80         Pulse Readings from Last 3 Encounters:   02/24/23 98   11/20/22 83   09/19/22 93         Physical Exam     Constitutional   General appearance: No acute distress, well appearing and well nourished  Eyes   Conjunctiva and lids: No swelling, erythema or discharge  Pupils and irises: Equal, round and reactive to light  Ears, Nose, Mouth, and Throat   External inspection of ears and nose: Normal     Nasal mucosa, septum, and turbinates: Normal without edema or erythema  Oropharynx: Normal with no erythema, edema, exudate or lesions  Pulmonary   Respiratory effort: No increased work of breathing or signs of respiratory distress  Auscultation of lungs: Clear to auscultation  Cardiovascular   Palpation of heart: Normal PMI, no thrills      Auscultation of heart: Normal rate and rhythm, normal S1 and S2, without murmurs  Examination of extremities for edema and/or varicosities: Normal     Carotid pulses: Normal     Abdomen   Abdomen: Non-tender, no masses  Liver and spleen: No hepatomegaly or splenomegaly  Lymphatic   Palpation of lymph nodes in neck: No lymphadenopathy  Musculoskeletal   Gait and station: Normal     Digits and nails: Normal without clubbing or cyanosis  Inspection/palpation of joints, bones, and muscles: Normal     Skin   Skin and subcutaneous tissue: Normal without rashes or lesions  Neurologic   Cranial nerves: Cranial nerves 2-12 intact  Sensation: No sensory loss  Psychiatric   Orientation to person, place, and time: Normal     Mood and affect: Normal         Assessment / Plan:      The patient is an 78-year-old male with JAK2 positive thrombocytosis   He was initially hospitalized for a TIA    He was started on Hydrea  He takes 500 mg 3 times a day  His most recent blood work shows a hemoglobin of 15 0 with a white count of 8 1 and platelet count of 294  Creatinine is within acceptable limits  He will stay on his current dose  I will see him back in 3 months  He is getting his anticoagulation through his primary care physician's office  He will stay on this for now  Will stay on his current dose of Hydrea  Until then if he has any questions he will call our office  Goals and Barriers:  Current Goal:  Prolong Survival from JAK2 positive thrombocytosis  Barriers: None  Patient's Capacity to Self Care:  Patient able to self care  Portions of the record may have been created with voice recognition software  Occasional wrong word or "sound a like" substitutions may have occurred due to the inherent limitations of voice recognition software  Read the chart carefully and recognize, using context, where substitutions have occurred

## 2023-04-30 ENCOUNTER — HOSPITAL ENCOUNTER (EMERGENCY)
Facility: HOSPITAL | Age: 83
Discharge: HOME | End: 2023-04-30
Attending: SURGERY

## 2023-04-30 ENCOUNTER — APPOINTMENT (EMERGENCY)
Dept: RADIOLOGY | Facility: HOSPITAL | Age: 83
End: 2023-04-30

## 2023-04-30 VITALS
SYSTOLIC BLOOD PRESSURE: 130 MMHG | WEIGHT: 211.64 LBS | HEART RATE: 80 BPM | TEMPERATURE: 97.8 F | RESPIRATION RATE: 16 BRPM | OXYGEN SATURATION: 96 % | DIASTOLIC BLOOD PRESSURE: 74 MMHG
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
## 2023-04-30 DIAGNOSIS — S01.81XA LACERATION OF FOREHEAD, INITIAL ENCOUNTER: ICD-10-CM

## 2023-04-30 LAB
BASE EXCESS BLDA CALC-SCNC: 2 MMOL/L (ref -2–3)
CA-I BLD-SCNC: 1.2 MMOL/L (ref 1.12–1.32)
GLUCOSE SERPL-MCNC: 154 MG/DL (ref 65–140)
HCO3 BLDA-SCNC: 27.6 MMOL/L (ref 24–30)
HCT VFR BLD CALC: 42 % (ref 36.5–49.3)
HGB BLDA-MCNC: 14.3 G/DL (ref 12–17)
PCO2 BLD: 29 MMOL/L (ref 21–32)
PCO2 BLD: 44.1 MM HG (ref 42–50)
PH BLD: 7.41 [PH] (ref 7.3–7.4)
PO2 BLD: 28 MM HG (ref 35–45)
POTASSIUM BLD-SCNC: 4.2 MMOL/L (ref 3.5–5.3)
SAO2 % BLD FROM PO2: 53 % (ref 60–85)
SODIUM BLD-SCNC: 144 MMOL/L (ref 136–145)
SPECIMEN SOURCE: ABNORMAL

## 2023-04-30 RX ORDER — LIDOCAINE HYDROCHLORIDE 10 MG/ML
10 INJECTION, SOLUTION EPIDURAL; INFILTRATION; INTRACAUDAL; PERINEURAL ONCE
Status: COMPLETED | OUTPATIENT
Start: 2023-04-30 | End: 2023-04-30

## 2023-04-30 RX ORDER — GINSENG 100 MG
1 CAPSULE ORAL ONCE
Status: COMPLETED | OUTPATIENT
Start: 2023-04-30 | End: 2023-04-30

## 2023-04-30 RX ADMIN — LIDOCAINE HYDROCHLORIDE 10 ML: 10 INJECTION, SOLUTION EPIDURAL; INFILTRATION; INTRACAUDAL; PERINEURAL at 11:18

## 2023-04-30 RX ADMIN — BACITRACIN ZINC 1 SMALL APPLICATION: 500 OINTMENT TOPICAL at 12:04

## 2023-04-30 NOTE — PROCEDURES
Laceration repair    Date/Time: 4/30/2023 11:47 AM  Performed by: Nathen Obrien MD  Authorized by: Nathen Obrien MD   Consent: Verbal consent obtained    Risks and benefits: risks, benefits and alternatives were discussed  Consent given by: patient  Patient identity confirmed: verbally with patient and hospital-assigned identification number  Body area: head/neck  Location details: forehead  Laceration length: 2 cm  Foreign bodies: no foreign bodies  Tendon involvement: none  Nerve involvement: none  Vascular damage: no  Anesthesia: local infiltration    Anesthesia:  Local Anesthetic: lidocaine 1% without epinephrine  Anesthetic total: 5 mL    Sedation:  Patient sedated: no      Wound Dehiscence:  Superficial Wound Dehiscence: simple closure      Procedure Details:  Irrigation solution: saline  Irrigation method: syringe  Amount of cleaning: standard  Debridement: none  Degree of undermining: none  Skin closure: 5-0 nylon  Number of sutures: 5  Technique: simple  Approximation: close  Dressing: 4x4 sterile gauze  Patient tolerance: patient tolerated the procedure well with no immediate complications  Comments: Superior forehead laceration

## 2023-04-30 NOTE — H&P
H&P - Trauma   María Elena Reyes 78 y o  male MRN: [id number]  Unit/Bed#: ED 09 Encounter: [id number]    Trauma Alert: Level B   Model of Arrival: Ambulance    Trauma Team: Attending Awilda Hudson and Residents [facility]  Consultants:     None     Assessment/Plan   Active Problems / Assessment:   Forehead laceration x 2  Fall     Plan:   C-collar cleared  Will repair forehead lacerations x 2 at bedside  Trial of PO diet and ambulation, if does ok will D/C from the ED  Follow up with PCP in 2 weeks for suture removal    History of Present Illness     Chief Complaint: Fall, Forehead lacerations  Mechanism:Fall     HPI:    María Elena Reyes is a 78 y o  male who presents following a ground-level mechanical fall while going to [facility] this morning  He reportedly fell forward hitting his forehead on some granite ground  He reportedly uses Eliquis for a history of PE/DVT 5 weeks ago  After the fall his only complaint was pain in his forehead  He presented to [facility] as a level B trauma  Trauma work-up revealed 2 forehead lacerations but no other traumatic injuries  Review of Systems   Constitutional: Negative  HENT:        Forehead laceration   Eyes: Negative  Respiratory: Negative  Cardiovascular: Negative  Gastrointestinal: Negative  Endocrine: Negative  Genitourinary: Negative  Musculoskeletal: Negative  Skin: Positive for wound (Forehead lacerations x 2)  Neurological: Negative  Psychiatric/Behavioral: Negative  12-point, complete review of systems was reviewed and negative except as stated above  Historical Information     History reviewed  No pertinent past medical history  History reviewed  No pertinent surgical history  There is no immunization history on file for this patient  Last Tetanus: Not indicated  Family History: Non-contributory    1  Before the illness or injury that brought you to the Emergency, did you need someone to help you on a regular basis?  0=No 2  Since the illness or injury that brought you to the Emergency, have you needed more help than usual to take care of yourself? 0=No   3  Have you been hospitalized for one or more nights during the past 6 months (excluding a stay in the Emergency Department)? 0=No   4  In general, do you see well? 0=Yes   5  In general, do you have serious problems with your memory? 0=No   6  Do you take more than three different medications everyday? 1=Yes   TOTAL   1     Did you order a geriatric consult if the score was 2 or greater?: n/a     Meds/Allergies   current meds:   No current facility-administered medications for this encounter  and PTA meds:   None     Allergies have not been reviewed; Not on File    Objective   Initial Vitals:   Temperature: 97 8 °F (36 6 °C) (04/30/23 1104)  Pulse: 89 (04/30/23 1104)  Respirations: 18 (04/30/23 1104)  Blood Pressure: 138/85 (04/30/23 1104)    Primary Survey:   Airway:        Status: patent;        Pre-hospital Interventions: none        Hospital Interventions: none  Breathing:        Pre-hospital Interventions: none       Effort: normal       Right breath sounds: normal       Left breath sounds: normal  Circulation:        Rhythm: regular       Rate: regular   Right Pulses Left Pulses    R radial: 2+    R pedal: 1+     L radial: 2+    L pedal: 1+       Disability:        GCS: Eye: 4; Verbal: 5 Motor: 6 Total: 15       Right Pupil: 3 mm;  round;  reactive         Left Pupil:  3 mm;  round;  reactive      R Motor Strength L Motor Strength    R : 5/5  R dorsiflex: 5/5  R plantarflex: 5/5 L : 5/5  L dorsiflex: 5/5  L plantarflex: 5/5        Sensory:  No sensory deficit  Exposure:       Completed: Yes      Secondary Survey:  Physical Exam  Constitutional:       Appearance: Normal appearance  HENT:      Head: Normocephalic        Comments: Forehead laceration x 2     Right Ear: External ear normal       Left Ear: External ear normal       Nose: Nose normal  Mouth/Throat:      Mouth: Mucous membranes are moist       Pharynx: Oropharynx is clear  Eyes:      Extraocular Movements: Extraocular movements intact  Conjunctiva/sclera: Conjunctivae normal       Pupils: Pupils are equal, round, and reactive to light  Neck:      Comments: C-collar in place  Cardiovascular:      Rate and Rhythm: Normal rate and regular rhythm  Pulses: Normal pulses  Pulmonary:      Effort: Pulmonary effort is normal    Abdominal:      General: Abdomen is flat  Palpations: Abdomen is soft  Tenderness: There is no abdominal tenderness  Musculoskeletal:         General: Normal range of motion  Skin:     General: Skin is warm and dry  Neurological:      General: No focal deficit present  Mental Status: He is alert and oriented to person, place, and time  Psychiatric:         Mood and Affect: Mood normal          Behavior: Behavior normal          Invasive Devices     Peripheral Intravenous Line  Duration           Peripheral IV 04/30/23 Left Antecubital <1 day              Lab Results: Results: I have personally reviewed all pertinent laboratory/tests results    Imaging Results: I have personally reviewed pertinent reports      Chest Xray(s): negative for acute findings   FAST exam(s): negative for acute findings   CT Scan(s): negative for acute findings   Additional Xray(s): N/A     Other Studies: None    Code Status: No Order  Advance Directive and Living Will:      Power of :    POLST:

## 2023-04-30 NOTE — PROCEDURES
Laceration repair    Date/Time: 4/30/2023 11:49 AM  Performed by: Nahum Ngo MD  Authorized by: Nahum Ngo MD   Consent: Verbal consent obtained  Consent given by: patient  Patient identity confirmed: verbally with patient and hospital-assigned identification number  Body area: head/neck  Location details: forehead  Laceration length: 2 5 cm  Foreign bodies: no foreign bodies  Tendon involvement: none  Nerve involvement: none  Vascular damage: no  Anesthesia: local infiltration    Anesthesia:  Local Anesthetic: lidocaine 1% without epinephrine  Anesthetic total: 5 mL    Sedation:  Patient sedated: no      Wound Dehiscence:  Superficial Wound Dehiscence: simple closure      Procedure Details:  Preparation: Patient was prepped and draped in the usual sterile fashion    Irrigation solution: saline  Irrigation method: syringe  Amount of cleaning: standard  Debridement: none  Degree of undermining: none  Skin closure: 5-0 nylon  Number of sutures: 7  Technique: simple  Approximation: close  Approximation difficulty: simple  Dressing: 4x4 sterile gauze  Patient tolerance: patient tolerated the procedure well with no immediate complications  Comments: Inferior forehead laceration

## 2023-04-30 NOTE — QUICK NOTE
Cervical Collar Clearance: The patient had a CT scan of the cervical spine demonstrating no acute injury  On exam, the patient had no midline point tenderness or paresthesias/numbness/weakness in the extremities  The patient had full range of motion (was then able to flex, extend, and rotate head laterally) without pain  There were no distracting injuries and the patient was not intoxicated  The patient's cervical spine was cleared radiologically and clinically  Cervical collar removed at this time       Contreras La MD  4/30/2023 11:56 AM

## 2023-04-30 NOTE — ED PROVIDER NOTES
Emergency Department Airway Evaluation and Management Form    History  Obtained from: EMS  Review of patient's allergies indicates no known allergies  Chief Complaint:  Trauma Alert    HPI: Pt is a 78 y o  male presents s/p     78 at [facility], went to top step, fell at [facility]  Hit forehead on flat surfface  Laceration  No LOC      I have reviewed and agree with the history as documented  Physical Exam    There were no vitals filed for this visit  Supplemental Oxygen:none1    GCS: 15    Neuro: Alert and oriented  Psych: not combative, not anxious, cooperative for exam  Neck: In collar, No JVD, No midline tenderness  Cardio:  Normal  Respiratory: Normal  Mouth:  Normal  Pharynx: Normal    Monitor:  NSR      ED Medications    No current facility-administered medications for this encounter  No current outpatient medications on file        Intubation    No intubation required    Final Diagnosis:  Fall  Head trauma  Frequent falls    ED Provider  Electronically Signed by           Sagar Pierson MD  04/30/23 1054

## 2023-04-30 NOTE — PROCEDURES
POC FAST US    Date/Time: 4/30/2023 11:10 AM  Performed by: Raf Peace MD  Authorized by: Raf Peace MD     Patient location:  Trauma  Other Assisting Provider: No    Procedure details:     Exam Type:  Diagnostic    Indications: blunt abdominal trauma and blunt chest trauma      Assess for:  Intra-abdominal fluid and pericardial effusion    Technique: FAST      Views obtained:  Heart - Pericardial sac, RUQ - Weaver's Pouch, LUQ - Splenorenal space and Suprapubic - Pouch of Geovanny    Image quality: diagnostic      Image availability:  Images available in PACS  FAST Findings:     RUQ (Hepatorenal) free fluid: absent      LUQ (Splenorenal) free fluid: absent      Suprapubic free fluid: absent      Cardiac wall motion: identified      Pericardial effusion: absent    Interpretation:     Impressions: negative

## 2023-04-30 NOTE — PROGRESS NOTES
04/30/23 1100   Clinical Encounter Type   Crisis Visit Trauma  (Level B fall)   Family Spiritual Encounters   Family Coping Accepting   Family Normalization 5   Family Support During Treatment 5   Caregiver-Patient Relationship 5     This  responded to a trauma call level B; pt fall @ [facility]  Wife and adult son onsite   provided comfort, beverages and discussion regarding pt lizeth  Pt / spouse have been  60 years overall in good health and they reside independently   services here to support if needed

## 2023-04-30 NOTE — DISCHARGE INSTR - AVS FIRST PAGE
You may resume your normal diet and all of your normal medications  You may shower daily  Do not scrub your forehead wounds, just allow soapy water to run over them and rinse  No tubs, baths or pools until wounds are fully healed  Please follow up with your PCP in 2 weeks for suture removal     You may take tylenol and ibuprofen for pain control

## 2023-05-01 ENCOUNTER — HOSPITAL ENCOUNTER (EMERGENCY)
Facility: HOSPITAL | Age: 83
Discharge: HOME | End: 2023-05-01
Attending: EMERGENCY MEDICINE

## 2023-05-01 ENCOUNTER — APPOINTMENT (EMERGENCY)
Dept: RADIOLOGY | Facility: HOSPITAL | Age: 83
End: 2023-05-01

## 2023-05-01 VITALS
OXYGEN SATURATION: 93 % | RESPIRATION RATE: 16 BRPM | DIASTOLIC BLOOD PRESSURE: 77 MMHG | HEART RATE: 66 BPM | SYSTOLIC BLOOD PRESSURE: 133 MMHG | TEMPERATURE: 97.4 F

## 2023-05-01 DIAGNOSIS — R42 DIZZINESS: Primary | ICD-10-CM

## 2023-05-01 DIAGNOSIS — S06.0XAA CONCUSSION: ICD-10-CM

## 2023-05-01 LAB
ATRIAL RATE: 68 BPM
P AXIS: 54 DEGREES
PR INTERVAL: 170 MS
QRS AXIS: -69 DEGREES
QRSD INTERVAL: 166 MS
QT INTERVAL: 452 MS
QTC INTERVAL: 480 MS
T WAVE AXIS: 18 DEGREES
VENTRICULAR RATE: 68 BPM

## 2023-05-01 RX ORDER — MECLIZINE HYDROCHLORIDE 25 MG/1
25 TABLET ORAL ONCE
Status: COMPLETED | OUTPATIENT
Start: 2023-05-01 | End: 2023-05-01

## 2023-05-01 RX ORDER — ACETAMINOPHEN 325 MG/1
650 TABLET ORAL ONCE
Status: COMPLETED | OUTPATIENT
Start: 2023-05-01 | End: 2023-05-01

## 2023-05-01 RX ADMIN — MECLIZINE HYDROCHLORIDE 25 MG: 25 TABLET ORAL at 11:33

## 2023-05-01 RX ADMIN — ACETAMINOPHEN 650 MG: 325 TABLET ORAL at 12:21

## 2023-05-01 NOTE — ED ATTENDING ATTESTATION
5/1/2023  I, Flaco Oviedo MD, saw and evaluated the patient  I have discussed the patient with the resident/non-physician practitioner and agree with the resident's/non-physician practitioner's findings, Plan of Care, and MDM as documented in the resident's/non-physician practitioner's note, except where noted  All available labs and Radiology studies were reviewed  I was present for key portions of any procedure(s) performed by the resident/non-physician practitioner and I was immediately available to provide assistance  At this point I agree with the current assessment done in the Emergency Department  I have conducted an independent evaluation of this patient a history and physical is as follows:    ED Course     Patient presents for evaluation due to unsteadiness and dizziness  Patient was seen as a trauma yesterday after he tripped walking up steps  Patient is on Eliquis  Yesterday, his head CT was normal but he reports having noted ongoing symptoms  Denies any numbness or weakness  No additional complaints  A/P: Closed head injury  Will CT scan to evaluate for delayed bleed  Symptoms likely due to concussion      Critical Care Time  Procedures

## 2023-05-01 NOTE — DISCHARGE INSTRUCTIONS
You have been evaluated in the Emergency Department today for your head injury  Your CT scan did not show signs of bleeds or fractures in your head  You may take ibuprofen every 6 hours or tylenol every 6 hours as needed for pain  If needed, you can alternate these medications so that you take one medication every 3 hours  For instance, at noon take ibuprofen, then at 3pm take tylenol, then at 6pm take ibuprofen  Please schedule an appointment for follow up with your primary care physician as soon as possible  Return to the Emergency Department if you experience worsening or uncontrolled pain, vision changes, recurrent vomiting, difficulty with normal activities, abnormal behavior, difficulty walking, numbness, weakness, or any other concerning symptoms

## 2023-05-02 NOTE — ED PROVIDER NOTES
History  Chief Complaint   Patient presents with    Dizziness     Pt seen yesterday for a fall, had sutures placed on forehead, has ecchymosis to b/l eyes from fall  Is on eliquis  Patient reports he started this morning with being unsteady on feet and having dizziness  Patient reports nausea and headache     Patient is an 49-year-old male, past medical history including hypercholesterolemia, hypertension, and on Eliquis, who presents to the emergency department for dizziness and lightheadedness  Patient states he sustained a fall yesterday and was seen in the trauma bay  His scans were clear and he was discharged  He states since being discharged she has had some dizziness, nausea, and a headache  He feels unsteady on his feet  Dizziness is worse with certain head movements  No other modifying factors  No other associated symptoms  No fevers or chills  No other complaints or concerns at this time  Prior to Admission Medications   Prescriptions Last Dose Informant Patient Reported? Taking?    ALPRAZolam (XANAX) 0 25 mg tablet   Yes No   Eliquis 5 MG   Yes No   Sig: Take 5 mg by mouth 2 (two) times a day   Multiple Vitamin tablet   Yes No   Sig: Take 1 tablet by mouth daily   PARoxetine (PAXIL) 40 MG tablet   Yes No   Sig: Take 40 mg by mouth daily   acetaminophen (TYLENOL) 500 mg tablet   Yes No   Sig: Take 500 mg by mouth every 4 (four) hours   aspirin 81 MG tablet   Yes No   Sig: Take by mouth   cyanocobalamin (VITAMIN B-12) 500 MCG tablet   Yes No   Sig: Take 1 tablet by mouth daily   fluorouracil (EFUDEX) 5 % cream   Yes No   Sig: APPLY TO THE SCALP TWICE A DAY X2 WEEKS   hydroxyurea (HYDREA) 500 mg capsule   No No   Sig: TAKE 1 CAPSULE (500 MG TOTAL) BY MOUTH DAILY THREE TIMES A DAY ALTERNATING WITH TWO TIMES A DAY   lisinopril (ZESTRIL) 10 mg tablet   Yes No   Sig: Take 10 mg by mouth   pramipexole (MIRAPEX) 0 25 mg tablet   Yes No   Sig: Take 0 25 mg by mouth 2 (two) times a day Facility-Administered Medications: None       Past Medical History:   Diagnosis Date    Cancer [facility])     prostate    Hypercholesterolemia     Hypertension     Stroke [facility])        Past Surgical History:   Procedure Laterality Date    PROSTATECTOMY      TOTAL SHOULDER REPLACEMENT         History reviewed  No pertinent family history  I have reviewed and agree with the history as documented  E-Cigarette/Vaping    E-Cigarette Use Never User      E-Cigarette/Vaping Substances    Nicotine No     THC No     CBD No     Flavoring No     Other No     Unknown No      Social History     Tobacco Use    Smoking status: Never    Smokeless tobacco: Never   Vaping Use    Vaping Use: Never used   Substance Use Topics    Alcohol use: No    Drug use: No        Review of Systems   Constitutional: Negative for chills and fever  Respiratory: Negative for shortness of breath  Cardiovascular: Negative for chest pain  Gastrointestinal: Positive for nausea  Musculoskeletal: Negative for back pain and neck pain  Neurological: Positive for dizziness and headaches  All other systems reviewed and are negative  Physical Exam  ED Triage Vitals   Temperature Pulse Respirations Blood Pressure SpO2   05/01/23 1052 05/01/23 1050 05/01/23 1050 05/01/23 1050 05/01/23 1050   (!) 97 4 °F (36 3 °C) 71 18 159/95 95 %      Temp Source Heart Rate Source Patient Position - Orthostatic VS BP Location FiO2 (%)   05/01/23 1052 05/01/23 1050 05/01/23 1050 05/01/23 1050 --   Oral Monitor Sitting Right arm       Pain Score       05/01/23 1050       3             Orthostatic Vital Signs  Vitals:    05/01/23 1050 05/01/23 1200 05/01/23 1300   BP: 159/95 143/79 133/77   Pulse: 71 68 66   Patient Position - Orthostatic VS: Sitting  Lying       Physical Exam  Vitals and nursing note reviewed  Constitutional:       General: He is not in acute distress  Appearance: He is well-developed  He is not diaphoretic     HENT: Head: Normocephalic  Comments: Bilateral periorbital ecchymosis  2 well-healing linear lacerations to the forehead  Right Ear: External ear normal       Left Ear: External ear normal       Nose: Nose normal    Eyes:      General: Lids are normal  No scleral icterus  Cardiovascular:      Rate and Rhythm: Normal rate and regular rhythm  Heart sounds: Normal heart sounds  No murmur heard  No friction rub  No gallop  Pulmonary:      Effort: Pulmonary effort is normal  No respiratory distress  Breath sounds: Normal breath sounds  No wheezing or rales  Abdominal:      Palpations: Abdomen is soft  Tenderness: There is no abdominal tenderness  There is no guarding or rebound  Musculoskeletal:         General: No deformity  Normal range of motion  Cervical back: Normal range of motion and neck supple  Skin:     General: Skin is warm and dry  Neurological:      General: No focal deficit present  Mental Status: He is alert  Psychiatric:         Mood and Affect: Mood normal          Behavior: Behavior normal          ED Medications  Medications   meclizine (ANTIVERT) tablet 25 mg (25 mg Oral Given 5/1/23 1133)   acetaminophen (TYLENOL) tablet 650 mg (650 mg Oral Given 5/1/23 1221)       Diagnostic Studies  Results Reviewed     None                 CT head without contrast   Final Result by Wenceslao Schaumann, MD (05/01 1309)      No acute intracranial abnormality  Microangiopathic changes  Workstation performed: [id number]               Procedures  Procedures      ED Course  ED Course as of 05/02/23 1506   Mon May 01, 2023   1310 CT head without contrast  No acute intracranial abnormality  Microangiopathic changes  1320 Patient was reevaluated  Resting comfortably  States he feels better  Discussed results and findings  As there is no indication for further work-up at this time will discharge  Recommended PCP follow-up    Recommended meclizine as needed "dizziness  Return precautions discussed  Patient verbalized understanding and agreed to plan of care  Medical Decision Making  Patient is a 78 y o  male who presents to the ED for dizziness, headache, and nausea after a mechanical trip and fall yesterday  Patient is nontoxic, well-appearing  Vitals are stable  I suspect symptoms are likely secondary to concussion  Differential includes delayed intracranial bleeding  Plan: Repeat CT head, symptomatic treatment, reassessment                 Portions of the record may have been created with voice recognition software  Occasional wrong word or \"sound a like\" substitutions may have occurred due to the inherent limitations of voice recognition software  Read the chart carefully and recognize, using context, where substitutions have occurred  Dizziness: acute illness or injury  Amount and/or Complexity of Data Reviewed  Radiology: ordered  Risk  OTC drugs  Disposition  Final diagnoses:   Dizziness   Concussion     Time reflects when diagnosis was documented in both MDM as applicable and the Disposition within this note     Time User Action Codes Description Comment    5/1/2023  1:37 PM Nunu Staton [R42] Dizziness     5/2/2023  3:06 PM Nunu Staton [S06  [id number]] Concussion       ED Disposition     ED Disposition   Discharge    Condition   Stable    Date/Time   Mon May 1, 2023  1:12 PM    Comment   Barron Mckeon discharge to home/self care                 Follow-up Information     Follow up With Specialties Details Why Contact Info Additional Information    Sharath Chilel MD Internal Medicine   [address] [place] [address] Emergency Department Emergency Medicine  As needed [phone]  [address] Emergency Department, [address], " [address] [facility], [address]          Discharge Medication List as of 5/1/2023  1:37 PM      CONTINUE these medications which have NOT CHANGED    Details   acetaminophen (TYLENOL) 500 mg tablet Take 500 mg by mouth every 4 (four) hours, Starting Fri 11/17/2017, Historical Med      ALPRAZolam Jelly Stain) 0 25 mg tablet Starting Sun 10/6/2019, Historical Med      aspirin 81 MG tablet Take by mouth, Historical Med      cyanocobalamin (VITAMIN B-12) 500 MCG tablet Take 1 tablet by mouth daily, Starting Tue 12/21/2021, Historical Med      Eliquis 5 MG Take 5 mg by mouth 2 (two) times a day, Starting Thu 2/16/2023, Historical Med      fluorouracil (EFUDEX) 5 % cream APPLY TO THE SCALP TWICE A DAY X2 WEEKS, Historical Med      hydroxyurea (HYDREA) 500 mg capsule TAKE 1 CAPSULE (500 MG TOTAL) BY MOUTH DAILY THREE TIMES A DAY ALTERNATING WITH TWO TIMES A DAY, Normal      lisinopril (ZESTRIL) 10 mg tablet Take 10 mg by mouth, Historical Med      Multiple Vitamin tablet Take 1 tablet by mouth daily, Historical Med      PARoxetine (PAXIL) 40 MG tablet Take 40 mg by mouth daily, Starting Tue 9/13/2022, Historical Med      pramipexole (MIRAPEX) 0 25 mg tablet Take 0 25 mg by mouth 2 (two) times a day, Starting Tue 11/29/2022, Historical Med           No discharge procedures on file  PDMP Review     None           ED Provider  Attending physically available and evaluated Selma Leamargaret BUNN managed the patient along with the ED Attending      Electronically Signed by         Vincent Acosta DO  05/02/23 7749

## 2023-05-06 ENCOUNTER — HOSPITAL ENCOUNTER (EMERGENCY)
Facility: HOSPITAL | Age: 83
Discharge: HOME | End: 2023-05-06
Attending: EMERGENCY MEDICINE

## 2023-05-06 ENCOUNTER — APPOINTMENT (EMERGENCY)
Dept: RADIOLOGY | Facility: HOSPITAL | Age: 83
End: 2023-05-06

## 2023-05-06 VITALS
SYSTOLIC BLOOD PRESSURE: 158 MMHG | OXYGEN SATURATION: 96 % | DIASTOLIC BLOOD PRESSURE: 88 MMHG | HEART RATE: 72 BPM | TEMPERATURE: 97.5 F | RESPIRATION RATE: 18 BRPM

## 2023-05-06 DIAGNOSIS — R42 DIZZINESS: Primary | ICD-10-CM

## 2023-05-06 LAB
ANION GAP SERPL CALCULATED.3IONS-SCNC: 1 MMOL/L (ref 4–13)
ATRIAL RATE: 76 BPM
BASOPHILS # BLD AUTO: 0.08 THOUSANDS/ÂΜL (ref 0–0.1)
BASOPHILS NFR BLD AUTO: 1 % (ref 0–1)
BUN SERPL-MCNC: 20 MG/DL (ref 5–25)
CALCIUM SERPL-MCNC: 9 MG/DL (ref 8.3–10.1)
CHLORIDE SERPL-SCNC: 112 MMOL/L (ref 96–108)
CO2 SERPL-SCNC: 28 MMOL/L (ref 21–32)
CREAT SERPL-MCNC: 0.93 MG/DL (ref 0.6–1.3)
EOSINOPHIL # BLD AUTO: 0.09 THOUSAND/ÂΜL (ref 0–0.61)
EOSINOPHIL NFR BLD AUTO: 1 % (ref 0–6)
ERYTHROCYTE [DISTWIDTH] IN BLOOD BY AUTOMATED COUNT: 15.9 % (ref 11.6–15.1)
GFR SERPL CREATININE-BSD FRML MDRD: 75 ML/MIN/1.73SQ M
GLUCOSE SERPL-MCNC: 112 MG/DL (ref 65–140)
HCT VFR BLD AUTO: 41.5 % (ref 36.5–49.3)
HGB BLD-MCNC: 14.6 G/DL (ref 12–17)
IMM GRANULOCYTES # BLD AUTO: 0.05 THOUSAND/UL (ref 0–0.2)
IMM GRANULOCYTES NFR BLD AUTO: 1 % (ref 0–2)
LYMPHOCYTES # BLD AUTO: 1.26 THOUSANDS/ÂΜL (ref 0.6–4.47)
LYMPHOCYTES NFR BLD AUTO: 13 % (ref 14–44)
MCH RBC QN AUTO: 37.6 PG (ref 26.8–34.3)
MCHC RBC AUTO-ENTMCNC: 35.2 G/DL (ref 31.4–37.4)
MCV RBC AUTO: 107 FL (ref 82–98)
MONOCYTES # BLD AUTO: 0.64 THOUSAND/ÂΜL (ref 0.17–1.22)
MONOCYTES NFR BLD AUTO: 6 % (ref 4–12)
NEUTROPHILS # BLD AUTO: 7.82 THOUSANDS/ÂΜL (ref 1.85–7.62)
NEUTS SEG NFR BLD AUTO: 78 % (ref 43–75)
NRBC BLD AUTO-RTO: 0 /100 WBCS
P AXIS: 54 DEGREES
PLATELET # BLD AUTO: 395 THOUSANDS/UL (ref 149–390)
PMV BLD AUTO: 9.6 FL (ref 8.9–12.7)
POTASSIUM SERPL-SCNC: 4.3 MMOL/L (ref 3.5–5.3)
PR INTERVAL: 168 MS
QRS AXIS: -70 DEGREES
QRSD INTERVAL: 162 MS
QT INTERVAL: 436 MS
QTC INTERVAL: 490 MS
RBC # BLD AUTO: 3.88 MILLION/UL (ref 3.88–5.62)
SODIUM SERPL-SCNC: 141 MMOL/L (ref 135–147)
T WAVE AXIS: 14 DEGREES
VENTRICULAR RATE: 76 BPM
WBC # BLD AUTO: 9.94 THOUSAND/UL (ref 4.31–10.16)

## 2023-05-06 RX ORDER — MECLIZINE HYDROCHLORIDE 25 MG/1
25 TABLET ORAL ONCE
Status: COMPLETED | OUTPATIENT
Start: 2023-05-06 | End: 2023-05-06

## 2023-05-06 RX ORDER — MECLIZINE HYDROCHLORIDE 25 MG/1
25 TABLET ORAL 3 TIMES DAILY PRN
Qty: 30 TABLET | Refills: 0 | Status: SHIPPED | OUTPATIENT
Start: 2023-05-06

## 2023-05-06 RX ADMIN — IOHEXOL 85 ML: 350 INJECTION, SOLUTION INTRAVENOUS at 19:59

## 2023-05-06 RX ADMIN — MECLIZINE HYDROCHLORIDE 25 MG: 25 TABLET ORAL at 18:36

## 2023-05-06 NOTE — ED ATTENDING ATTESTATION
5/6/2023  IParris MD, saw and evaluated the patient  I have discussed the patient with the resident/non-physician practitioner and agree with the resident's/non-physician practitioner's findings, Plan of Care, and MDM as documented in the resident's/non-physician practitioner's note, except where noted  All available labs and Radiology studies were reviewed  I was present for key portions of any procedure(s) performed by the resident/non-physician practitioner and I was immediately available to provide assistance  At this point I agree with the current assessment done in the Emergency Department  I have conducted an independent evaluation of this patient a history and physical is as follows:    ED Course         Critical Care Time  Procedures    79 yo male with hx of dvt/pe, on blood thinners, had a fall and lac repair on Sunday  Pt then had episode of dizziness Monday and was reevaluated and sent home after meclizine and he felt better  Pt today with the same symptoms of dizziness, room spinning  No cp, no sob, no abdominal pain  No numbness, tingling, weakness  Vss, afebrile, lungs cta, rrr, abdomen soft nontender, no neuro deficits  cta head and neck, labs, meclizine

## 2023-05-06 NOTE — ED PROVIDER NOTES
History  Chief Complaint   Patient presents with   • Dizziness     Pt c/o dizziness and headache starting at 1530 today after laying down for a long period of time  HPI   Patient is an 23-year-old male with past medical history of HTN, HLD, prostate cancer, PE/DVT on Eliquis, who presents to the ED for evaluation of dizziness described as room spinning sensation that began upon standing at 3:30 PM today  Patient was seen in in this ED as a trauma on Sunday with negative work-up and discharged home  Patient reports similar episode of room spinning sensation on Monday with repeat CT that was negative and discharged home  Patient reports meclizine improved dizziness  Patient denies any recurrent falls or trauma  No numbness, no tingling, no weakness, no focal deficits  Patient reports the dizziness lasted for approximately 1 hour and slowly resolved  Denies dizziness or room spinning sensation on exam   Denies any chest pain, palpitations, shortness of breath, nausea or vomiting, abdominal pain, headache, changes in vision or hearing, urinary complaints, or any other concerns at this time  None       Past Medical History:   Diagnosis Date   • History of pulmonary embolus (PE)    • Personal history of DVT (deep vein thrombosis)        History reviewed  No pertinent surgical history  History reviewed  No pertinent family history  I have reviewed and agree with the history as documented  E-Cigarette/Vaping     E-Cigarette/Vaping Substances           Review of Systems   Constitutional: Negative for chills and fever  HENT: Negative for congestion and sore throat  Eyes: Negative for pain and visual disturbance  Respiratory: Negative for cough and shortness of breath  Cardiovascular: Negative for chest pain and palpitations  Gastrointestinal: Negative for abdominal pain, diarrhea, nausea and vomiting  Genitourinary: Negative for dysuria and hematuria     Musculoskeletal: Negative for back pain and myalgias  Skin: Negative for color change and rash  Neurological: Positive for dizziness  Negative for headaches  All other systems reviewed and are negative  Physical Exam  ED Triage Vitals [05/06/23 1739]   Temperature Pulse Respirations Blood Pressure SpO2   97 5 °F (36 4 °C) 72 18 145/87 98 %      Temp Source Heart Rate Source Patient Position - Orthostatic VS BP Location FiO2 (%)   Temporal Monitor Lying Left arm --      Pain Score       3             Orthostatic Vital Signs  Vitals:    05/06/23 1838 05/06/23 1839 05/06/23 1840 05/06/23 1945   BP: 153/76 148/70 156/75 158/88   Pulse: 71 69 71 72   Patient Position - Orthostatic VS: Lying Sitting Standing Lying       Physical Exam  Vitals and nursing note reviewed  Constitutional:       General: He is not in acute distress  HENT:      Head: Normocephalic and atraumatic  Comments: Resolving bruising noted to bilateral orbits consistent with previous fall, sutures to 2 lacerations to the forehead and scalp C/D/I, improving hematoma just medial and superior to right eyebrow     Right Ear: There is impacted cerumen  Left Ear: Tympanic membrane, ear canal and external ear normal       Nose: No congestion or rhinorrhea  Mouth/Throat:      Mouth: Mucous membranes are moist       Pharynx: Oropharynx is clear  Comments: Tongue and uvula midline  Eyes:      General: No scleral icterus  Extraocular Movements: Extraocular movements intact  Conjunctiva/sclera: Conjunctivae normal       Pupils: Pupils are equal, round, and reactive to light  Cardiovascular:      Rate and Rhythm: Normal rate and regular rhythm  Pulses: Normal pulses  Heart sounds: Normal heart sounds  No murmur heard  Pulmonary:      Effort: Pulmonary effort is normal  No respiratory distress  Breath sounds: Normal breath sounds  No wheezing, rhonchi or rales  Abdominal:      General: There is no distension        Palpations: Abdomen is soft       Tenderness: There is no abdominal tenderness  There is no guarding or rebound  Musculoskeletal:         General: No deformity or signs of injury  Normal range of motion  Cervical back: Normal range of motion and neck supple  No rigidity or tenderness  Skin:     General: Skin is warm  Capillary Refill: Capillary refill takes less than 2 seconds  Coloration: Skin is not pale  Findings: No bruising  Neurological:      General: No focal deficit present  Mental Status: He is alert and oriented to person, place, and time  Mental status is at baseline  Cranial Nerves: Cranial nerves 2-12 are intact  No cranial nerve deficit, dysarthria or facial asymmetry  Sensory: Sensation is intact  No sensory deficit  Motor: Motor function is intact  No weakness or pronator drift  Coordination: Coordination is intact  Romberg sign negative  Coordination normal  Finger-Nose-Finger Test and Heel to [facility] Test normal       Gait: Gait is intact   Gait normal    Psychiatric:         Mood and Affect: Mood normal          Behavior: Behavior normal          ED Medications  Medications   meclizine (ANTIVERT) tablet 25 mg (25 mg Oral Given 5/6/23 1836)   iohexol (OMNIPAQUE) 350 MG/ML injection (SINGLE-DOSE) 100 mL (85 mL Intravenous Given 5/6/23 1959)       Diagnostic Studies  Results Reviewed     Procedure Component Value Units Date/Time    Basic metabolic panel [[id number]]  (Abnormal) Collected: 05/06/23 1846    Lab Status: Final result Specimen: Blood from Arm, Right Updated: 05/06/23 1910     Sodium 141 mmol/L      Potassium 4 3 mmol/L      Chloride 112 mmol/L      CO2 28 mmol/L      ANION GAP 1 mmol/L      BUN 20 mg/dL      Creatinine 0 93 mg/dL      Glucose 112 mg/dL      Calcium 9 0 mg/dL      eGFR 75 ml/min/1 73sq m     Narrative:      [place] guidelines for Chronic Kidney Disease (CKD):   •  Stage 1 with normal or high GFR (GFR > 90 mL/min/1 73 square meters)  •  Stage 2 Mild CKD (GFR = 60-89 mL/min/1 73 square meters)  •  Stage 3A Moderate CKD (GFR = 45-59 mL/min/1 73 square meters)  •  Stage 3B Moderate CKD (GFR = 30-44 mL/min/1 73 square meters)  •  Stage 4 Severe CKD (GFR = 15-29 mL/min/1 73 square meters)  •  Stage 5 End Stage CKD (GFR <15 mL/min/1 73 square meters)  Note: GFR calculation is accurate only with a steady state creatinine    CBC and differential [[id number]]  (Abnormal) Collected: 05/06/23 1846    Lab Status: Final result Specimen: Blood from Arm, Right Updated: 05/06/23 1853     WBC 9 94 Thousand/uL      RBC 3 88 Million/uL      Hemoglobin 14 6 g/dL      Hematocrit 41 5 %       fL      MCH 37 6 pg      MCHC 35 2 g/dL      RDW 15 9 %      MPV 9 6 fL      Platelets 927 Thousands/uL      nRBC 0 /100 WBCs      Neutrophils Relative 78 %      Immat GRANS % 1 %      Lymphocytes Relative 13 %      Monocytes Relative 6 %      Eosinophils Relative 1 %      Basophils Relative 1 %      Neutrophils Absolute 7 82 Thousands/µL      Immature Grans Absolute 0 05 Thousand/uL      Lymphocytes Absolute 1 26 Thousands/µL      Monocytes Absolute 0 64 Thousand/µL      Eosinophils Absolute 0 09 Thousand/µL      Basophils Absolute 0 08 Thousands/µL                  CTA head and neck with and without contrast   Final Result by Shavon Gillette MD (05/06 2021)      1  No acute intracranial CT abnormality  Right frontal scalp hematoma  2   Patent major vasculature of the [place] without high-grade stenosis  3   No hemodynamically significant stenosis of cervical carotid and vertebral arteries                    Workstation performed: [id number]               Procedures  Procedures      ED Course  ED Course as of 05/06/23 2357   Sat May 06, 2023   1800 Procedure Note: ECG Interpretation  Date/Time: 05/06/23 1739  Interpreted by: Nury Ivey  Indications / Diagnosis: dizziness  ECG reviewed by me, the ED Provider: yes  The ECG demonstrates:  Rhythm: normal sinus at 76  Axis: normal axis  QRS/Blocks: RBBB, Bifasicular block  ST Changes: no acute ST Changes, no STD/SUMIT  No significant changes compared to prior on 5/1/2023 1901 Hemoglobin: 14 6  Previously 14 8 on 4/10 (see chart marked for merge)   1902 WBC: 9 94  WBC 8 3 on 4/10 (see chart marked for merge)   8392 Basic metabolic panel(!)  No significant acute electrolyte abnormalities   2033 CTA head and neck with and without contrast  1  No acute intracranial CT abnormality  Right frontal scalp hematoma  2   Patent major vasculature of the [place] without high-grade stenosis  3   No hemodynamically significant stenosis of cervical carotid and vertebral arteries  SBIRT 22yo+    Flowsheet Row Most Recent Value   Initial Alcohol Screen: US AUDIT-C     1  How often do you have a drink containing alcohol? 0 Filed at: 05/06/2023 1738   2  How many drinks containing alcohol do you have on a typical day you are drinking? 0 Filed at: 05/06/2023 1738   3a  Male UNDER 65: How often do you have five or more drinks on one occasion? 0 Filed at: 05/06/2023 1738   3b  FEMALE Any Age, or MALE 65+: How often do you have 4 or more drinks on one occassion? 0 Filed at: 05/06/2023 1738   Audit-C Score 0 Filed at: 05/06/2023 1738   PARDEEP: How many times in the past year have you    Used an illegal drug or used a prescription medication for non-medical reasons? Never Filed at: 05/06/2023 1738                Medical Decision Making  Dizziness: acute illness or injury  Amount and/or Complexity of Data Reviewed  Independent Historian: whitney  External Data Reviewed: radiology and notes  Details: Prior records reviewed, CT head and repeat CT head noted, negative  Labs: ordered  Decision-making details documented in ED Course  Radiology: ordered  Decision-making details documented in ED Course  Risk  OTC drugs  Prescription drug management    Decision regarding hospitalization  Patient is an 68-year-old male who presents to the ED for evaluation of recurrent dizziness  Prior records reviewed  Differential diagnosis includes but not limited to: Vertigo, arrhythmia, anemia, electrolyte abnormality, orthostatic hypotension, carotid/vertebral artery dissection, doubt ICH due to negative CT head  CBC for leukocytosis/anemia, BMP for electrolytes/RFT's, EKG for anemia, CTA head and neck with and without contrast for dissection and intracranial pathology  Patient treated with meclizine 25 mg p o  See ED course for additional details  Labs and diagnostics reviewed  No evidence of dissection or stenosis on CT  Orthostatics negative  Patient ambulated without difficulty  No recurrent episodes of dizziness while observed in the ED  Vital signs have remained stable  Discussed results and plan with patient and family at bedside  Advised on need for outpatient follow up, given information  Given return precautions verbally and in discharge instructions  Given prescription for meclizine and advised on proper use all questions answered  Patient expressed verbal understanding and is agreeable with plan for discharge with outpatient follow up  Disposition  Final diagnoses:   Dizziness     Time reflects when diagnosis was documented in both MDM as applicable and the Disposition within this note     Time User Action Codes Description Comment    5/6/2023  8:33 PM Kerri Lua Add [R42] Dizziness       ED Disposition     ED Disposition   Discharge    Condition   Stable    Date/Time   Sat May 6, 2023  8:33 PM    Comment   Shama Gamez discharge to home/self care                 Follow-up Information     Follow up With Specialties Details Why Contact Info Additional Information    Bull Little MD Internal Medicine   [address] [place] [address] Emergency Department Emergency Medicine Go to If symptoms worsen [address] [id number]  [facility] [address] Emergency Department, [address], [place], [place], [address]          Discharge Medication List as of 5/6/2023  8:38 PM      START taking these medications    Details   meclizine (ANTIVERT) 25 mg tablet Take 1 tablet (25 mg total) by mouth 3 (three) times a day as needed for dizziness, Starting Sat 5/6/2023, Normal               PDMP Review     None           ED Provider  Attending physically available and evaluated Kaye Emanuel I managed the patient along with the ED Attending      Electronically Signed by         Dinora Cho DO  05/06/23 4078

## 2023-05-07 NOTE — DISCHARGE INSTRUCTIONS
You can take meclizine up to three times per day  Please go slowly when standing up or changing position  Continue to monitor symptoms at home  Stay well hydrated  Please follow up with your primary care provider  Please return to the Emergency Department if you experience worsening of your current symptoms, recurrent dizziness/lightheadedness, feeling unsteady on your feet, falls/trauma, or any other concerning symptoms

## 2023-05-08 LAB
ATRIAL RATE: 76 BPM
P AXIS: 54 DEGREES
PR INTERVAL: 168 MS
QRS AXIS: -70 DEGREES
QRSD INTERVAL: 162 MS
QT INTERVAL: 436 MS
QTC INTERVAL: 490 MS
T WAVE AXIS: 14 DEGREES
VENTRICULAR RATE: 76 BPM

## 2023-05-29 DIAGNOSIS — Z15.89 JAK-2 GENE MUTATION: ICD-10-CM

## 2023-05-30 RX ORDER — HYDROXYUREA 500 MG/1
CAPSULE ORAL
Qty: 270 CAPSULE | Refills: 3 | Status: SHIPPED | OUTPATIENT
Start: 2023-05-30

## 2023-06-23 ENCOUNTER — APPOINTMENT (OUTPATIENT)
Dept: LAB | Facility: CLINIC | Age: 83
End: 2023-06-23
Payer: COMMERCIAL

## 2023-06-23 ENCOUNTER — TELEPHONE (OUTPATIENT)
Dept: HEMATOLOGY ONCOLOGY | Facility: CLINIC | Age: 83
End: 2023-06-23

## 2023-06-23 DIAGNOSIS — D47.3 THROMBOCYTHEMIA, ESSENTIAL (HCC): ICD-10-CM

## 2023-06-23 DIAGNOSIS — D75.839 THROMBOCYTOSIS: ICD-10-CM

## 2023-06-23 DIAGNOSIS — C61 PROSTATE CANCER (HCC): ICD-10-CM

## 2023-06-23 DIAGNOSIS — D75.839 THROMBOCYTOSIS: Primary | ICD-10-CM

## 2023-06-23 DIAGNOSIS — Z15.89 JAK-2 GENE MUTATION: ICD-10-CM

## 2023-06-23 LAB
ALBUMIN SERPL BCP-MCNC: 3.5 G/DL (ref 3.5–5)
ALP SERPL-CCNC: 60 U/L (ref 46–116)
ALT SERPL W P-5'-P-CCNC: 20 U/L (ref 12–78)
ANION GAP SERPL CALCULATED.3IONS-SCNC: 4 MMOL/L
AST SERPL W P-5'-P-CCNC: 21 U/L (ref 5–45)
BASOPHILS # BLD AUTO: 0.08 THOUSANDS/ÂΜL (ref 0–0.1)
BASOPHILS NFR BLD AUTO: 1 % (ref 0–1)
BILIRUB SERPL-MCNC: 0.56 MG/DL (ref 0.2–1)
BUN SERPL-MCNC: 22 MG/DL (ref 5–25)
CALCIUM SERPL-MCNC: 9.5 MG/DL (ref 8.3–10.1)
CHLORIDE SERPL-SCNC: 111 MMOL/L (ref 96–108)
CO2 SERPL-SCNC: 26 MMOL/L (ref 21–32)
CREAT SERPL-MCNC: 0.9 MG/DL (ref 0.6–1.3)
EOSINOPHIL # BLD AUTO: 0.11 THOUSAND/ÂΜL (ref 0–0.61)
EOSINOPHIL NFR BLD AUTO: 2 % (ref 0–6)
ERYTHROCYTE [DISTWIDTH] IN BLOOD BY AUTOMATED COUNT: 15.4 % (ref 11.6–15.1)
GFR SERPL CREATININE-BSD FRML MDRD: 77 ML/MIN/1.73SQ M
GLUCOSE SERPL-MCNC: 108 MG/DL (ref 65–140)
HCT VFR BLD AUTO: 41.9 % (ref 36.5–49.3)
HGB BLD-MCNC: 14.7 G/DL (ref 12–17)
IMM GRANULOCYTES # BLD AUTO: 0.06 THOUSAND/UL (ref 0–0.2)
IMM GRANULOCYTES NFR BLD AUTO: 1 % (ref 0–2)
LYMPHOCYTES # BLD AUTO: 1.49 THOUSANDS/ÂΜL (ref 0.6–4.47)
LYMPHOCYTES NFR BLD AUTO: 20 % (ref 14–44)
MCH RBC QN AUTO: 39.9 PG (ref 26.8–34.3)
MCHC RBC AUTO-ENTMCNC: 35.1 G/DL (ref 31.4–37.4)
MCV RBC AUTO: 114 FL (ref 82–98)
MONOCYTES # BLD AUTO: 0.58 THOUSAND/ÂΜL (ref 0.17–1.22)
MONOCYTES NFR BLD AUTO: 8 % (ref 4–12)
NEUTROPHILS # BLD AUTO: 5.14 THOUSANDS/ÂΜL (ref 1.85–7.62)
NEUTS SEG NFR BLD AUTO: 68 % (ref 43–75)
NRBC BLD AUTO-RTO: 0 /100 WBCS
PLATELET # BLD AUTO: 425 THOUSANDS/UL (ref 149–390)
PMV BLD AUTO: 9.9 FL (ref 8.9–12.7)
POTASSIUM SERPL-SCNC: 4.5 MMOL/L (ref 3.5–5.3)
PROT SERPL-MCNC: 6.7 G/DL (ref 6.4–8.4)
RBC # BLD AUTO: 3.68 MILLION/UL (ref 3.88–5.62)
SODIUM SERPL-SCNC: 141 MMOL/L (ref 135–147)
WBC # BLD AUTO: 7.46 THOUSAND/UL (ref 4.31–10.16)

## 2023-06-23 PROCEDURE — 85025 COMPLETE CBC W/AUTO DIFF WBC: CPT

## 2023-06-23 PROCEDURE — 80053 COMPREHEN METABOLIC PANEL: CPT

## 2023-06-23 PROCEDURE — 36415 COLL VENOUS BLD VENIPUNCTURE: CPT

## 2023-06-23 NOTE — TELEPHONE ENCOUNTER
Spoke with patient reminding him to have labs completed prior to appt, patient voiced understanding

## 2023-06-26 ENCOUNTER — OFFICE VISIT (OUTPATIENT)
Age: 83
End: 2023-06-26
Payer: COMMERCIAL

## 2023-06-26 VITALS
WEIGHT: 213 LBS | HEART RATE: 94 BPM | SYSTOLIC BLOOD PRESSURE: 126 MMHG | OXYGEN SATURATION: 95 % | HEIGHT: 67 IN | DIASTOLIC BLOOD PRESSURE: 74 MMHG | BODY MASS INDEX: 33.43 KG/M2 | RESPIRATION RATE: 17 BRPM | TEMPERATURE: 97.4 F

## 2023-06-26 DIAGNOSIS — D47.3 THROMBOCYTHEMIA, ESSENTIAL (HCC): Primary | ICD-10-CM

## 2023-06-26 PROCEDURE — 99213 OFFICE O/P EST LOW 20 MIN: CPT | Performed by: INTERNAL MEDICINE

## 2023-06-26 NOTE — PROGRESS NOTES
HEMATOLOGY / [address] FOLLOW UP NOTE    Primary Care Provider: Miguel Villavicencio MD  Referring Provider:    MRN: [id number]  : 1940    Reason for Encounter: follow up essential thrombocythemia       Oncology History Overview Note    - essential thrombocythemia, JAK2 positive    Current hydrea dose - 500 mg in AM and 1000 mg in [place]    2023 - DVT/PE - eliquis 5 mg BID     Basal cell carcinoma of skin of other parts of face   3/11/2022 Initial Diagnosis    Basal cell carcinoma of skin of other parts of face         Interval History: Patient presents for follow up of his essential thrombocythemia  His Hydrea dose is 500 mg in the morning and 1000 mg at night  Labs prior to this visit shows a platelet count of 954,546  He has no other cytopenias on his CBC  He is feeling well  He denies any fevers night sweats or weight loss  No recurrent infections  No shortness of breath  No itching  He is on Eliquis for a DVT PE that was treated at [facility] in February of this year  It is costing him $45 a month  He is not having any bleeding problems on it  REVIEW OF SYSTEMS:  Please note that a 14-point review of systems was performed to include Constitutional, HEENT, Respiratory, CVS, GI, , Musculoskeletal, Integumentary, Neurologic, Rheumatologic, Endocrinologic, Psychiatric, Lymphatic, and Hematologic/Oncologic systems were reviewed and are negative unless otherwise stated in HPI  Positive and negative findings pertinent to this evaluation are incorporated into the history of present illness  ECOG PS: 1    PROBLEM LIST:  Patient Active Problem List   Diagnosis   • Prostate cancer ([facility] )   • Basal cell carcinoma of skin of other parts of face   • Bladder neck contracture   • Cerebrovascular accident (CVA) ([address] )   • 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[facility] )   • Urge incontinence   • Nocturia       Assessment / Plan: 78-year-old male with essential thrombocythemia    Ideally we want his platelets under 400,000 but I am not can adjust any doses based on value of 425,000  He will continue on the same dosing and he will see my NP back in 3 months with a CBC and CMP prior  I will see him back in 6 months and at that time we will be closer to a full year of Eliquis  At that time we could cut him back to half dose if he is not having any other problems with thrombotic events  he knows to call in the interim with any questions or concerns  I spent 20 minutes on chart review, face to face counseling time, coordination of care and documentation  Past Medical History:   has a past medical history of Cancer ([facility] ), History of pulmonary embolus (PE), Hypercholesterolemia, Hypertension, Personal history of DVT (deep vein thrombosis), and Stroke ([facility] )  PAST SURGICAL HISTORY:   has a past surgical history that includes Total shoulder replacement and Prostatectomy      CURRENT MEDICATIONS  Current Outpatient Medications   Medication Sig Dispense Refill   • acetaminophen (TYLENOL) 500 mg tablet Take 500 mg by mouth every 4 (four) hours     • ALPRAZolam (XANAX) 0 25 mg tablet      • aspirin 81 MG tablet Take by mouth     • cyanocobalamin (VITAMIN B-12) 500 MCG tablet Take 1 tablet by mouth daily     • Eliquis 5 MG Take 5 mg by mouth 2 (two) times a day     • fluorouracil (EFUDEX) 5 % cream APPLY TO THE SCALP TWICE A DAY X2 WEEKS  0   • hydroxyurea (HYDREA) 500 mg capsule TAKE 1 CAPSULE (500 MG TOTAL) BY MOUTH DAILY THREE TIMES A DAY ALTERNATING WITH TWO TIMES A  capsule 3   • lisinopril (ZESTRIL) 10 mg tablet Take 10 mg by mouth     • meclizine (ANTIVERT) 25 mg tablet Take 1 tablet (25 mg total) by mouth 3 (three) times a day as needed for dizziness 30 tablet 0   • Multiple Vitamin tablet Take 1 tablet by mouth daily     • PARoxetine (PAXIL) 40 MG tablet Take 40 mg by mouth daily     • pramipexole (MIRAPEX) 0 25 mg tablet Take 0 25 mg by mouth 2 (two) times a day       No current facility-administered "medications for this visit  [unfilled]    SOCIAL HISTORY:   reports that he does not have a smoking history on file  He has never used smokeless tobacco  He reports that he does not drink alcohol and does not use drugs  FAMILY HISTORY:  family history is not on file  ALLERGIES:  is allergic to penicillins, sulfa antibiotics, and sulfa antibiotics  Physical Exam:  Vital Signs:   Visit Vitals  /74 (BP Location: Left arm, Patient Position: Sitting, Cuff Size: Adult)   Pulse 94   Temp (!) 97 4 °F (36 3 °C)   Resp 17   Ht 5' 7\" (1 702 m)   Wt 96 6 kg (213 lb)   SpO2 95%   BMI 33 36 kg/m²   Smoking Status Never Assessed   BSA 2 08 m²     Body mass index is 33 36 kg/m²  Body surface area is 2 08 meters squared  GEN: Alert, awake oriented x3, in no acute distress  HEENT- No pallor, icterus, cyanosis, no oral mucosal lesions,   LAD - no palpable cervical, clavicle, axillary, inguinal LAD  Heart- normal S1 S2, regular rate and rhythm, No murmur, rubs     Lungs- clear breathing sound bilateral    Abdomen- soft, Non tender, bowel sounds present  Extremities- No cyanosis, clubbing, edema  Neuro- No focal neurological deficit    Labs:  Lab Results   Component Value Date    WBC 7 46 06/23/2023    HGB 14 7 06/23/2023    HCT 41 9 06/23/2023     (H) 06/23/2023     (H) 06/23/2023     Lab Results   Component Value Date     10/01/2015    SODIUM 141 06/23/2023    K 4 5 06/23/2023     (H) 06/23/2023    CO2 26 06/23/2023    ANIONGAP 6 10/01/2015    AGAP 4 06/23/2023    BUN 22 06/23/2023    CREATININE 0 90 06/23/2023    GLUC 108 06/23/2023    GLUF 189 (H) 08/09/2022    CALCIUM 9 5 06/23/2023    AST 21 06/23/2023    ALT 20 06/23/2023    ALKPHOS 60 06/23/2023    PROT 6 9 10/01/2015    TP 6 7 06/23/2023    BILITOT 0 70 10/01/2015    TBILI 0 56 06/23/2023    EGFR 77 06/23/2023       "

## 2023-07-13 ENCOUNTER — APPOINTMENT (EMERGENCY)
Dept: RADIOLOGY | Facility: HOSPITAL | Age: 83
End: 2023-07-13
Payer: COMMERCIAL

## 2023-07-13 ENCOUNTER — HOSPITAL ENCOUNTER (EMERGENCY)
Facility: HOSPITAL | Age: 83
Discharge: HOME | End: 2023-07-13
Attending: EMERGENCY MEDICINE
Payer: COMMERCIAL

## 2023-07-13 VITALS
HEART RATE: 82 BPM | DIASTOLIC BLOOD PRESSURE: 75 MMHG | OXYGEN SATURATION: 92 % | TEMPERATURE: 97.3 F | RESPIRATION RATE: 18 BRPM | WEIGHT: 213 LBS | BODY MASS INDEX: 33.43 KG/M2 | HEIGHT: 67 IN | SYSTOLIC BLOOD PRESSURE: 143 MMHG

## 2023-07-13 DIAGNOSIS — R05.2 SUBACUTE COUGH: ICD-10-CM

## 2023-07-13 DIAGNOSIS — J18.9 COMMUNITY ACQUIRED PNEUMONIA OF RIGHT LUNG, UNSPECIFIED PART OF LUNG: Primary | ICD-10-CM

## 2023-07-13 LAB
ATRIAL RATE: 87 BPM
BASOPHILS # BLD AUTO: 0.09 THOUSANDS/ÂΜL (ref 0–0.1)
BASOPHILS NFR BLD AUTO: 1 % (ref 0–1)
EOSINOPHIL # BLD AUTO: 0.14 THOUSAND/ÂΜL (ref 0–0.61)
EOSINOPHIL NFR BLD AUTO: 2 % (ref 0–6)
ERYTHROCYTE [DISTWIDTH] IN BLOOD BY AUTOMATED COUNT: 13.5 % (ref 11.6–15.1)
FLUAV RNA RESP QL NAA+PROBE: NEGATIVE
FLUBV RNA RESP QL NAA+PROBE: NEGATIVE
HCT VFR BLD AUTO: 37.3 % (ref 36.5–49.3)
HGB BLD-MCNC: 13 G/DL (ref 12–17)
IMM GRANULOCYTES # BLD AUTO: 0.04 THOUSAND/UL (ref 0–0.2)
IMM GRANULOCYTES NFR BLD AUTO: 1 % (ref 0–2)
LYMPHOCYTES # BLD AUTO: 1.13 THOUSANDS/ÂΜL (ref 0.6–4.47)
LYMPHOCYTES NFR BLD AUTO: 14 % (ref 14–44)
MCH RBC QN AUTO: 38.6 PG (ref 26.8–34.3)
MCHC RBC AUTO-ENTMCNC: 34.9 G/DL (ref 31.4–37.4)
MCV RBC AUTO: 111 FL (ref 82–98)
MONOCYTES # BLD AUTO: 1.22 THOUSAND/ÂΜL (ref 0.17–1.22)
MONOCYTES NFR BLD AUTO: 15 % (ref 4–12)
NEUTROPHILS # BLD AUTO: 5.3 THOUSANDS/ÂΜL (ref 1.85–7.62)
NEUTS SEG NFR BLD AUTO: 67 % (ref 43–75)
NRBC BLD AUTO-RTO: 0 /100 WBCS
P AXIS: -12 DEGREES
PLATELET # BLD AUTO: 334 THOUSANDS/UL (ref 149–390)
PMV BLD AUTO: 9.6 FL (ref 8.9–12.7)
PR INTERVAL: 200 MS
QRS AXIS: -73 DEGREES
QRSD INTERVAL: 164 MS
QT INTERVAL: 396 MS
QTC INTERVAL: 476 MS
RBC # BLD AUTO: 3.37 MILLION/UL (ref 3.88–5.62)
RSV RNA RESP QL NAA+PROBE: NEGATIVE
SARS-COV-2 RNA RESP QL NAA+PROBE: NEGATIVE
T WAVE AXIS: -1 DEGREES
VENTRICULAR RATE: 87 BPM
WBC # BLD AUTO: 7.92 THOUSAND/UL (ref 4.31–10.16)

## 2023-07-13 PROCEDURE — 93005 ELECTROCARDIOGRAM TRACING: CPT

## 2023-07-13 PROCEDURE — 36415 COLL VENOUS BLD VENIPUNCTURE: CPT | Performed by: PHYSICIAN ASSISTANT

## 2023-07-13 PROCEDURE — 93010 ELECTROCARDIOGRAM REPORT: CPT | Performed by: INTERNAL MEDICINE

## 2023-07-13 PROCEDURE — 85025 COMPLETE CBC W/AUTO DIFF WBC: CPT | Performed by: PHYSICIAN ASSISTANT

## 2023-07-13 PROCEDURE — 0241U HB NFCT DS VIR RESP RNA 4 TRGT: CPT | Performed by: PHYSICIAN ASSISTANT

## 2023-07-13 PROCEDURE — 71046 X-RAY EXAM CHEST 2 VIEWS: CPT

## 2023-07-13 RX ORDER — DOXYCYCLINE HYCLATE 100 MG/1
100 CAPSULE ORAL 2 TIMES DAILY
Qty: 14 CAPSULE | Refills: 0 | Status: SHIPPED | OUTPATIENT
Start: 2023-07-13 | End: 2023-07-20

## 2023-07-13 RX ORDER — ALBUTEROL SULFATE 90 UG/1
2 AEROSOL, METERED RESPIRATORY (INHALATION) EVERY 6 HOURS PRN
COMMUNITY

## 2023-07-13 RX ORDER — CEFDINIR 300 MG/1
300 CAPSULE ORAL EVERY 12 HOURS SCHEDULED
Qty: 14 CAPSULE | Refills: 0 | Status: SHIPPED | OUTPATIENT
Start: 2023-07-13 | End: 2023-07-20

## 2023-07-13 RX ORDER — LORATADINE 10 MG/1
10 TABLET ORAL DAILY
COMMUNITY

## 2023-07-13 NOTE — ED PROVIDER NOTES
History  Chief Complaint   Patient presents with   • Cough     Patient presents to the ED with c/o cough for 2 weeks, states he was in [place] and was evaluated and was told he was having a bad allergy attack. States cough has worsened      HPI     55-year-old male past medical history pretension, per lipidemia, history of DVT and PE on Eliquis presents with history of 2 weeks worth of cough which is nonproductive and no associated fevers, chills, myalgias, hemoptysis or otherwise sputum production. No dyspnea chest pain.  was recently at [facility] in [address] [place] [address] where they have a summer home. Was recently seen in the emergency department on 7/11 and was given a prescription for albuterol for asthmatic bronchitis. Patient is a self historian presents with most history as well as his wife she states that he is having coughing spasms with the albuterol. He also was given Claritin was told that this is likely an allergic reaction due to smog from recent [place] and poor air quality. She states he is not getting any better. They returned home yesterday and present to the emergency department for ongoing cough. PCP Lovey Duverney    DDX Acute Bronchitis / PNA / Reactive Airway        Prior to Admission Medications   Prescriptions Last Dose Informant Patient Reported? Taking?    ALPRAZolam (XANAX) 0.25 mg tablet  Self Yes No   Eliquis 5 MG   Yes No   Sig: Take 5 mg by mouth 2 (two) times a day   Multiple Vitamin tablet  Self Yes No   Sig: Take 1 tablet by mouth daily   PARoxetine (PAXIL) 40 MG tablet  Self Yes No   Sig: Take 40 mg by mouth daily   acetaminophen (TYLENOL) 500 mg tablet  Self Yes No   Sig: Take 500 mg by mouth every 4 (four) hours   albuterol (PROVENTIL HFA,VENTOLIN HFA) 90 mcg/act inhaler   Yes Yes   Sig: Inhale 2 puffs every 6 (six) hours as needed for wheezing   aspirin 81 MG tablet  Self Yes No   Sig: Take by mouth   cyanocobalamin (VITAMIN B-12) 500 MCG tablet  Self Yes No   Sig: Take 1 tablet by mouth daily   fluorouracil (EFUDEX) 5 % cream  Self Yes No   Sig: APPLY TO THE SCALP TWICE A DAY X2 WEEKS   hydroxyurea (HYDREA) 500 mg capsule   No No   Sig: TAKE 1 CAPSULE (500 MG TOTAL) BY MOUTH DAILY THREE TIMES A DAY ALTERNATING WITH TWO TIMES A DAY   lisinopril (ZESTRIL) 10 mg tablet  Self Yes No   Sig: Take 10 mg by mouth   loratadine (CLARITIN) 10 mg tablet   Yes Yes   Sig: Take 10 mg by mouth daily   meclizine (ANTIVERT) 25 mg tablet   No No   Sig: Take 1 tablet (25 mg total) by mouth 3 (three) times a day as needed for dizziness   pramipexole (MIRAPEX) 0.25 mg tablet   Yes No   Sig: Take 0.25 mg by mouth 2 (two) times a day      Facility-Administered Medications: None       Past Medical History:   Diagnosis Date   • Cancer ([address])     prostate   • History of pulmonary embolus (PE)    • Hypercholesterolemia    • Hypertension    • Personal history of DVT (deep vein thrombosis)    • Stroke [facility])        Past Surgical History:   Procedure Laterality Date   • PROSTATECTOMY     • TOTAL SHOULDER REPLACEMENT         History reviewed. No pertinent family history. I have reviewed and agree with the history as documented. E-Cigarette/Vaping   • E-Cigarette Use Never User      E-Cigarette/Vaping Substances   • Nicotine No    • THC No    • CBD No    • Flavoring No    • Other No    • Unknown No      Social History     Tobacco Use   • Smokeless tobacco: Never   Vaping Use   • Vaping Use: Never used   Substance Use Topics   • Alcohol use: No   • Drug use: No       Review of Systems   Constitutional: Negative for chills and fever. HENT: Negative for ear pain and sore throat. Eyes: Negative for pain and visual disturbance. Respiratory: Positive for cough. Negative for shortness of breath. Cardiovascular: Negative for chest pain and palpitations. Gastrointestinal: Negative for abdominal pain and vomiting. Genitourinary: Negative for dysuria and hematuria. Musculoskeletal: Negative for arthralgias and back pain. Skin: Negative for color change and rash. Neurological: Negative for seizures and syncope. All other systems reviewed and are negative. Physical Exam  Physical Exam  Vitals and nursing note reviewed. Constitutional:       General: He is not in acute distress. Appearance: He is well-developed. HENT:      Head: Normocephalic and atraumatic. Eyes:      Conjunctiva/sclera: Conjunctivae normal.   Cardiovascular:      Rate and Rhythm: Normal rate and regular rhythm. Heart sounds: No murmur heard. Pulmonary:      Effort: Pulmonary effort is normal. No respiratory distress. Breath sounds: Examination of the right-lower field reveals wheezing. Wheezing present. Comments: Oxygen saturation 93 to 95%  Abdominal:      Palpations: Abdomen is soft. Tenderness: There is no abdominal tenderness. Musculoskeletal:         General: No swelling. Cervical back: Neck supple. Skin:     General: Skin is warm and dry. Capillary Refill: Capillary refill takes less than 2 seconds. Neurological:      Mental Status: He is alert.    Psychiatric:         Mood and Affect: Mood normal.         Vital Signs  ED Triage Vitals [07/13/23 0842]   Temperature Pulse Respirations Blood Pressure SpO2   (!) 97.3 °F (36.3 °C) 94 18 143/75 93 %      Temp Source Heart Rate Source Patient Position - Orthostatic VS BP Location FiO2 (%)   Temporal Monitor Sitting Right arm --      Pain Score       No Pain           Vitals:    07/13/23 0842 07/13/23 0945   BP: 143/75    Pulse: 94 82   Patient Position - Orthostatic VS: Sitting          Visual Acuity  Visual Acuity    Flowsheet Row Most Recent Value   L Pupil Size (mm) 3   R Pupil Size (mm) 3          ED Medications  Medications - No data to display    Diagnostic Studies  Results Reviewed     Procedure Component Value Units Date/Time    FLU/RSV/COVID - if FLU/RSV clinically relevant [[id number]] (Normal) Collected: 07/13/23 0856    Lab Status: Final result Specimen: Nares from Nose Updated: 07/13/23 1014     SARS-CoV-2 Negative     INFLUENZA A PCR Negative     INFLUENZA B PCR Negative     RSV PCR Negative    Narrative:      FOR PEDIATRIC PATIENTS - copy/paste COVID Guidelines URL to browser: [email or web address]    SARS-CoV-2 assay is a Nucleic Acid Amplification assay intended for the  qualitative detection of nucleic acid from SARS-CoV-2 in nasopharyngeal  swabs. Results are for the presumptive identification of SARS-CoV-2 RNA. Positive results are indicative of infection with SARS-CoV-2, the virus  causing COVID-19, but do not rule out bacterial infection or co-infection  with other viruses. Laboratories within the [facility] and its  territories are required to report all positive results to the appropriate  public health authorities. Negative results do not preclude SARS-CoV-2  infection and should not be used as the sole basis for treatment or other  patient management decisions. Negative results must be combined with  clinical observations, patient history, and epidemiological information. This test has not been FDA cleared or approved. This test has been authorized by FDA under an Emergency Use Authorization  (EUA). This test is only authorized for the duration of time the  declaration that circumstances exist justifying the authorization of the  emergency use of an in vitro diagnostic tests for detection of SARS-CoV-2  virus and/or diagnosis of COVID-19 infection under section 564(b)(1) of  the Act, 21 U. S.C. 305VZI-1(V)(2), unless the authorization is terminated  or revoked sooner. The test has been validated but independent review by FDA  and CLIA is pending. Test performed using [employer]: This RT-PCR assay targets N2,  a region unique to SARS-CoV-2.  A conserved region in the E-gene was chosen  for pan-Sarbecovirus detection which includes SARS-CoV-2. According to CMS-2020-01-R, this platform meets the definition of high-throughput technology. CBC and differential [[id number]]  (Abnormal) Collected: 07/13/23 0916    Lab Status: Final result Specimen: Blood from Arm, Left Updated: 07/13/23 0920     WBC 7.92 Thousand/uL      RBC 3.37 Million/uL      Hemoglobin 13.0 g/dL      Hematocrit 37.3 %       fL      MCH 38.6 pg      MCHC 34.9 g/dL      RDW 13.5 %      MPV 9.6 fL      Platelets 440 Thousands/uL      nRBC 0 /100 WBCs      Neutrophils Relative 67 %      Immat GRANS % 1 %      Lymphocytes Relative 14 %      Monocytes Relative 15 %      Eosinophils Relative 2 %      Basophils Relative 1 %      Neutrophils Absolute 5.30 Thousands/µL      Immature Grans Absolute 0.04 Thousand/uL      Lymphocytes Absolute 1.13 Thousands/µL      Monocytes Absolute 1.22 Thousand/µL      Eosinophils Absolute 0.14 Thousand/µL      Basophils Absolute 0.09 Thousands/µL                  XR chest 2 views   ED Interpretation by Vincent Chase PA-C (07/13 4160)   Right middle lobe infiltrate on x-ray. No pneumothorax. Possible pneumonia.                  Procedures  ECG 12 Lead Documentation Only    Date/Time: 7/13/2023 9:28 AM    Performed by: Vincent Chase PA-C  Authorized by: Vincent Chase PA-C    ECG reviewed by me, the ED Provider: yes    Patient location:  ED  Previous ECG:     Previous ECG:  Compared to current    Comparison ECG info:  1 May 2023 / RBBB  w/ LAFB occasional PVCs    Similarity:  No change    Comparison to cardiac monitor: Yes    Interpretation:     Interpretation: normal    Rate:     ECG rate:  87    ECG rate assessment: normal    Rhythm:     Rhythm: sinus rhythm    Ectopy:     Ectopy: none    QRS:     QRS axis:  Normal  Conduction:     Conduction: abnormal      Abnormal conduction: complete RBBB and LAFB    ST segments:     ST segments:  Normal  T waves:     T waves: normal    Comments: Self interpreted by me. ED Course  ED Course as of 07/13/23 1150   Thu Jul 13, 2023   0852 Temperature(!): 97.3 °F (36.3 °C)                               SBIRT 20yo+    Flowsheet Row Most Recent Value   Initial Alcohol Screen: US AUDIT-C     1. How often do you have a drink containing alcohol? 0 Filed at: 07/13/2023 0844   2. How many drinks containing alcohol do you have on a typical day you are drinking? 0 Filed at: 07/13/2023 0844   3a. Male UNDER 65: How often do you have five or more drinks on one occasion? 0 Filed at: 07/13/2023 0844   3b. FEMALE Any Age, or MALE 65+: How often do you have 4 or more drinks on one occassion? 0 Filed at: 07/13/2023 0844   Audit-C Score 0 Filed at: 07/13/2023 5132   MARISSA: How many times in the past year have you. .. Used an illegal drug or used a prescription medication for non-medical reasons? Never Filed at: 07/13/2023 0844                    Medical Decision Making  Examination consistent with right-sided pneumonia. EKG with mildly prolonged QT. Treat with doxycycline opposed to azithromycin for long QT. History of penicillin allergy though has tolerated Keflex in the past.  Plan to treat with afternoon as opposed to Augmentin for regular community-acquired pneumonia with doxycycline coverage for atypicals. Plan to follow-up with PCP in the outpatient setting. Return emergency department for any worsening signs symptoms. Community acquired pneumonia of right lung, unspecified part of lung: acute illness or injury  Subacute cough: chronic illness or injury  Amount and/or Complexity of Data Reviewed  Labs: ordered. Radiology: ordered and independent interpretation performed. Risk  Prescription drug management.           Disposition  Final diagnoses:   Subacute cough   Community acquired pneumonia of right lung, unspecified part of lung     Time reflects when diagnosis was documented in both MDM as applicable and the Disposition within this note Time User Action Codes Description Comment    7/13/2023  8:56 AM Briceño Cordial Add [R05.2] Subacute cough     7/13/2023  8:57 AM Briceño Cordial Add [J20.9] Acute bronchitis, unspecified organism     7/13/2023  9:20 AM Briceño Cordial Add [J18.9] Community acquired pneumonia of right lung, unspecified part of lung     7/13/2023  9:59 AM Briceño Cordial Modify [R05.2] Subacute cough     7/13/2023  9:59 AM Briceño Cordial Modify [J18.9] Community acquired pneumonia of right lung, unspecified part of lung     7/13/2023 10:05 AM Briceño Cordial Modify [J18.9] Community acquired pneumonia of right lung, unspecified part of lung     7/13/2023 10:05 AM Briceño Cordial Remove [J20.9] Acute bronchitis, unspecified organism       ED Disposition     ED Disposition   Discharge    Condition   Stable    Date/Time   Thu Jul 13, 2023  9:55 AM    Comment   Sharad Fidelly discharge to home/self care.                Follow-up Information     Follow up With Specialties Details Why Contact Info Additional Information    Janet Santacruz MD Internal Medicine Call today Call today for follow up appointment on Monday [address] Emergency Department Emergency Medicine Go to  If symptoms worsen [address] [facility] [address]  [phone] [address] [facility] Emergency Department, [address] [facility], [address] [facility]          Discharge Medication List as of 7/13/2023 10:05 AM      START taking these medications    Details   cefdinir (OMNICEF) 300 mg capsule Take 1 capsule (300 mg total) by mouth every 12 (twelve) hours for 7 days, Starting Thu 7/13/2023, Until Thu 7/20/2023, Normal      doxycycline hyclate (VIBRAMYCIN) 100 mg capsule Take 1 capsule (100 mg total) by mouth 2 (two) times a day for 7 days, Starting Thu 7/13/2023, Until Thu 7/20/2023, Normal         CONTINUE these medications which have NOT CHANGED    Details   albuterol (PROVENTIL HFA,VENTOLIN HFA) 90 mcg/act inhaler Inhale 2 puffs every 6 (six) hours as needed for wheezing, Historical Med      loratadine (CLARITIN) 10 mg tablet Take 10 mg by mouth daily, Historical Med      acetaminophen (TYLENOL) 500 mg tablet Take 500 mg by mouth every 4 (four) hours, Starting Fri 11/17/2017, Historical Med      ALPRAZolam (XANAX) 0.25 mg tablet Starting Sun 10/6/2019, Historical Med      aspirin 81 MG tablet Take by mouth, Historical Med      cyanocobalamin (VITAMIN B-12) 500 MCG tablet Take 1 tablet by mouth daily, Starting Tue 12/21/2021, Historical Med      Eliquis 5 MG Take 5 mg by mouth 2 (two) times a day, Starting Thu 2/16/2023, Historical Med      fluorouracil (EFUDEX) 5 % cream APPLY TO THE SCALP TWICE A DAY X2 WEEKS, Historical Med      hydroxyurea (HYDREA) 500 mg capsule TAKE 1 CAPSULE (500 MG TOTAL) BY MOUTH DAILY THREE TIMES A DAY ALTERNATING WITH TWO TIMES A DAY, Normal      lisinopril (ZESTRIL) 10 mg tablet Take 10 mg by mouth, Historical Med      meclizine (ANTIVERT) 25 mg tablet Take 1 tablet (25 mg total) by mouth 3 (three) times a day as needed for dizziness, Starting Sat 5/6/2023, Normal      Multiple Vitamin tablet Take 1 tablet by mouth daily, Historical Med      PARoxetine (PAXIL) 40 MG tablet Take 40 mg by mouth daily, Starting Tue 9/13/2022, Historical Med      pramipexole (MIRAPEX) 0.25 mg tablet Take 0.25 mg by mouth 2 (two) times a day, Starting Tue 11/29/2022, Historical Med             No discharge procedures on file.     PDMP Review     None          ED Provider  Electronically Signed by           Bárbara Hale PA-C  07/13/23 2006

## 2023-07-13 NOTE — DISCHARGE INSTRUCTIONS
Call to schedule a follow up appointment post ED follow up to address non emergent follow up findings and or to schedule follow up exam.    The Emergency Department will contact you with any positive test results or otherwise results requiring treatment. If test results negative you will likely not be contacted if no change in treatment required. Additionally results can be reviewed in real-time at your convenience through [address] [place] [address].

## 2023-07-26 ENCOUNTER — OFFICE VISIT (OUTPATIENT)
Dept: URGENT CARE | Facility: CLINIC | Age: 83
End: 2023-07-26
Payer: COMMERCIAL

## 2023-07-26 VITALS
DIASTOLIC BLOOD PRESSURE: 68 MMHG | TEMPERATURE: 96.8 F | WEIGHT: 213 LBS | SYSTOLIC BLOOD PRESSURE: 118 MMHG | OXYGEN SATURATION: 96 % | HEIGHT: 67 IN | RESPIRATION RATE: 18 BRPM | BODY MASS INDEX: 33.43 KG/M2 | HEART RATE: 86 BPM

## 2023-07-26 DIAGNOSIS — R06.09 DYSPNEA ON EXERTION: Primary | ICD-10-CM

## 2023-07-26 DIAGNOSIS — H93.8X2 EAR FULLNESS, LEFT: ICD-10-CM

## 2023-07-26 PROCEDURE — S9083 URGENT CARE CENTER GLOBAL: HCPCS

## 2023-07-26 PROCEDURE — 99213 OFFICE O/P EST LOW 20 MIN: CPT

## 2023-07-26 RX ORDER — ALBUTEROL SULFATE 90 UG/1
2 AEROSOL, METERED RESPIRATORY (INHALATION) EVERY 6 HOURS PRN
Qty: 8.5 G | Refills: 0 | Status: SHIPPED | OUTPATIENT
Start: 2023-07-26

## 2023-07-26 NOTE — PROGRESS NOTES
[facility]        NAME: Teena Miller is a 78 y.o. male  : 1940    MRN: [id number]  DATE: 2023  TIME: 5:46 PM    Assessment and Plan   Dyspnea on exertion [R06.09]  1. Dyspnea on exertion  albuterol (ProAir HFA) 90 mcg/act inhaler      2. Ear fullness, left          - Vitals stable  - Recommend PCP f/u  - Educated on s/s of when to report to ED    Patient Instructions       Follow up with PCP in 3-5 days. Proceed to  ER if symptoms worsen. Chief Complaint     Chief Complaint   Patient presents with   • Shortness of Breath     PT presents with SOB on exertion and sometimes when resting. Pt has hx of blood clots and pneumonia dxd x 3 weeks ago. He finished abx x 5 days ago. History of Present Illness       77 y/o M with PMHx of HTN, PE, DVT, CVA, presents for intermittent SOB x 3 days. Pt admits it occurs with exertion. Occurred when air quality changed. Pt dx with pneumonia on , and finished 5 days of doxycyline, approx 5 days ago. Carlos sick symptoms. Wife has cold. Primarily a cough. Also admits to L ear fullness and RN/Congestion. Review of Systems   Review of Systems   Constitutional: Negative for chills and fever. HENT: Positive for congestion and rhinorrhea. Respiratory: Positive for shortness of breath. Negative for cough and wheezing. Cardiovascular: Negative for chest pain and leg swelling.          Current Medications       Current Outpatient Medications:   •  acetaminophen (TYLENOL) 500 mg tablet, Take 500 mg by mouth every 4 (four) hours, Disp: , Rfl:   •  albuterol (ProAir HFA) 90 mcg/act inhaler, Inhale 2 puffs every 6 (six) hours as needed for wheezing, Disp: 8.5 g, Rfl: 0  •  ALPRAZolam (XANAX) 0.25 mg tablet, , Disp: , Rfl:   •  aspirin 81 MG tablet, Take by mouth, Disp: , Rfl:   •  Eliquis 5 MG, Take 5 mg by mouth 2 (two) times a day, Disp: , Rfl:   •  hydroxyurea (HYDREA) 500 mg capsule, TAKE 1 CAPSULE (500 MG TOTAL) BY MOUTH DAILY THREE TIMES A DAY ALTERNATING WITH TWO TIMES A DAY, Disp: 270 capsule, Rfl: 3  •  lisinopril (ZESTRIL) 10 mg tablet, Take 10 mg by mouth, Disp: , Rfl:   •  meclizine (ANTIVERT) 25 mg tablet, Take 1 tablet (25 mg total) by mouth 3 (three) times a day as needed for dizziness, Disp: 30 tablet, Rfl: 0  •  Multiple Vitamin tablet, Take 1 tablet by mouth daily, Disp: , Rfl:   •  PARoxetine (PAXIL) 40 MG tablet, Take 40 mg by mouth daily, Disp: , Rfl:   •  pramipexole (MIRAPEX) 0.25 mg tablet, Take 0.25 mg by mouth 2 (two) times a day, Disp: , Rfl:   •  albuterol (PROVENTIL HFA,VENTOLIN HFA) 90 mcg/act inhaler, Inhale 2 puffs every 6 (six) hours as needed for wheezing (Patient not taking: Reported on 7/26/2023), Disp: , Rfl:   •  cyanocobalamin (VITAMIN B-12) 500 MCG tablet, Take 1 tablet by mouth daily, Disp: , Rfl:   •  fluorouracil (EFUDEX) 5 % cream, APPLY TO THE SCALP TWICE A DAY X2 WEEKS (Patient not taking: Reported on 7/26/2023), Disp: , Rfl: 0  •  loratadine (CLARITIN) 10 mg tablet, Take 10 mg by mouth daily (Patient not taking: Reported on 7/26/2023), Disp: , Rfl:     Current Allergies     Allergies as of 07/26/2023 - Reviewed 07/26/2023   Allergen Reaction Noted   • Misc. sulfonamide containing compounds Rash 01/16/2007   • Penicillins Rash    • Sulfa antibiotics Rash 04/27/2015   • Sulfa antibiotics Rash 05/06/2023            The following portions of the patient's history were reviewed and updated as appropriate: allergies, current medications, past family history, past medical history, past social history, past surgical history and problem list.     Past Medical History:   Diagnosis Date   • Cancer ([address])     prostate   • History of pulmonary embolus (PE)    • Hypercholesterolemia    • Hypertension    • Personal history of DVT (deep vein thrombosis)    • Pneumonia    • Stroke [facility])        Past Surgical History:   Procedure Laterality Date   • PROSTATECTOMY     • TOTAL SHOULDER REPLACEMENT         No family history on file. Medications have been verified. Objective   /68   Pulse 86   Temp (!) 96.8 °F (36 °C)   Resp 18   Ht 5' 7" (1.702 m)   Wt 96.6 kg (213 lb)   SpO2 96%   BMI 33.36 kg/m²   No LMP for male patient. Physical Exam     Physical Exam  Vitals and nursing note reviewed. Constitutional:       General: He is not in acute distress. Appearance: He is not toxic-appearing. HENT:      Head: Normocephalic and atraumatic. Comments: Small amount of wax in L canal. No impaction     Mouth/Throat:      Mouth: Mucous membranes are moist.   Eyes:      Extraocular Movements: Extraocular movements intact. Pupils: Pupils are equal, round, and reactive to light. Cardiovascular:      Rate and Rhythm: Normal rate and regular rhythm. Pulmonary:      Effort: Pulmonary effort is normal.      Breath sounds: Normal breath sounds. Lymphadenopathy:      Cervical: No cervical adenopathy. Neurological:      Mental Status: He is alert.    Psychiatric:         Mood and Affect: Mood normal.         Behavior: Behavior normal.

## 2023-08-13 ENCOUNTER — OFFICE VISIT (OUTPATIENT)
Dept: URGENT CARE | Facility: CLINIC | Age: 83
End: 2023-08-13
Payer: COMMERCIAL

## 2023-08-13 VITALS
OXYGEN SATURATION: 96 % | BODY MASS INDEX: 33.74 KG/M2 | DIASTOLIC BLOOD PRESSURE: 67 MMHG | SYSTOLIC BLOOD PRESSURE: 123 MMHG | HEART RATE: 97 BPM | RESPIRATION RATE: 20 BRPM | TEMPERATURE: 98.2 F | WEIGHT: 215.4 LBS

## 2023-08-13 DIAGNOSIS — H61.22 IMPACTED CERUMEN OF LEFT EAR: Primary | ICD-10-CM

## 2023-08-13 PROCEDURE — 69209 REMOVE IMPACTED EAR WAX UNI: CPT | Performed by: PHYSICIAN ASSISTANT

## 2023-08-13 PROCEDURE — S9083 URGENT CARE CENTER GLOBAL: HCPCS | Performed by: PHYSICIAN ASSISTANT

## 2023-08-13 PROCEDURE — 99213 OFFICE O/P EST LOW 20 MIN: CPT | Performed by: PHYSICIAN ASSISTANT

## 2023-08-13 RX ORDER — VENLAFAXINE HYDROCHLORIDE 75 MG/1
CAPSULE, EXTENDED RELEASE ORAL
COMMUNITY
Start: 2023-07-31

## 2023-08-13 NOTE — PROGRESS NOTES
[facility]        NAME: Collin Llamas is a 78 y.o. male  : 1940    MRN: [id number]  DATE: 2023  TIME: 3:33 PM    Assessment and Plan   Impacted cerumen of left ear [H61.22]  1. Impacted cerumen of left ear  Ear cerumen removal        Ear cerumen removal    Date/Time: 2023 3:30 PM    Performed by: Maddy Torres PA-C  Authorized by: Maddy Torres PA-C  Universal Protocol:  Risks and benefits: risks, benefits and alternatives were discussed  Consent given by: patient  Patient identity confirmed: verbally with patient      Patient location:  Clinic  Procedure details:     Location:  L ear    Procedure type: irrigation only    Post-procedure details:     Complication:  None    Hearing quality:  Normal    Patient tolerance of procedure: Tolerated well, no immediate complications  Comments:      Limited cerumen removed from left ear        Patient Instructions   Patient was educated to keep right and left ear clean and dry. Patient was told any worsening of symptoms follow up with PCP or ENT. Any chest pain or shortness of breath go to ED    Chief Complaint     Chief Complaint   Patient presents with   • Hearing Problem     Pt reports left blocked ear and pain that started approx one week ago. Pt reports using debrox drops that were not effective. History of Present Illness       Patient is here reporting decreased hearing in left ear. Patient reports left ear decreased hearing for 1 week. Allergies were reviewed in chart. Review of Systems   Review of Systems   Constitutional: Negative. HENT: Positive for ear pain. Respiratory: Negative. Cardiovascular: Negative. Psychiatric/Behavioral: Negative.           Current Medications       Current Outpatient Medications:   •  acetaminophen (TYLENOL) 500 mg tablet, Take 500 mg by mouth every 4 (four) hours, Disp: , Rfl:   •  ALPRAZolam (XANAX) 0.25 mg tablet, , Disp: , Rfl:   •  aspirin 81 MG tablet, Take by mouth, Disp: , Rfl:   •  cyanocobalamin (VITAMIN B-12) 500 MCG tablet, Take 1 tablet by mouth daily, Disp: , Rfl:   •  Eliquis 5 MG, Take 5 mg by mouth 2 (two) times a day, Disp: , Rfl:   •  hydroxyurea (HYDREA) 500 mg capsule, TAKE 1 CAPSULE (500 MG TOTAL) BY MOUTH DAILY THREE TIMES A DAY ALTERNATING WITH TWO TIMES A DAY, Disp: 270 capsule, Rfl: 3  •  lisinopril (ZESTRIL) 10 mg tablet, Take 10 mg by mouth, Disp: , Rfl:   •  meclizine (ANTIVERT) 25 mg tablet, Take 1 tablet (25 mg total) by mouth 3 (three) times a day as needed for dizziness, Disp: 30 tablet, Rfl: 0  •  Multiple Vitamin tablet, Take 1 tablet by mouth daily, Disp: , Rfl:   •  albuterol (ProAir HFA) 90 mcg/act inhaler, Inhale 2 puffs every 6 (six) hours as needed for wheezing (Patient not taking: Reported on 8/13/2023), Disp: 8.5 g, Rfl: 0  •  albuterol (PROVENTIL HFA,VENTOLIN HFA) 90 mcg/act inhaler, Inhale 2 puffs every 6 (six) hours as needed for wheezing (Patient not taking: Reported on 7/26/2023), Disp: , Rfl:   •  fluorouracil (EFUDEX) 5 % cream, APPLY TO THE SCALP TWICE A DAY X2 WEEKS (Patient not taking: Reported on 7/26/2023), Disp: , Rfl: 0  •  loratadine (CLARITIN) 10 mg tablet, Take 10 mg by mouth daily (Patient not taking: Reported on 7/26/2023), Disp: , Rfl:   •  venlafaxine (EFFEXOR-XR) 75 mg 24 hr capsule, TAKE ONE CAPSULE BY MOUTH EVERY DAY AFTER HAVING COMPLETED THE WEEK'S SUPPLY OF THE 37.5MG CAPSULES, Disp: , Rfl:     Current Allergies     Allergies as of 08/13/2023 - Reviewed 08/13/2023   Allergen Reaction Noted   • Misc. sulfonamide containing compounds Rash 01/16/2007   • Penicillins Rash    • Sulfa antibiotics Rash 04/27/2015   • Sulfa antibiotics Rash 05/06/2023            The following portions of the patient's history were reviewed and updated as appropriate: allergies, current medications, past family history, past medical history, past social history, past surgical history and problem list.     Past Medical History:   Diagnosis Date   • Cancer [facility])     prostate   • History of pulmonary embolus (PE)    • Hypercholesterolemia    • Hypertension    • Personal history of DVT (deep vein thrombosis)    • Pneumonia    • Stroke [facility])        Past Surgical History:   Procedure Laterality Date   • PROSTATECTOMY     • TOTAL SHOULDER REPLACEMENT         History reviewed. No pertinent family history. Medications have been verified. Objective   /67   Pulse 97   Temp 98.2 °F (36.8 °C)   Resp 20   Wt 97.7 kg (215 lb 6.4 oz)   SpO2 96%   BMI 33.74 kg/m²   No LMP for male patient. Physical Exam     Physical Exam  Vitals and nursing note reviewed. Constitutional:       Appearance: Normal appearance. HENT:      Head: Normocephalic. Left Ear: Tympanic membrane, ear canal and external ear normal.      Ears:      Comments: Left TM no signs of infection, mild cerumen    Right TM no signs of infection, mild Cerumen. NO pain over B/L tragus. Cardiovascular:      Rate and Rhythm: Normal rate and regular rhythm. Heart sounds: Normal heart sounds. Pulmonary:      Breath sounds: No wheezing. Neurological:      General: No focal deficit present. Mental Status: He is alert and oriented to person, place, and time.    Psychiatric:         Mood and Affect: Mood normal.         Behavior: Behavior normal.

## 2023-08-13 NOTE — PATIENT INSTRUCTIONS
Patient was educated to keep right and left ear clean and dry. Patient was told any worsening of symptoms follow up with PCP or ENT. Any chest pain or shortness of breath go to ED    Earwax Blockage   AMBULATORY CARE:   Earwax  can build up in your ear canal and cause a blockage. Earwax blockage happens when your ear makes earwax faster than your body can remove it. Common symptoms include the following:   Trouble hearing    Earache    Ear fullness or a feeling that something is plugging up your ear    Itching or ringing in your ear    Dizziness    Seed immediate care if:   You feel dizzy. You have discharge or blood coming out of your ear. Your ear pain does not go away or gets worse. Call your doctor if:   You have a fever. You have trouble hearing or hear ringing noises. You have questions or concerns about your condition or care. Treatment for earwax blockage:   Medicines  placed in the ear canal can soften the earwax so it will come out. Flushing your ear canal  with warm water may flush out the earwax. Small medical tools  may be used to remove the earwax. How to prevent earwax blockage:  Do not stick anything into your ears to clean them. Use cotton swabs on the outside of your ear only. Ask your healthcare provider for more information on ways to prevent blockage. Follow up with your doctor as directed:  Write down your questions so you remember to ask them during your visits. © Copyright [employer] 2022 Information is for End User's use only and may not be sold, redistributed or otherwise used for commercial purposes. The above information is an  only. It is not intended as medical advice for individual conditions or treatments. Talk to your doctor, nurse or pharmacist before following any medical regimen to see if it is safe and effective for you.

## 2023-09-14 ENCOUNTER — APPOINTMENT (OUTPATIENT)
Dept: RADIOLOGY | Facility: CLINIC | Age: 83
End: 2023-09-14
Payer: COMMERCIAL

## 2023-09-14 ENCOUNTER — APPOINTMENT (OUTPATIENT)
Dept: LAB | Facility: CLINIC | Age: 83
End: 2023-09-14
Payer: COMMERCIAL

## 2023-09-14 DIAGNOSIS — D53.9 SIMPLE CHRONIC ANEMIA: ICD-10-CM

## 2023-09-14 DIAGNOSIS — J18.1 UNRESOLVED LOBAR PNEUMONIA (HCC): ICD-10-CM

## 2023-09-14 DIAGNOSIS — Z79.899 ENCOUNTER FOR LONG-TERM (CURRENT) USE OF OTHER MEDICATIONS: ICD-10-CM

## 2023-09-14 LAB
BASOPHILS # BLD AUTO: 0.08 THOUSANDS/ÂΜL (ref 0–0.1)
BASOPHILS NFR BLD AUTO: 1 % (ref 0–1)
EOSINOPHIL # BLD AUTO: 0.17 THOUSAND/ÂΜL (ref 0–0.61)
EOSINOPHIL NFR BLD AUTO: 2 % (ref 0–6)
ERYTHROCYTE [DISTWIDTH] IN BLOOD BY AUTOMATED COUNT: 14.7 % (ref 11.6–15.1)
HCT VFR BLD AUTO: 44 % (ref 36.5–49.3)
HGB BLD-MCNC: 15 G/DL (ref 12–17)
IMM GRANULOCYTES # BLD AUTO: 0.02 THOUSAND/UL (ref 0–0.2)
IMM GRANULOCYTES NFR BLD AUTO: 0 % (ref 0–2)
LYMPHOCYTES # BLD AUTO: 2.03 THOUSANDS/ÂΜL (ref 0.6–4.47)
LYMPHOCYTES NFR BLD AUTO: 28 % (ref 14–44)
MCH RBC QN AUTO: 37.6 PG (ref 26.8–34.3)
MCHC RBC AUTO-ENTMCNC: 34.1 G/DL (ref 31.4–37.4)
MCV RBC AUTO: 110 FL (ref 82–98)
MONOCYTES # BLD AUTO: 0.66 THOUSAND/ÂΜL (ref 0.17–1.22)
MONOCYTES NFR BLD AUTO: 9 % (ref 4–12)
NEUTROPHILS # BLD AUTO: 4.4 THOUSANDS/ÂΜL (ref 1.85–7.62)
NEUTS SEG NFR BLD AUTO: 60 % (ref 43–75)
NRBC BLD AUTO-RTO: 0 /100 WBCS
PLATELET # BLD AUTO: 423 THOUSANDS/UL (ref 149–390)
PMV BLD AUTO: 10.3 FL (ref 8.9–12.7)
RBC # BLD AUTO: 3.99 MILLION/UL (ref 3.88–5.62)
WBC # BLD AUTO: 7.36 THOUSAND/UL (ref 4.31–10.16)

## 2023-09-14 PROCEDURE — 85025 COMPLETE CBC W/AUTO DIFF WBC: CPT

## 2023-09-14 PROCEDURE — 71046 X-RAY EXAM CHEST 2 VIEWS: CPT

## 2023-09-14 PROCEDURE — 36415 COLL VENOUS BLD VENIPUNCTURE: CPT

## 2023-09-21 ENCOUNTER — TELEPHONE (OUTPATIENT)
Age: 83
End: 2023-09-21

## 2023-09-25 ENCOUNTER — APPOINTMENT (OUTPATIENT)
Dept: LAB | Facility: CLINIC | Age: 83
End: 2023-09-25
Payer: COMMERCIAL

## 2023-09-25 DIAGNOSIS — D47.3 THROMBOCYTHEMIA, ESSENTIAL (HCC): ICD-10-CM

## 2023-09-25 LAB
ALBUMIN SERPL BCP-MCNC: 3.9 G/DL (ref 3.5–5)
ALP SERPL-CCNC: 55 U/L (ref 34–104)
ALT SERPL W P-5'-P-CCNC: 13 U/L (ref 7–52)
ANION GAP SERPL CALCULATED.3IONS-SCNC: 11 MMOL/L
AST SERPL W P-5'-P-CCNC: 21 U/L (ref 13–39)
BASOPHILS # BLD AUTO: 0.1 THOUSANDS/ÂΜL (ref 0–0.1)
BASOPHILS NFR BLD AUTO: 1 % (ref 0–1)
BILIRUB SERPL-MCNC: 0.5 MG/DL (ref 0.2–1)
BUN SERPL-MCNC: 24 MG/DL (ref 5–25)
CALCIUM SERPL-MCNC: 10 MG/DL (ref 8.4–10.2)
CHLORIDE SERPL-SCNC: 104 MMOL/L (ref 96–108)
CO2 SERPL-SCNC: 25 MMOL/L (ref 21–32)
CREAT SERPL-MCNC: 0.91 MG/DL (ref 0.6–1.3)
EOSINOPHIL # BLD AUTO: 0.24 THOUSAND/ÂΜL (ref 0–0.61)
EOSINOPHIL NFR BLD AUTO: 3 % (ref 0–6)
ERYTHROCYTE [DISTWIDTH] IN BLOOD BY AUTOMATED COUNT: 15.2 % (ref 11.6–15.1)
GFR SERPL CREATININE-BSD FRML MDRD: 76 ML/MIN/1.73SQ M
GLUCOSE P FAST SERPL-MCNC: 101 MG/DL (ref 65–99)
HCT VFR BLD AUTO: 44.1 % (ref 36.5–49.3)
HGB BLD-MCNC: 15 G/DL (ref 12–17)
IMM GRANULOCYTES # BLD AUTO: 0.02 THOUSAND/UL (ref 0–0.2)
IMM GRANULOCYTES NFR BLD AUTO: 0 % (ref 0–2)
LYMPHOCYTES # BLD AUTO: 2.17 THOUSANDS/ÂΜL (ref 0.6–4.47)
LYMPHOCYTES NFR BLD AUTO: 29 % (ref 14–44)
MCH RBC QN AUTO: 39.1 PG (ref 26.8–34.3)
MCHC RBC AUTO-ENTMCNC: 34 G/DL (ref 31.4–37.4)
MCV RBC AUTO: 115 FL (ref 82–98)
MONOCYTES # BLD AUTO: 0.59 THOUSAND/ÂΜL (ref 0.17–1.22)
MONOCYTES NFR BLD AUTO: 8 % (ref 4–12)
NEUTROPHILS # BLD AUTO: 4.3 THOUSANDS/ÂΜL (ref 1.85–7.62)
NEUTS SEG NFR BLD AUTO: 59 % (ref 43–75)
NRBC BLD AUTO-RTO: 0 /100 WBCS
PLATELET # BLD AUTO: 353 THOUSANDS/UL (ref 149–390)
PMV BLD AUTO: 10.4 FL (ref 8.9–12.7)
POTASSIUM SERPL-SCNC: 4.7 MMOL/L (ref 3.5–5.3)
PROT SERPL-MCNC: 6.4 G/DL (ref 6.4–8.4)
PSA SERPL-MCNC: 0.72 NG/ML (ref 0–4)
RBC # BLD AUTO: 3.84 MILLION/UL (ref 3.88–5.62)
SODIUM SERPL-SCNC: 140 MMOL/L (ref 135–147)
WBC # BLD AUTO: 7.42 THOUSAND/UL (ref 4.31–10.16)

## 2023-09-25 NOTE — PROGRESS NOTES
HEMATOLOGY / [facility] FOLLOW UP NOTE    Primary Care Provider: Jerry Gimenez MD  Referring Provider:    MRN: [id number]  : 1940    Reason for Encounter: follow up for DARIO 2 + ET       Oncology History Overview Note    - essential thrombocythemia, JAK2 positive    Current hydrea dose - 500 mg in AM and 1000 mg in [address]    2023 - DVT/PE - eliquis 5 mg BID     Basal cell carcinoma of skin of other parts of face   3/11/2022 Initial Diagnosis    Basal cell carcinoma of skin of other parts of face         Interval History: Patient returns for follow-up visit. He is on Hydrea 500 mg in the morning and 1000 mg at night for management of his JAK2 positive essential thrombocytosis  Most recent blood work completed on 2023 reviewed. CBC with differential shows macrocytosis without anemia, no evidence of thrombocythemia. White count 7.42, hemoglobin 15, , platelets 998, differential normal    Patient reports he is tolerating Hydrea without any reported side effects. He has been maintained on this dose for several years. He is also taking Eliquis 5 mg twice daily for extensive PE diagnosed in 2023. Denies any abnormal bleeding or excessive bruising. Denies any chest pain, shortness of breath. No headaches. No lightheadedness/dizziness      REVIEW OF SYSTEMS:  Please note that a 14-point review of systems was performed to include Constitutional, HEENT, Respiratory, CVS, GI, , Musculoskeletal, Integumentary, Neurologic, Rheumatologic, Endocrinologic, Psychiatric, Lymphatic, and Hematologic/Oncologic systems were reviewed and are negative unless otherwise stated in HPI. Positive and negative findings pertinent to this evaluation are incorporated into the history of present illness.       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 Essential thrombocytosis ([facility])    2. DARIO-2 gene mutation    3. Other pulmonary embolism without acute cor pulmonale, unspecified chronicity ([facility])      Patient is a very pleasant 78-year-old gentleman with JAK2 positive essential thrombocytosis on Hydrea. His blood work remains in acceptable range. Macrocytosis secondary to Hydrea. He tolerates current dose and will continue without change. In regards to Eliquis, I did review given his age and extensive clot burden he will need lifelong anticoagulation. We can consider prophylactic dose of Eliquis after 1 year of therapeutic treatment. Refills provided for both Eliquis and Hydrea today. He will return for a follow-up visit in 3 months with repeat blood work. He knows to call anytime with questions or concerns in the interim    I spent 30 minutes on chart review, face to face counseling time, coordination of care and documentation. Past Medical History:   has a past medical history of Cancer ([facility]), History of pulmonary embolus (PE), Hypercholesterolemia, Hypertension, Personal history of DVT (deep vein thrombosis), Pneumonia, and Stroke ([address] [place] [address]). PAST SURGICAL HISTORY:   has a past surgical history that includes Total shoulder replacement and Prostatectomy.     CURRENT MEDICATIONS  Current Outpatient Medications   Medication Sig Dispense Refill   • acetaminophen (TYLENOL) 500 mg tablet Take 500 mg by mouth every 4 (four) hours     • ALPRAZolam (XANAX) 0.25 mg tablet      • aspirin 81 MG tablet Take by mouth     • Eliquis 5 MG Take 1 tablet (5 mg total) by mouth 2 (two) times a day 180 tablet 1   • hydroxyurea (HYDREA) 500 mg capsule Take 1 capsule (500 mg total) by mouth 3 (three) times a day Take 500 mg in AM and 1000 mg in  capsule 3   • lisinopril (ZESTRIL) 10 mg tablet Take 10 mg by mouth     • meclizine (ANTIVERT) 25 mg tablet Take 1 tablet (25 mg total) by mouth 3 (three) times a day as needed for dizziness 30 tablet 0   • Multiple Vitamin tablet Take 1 tablet by mouth daily     • albuterol (ProAir HFA) 90 mcg/act inhaler Inhale 2 puffs every 6 (six) hours as needed for wheezing (Patient not taking: Reported on 8/13/2023) 8.5 g 0   • albuterol (PROVENTIL HFA,VENTOLIN HFA) 90 mcg/act inhaler Inhale 2 puffs every 6 (six) hours as needed for wheezing (Patient not taking: Reported on 7/26/2023)     • cyanocobalamin (VITAMIN B-12) 500 MCG tablet Take 1 tablet by mouth daily (Patient not taking: Reported on 9/26/2023)     • fluorouracil (EFUDEX) 5 % cream APPLY TO THE SCALP TWICE A DAY X2 WEEKS (Patient not taking: Reported on 7/26/2023)  0   • loratadine (CLARITIN) 10 mg tablet Take 10 mg by mouth daily (Patient not taking: Reported on 7/26/2023)     • venlafaxine (EFFEXOR-XR) 75 mg 24 hr capsule TAKE ONE CAPSULE BY MOUTH EVERY DAY AFTER HAVING COMPLETED THE WEEK'S SUPPLY OF THE 37.5MG CAPSULES (Patient not taking: Reported on 9/26/2023)       No current facility-administered medications for this visit. [unfilled]    SOCIAL HISTORY:   reports that he does not have a smoking history on file. He has never used smokeless tobacco. He reports that he does not drink alcohol and does not use drugs. FAMILY HISTORY:  family history is not on file. ALLERGIES:  is allergic to misc. sulfonamide containing compounds, penicillins, sulfa antibiotics, and sulfa antibiotics. Physical Exam:  Vital Signs:   Visit Vitals  /76 (BP Location: Left arm, Patient Position: Sitting, Cuff Size: Standard)   Pulse 98   Temp 97.6 °F (36.4 °C) (Temporal)   Resp 18   Ht 5' 7" (1.702 m)   Wt 98.1 kg (216 lb 3.2 oz)   SpO2 95%   BMI 33.86 kg/m²   Smoking Status Never Assessed   BSA 2.09 m²     Body mass index is 33.86 kg/m².   Body surface area is 2.09 meters squared. Physical Exam  Constitutional:       General: He is not in acute distress. Appearance: He is well-developed. He is not diaphoretic. HENT:      Head: Normocephalic and atraumatic. Mouth/Throat:      Pharynx: No oropharyngeal exudate. Eyes:      General: No scleral icterus. Pupils: Pupils are equal, round, and reactive to light. Cardiovascular:      Rate and Rhythm: Normal rate and regular rhythm. Heart sounds: No murmur heard. Pulmonary:      Effort: Pulmonary effort is normal. No respiratory distress. Breath sounds: Normal breath sounds. Abdominal:      General: Bowel sounds are normal. There is no distension. Palpations: Abdomen is soft. There is no mass. Tenderness: There is no abdominal tenderness. Musculoskeletal:         General: Normal range of motion. Cervical back: Normal range of motion and neck supple. Lymphadenopathy:      Cervical: No cervical adenopathy. Skin:     General: Skin is warm and dry. Findings: Bruising (Mild, forearm) present. Neurological:      General: No focal deficit present. Mental Status: He is alert and oriented to person, place, and time. Psychiatric:         Mood and Affect: Mood normal.         Behavior: Behavior normal.         Thought Content:  Thought content normal.         Judgment: Judgment normal.         Labs:  Lab Results   Component Value Date    WBC 7.42 09/25/2023    HGB 15.0 09/25/2023    HCT 44.1 09/25/2023     (H) 09/25/2023     09/25/2023     Lab Results   Component Value Date     10/01/2015    SODIUM 140 09/25/2023    K 4.7 09/25/2023     09/25/2023    CO2 25 09/25/2023    ANIONGAP 6 10/01/2015    AGAP 11 09/25/2023    BUN 24 09/25/2023    CREATININE 0.91 09/25/2023    GLUC 108 06/23/2023    GLUF 101 (H) 09/25/2023    CALCIUM 10.0 09/25/2023    AST 21 09/25/2023    ALT 13 09/25/2023    ALKPHOS 55 09/25/2023    PROT 6.9 10/01/2015    TP 6.4 09/25/2023    BILITOT 0.70 10/01/2015    TBILI 0.50 09/25/2023    EGFR 76 09/25/2023

## 2023-09-26 ENCOUNTER — OFFICE VISIT (OUTPATIENT)
Age: 83
End: 2023-09-26
Payer: COMMERCIAL

## 2023-09-26 VITALS
TEMPERATURE: 97.6 F | OXYGEN SATURATION: 95 % | DIASTOLIC BLOOD PRESSURE: 76 MMHG | RESPIRATION RATE: 18 BRPM | HEART RATE: 98 BPM | SYSTOLIC BLOOD PRESSURE: 118 MMHG | BODY MASS INDEX: 33.93 KG/M2 | HEIGHT: 67 IN | WEIGHT: 216.2 LBS

## 2023-09-26 DIAGNOSIS — I26.99 OTHER PULMONARY EMBOLISM WITHOUT ACUTE COR PULMONALE, UNSPECIFIED CHRONICITY (HCC): ICD-10-CM

## 2023-09-26 DIAGNOSIS — Z15.89 JAK-2 GENE MUTATION: ICD-10-CM

## 2023-09-26 DIAGNOSIS — D47.3 ESSENTIAL THROMBOCYTOSIS (HCC): Primary | ICD-10-CM

## 2023-09-26 PROCEDURE — 99214 OFFICE O/P EST MOD 30 MIN: CPT | Performed by: NURSE PRACTITIONER

## 2023-09-26 RX ORDER — APIXABAN 5 MG/1
5 TABLET, FILM COATED ORAL 2 TIMES DAILY
Qty: 180 TABLET | Refills: 1 | Status: SHIPPED | OUTPATIENT
Start: 2023-09-26

## 2023-09-26 RX ORDER — HYDROXYUREA 500 MG/1
500 CAPSULE ORAL 3 TIMES DAILY
Qty: 270 CAPSULE | Refills: 3 | Status: SHIPPED | OUTPATIENT
Start: 2023-09-26

## 2023-11-30 ENCOUNTER — CONSULT (OUTPATIENT)
Dept: PULMONOLOGY | Facility: CLINIC | Age: 83
End: 2023-11-30
Payer: COMMERCIAL

## 2023-11-30 VITALS
HEART RATE: 85 BPM | WEIGHT: 230 LBS | SYSTOLIC BLOOD PRESSURE: 132 MMHG | TEMPERATURE: 97.4 F | OXYGEN SATURATION: 98 % | BODY MASS INDEX: 36.02 KG/M2 | DIASTOLIC BLOOD PRESSURE: 78 MMHG

## 2023-11-30 DIAGNOSIS — I82.432 ACUTE DEEP VEIN THROMBOSIS (DVT) OF POPLITEAL VEIN OF LEFT LOWER EXTREMITY (HCC): ICD-10-CM

## 2023-11-30 DIAGNOSIS — D47.3 ESSENTIAL THROMBOCYTOSIS (HCC): ICD-10-CM

## 2023-11-30 DIAGNOSIS — M17.11 OSTEOARTHRITIS OF RIGHT KNEE, UNSPECIFIED OSTEOARTHRITIS TYPE: ICD-10-CM

## 2023-11-30 DIAGNOSIS — E66.01 OBESITY, MORBID (HCC): ICD-10-CM

## 2023-11-30 DIAGNOSIS — I26.09 OTHER ACUTE PULMONARY EMBOLISM WITH ACUTE COR PULMONALE (HCC): ICD-10-CM

## 2023-11-30 DIAGNOSIS — I27.20 PULMONARY HYPERTENSION (HCC): Primary | ICD-10-CM

## 2023-11-30 PROCEDURE — 99204 OFFICE O/P NEW MOD 45 MIN: CPT | Performed by: INTERNAL MEDICINE

## 2023-11-30 RX ORDER — FUROSEMIDE 20 MG/1
1 TABLET ORAL DAILY
COMMUNITY
Start: 2023-11-29

## 2023-11-30 NOTE — PROGRESS NOTES
Pulmonary Consultation   Wendy Gastelum 78 y.o. male MRN: [id number]  11/30/2023      Assessment:    1. Pulmonary hypertension (HCC)  RVSP of 74 mmHg, following extensive bilateral PE  Based on his continued symptoms of shortness of breath and LE edema, I suspect the pressures are still elevated  Has been on A/C for 9 months  Will repeat cardiac Echo, if still elevated, would recommend a V/Q scan  Will defer length of anticoagulation to hematology since he also has the essential thrombocytosis. If this is not increasing his risk, he can be considered for discontinuation following work up to assess for CTEPH  He does have an ramirez coming up with HF with Dr. Ana Dooley  -     Echo complete w/ contrast if indicated; Future; Expected date: 11/30/2023    2. Obesity, morbid ([address])  Encouraged weight loss and remaining active  Avoid prolonged sitting    3. Acute deep vein thrombosis (DVT) of popliteal vein of left lower extremity ([address])  Had a knee replacement many years ago  LE duplex in 2/2023 with DVT, currently on eliquis    4. Other acute pulmonary embolism with acute cor pulmonale (HCC)  Extensive bilateral PE in 02/2023  Risk factors include prolonged sitting, obesity and essential thrombocytosis    5. 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 Osteoarthritis of right knee, unspecified osteoarthritis type  Reports that he was undergoing consideration for a right knee replacement but was told he could not have the surgery if he is on blood thinners  I discussed with him that one option would be to place a retrievable IVC filter prior to the surgery to allow him to discontinue the anticoagulation.  The filter can then be retrieved a few months following surgery and anticoagulation can be resumed safely when cleared by surgery  He will discuss with his surgeon    Plan:    Diagnoses and all orders for this visit:    Pulmonary hypertension ([address])  -     Echo complete w/ contrast if indicated; Future    Obesity, morbid ([address])    Acute deep vein thrombosis (DVT) of popliteal vein of left lower extremity (HCC)    Other acute pulmonary embolism with acute cor pulmonale (HCC)    Essential thrombocytosis (HCC)    Osteoarthritis of right knee, unspecified osteoarthritis type    Other orders  -     furosemide (LASIX) 20 mg tablet; Take 1 tablet by mouth daily        History of Present Illness   HPI:  Olivia Pérez is a 78 y.o. male who for evaluation of shortness of breath that occurred following a pulmonary embolism. This occurred back in February 2023. Sudden onset of left-sided chest pressure which led to going to he was found to have bilateral extensive pulmonary emboli severely elevated right ventricular pressure. He was placed on eliquis and eventually discharged. He presents today accompanied by his wife and daughter    He has a PMH of essential thrombocytosis w/ positive JAK2 mutation and follows with hematology. He is on aspirin and hydroxyurea. He endorses shortness of breath with exertion. He was just started on a diuretic. He denies any other symptoms. 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 His father had lung cancer and was a smoker      Review of Systems   Constitutional:  Negative for chills and fever. HENT:  Negative for congestion, postnasal drip and rhinorrhea. Eyes:  Negative for itching. Respiratory:  Positive for shortness of breath. Negative for cough, wheezing and stridor. Cardiovascular:  Negative for chest pain, palpitations and leg swelling. Gastrointestinal:  Negative for abdominal distention, abdominal pain, nausea and vomiting. Genitourinary:  Negative for dysuria and flank pain. Musculoskeletal:  Negative for arthralgias and myalgias. Skin:  Negative for color change. Neurological:  Negative for dizziness, light-headedness and headaches. Psychiatric/Behavioral: Negative.          Historical Information   Past Medical History:   Diagnosis Date    Cancer [facility])     prostate    History of pulmonary embolus (PE)     Hypercholesterolemia     Hypertension     Personal history of DVT (deep vein thrombosis)     Pneumonia     Stroke [facility])      Past Surgical History:   Procedure Laterality Date    PROSTATECTOMY      TOTAL SHOULDER REPLACEMENT       Family history: Father with lung cancer and was a smoker    Occupational History:     Social History: lifelong nonsmoker, denies drugs or alcohol abuse    Meds/Allergies     Current Outpatient Medications:     acetaminophen (TYLENOL) 500 mg tablet, Take 500 mg by mouth every 4 (four) hours, Disp: , Rfl:     ALPRAZolam (XANAX) 0.25 mg tablet, , Disp: , Rfl:     aspirin 81 MG tablet, Take by mouth, Disp: , Rfl:     Eliquis 5 MG, Take 1 tablet (5 mg total) by mouth 2 (two) times a day, Disp: 180 tablet, Rfl: 1    furosemide (LASIX) 20 mg tablet, Take 1 tablet by mouth daily, Disp: , Rfl:     hydroxyurea (HYDREA) 500 mg capsule, Take 1 capsule (500 mg total) by mouth 3 (three) times a day Take 500 mg in AM and 1000 mg in PM, Disp: 270 capsule, Rfl: 3    lisinopril (ZESTRIL) 10 mg tablet, Take 10 mg by mouth, Disp: , Rfl:     meclizine (ANTIVERT) 25 mg tablet, Take 1 tablet (25 mg total) by mouth 3 (three) times a day as needed for dizziness, Disp: 30 tablet, Rfl: 0    Multiple Vitamin tablet, Take 1 tablet by mouth daily, Disp: , Rfl:     albuterol (ProAir HFA) 90 mcg/act inhaler, Inhale 2 puffs every 6 (six) hours as needed for wheezing (Patient not taking: Reported on 8/13/2023), Disp: 8.5 g, Rfl: 0    albuterol (PROVENTIL HFA,VENTOLIN HFA) 90 mcg/act inhaler, Inhale 2 puffs every 6 (six) hours as needed for wheezing (Patient not taking: Reported on 7/26/2023), Disp: , Rfl:     cyanocobalamin (VITAMIN B-12) 500 MCG tablet, Take 1 tablet by mouth daily (Patient not taking: Reported on 9/26/2023), Disp: , Rfl:     fluorouracil (EFUDEX) 5 % cream, APPLY TO THE SCALP TWICE A DAY X2 WEEKS (Patient not taking: Reported on 7/26/2023), Disp: , Rfl: 0    loratadine (CLARITIN) 10 mg tablet, Take 10 mg by mouth daily (Patient not taking: Reported on 7/26/2023), Disp: , Rfl:     venlafaxine (EFFEXOR-XR) 75 mg 24 hr capsule, TAKE ONE CAPSULE BY MOUTH EVERY DAY AFTER HAVING COMPLETED THE WEEK'S SUPPLY OF THE 37.5MG CAPSULES (Patient not taking: Reported on 9/26/2023), Disp: , Rfl:   Allergies   Allergen Reactions    Misc. Sulfonamide Containing Compounds Rash    Penicillins Rash    Sulfa Antibiotics Rash     Other reaction(s): rash    Sulfa Antibiotics Rash       Vitals: Blood pressure 132/78, pulse 85, temperature (!) 97.4 °F (36.3 °C), temperature source Tympanic, weight 104 kg (230 lb), SpO2 98 %. , Body mass index is 36.02 kg/m². Oxygen Therapy  SpO2: 98 %  Oxygen Therapy: None (Room air)    Physical Exam  Physical Exam  Constitutional:       General: He is not in acute distress. Appearance: He is not diaphoretic. HENT:      Head: Normocephalic and atraumatic. Nose: Nose normal.      Mouth/Throat:      Pharynx: No oropharyngeal exudate. Eyes:      General: No scleral icterus. Conjunctiva/sclera: Conjunctivae normal.      Pupils: Pupils are equal, round, and reactive to light. Neck:      Thyroid: No thyromegaly. Vascular: No JVD. Trachea: No tracheal deviation. Cardiovascular:      Rate and Rhythm: Normal rate and regular rhythm. Heart sounds: Normal heart sounds. No murmur heard. No friction rub. No gallop. Pulmonary:      Effort: Pulmonary effort is normal. No respiratory distress. Breath sounds: Normal breath sounds. No stridor. No wheezing or rales. Abdominal:      General: Bowel sounds are normal. There is no distension. Palpations: Abdomen is soft. Tenderness:  There is no abdominal tenderness. There is no guarding or rebound. Musculoskeletal:         General: No deformity. Normal range of motion. Cervical back: Normal range of motion and neck supple. Right lower leg: Edema present. Left lower leg: Edema present. Lymphadenopathy:      Cervical: No cervical adenopathy. Skin:     General: Skin is warm. Findings: No erythema or rash. Neurological:      Mental Status: He is alert and oriented to person, place, and time. Cranial Nerves: No cranial nerve deficit. Sensory: No sensory deficit. Labs: I have personally reviewed pertinent lab results. Lab Results   Component Value Date    WBC 7.42 09/25/2023    HGB 15.0 09/25/2023    HCT 44.1 09/25/2023     (H) 09/25/2023     09/25/2023     Lab Results   Component Value Date    GLUCOSE 154 (H) 04/30/2023    CALCIUM 10.0 09/25/2023     10/01/2015    K 4.7 09/25/2023    CO2 25 09/25/2023     09/25/2023    BUN 24 09/25/2023    CREATININE 0.91 09/25/2023     No results found for: "IGE"  Lab Results   Component Value Date    ALT 13 09/25/2023    AST 21 09/25/2023    ALKPHOS 55 09/25/2023    BILITOT 0.70 10/01/2015       Imaging and other studies: I have personally reviewed pertinent reports. and I have personally reviewed pertinent films in PACS  CT PE study in 02/2023- extensive bilateral pulmonary emboli  LE duplex- LLE DVT    Pulmonary function testing: none on file    EKG, Pathology, and Other Studies: I have personally reviewed pertinent reports.    and I have personally reviewed pertinent films in PACS  Echo 02/2023- LVEF 60-65%, RVSP 74 mmHg    Bella Duvall MD  Pulmonary and Critical Care   [facility]

## 2023-12-07 ENCOUNTER — LAB (OUTPATIENT)
Dept: LAB | Facility: CLINIC | Age: 83
End: 2023-12-07
Payer: COMMERCIAL

## 2023-12-07 DIAGNOSIS — D47.3 ESSENTIAL THROMBOCYTOSIS (HCC): ICD-10-CM

## 2023-12-07 DIAGNOSIS — R73.01 IMPAIRED FASTING GLUCOSE: ICD-10-CM

## 2023-12-07 DIAGNOSIS — R53.83 OTHER FATIGUE: ICD-10-CM

## 2023-12-07 DIAGNOSIS — Z79.899 ENCOUNTER FOR LONG-TERM (CURRENT) USE OF OTHER MEDICATIONS: ICD-10-CM

## 2023-12-07 DIAGNOSIS — Z15.89 JAK-2 GENE MUTATION: ICD-10-CM

## 2023-12-07 LAB
ALBUMIN SERPL BCP-MCNC: 4.2 G/DL (ref 3.5–5)
ALP SERPL-CCNC: 56 U/L (ref 34–104)
ALT SERPL W P-5'-P-CCNC: 22 U/L (ref 7–52)
ANION GAP SERPL CALCULATED.3IONS-SCNC: 9 MMOL/L
AST SERPL W P-5'-P-CCNC: 27 U/L (ref 13–39)
BASOPHILS # BLD AUTO: 0.07 THOUSANDS/ÂΜL (ref 0–0.1)
BASOPHILS NFR BLD AUTO: 1 % (ref 0–1)
BILIRUB SERPL-MCNC: 0.71 MG/DL (ref 0.2–1)
BUN SERPL-MCNC: 27 MG/DL (ref 5–25)
CALCIUM SERPL-MCNC: 9.9 MG/DL (ref 8.4–10.2)
CHLORIDE SERPL-SCNC: 102 MMOL/L (ref 96–108)
CO2 SERPL-SCNC: 28 MMOL/L (ref 21–32)
CREAT SERPL-MCNC: 1.08 MG/DL (ref 0.6–1.3)
EOSINOPHIL # BLD AUTO: 0.13 THOUSAND/ÂΜL (ref 0–0.61)
EOSINOPHIL NFR BLD AUTO: 2 % (ref 0–6)
ERYTHROCYTE [DISTWIDTH] IN BLOOD BY AUTOMATED COUNT: 15 % (ref 11.6–15.1)
EST. AVERAGE GLUCOSE BLD GHB EST-MCNC: 114 MG/DL
GFR SERPL CREATININE-BSD FRML MDRD: 62 ML/MIN/1.73SQ M
GLUCOSE P FAST SERPL-MCNC: 210 MG/DL (ref 65–99)
HBA1C MFR BLD: 5.6 %
HCT VFR BLD AUTO: 43.7 % (ref 36.5–49.3)
HGB BLD-MCNC: 15 G/DL (ref 12–17)
IMM GRANULOCYTES # BLD AUTO: 0.03 THOUSAND/UL (ref 0–0.2)
IMM GRANULOCYTES NFR BLD AUTO: 1 % (ref 0–2)
LYMPHOCYTES # BLD AUTO: 1.39 THOUSANDS/ÂΜL (ref 0.6–4.47)
LYMPHOCYTES NFR BLD AUTO: 21 % (ref 14–44)
MCH RBC QN AUTO: 38.7 PG (ref 26.8–34.3)
MCHC RBC AUTO-ENTMCNC: 34.3 G/DL (ref 31.4–37.4)
MCV RBC AUTO: 113 FL (ref 82–98)
MONOCYTES # BLD AUTO: 0.47 THOUSAND/ÂΜL (ref 0.17–1.22)
MONOCYTES NFR BLD AUTO: 7 % (ref 4–12)
NEUTROPHILS # BLD AUTO: 4.55 THOUSANDS/ÂΜL (ref 1.85–7.62)
NEUTS SEG NFR BLD AUTO: 68 % (ref 43–75)
NRBC BLD AUTO-RTO: 0 /100 WBCS
PLATELET # BLD AUTO: 400 THOUSANDS/UL (ref 149–390)
PMV BLD AUTO: 10.2 FL (ref 8.9–12.7)
POTASSIUM SERPL-SCNC: 5.4 MMOL/L (ref 3.5–5.3)
PROT SERPL-MCNC: 6.7 G/DL (ref 6.4–8.4)
RBC # BLD AUTO: 3.88 MILLION/UL (ref 3.88–5.62)
SODIUM SERPL-SCNC: 139 MMOL/L (ref 135–147)
TSH SERPL DL<=0.05 MIU/L-ACNC: 5.78 UIU/ML (ref 0.45–4.5)
WBC # BLD AUTO: 6.64 THOUSAND/UL (ref 4.31–10.16)

## 2023-12-07 PROCEDURE — 80053 COMPREHEN METABOLIC PANEL: CPT

## 2023-12-07 PROCEDURE — 84443 ASSAY THYROID STIM HORMONE: CPT

## 2023-12-07 PROCEDURE — 83036 HEMOGLOBIN GLYCOSYLATED A1C: CPT

## 2023-12-07 PROCEDURE — 36415 COLL VENOUS BLD VENIPUNCTURE: CPT

## 2023-12-07 PROCEDURE — 85025 COMPLETE CBC W/AUTO DIFF WBC: CPT

## 2023-12-08 ENCOUNTER — HOSPITAL ENCOUNTER (OUTPATIENT)
Dept: NON INVASIVE DIAGNOSTICS | Facility: CLINIC | Age: 83
Discharge: HOME | End: 2023-12-08
Payer: COMMERCIAL

## 2023-12-08 VITALS
WEIGHT: 230 LBS | SYSTOLIC BLOOD PRESSURE: 132 MMHG | HEART RATE: 85 BPM | BODY MASS INDEX: 36.1 KG/M2 | HEIGHT: 67 IN | DIASTOLIC BLOOD PRESSURE: 78 MMHG

## 2023-12-08 DIAGNOSIS — I27.20 PULMONARY HYPERTENSION (HCC): ICD-10-CM

## 2023-12-08 LAB
AORTIC ROOT: 2.9 CM
APICAL FOUR CHAMBER EJECTION FRACTION: 55 %
ASCENDING AORTA: 3 CM
E WAVE DECELERATION TIME: 208 MS
E/A RATIO: 0.8
FRACTIONAL SHORTENING: 36 (ref 28–44)
INTERVENTRICULAR SEPTUM IN DIASTOLE (PARASTERNAL SHORT AXIS VIEW): 1 CM
INTERVENTRICULAR SEPTUM: 1 CM (ref 0.6–1.1)
LAAS-AP2: 11.3 CM2
LAAS-AP4: 11.7 CM2
LEFT ATRIUM SIZE: 3.3 CM
LEFT ATRIUM VOLUME (MOD BIPLANE): 21 ML
LEFT ATRIUM VOLUME INDEX (MOD BIPLANE): 9.8 ML/M2
LEFT INTERNAL DIMENSION IN SYSTOLE: 2.5 CM (ref 2.1–4)
LEFT VENTRICULAR INTERNAL DIMENSION IN DIASTOLE: 3.9 CM (ref 3.5–6)
LEFT VENTRICULAR POSTERIOR WALL IN END DIASTOLE: 1.2 CM
LEFT VENTRICULAR STROKE VOLUME: 43 ML
LVSV (TEICH): 43 ML
MV E'TISSUE VEL-LAT: 9 CM/S
MV E'TISSUE VEL-SEP: 7 CM/S
MV PEAK A VEL: 1.36 M/S
MV PEAK E VEL: 109 CM/S
MV STENOSIS PRESSURE HALF TIME: 60 MS
MV VALVE AREA P 1/2 METHOD: 3.67
RA PRESSURE ESTIMATED: 3 MMHG
RIGHT ATRIUM AREA SYSTOLE A4C: 17.9 CM2
RIGHT VENTRICLE ID DIMENSION: 3.4 CM
RV PSP: 38 MMHG
SL CV LEFT ATRIUM LENGTH A2C: 5.3 CM
SL CV LV EF: 60
SL CV PED ECHO LEFT VENTRICLE DIASTOLIC VOLUME (MOD BIPLANE) 2D: 65 ML
SL CV PED ECHO LEFT VENTRICLE SYSTOLIC VOLUME (MOD BIPLANE) 2D: 21 ML
TR MAX PG: 35 MMHG
TR PEAK VELOCITY: 3 M/S
TRICUSPID ANNULAR PLANE SYSTOLIC EXCURSION: 2.7 CM
TRICUSPID VALVE PEAK REGURGITATION VELOCITY: 2.97 M/S

## 2023-12-08 PROCEDURE — 93306 TTE W/DOPPLER COMPLETE: CPT

## 2023-12-08 PROCEDURE — 93306 TTE W/DOPPLER COMPLETE: CPT | Performed by: INTERNAL MEDICINE

## 2023-12-08 NOTE — PROGRESS NOTES
Outpatient Cardiology Consult Note - Gaye Brandon 78 y.o. 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[address]) 03/11/2022    Urge incontinence 03/11/2022    Nocturia 03/11/2022    Bladder neck contracture 03/14/2019    Prostate cancer ([address]) 12/22/2017    Cerebrovascular accident (CVA) ([address]) 01/06/2015       Assessment:  # Pulmonary HTN/ Chronic HFpEF  Impression: Acute PE Feb 2023  [facility] Rx: none  Diuretic: lasix 20 mg daily  Weight: 226 lbs  BNP:    Studies- personally reviewed by me  EKG- SR 87 bpm, RBBB, LAFB    Echo 12/8/23  LVEF: 60%  LVEDd: 3.9 cm  RV: dilated  PASP: 38 mmHg  RVOT:   Moderate TR    Echo 2/14/23: [facility]  LVEF: 65%  RV: moderately dilated  PASP: 74 mmHg  RVOT:   Moderate TR    CTA 2/13/23:Large filling defects are noted within the main pulmonary arteries bilaterally   extending into lobar arteries of all 5 lobes of the lungs. This is compatible   with extensive bilateral pulmonary emboli     # Bilateral PE Feb 2023  CTA as above  AC: Eliquis  # Acute DVT  LE duplex 2/2023 with DVT  # essential thrombocytosis  Positive JAK2 mutation  Hydroxyurea and asa  # right osteoarthritis knee  Considering TKR  Consider IVC filter  # basal cell skin cancer    Today's Plan:  Lifelong AC given essential thrombocytosis/ myeloproliferative disorder  Check VQ scan for persistence of any pulmonary perfusion defects  Home sleep study  Examines fluid overloaded, needs more diuresis  Double lasix to 40 mg daily  Cut back on sodium intake    HPI:      77 yo male with acute bilateral PE Feb 2023 referred for evaluation of CTEPH. Echo done on PE admit showed indirect PA pressures around 75 mmHg. Has been on [facility] for 9 months. He had presented with chest pressure. He has been maintained on Eliquis. Follow up echo 12/8 PASP: 38 mmHg down from PASP: 74 mmHg. He is back to his baseline breathing. He does get some LOVE with stairs but had this before his PE. No prior hx of pulmonary embolism. No smoking. No recent orthopedic injuries. Past Medical History:   Diagnosis Date    Cancer [facility])     prostate    History of pulmonary embolus (PE)     Hypercholesterolemia     Hypertension     Personal history of DVT (deep vein thrombosis)     Pneumonia     Stroke [facility])        Review of Systems   Constitutional:  Negative for activity change, appetite change, fatigue and unexpected weight change. HENT:  Negative for congestion and nosebleeds. Eyes: Negative. Respiratory:  Positive for shortness of breath. Negative for cough and chest tightness. Cardiovascular:  Positive for leg swelling. Negative for chest pain and palpitations. Gastrointestinal:  Negative for abdominal distention. Endocrine: Negative. Genitourinary: Negative. Musculoskeletal: Negative. Skin: Negative. Neurological:  Negative for dizziness, syncope and weakness. Hematological: Negative. Psychiatric/Behavioral: Negative. Allergies   Allergen Reactions    Misc. Sulfonamide Containing Compounds Rash    Penicillins Rash    Sulfa Antibiotics Rash     Other reaction(s): rash    Sulfa Antibiotics Rash     .     Current Outpatient Medications:     acetaminophen (TYLENOL) 500 mg tablet, Take 500 mg by mouth every 4 (four) hours, Disp: , Rfl:     albuterol (ProAir HFA) 90 mcg/act inhaler, Inhale 2 puffs every 6 (six) hours as needed for wheezing (Patient not taking: Reported on 8/13/2023), Disp: 8.5 g, Rfl: 0    albuterol (PROVENTIL HFA,VENTOLIN HFA) 90 mcg/act inhaler, Inhale 2 puffs every 6 (six) hours as needed for wheezing (Patient not taking: Reported on 7/26/2023), Disp: , Rfl:     ALPRAZolam (XANAX) 0.25 mg tablet, , Disp: , Rfl:     aspirin 81 MG tablet, Take by mouth, Disp: , Rfl:     cyanocobalamin (VITAMIN B-12) 500 MCG tablet, Take 1 tablet by mouth daily (Patient not taking: Reported on 9/26/2023), Disp: , Rfl:     Eliquis 5 MG, Take 1 tablet (5 mg total) by mouth 2 (two) times a day, Disp: 180 tablet, Rfl: 1    fluorouracil (EFUDEX) 5 % cream, APPLY TO THE SCALP TWICE A DAY X2 WEEKS (Patient not taking: Reported on 7/26/2023), Disp: , Rfl: 0    furosemide (LASIX) 20 mg tablet, Take 1 tablet by mouth daily, Disp: , Rfl:     hydroxyurea (HYDREA) 500 mg capsule, Take 1 capsule (500 mg total) by mouth 3 (three) times a day Take 500 mg in AM and 1000 mg in PM, Disp: 270 capsule, Rfl: 3    lisinopril (ZESTRIL) 10 mg tablet, Take 10 mg by mouth, Disp: , Rfl:     loratadine (CLARITIN) 10 mg tablet, Take 10 mg by mouth daily (Patient not taking: Reported on 7/26/2023), Disp: , Rfl:     meclizine (ANTIVERT) 25 mg tablet, Take 1 tablet (25 mg total) by mouth 3 (three) times a day as needed for dizziness, Disp: 30 tablet, Rfl: 0    Multiple Vitamin tablet, Take 1 tablet by mouth daily, Disp: , Rfl:     venlafaxine (EFFEXOR-XR) 75 mg 24 hr capsule, TAKE ONE CAPSULE BY MOUTH EVERY DAY AFTER HAVING COMPLETED THE WEEK'S SUPPLY OF THE 37.5MG CAPSULES (Patient not taking: Reported on 9/26/2023), Disp: , Rfl:     Social History     Socioeconomic History    Marital status: /Civil Union     Spouse name: Not on file    Number of children: Not on file    Years of education: Not on file    Highest education level: Not on file   Occupational History    Not on file   Tobacco Use    Smoking status: Never    Smokeless tobacco: Never   Vaping Use    Vaping Use: Never used   Substance and Sexual Activity    Alcohol use: No    Drug use: No    Sexual activity: Not on file   Other Topics Concern    Not on file   Social History Narrative    ** Merged History Encounter **          Social Determinants of Health     Financial Resource Strain: Not on file   Food Insecurity: Not on file   Transportation Needs: Not on file   Physical Activity: Not on file   Stress: Not on file   Social Connections: Not on file   Intimate Partner Violence: Not on file   Housing Stability: Not on file       No family history on file. Physical Exam:    Vitals: There were no vitals taken for this visit. , There is no height or weight on file to calculate BMI.,   Wt Readings from Last 3 Encounters:   11/30/23 104 kg (230 lb)   09/26/23 98.1 kg (216 lb 3.2 oz)   08/13/23 97.7 kg (215 lb 6.4 oz)       Physical Exam:  There were no vitals filed for this visit. Physical Exam    Labs & Results:    Lab Results   Component Value Date    WBC 6.64 12/07/2023    HGB 15.0 12/07/2023    HCT 43.7 12/07/2023     (H) 12/07/2023     (H) 12/07/2023     Lab Results   Component Value Date    SODIUM 139 12/07/2023    K 5.4 (H) 12/07/2023     12/07/2023    CO2 28 12/07/2023    BUN 27 (H) 12/07/2023    CREATININE 1.08 12/07/2023    GLUC 108 06/23/2023    CALCIUM 9.9 12/07/2023     No results found for: "BNP"   Lab Results   Component Value Date    CHOLESTEROL 213 (H) 08/09/2022    CHOLESTEROL 226 (H) 02/07/2020    CHOLESTEROL 204 (H) 03/06/2017     Lab Results   Component Value Date    HDL 44 08/09/2022    HDL 48 02/07/2020    HDL 55 03/06/2017     Lab Results   Component Value Date    TRIG 135 08/09/2022    TRIG 179 (H) 02/07/2020    TRIG 157 (H) 03/06/2017     Lab Results   Component Value Date    [address] 169 08/09/2022    [address] 178 02/07/2020             EKG personally reviewed by Brianne Landin DO. Counseling / Coordination of Care  Time spent today 40 minutes. Greater than 50% of total time was spent with the patient and / or family counseling and / or coordination of care. We discussed diagnoses, most recent studies and any changes in treatment  Thank you for the opportunity to participate in the care of this patient.     [facility] PULMONARY [facility]

## 2023-12-12 ENCOUNTER — OFFICE VISIT (OUTPATIENT)
Dept: CARDIOLOGY CLINIC | Facility: CLINIC | Age: 83
End: 2023-12-12
Payer: COMMERCIAL

## 2023-12-12 VITALS
DIASTOLIC BLOOD PRESSURE: 64 MMHG | HEIGHT: 67 IN | WEIGHT: 226 LBS | OXYGEN SATURATION: 97 % | SYSTOLIC BLOOD PRESSURE: 120 MMHG | HEART RATE: 82 BPM | BODY MASS INDEX: 35.47 KG/M2

## 2023-12-12 DIAGNOSIS — Z86.718 HISTORY OF DVT (DEEP VEIN THROMBOSIS): ICD-10-CM

## 2023-12-12 DIAGNOSIS — G47.33 OSA (OBSTRUCTIVE SLEEP APNEA): ICD-10-CM

## 2023-12-12 DIAGNOSIS — I26.09 OTHER PULMONARY EMBOLISM WITH ACUTE COR PULMONALE, UNSPECIFIED CHRONICITY (HCC): Primary | ICD-10-CM

## 2023-12-12 DIAGNOSIS — I27.21 PULMONARY ARTERIAL HYPERTENSION (HCC): ICD-10-CM

## 2023-12-12 PROCEDURE — 99204 OFFICE O/P NEW MOD 45 MIN: CPT | Performed by: INTERNAL MEDICINE

## 2023-12-12 RX ORDER — FUROSEMIDE 40 MG/1
40 TABLET ORAL DAILY
Qty: 30 TABLET | Refills: 4 | Status: SHIPPED | OUTPATIENT
Start: 2023-12-12

## 2023-12-12 NOTE — PATIENT INSTRUCTIONS
I ordered a VQ scan to rule out persistent clot in your lung  Have to do chest x ray with this    Also ordered home sleep study for sleep apnea    Please check daily weights and contact the heart failure program at [phone] if you gain 3 lb in one day or 5 lb in one week. Please limit daily sodium intake to 2000 mg daily. Please limit daily fluid intake to 2000 ml daily. Bring complete list of medications to your follow up appointment.

## 2023-12-18 ENCOUNTER — TELEPHONE (OUTPATIENT)
Dept: SLEEP CENTER | Facility: CLINIC | Age: 83
End: 2023-12-18

## 2023-12-18 NOTE — TELEPHONE ENCOUNTER
----- Message from Johnathon Caro MD sent at 12/16/2023 12:44 PM EST -----  In a patient with a hx of heart failure,  diagnostic study is needed over HST.  Please expedite.  Looping in nurses, in case they need to change the order   ----- Message -----  From: Pamella Diaz  Sent: 12/13/2023   9:16 AM EST  To: Sleep Medicine [place] Provider    This home sleep study needs approval.     If approved please sign and return to clerical pool.    If denied please include reasons why.  Also provide alternative testing if warranted. Please sign and return to clerical pool.

## 2023-12-18 NOTE — PROGRESS NOTES
HEMATOLOGY / ONCOLOGY CLINIC FOLLOW UP NOTE    Primary Care Provider: Ky Rubi MD  Referring Provider:    MRN: [id number]  : 1940    Reason for Encounter: follow up for DARIO 2 + ET        Oncology History Overview Note    - essential thrombocythemia, JAK2 positive    Current hydrea dose - 500 mg in AM and 1000 mg in PA    2023 - DVT/PE - eliquis 5 mg BID     Basal cell carcinoma of skin of other parts of face   3/11/2022 Initial Diagnosis    Basal cell carcinoma of skin of other parts of face         Interval History: Patient returns for follow-up visit of JAK2 positive thrombocytosis on Hydrea.  He is following closely with Dr. Lees and our heart failure team as well as pulmonology for pulmonary hypertension/chronic heart failure.  Most recent blood work completed on 2023 reviewed.  Platelet count is stable at 400.  White count, hemoglobin and differential are normal.   LFTs normal  He is now on Lasix 40 mg daily.  He has some mild lower extremity edema that he states is greatly improved since starting increased dose of Lasix.  Denies any chest pain, shortness of breath.  He is taking Hydrea 500 mg in the a.m. and 1000 mg at night.  He tolerates this well without any reported side effects.  He also continues on Eliquis for history of extensive PE.  He does bruise easily.  Denies any significant bruising or abnormal bleeding      REVIEW OF SYSTEMS:  Please note that a 14-point review of systems was performed to include Constitutional, HEENT, Respiratory, CVS, GI, , Musculoskeletal, Integumentary, Neurologic, Rheumatologic, Endocrinologic, Psychiatric, Lymphatic, and Hematologic/Oncologic systems were reviewed and are negative unless otherwise stated in HPI. Positive and negative findings pertinent to this evaluation are incorporated into the history of present illness.      ECOG PS: 1    PROBLEM LIST:  Patient Active Problem List   Diagnosis    Prostate cancer (HCC)    Basal cell  carcinoma of skin of other parts of face    Bladder neck contracture    Cerebrovascular accident (CVA) (HCC)    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HCC)    Urge incontinence    Nocturia    Other pulmonary embolism without acute cor pulmonale (HCC)    Essential thrombocytosis (HCC)    Obesity, morbid (HCC)       Assessment / Plan:  1. Essential thrombocytosis (HCC)    2. DARIO-2 gene mutation    3. Other pulmonary embolism without acute cor pulmonale, unspecified chronicity (HCC)      Patient is a very pleasant 83-year-old gentleman with JAK2 positive essential thrombocytosis on Hydrea .  Most recent blood work remains within acceptable range on current dose of Hydrea.  He will continue Hydrea without change.  He will also continue Eliquis indefinitely given his myeloproliferative disease and extent of blood clot he experienced earlier this year.  Patient verbalizes understanding and agreement with this plan of care.  He will continue to follow closely with his heart failure team and pulmonology.  I will see him back in 3 months with repeat blood work.  He knows to call anytime with questions or concerns in the interim    I spent 30 minutes on chart review, face to face counseling time, coordination of care and documentation.    Past Medical History:   has a past medical history of Cancer (HCC), History of pulmonary embolus (PE), Hypercholesterolemia, Hypertension, Personal history of DVT (deep vein thrombosis), Pneumonia, and Stroke (HCC).    PAST SURGICAL HISTORY:   has a past surgical history that includes Total shoulder replacement and Prostatectomy.    CURRENT MEDICATIONS  Current Outpatient Medications   Medication Sig Dispense Refill    acetaminophen (TYLENOL) 500 mg tablet Take 500 mg by mouth every 4 (four) hours      ALPRAZolam (XANAX) 0.25 mg tablet       aspirin 81 MG tablet Take by mouth      Eliquis 5 MG Take 1 tablet (5 mg total) by mouth 2 (two) times a day 180 tablet 1    furosemide (LASIX) 40 mg tablet Take 1 tablet  "(40 mg total) by mouth daily 30 tablet 4    hydroxyurea (HYDREA) 500 mg capsule Take 1 capsule (500 mg total) by mouth 3 (three) times a day Take 500 mg in AM and 1000 mg in  capsule 3    lisinopril (ZESTRIL) 10 mg tablet Take 10 mg by mouth      Multiple Vitamin tablet Take 1 tablet by mouth daily      venlafaxine (EFFEXOR-XR) 75 mg 24 hr capsule       albuterol (ProAir HFA) 90 mcg/act inhaler Inhale 2 puffs every 6 (six) hours as needed for wheezing (Patient not taking: Reported on 8/13/2023) 8.5 g 0    albuterol (PROVENTIL HFA,VENTOLIN HFA) 90 mcg/act inhaler Inhale 2 puffs every 6 (six) hours as needed for wheezing (Patient not taking: Reported on 7/26/2023)      cyanocobalamin (VITAMIN B-12) 500 MCG tablet Take 1 tablet by mouth daily (Patient not taking: Reported on 9/26/2023)      fluorouracil (EFUDEX) 5 % cream   0    loratadine (CLARITIN) 10 mg tablet Take 10 mg by mouth daily (Patient not taking: Reported on 7/26/2023)      meclizine (ANTIVERT) 25 mg tablet Take 1 tablet (25 mg total) by mouth 3 (three) times a day as needed for dizziness (Patient not taking: Reported on 12/12/2023) 30 tablet 0     No current facility-administered medications for this visit.     [unfilled]    SOCIAL HISTORY:   reports that he has never smoked. He has never used smokeless tobacco. He reports that he does not drink alcohol and does not use drugs.     FAMILY HISTORY:  family history is not on file.     ALLERGIES:  is allergic to misc. sulfonamide containing compounds, penicillins, sulfa antibiotics, and sulfa antibiotics.      Physical Exam:  Vital Signs:   Visit Vitals  /71 (BP Location: Left arm, Patient Position: Sitting, Cuff Size: Standard)   Pulse 86   Temp (!) 97.4 °F (36.3 °C) (Temporal)   Ht 5' 7\" (1.702 m)   Wt 103 kg (226 lb)   SpO2 96%   BMI 35.40 kg/m²   Smoking Status Never   BSA 2.13 m²     Body mass index is 35.4 kg/m².  Body surface area is 2.13 meters squared.    Physical Exam  Constitutional:      "  General: He is not in acute distress.     Appearance: He is not toxic-appearing.   HENT:      Head: Normocephalic and atraumatic.   Eyes:      General: No scleral icterus.  Cardiovascular:      Rate and Rhythm: Normal rate and regular rhythm.   Pulmonary:      Effort: Pulmonary effort is normal.      Breath sounds: Normal breath sounds.   Abdominal:      General: There is no distension.      Palpations: Abdomen is soft.   Musculoskeletal:         General: Normal range of motion.      Cervical back: Normal range of motion.      Left lower leg: Edema (trace) present.   Skin:     Findings: Bruising (forearms) present.   Neurological:      General: No focal deficit present.      Mental Status: He is alert and oriented to person, place, and time.   Psychiatric:         Mood and Affect: Mood normal.         Behavior: Behavior normal.         Labs:  Lab Results   Component Value Date    WBC 6.64 12/07/2023    HGB 15.0 12/07/2023    HCT 43.7 12/07/2023     (H) 12/07/2023     (H) 12/07/2023     Lab Results   Component Value Date     10/01/2015    SODIUM 139 12/07/2023    K 5.4 (H) 12/07/2023     12/07/2023    CO2 28 12/07/2023    ANIONGAP 6 10/01/2015    AGAP 9 12/07/2023    BUN 27 (H) 12/07/2023    CREATININE 1.08 12/07/2023    GLUC 108 06/23/2023    GLUF 210 (H) 12/07/2023    CALCIUM 9.9 12/07/2023    AST 27 12/07/2023    ALT 22 12/07/2023    ALKPHOS 56 12/07/2023    PROT 6.9 10/01/2015    TP 6.7 12/07/2023    BILITOT 0.70 10/01/2015    TBILI 0.71 12/07/2023    EGFR 62 12/07/2023

## 2023-12-20 ENCOUNTER — HOSPITAL ENCOUNTER (OUTPATIENT)
Dept: RADIOLOGY | Facility: HOSPITAL | Age: 83
Discharge: HOME | End: 2023-12-20
Attending: INTERNAL MEDICINE
Payer: COMMERCIAL

## 2023-12-20 ENCOUNTER — TELEPHONE (OUTPATIENT)
Dept: SLEEP CENTER | Facility: CLINIC | Age: 83
End: 2023-12-20

## 2023-12-20 ENCOUNTER — OFFICE VISIT (OUTPATIENT)
Age: 83
End: 2023-12-20
Payer: COMMERCIAL

## 2023-12-20 VITALS
BODY MASS INDEX: 35.47 KG/M2 | WEIGHT: 226 LBS | HEIGHT: 67 IN | OXYGEN SATURATION: 96 % | TEMPERATURE: 97.4 F | HEART RATE: 86 BPM | DIASTOLIC BLOOD PRESSURE: 71 MMHG | SYSTOLIC BLOOD PRESSURE: 123 MMHG

## 2023-12-20 DIAGNOSIS — I26.99 OTHER PULMONARY EMBOLISM WITHOUT ACUTE COR PULMONALE, UNSPECIFIED CHRONICITY (HCC): ICD-10-CM

## 2023-12-20 DIAGNOSIS — I26.09 OTHER PULMONARY EMBOLISM WITH ACUTE COR PULMONALE, UNSPECIFIED CHRONICITY (HCC): ICD-10-CM

## 2023-12-20 DIAGNOSIS — D47.3 ESSENTIAL THROMBOCYTOSIS (HCC): Primary | ICD-10-CM

## 2023-12-20 DIAGNOSIS — I27.21 PULMONARY ARTERIAL HYPERTENSION (HCC): ICD-10-CM

## 2023-12-20 DIAGNOSIS — Z15.89 JAK-2 GENE MUTATION: ICD-10-CM

## 2023-12-20 PROCEDURE — A9558 XE133 XENON 10MCI: HCPCS

## 2023-12-20 PROCEDURE — A9540 TC99M MAA: HCPCS

## 2023-12-20 PROCEDURE — 99214 OFFICE O/P EST MOD 30 MIN: CPT | Performed by: NURSE PRACTITIONER

## 2023-12-20 PROCEDURE — 78582 LUNG VENTILAT&PERFUS IMAGING: CPT

## 2023-12-20 PROCEDURE — G1004 CDSM NDSC: HCPCS

## 2023-12-20 PROCEDURE — 71046 X-RAY EXAM CHEST 2 VIEWS: CPT

## 2023-12-20 NOTE — TELEPHONE ENCOUNTER
----- Message from Johnathon Caro MD sent at 12/16/2023 12:44 PM EST -----  In a patient with a hx of heart failure,  diagnostic study is needed over HST.  Please expedite.  Looping in nurses, in case they need to change the order   ----- Message -----  From: Pamella Diaz  Sent: 12/13/2023   9:16 AM EST  To: [facility] Provider    This home sleep study needs approval.     If approved please sign and return to clerical pool.    If denied please include reasons why.  Also provide alternative testing if warranted. Please sign and return to clerical pool.

## 2024-01-02 ENCOUNTER — HOSPITAL ENCOUNTER (OUTPATIENT)
Dept: SLEEP CENTER | Facility: CLINIC | Age: 84
Discharge: HOME | End: 2024-01-02
Payer: COMMERCIAL

## 2024-01-02 DIAGNOSIS — I27.21 PULMONARY ARTERIAL HYPERTENSION (HCC): ICD-10-CM

## 2024-01-02 DIAGNOSIS — I26.09 OTHER PULMONARY EMBOLISM WITH ACUTE COR PULMONALE, UNSPECIFIED CHRONICITY (HCC): ICD-10-CM

## 2024-01-02 DIAGNOSIS — G47.33 OSA (OBSTRUCTIVE SLEEP APNEA): ICD-10-CM

## 2024-01-02 PROCEDURE — G0399 HOME SLEEP TEST/TYPE 3 PORTA: HCPCS

## 2024-01-02 NOTE — PROGRESS NOTES
Home Sleep Study Documentation    HOME STUDY DEVICE: Noxturnal no                                           Juana G3 yes      Pre-Sleep Home Study:    Set-up and instructions performed by: Hoa    Technician performed demonstration for Patient: yes    Return demonstration performed by Patient: yes    Written instructions provided to Patient: yes    Patient signed consent form: yes        Post-Sleep Home Study:    Additional comments by Patient:       Home Sleep Study Failed:no:    Failure reason: N/A    Reported or Detected: N/A    Scored by: CALEB Hubbard

## 2024-01-03 PROBLEM — G47.33 OSA (OBSTRUCTIVE SLEEP APNEA): Status: ACTIVE | Noted: 2024-01-03

## 2024-01-05 PROCEDURE — 95806 SLEEP STUDY UNATT&RESP EFFT: CPT | Performed by: INTERNAL MEDICINE

## 2024-01-24 ENCOUNTER — TELEPHONE (OUTPATIENT)
Dept: CARDIOLOGY CLINIC | Facility: CLINIC | Age: 84
End: 2024-01-24

## 2024-01-24 DIAGNOSIS — G47.33 OSA (OBSTRUCTIVE SLEEP APNEA): Primary | ICD-10-CM

## 2024-01-24 DIAGNOSIS — I27.24 CTEPH (CHRONIC THROMBOEMBOLIC PULMONARY HYPERTENSION) (HCC): Primary | ICD-10-CM

## 2024-01-24 RX ORDER — RIOCIGUAT 1.5 MG/1
1.5 TABLET, FILM COATED ORAL 3 TIMES DAILY
Qty: 90 TABLET | Refills: 11 | Status: SHIPPED | OUTPATIENT
Start: 2024-01-24

## 2024-01-24 RX ORDER — RIOCIGUAT 2 MG/1
2.5 TABLET, FILM COATED ORAL 3 TIMES DAILY
Qty: 90 TABLET | Refills: 11 | Status: SHIPPED | OUTPATIENT
Start: 2024-01-24

## 2024-01-24 RX ORDER — RIOCIGUAT 2.5 MG/1
2.5 TABLET, FILM COATED ORAL 3 TIMES DAILY
Qty: 90 TABLET | Refills: 11 | Status: SHIPPED | OUTPATIENT
Start: 2024-01-24

## 2024-01-24 RX ORDER — RIOCIGUAT 1 MG/1
1 TABLET, FILM COATED ORAL 3 TIMES DAILY
Qty: 90 TABLET | Refills: 11 | Status: SHIPPED | OUTPATIENT
Start: 2024-01-24

## 2024-01-24 NOTE — TELEPHONE ENCOUNTER
Obdulio Mccauley : 1940 is Dr. Lees's patient.  Wife called requesting a call back regarding results of recent nuclear medicine study.  Scheduled f/u with Dr. Lees on , but would like to have study results prior.  Please call either number on file  [phone] or [phone]    Please advise

## 2024-02-01 ENCOUNTER — TELEPHONE (OUTPATIENT)
Dept: SLEEP CENTER | Facility: CLINIC | Age: 84
End: 2024-02-01

## 2024-02-01 NOTE — TELEPHONE ENCOUNTER
Sleep study resulted and shows moderate sleep apnea.    Ordering provider Dr. Lees placed referral for sleep consult. Needs to schedule.    Left message for patient to call office to review sleep study results.

## 2024-02-02 ENCOUNTER — TELEPHONE (OUTPATIENT)
Dept: CARDIOLOGY CLINIC | Facility: CLINIC | Age: 84
End: 2024-02-02

## 2024-02-02 NOTE — TELEPHONE ENCOUNTER
I have emailed you a [employer] form that needs to be faxed back -please fill out and fax back. Any problem call me -[phone]  This will need to be done today

## 2024-02-05 ENCOUNTER — TELEPHONE (OUTPATIENT)
Dept: CARDIOLOGY CLINIC | Facility: CLINIC | Age: 84
End: 2024-02-05

## 2024-02-05 NOTE — TELEPHONE ENCOUNTER
Patient's spouse left a message on the nurse line today. Stated Don woke up today with painful swelling in left knee, above and below the knee and on both sides. Knee only.  In her message she asked if this could be due to his dx of thrombocytosis.      I called back. Call went to voicemail.  I left a message advising  ER for evaluation.  
See HPI

## 2024-02-07 ENCOUNTER — HOSPITAL ENCOUNTER (EMERGENCY)
Facility: HOSPITAL | Age: 84
Discharge: HOME | End: 2024-02-07
Attending: EMERGENCY MEDICINE
Payer: COMMERCIAL

## 2024-02-07 ENCOUNTER — APPOINTMENT (EMERGENCY)
Dept: RADIOLOGY | Facility: HOSPITAL | Age: 84
End: 2024-02-07
Payer: COMMERCIAL

## 2024-02-07 VITALS
DIASTOLIC BLOOD PRESSURE: 102 MMHG | OXYGEN SATURATION: 96 % | TEMPERATURE: 98.2 F | HEART RATE: 88 BPM | SYSTOLIC BLOOD PRESSURE: 145 MMHG | RESPIRATION RATE: 19 BRPM

## 2024-02-07 DIAGNOSIS — J06.9 VIRAL URI WITH COUGH: Primary | ICD-10-CM

## 2024-02-07 DIAGNOSIS — R07.9 CHEST PAIN: ICD-10-CM

## 2024-02-07 LAB
2HR DELTA HS TROPONIN: 0 NG/L
ALBUMIN SERPL BCP-MCNC: 3.8 G/DL (ref 3.5–5)
ALP SERPL-CCNC: 54 U/L (ref 34–104)
ALT SERPL W P-5'-P-CCNC: 21 U/L (ref 7–52)
ANION GAP SERPL CALCULATED.3IONS-SCNC: 7 MMOL/L
AST SERPL W P-5'-P-CCNC: 30 U/L (ref 13–39)
ATRIAL RATE: 81 BPM
BASOPHILS # BLD AUTO: 0.04 THOUSANDS/ÂΜL (ref 0–0.1)
BASOPHILS NFR BLD AUTO: 1 % (ref 0–1)
BILIRUB SERPL-MCNC: 0.36 MG/DL (ref 0.2–1)
BUN SERPL-MCNC: 23 MG/DL (ref 5–25)
CALCIUM SERPL-MCNC: 9.1 MG/DL (ref 8.4–10.2)
CARDIAC TROPONIN I PNL SERPL HS: 16 NG/L
CARDIAC TROPONIN I PNL SERPL HS: 16 NG/L
CHLORIDE SERPL-SCNC: 104 MMOL/L (ref 96–108)
CO2 SERPL-SCNC: 26 MMOL/L (ref 21–32)
CREAT SERPL-MCNC: 1.08 MG/DL (ref 0.6–1.3)
EOSINOPHIL # BLD AUTO: 0.01 THOUSAND/ÂΜL (ref 0–0.61)
EOSINOPHIL NFR BLD AUTO: 0 % (ref 0–6)
ERYTHROCYTE [DISTWIDTH] IN BLOOD BY AUTOMATED COUNT: 16.6 % (ref 11.6–15.1)
GFR SERPL CREATININE-BSD FRML MDRD: 62 ML/MIN/1.73SQ M
GLUCOSE SERPL-MCNC: 105 MG/DL (ref 65–140)
HCT VFR BLD AUTO: 36.2 % (ref 36.5–49.3)
HGB BLD-MCNC: 12.8 G/DL (ref 12–17)
IMM GRANULOCYTES # BLD AUTO: 0.02 THOUSAND/UL (ref 0–0.2)
IMM GRANULOCYTES NFR BLD AUTO: 1 % (ref 0–2)
LYMPHOCYTES # BLD AUTO: 0.92 THOUSANDS/ÂΜL (ref 0.6–4.47)
LYMPHOCYTES NFR BLD AUTO: 24 % (ref 14–44)
MCH RBC QN AUTO: 41.2 PG (ref 26.8–34.3)
MCHC RBC AUTO-ENTMCNC: 35.4 G/DL (ref 31.4–37.4)
MCV RBC AUTO: 116 FL (ref 82–98)
MONOCYTES # BLD AUTO: 0.69 THOUSAND/ÂΜL (ref 0.17–1.22)
MONOCYTES NFR BLD AUTO: 18 % (ref 4–12)
NEUTROPHILS # BLD AUTO: 2.19 THOUSANDS/ÂΜL (ref 1.85–7.62)
NEUTS SEG NFR BLD AUTO: 56 % (ref 43–75)
NRBC BLD AUTO-RTO: 0 /100 WBCS
P AXIS: 73 DEGREES
PLATELET # BLD AUTO: 225 THOUSANDS/UL (ref 149–390)
PMV BLD AUTO: 9.5 FL (ref 8.9–12.7)
POTASSIUM SERPL-SCNC: 4.3 MMOL/L (ref 3.5–5.3)
PR INTERVAL: 168 MS
PROT SERPL-MCNC: 6.5 G/DL (ref 6.4–8.4)
QRS AXIS: -73 DEGREES
QRSD INTERVAL: 158 MS
QT INTERVAL: 414 MS
QTC INTERVAL: 480 MS
RBC # BLD AUTO: 3.11 MILLION/UL (ref 3.88–5.62)
SODIUM SERPL-SCNC: 137 MMOL/L (ref 135–147)
T WAVE AXIS: 27 DEGREES
VENTRICULAR RATE: 81 BPM
WBC # BLD AUTO: 3.87 THOUSAND/UL (ref 4.31–10.16)

## 2024-02-07 PROCEDURE — 71046 X-RAY EXAM CHEST 2 VIEWS: CPT

## 2024-02-07 PROCEDURE — 99285 EMERGENCY DEPT VISIT HI MDM: CPT

## 2024-02-07 PROCEDURE — 80053 COMPREHEN METABOLIC PANEL: CPT

## 2024-02-07 PROCEDURE — 99285 EMERGENCY DEPT VISIT HI MDM: CPT | Performed by: EMERGENCY MEDICINE

## 2024-02-07 PROCEDURE — 85025 COMPLETE CBC W/AUTO DIFF WBC: CPT

## 2024-02-07 PROCEDURE — 84484 ASSAY OF TROPONIN QUANT: CPT

## 2024-02-07 PROCEDURE — 36415 COLL VENOUS BLD VENIPUNCTURE: CPT

## 2024-02-07 PROCEDURE — 93005 ELECTROCARDIOGRAM TRACING: CPT

## 2024-02-07 NOTE — DISCHARGE INSTRUCTIONS
You were seen in the emergency department for worsening cough with associated chest pain.  Your EKG and lab work did not show any concerning findings.  Your chest x-ray did not show any evidence of pneumonia.  Follow-up with your primary care provider.  If you have worsening chest pain or shortness of breath please return to the emergency department.

## 2024-02-07 NOTE — ED PROVIDER NOTES
"History  Chief Complaint   Patient presents with    Shortness of Breath     Patient reports SOB and a cough that \"makes my lungs hurt\". 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      83-year-old male with a history of hypertension, hyperlipidemia, prostate cancer, and pulmonary embolism on Eliquis who presents complaining of cough and chest pain for the past few days.  The patient states that he tested positive for COVID at home.  The patient reports a nonproductive cough that worsened during the night tonight.  The patient reports pain throughout his chest that is worse with coughing.  The patient has been taking Mucinex for his cough.  The patient reports mild swelling in his lower extremities that is his baseline.  The patient denies fever, chills, nasal congestion, sore throat, nausea, vomiting, diarrhea, abdominal pain.        Prior to Admission Medications   Prescriptions Last Dose Informant Patient Reported? Taking?   ALPRAZolam (XANAX) 0.25 mg tablet  Self Yes No   Adempas 1 MG TABS   No No   Sig: Take 1 tablet (1 mg total) by mouth 3 (three) times a day Increase every 2 weeks goal 2.5   Adempas 1.5 MG TABS   No No   Sig: Take 1.5 tablets (2.25 mg total) by mouth 3 (three) times a day Increase every 2 weeks goal 2.5mg tid   Adempas 2 MG TABS   No No   Sig: Take 1.25 tablets (2.5 mg total) by mouth 3 (three) times a day   Adempas 2.5 MG TABS   No No   Sig: Take 1 tablet (2.5 mg total) by mouth 3 (three) times a day   Eliquis 5 MG  Self No No   Sig: Take 1 tablet (5 mg total) by mouth 2 (two) times a day   Multiple Vitamin tablet  Self Yes No   Sig: Take 1 tablet by mouth daily   acetaminophen (TYLENOL) 500 mg tablet  Self Yes No   Sig: Take 500 mg by mouth every 4 (four) hours   albuterol (PROVENTIL HFA,VENTOLIN HFA) 90 mcg/act inhaler  Self Yes No   Sig: Inhale 2 puffs every 6 (six) hours as needed for wheezing   Patient not taking: Reported on 7/26/2023   albuterol (ProAir " HFA) 90 mcg/act inhaler  Self No No   Sig: Inhale 2 puffs every 6 (six) hours as needed for wheezing   Patient not taking: Reported on 8/13/2023   aspirin 81 MG tablet  Self Yes No   Sig: Take by mouth   cyanocobalamin (VITAMIN B-12) 500 MCG tablet  Self Yes No   Sig: Take 1 tablet by mouth daily   Patient not taking: Reported on 9/26/2023   fluorouracil (EFUDEX) 5 % cream  Self Yes No   furosemide (LASIX) 40 mg tablet   No No   Sig: Take 1 tablet (40 mg total) by mouth daily   hydroxyurea (HYDREA) 500 mg capsule  Self No No   Sig: Take 1 capsule (500 mg total) by mouth 3 (three) times a day Take 500 mg in AM and 1000 mg in PM   lisinopril (ZESTRIL) 10 mg tablet  Self Yes No   Sig: Take 10 mg by mouth   loratadine (CLARITIN) 10 mg tablet  Self Yes No   Sig: Take 10 mg by mouth daily   Patient not taking: Reported on 7/26/2023   meclizine (ANTIVERT) 25 mg tablet  Self No No   Sig: Take 1 tablet (25 mg total) by mouth 3 (three) times a day as needed for dizziness   Patient not taking: Reported on 12/12/2023   venlafaxine (EFFEXOR-XR) 75 mg 24 hr capsule  Self Yes No      Facility-Administered Medications: None       Past Medical History:   Diagnosis Date    Cancer (HCC)     prostate    History of pulmonary embolus (PE)     Hypercholesterolemia     Hypertension     Personal history of DVT (deep vein thrombosis)     Pneumonia     Stroke (HCC)        Past Surgical History:   Procedure Laterality Date    PROSTATECTOMY      TOTAL SHOULDER REPLACEMENT         History reviewed. No pertinent family history.  I have reviewed and agree with the history as documented.    E-Cigarette/Vaping    E-Cigarette Use Never User      E-Cigarette/Vaping Substances    Nicotine No     THC No     CBD No     Flavoring No     Other No     Unknown No      Social History     Tobacco Use    Smoking status: Never    Smokeless tobacco: Never   Vaping Use    Vaping status: Never Used   Substance Use Topics    Alcohol use: No    Drug use: No         Review of Systems   Constitutional:  Negative for chills and fever.   HENT:  Negative for congestion and sore throat.    Eyes:  Negative for pain and redness.   Respiratory:  Positive for cough. Negative for shortness of breath.    Cardiovascular:  Positive for chest pain and leg swelling. Negative for palpitations.   Gastrointestinal:  Negative for abdominal pain, diarrhea, nausea and vomiting.   Genitourinary:  Negative for dysuria and hematuria.   Musculoskeletal:  Negative for arthralgias and myalgias.   Skin:  Negative for color change, pallor and rash.   Neurological:  Negative for syncope, weakness, light-headedness, numbness and headaches.   All other systems reviewed and are negative.      Physical Exam  ED Triage Vitals [02/07/24 0159]   Temperature Pulse Respirations Blood Pressure SpO2   98.2 °F (36.8 °C) 88 19 (!) 145/102 96 %      Temp Source Heart Rate Source Patient Position - Orthostatic VS BP Location FiO2 (%)   Oral Monitor Lying Left arm --      Pain Score       --             Orthostatic Vital Signs  Vitals:    02/07/24 0159   BP: (!) 145/102   Pulse: 88   Patient Position - Orthostatic VS: Lying       Physical Exam  Constitutional:       General: He is not in acute distress.     Appearance: Normal appearance. He is not ill-appearing, toxic-appearing or diaphoretic.   HENT:      Head: Normocephalic and atraumatic.      Nose: Nose normal.      Mouth/Throat:      Mouth: Mucous membranes are moist.      Pharynx: Oropharynx is clear.   Eyes:      Conjunctiva/sclera: Conjunctivae normal.      Pupils: Pupils are equal, round, and reactive to light.   Cardiovascular:      Rate and Rhythm: Normal rate and regular rhythm.      Pulses: Normal pulses.      Heart sounds: Normal heart sounds. No murmur heard.     No friction rub. No gallop.   Pulmonary:      Effort: Pulmonary effort is normal.      Breath sounds: Normal breath sounds. No wheezing, rhonchi or rales.   Abdominal:      General: Abdomen is  flat.      Palpations: Abdomen is soft.      Tenderness: There is no abdominal tenderness. There is no guarding or rebound.   Musculoskeletal:         General: No swelling or tenderness. Normal range of motion.      Cervical back: Normal range of motion and neck supple. No rigidity or tenderness.      Right lower le+ Pitting Edema present.      Left lower le+ Pitting Edema present.   Lymphadenopathy:      Cervical: No cervical adenopathy.   Skin:     General: Skin is warm and dry.      Capillary Refill: Capillary refill takes less than 2 seconds.      Coloration: Skin is not jaundiced or pale.      Findings: No bruising, erythema, lesion or rash.   Neurological:      General: No focal deficit present.      Mental Status: He is alert and oriented to person, place, and time.      Sensory: No sensory deficit.      Motor: No weakness.         ED Medications  Medications - No data to display    Diagnostic Studies  Results Reviewed       Procedure Component Value Units Date/Time    HS Troponin I 2hr [[id number]]  (Normal) Collected: 24 0453    Lab Status: Final result Specimen: Blood from Arm, Left Updated: 24 0542     hs TnI 2hr 16 ng/L      Delta 2hr hsTnI 0 ng/L     HS Troponin 0hr (reflex protocol) [[id number]]  (Normal) Collected: 24 0253    Lab Status: Final result Specimen: Blood from Arm, Left Updated: 24 0359     hs TnI 0hr 16 ng/L     Comprehensive metabolic panel [[id number]] Collected: 24 0253    Lab Status: Final result Specimen: Blood from Arm, Left Updated: 24 0327     Sodium 137 mmol/L      Potassium 4.3 mmol/L      Chloride 104 mmol/L      CO2 26 mmol/L      ANION GAP 7 mmol/L      BUN 23 mg/dL      Creatinine 1.08 mg/dL      Glucose 105 mg/dL      Calcium 9.1 mg/dL      AST 30 U/L      ALT 21 U/L      Alkaline Phosphatase 54 U/L      Total Protein 6.5 g/dL      Albumin 3.8 g/dL      Total Bilirubin 0.36 mg/dL      eGFR 62 ml/min/1.73sq m     Narrative:       National Kid[employer] Foundation guidelines for Chronic Kidney Disease (CKD):     Stage 1 with normal or high GFR (GFR > 90 mL/min/1.73 square meters)    Stage 2 Mild CKD (GFR = 60-89 mL/min/1.73 square meters)    Stage 3A Moderate CKD (GFR = 45-59 mL/min/1.73 square meters)    Stage 3B Moderate CKD (GFR = 30-44 mL/min/1.73 square meters)    Stage 4 Severe CKD (GFR = 15-29 mL/min/1.73 square meters)    Stage 5 End Stage CKD (GFR <15 mL/min/1.73 square meters)  Note: GFR calculation is accurate only with a steady state creatinine    CBC and differential [[id number]]  (Abnormal) Collected: 02/07/24 0253    Lab Status: Final result Specimen: Blood from Arm, Left Updated: 02/07/24 0306     WBC 3.87 Thousand/uL      RBC 3.11 Million/uL      Hemoglobin 12.8 g/dL      Hematocrit 36.2 %       fL      MCH 41.2 pg      MCHC 35.4 g/dL      RDW 16.6 %      MPV 9.5 fL      Platelets 225 Thousands/uL      nRBC 0 /100 WBCs      Neutrophils Relative 56 %      Immat GRANS % 1 %      Lymphocytes Relative 24 %      Monocytes Relative 18 %      Eosinophils Relative 0 %      Basophils Relative 1 %      Neutrophils Absolute 2.19 Thousands/µL      Immature Grans Absolute 0.02 Thousand/uL      Lymphocytes Absolute 0.92 Thousands/µL      Monocytes Absolute 0.69 Thousand/µL      Eosinophils Absolute 0.01 Thousand/µL      Basophils Absolute 0.04 Thousands/µL                    XR chest 2 views   Final Result by Oumar Mohan MD (02/07 1106)      No acute cardiopulmonary disease.            Workstation performed: [id number]               Procedures  Procedures      ED Course             HEART Risk Score      Flowsheet Row Most Recent Value   Heart Score Risk Calculator    History 0 Filed at: 02/07/2024 0512   ECG 1 Filed at: 02/07/2024 0512   Age 2 Filed at: 02/07/2024 0512   Risk Factors 1 Filed at: 02/07/2024 0512   Troponin 1 Filed at: 02/07/2024 0512   HEART Score 5 Filed at: 02/07/2024 0512                        SBIRT  20yo+      Flowsheet Row Most Recent Value   Initial Alcohol Screen: US AUDIT-C     1. How often do you have a drink containing alcohol? 0 Filed at: 02/07/2024 0305   2. How many drinks containing alcohol do you have on a typical day you are drinking?  0 Filed at: 02/07/2024 0305   3a. Male UNDER 65: How often do you have five or more drinks on one occasion? 0 Filed at: 02/07/2024 0305   3b. FEMALE Any Age, or MALE 65+: How often do you have 4 or more drinks on one occassion? 0 Filed at: 02/07/2024 0305   Audit-C Score 0 Filed at: 02/07/2024 0305   MARISSA: How many times in the past year have you...    Used an illegal drug or used a prescription medication for non-medical reasons? Never Filed at: 02/07/2024 0305                  Medical Decision Making  83-year-old male with a history of hypertension, hyperlipidemia, prostate cancer, and pulmonary embolism on Eliquis who presents complaining of cough and chest pain for the past few days with positive home COVID test.  Vitals are within the normal limits.  On exam the patient is alert and oriented, no acute distress, mucous membranes moist, heart is regular rate and rhythm, lungs are clear to auscultation bilaterally, no chest wall tenderness, abdomen is soft and nontender, trace pitting edema of the bilateral lower extremities, no focal neurologic deficits.  Differential diagnosis includes pneumonia, ACS, pneumothorax, musculoskeletal pain.  Will order EKG, troponin, CBC, CMP, and chest x-ray.    EKG shows rate 81, left axis deviation, bifascicular block, no change from prior.  Initial troponin is 16.  CBC and CMP are reviewed and without actionable derangements.  Chest x-ray shows no acute cardiopulmonary disease on my interpretation.  2-hour troponin is 16 for a delta of 0.  The patient is instructed to continue symptomatic management and follow-up with his PCP.  Return precautions are given and the patient is discharged.    Amount and/or Complexity of Data  Reviewed  Labs: ordered.  Radiology: ordered.          Disposition  Final diagnoses:   Viral URI with cough   Chest pain     Time reflects when diagnosis was documented in both MDM as applicable and the Disposition within this note       Time User Action Codes Description Comment    2/7/2024  5:12 AM Pavel Garcia Add [J06.9] Viral URI with cough     2/7/2024  5:12 AM Pavel Garcia Add [R07.9] Chest pain           ED Disposition       ED Disposition   Discharge    Condition   Stable    Date/Time   Wed Feb 7, 2024 0544    Comment   Obdulio Mccauley discharge to home/self care.                   Follow-up Information       Follow up With Specialties Details Why Contact Info Additional Information    [facility] Emergency Department Emergency Medicine Go to  If symptoms worsen [address] [place] [id number] [phone] [facility], [address] [place] [address], [id number] [phone]    Ky Rubi MD Internal Medicine Schedule an appointment as soon as possible for a visit   [address] [place] [address]  [phone]               Discharge Medication List as of 2/7/2024  5:54 AM        CONTINUE these medications which have NOT CHANGED    Details   acetaminophen (TYLENOL) 500 mg tablet Take 500 mg by mouth every 4 (four) hours, Starting Fri 11/17/2017, Historical Med      !! Adempas 1 MG TABS Take 1 tablet (1 mg total) by mouth 3 (three) times a day Increase every 2 weeks goal 2.5, Starting Wed 1/24/2024, Normal      !! Adempas 1.5 MG TABS Take 1.5 tablets (2.25 mg total) by mouth 3 (three) times a day Increase every 2 weeks goal 2.5mg tid, Starting Wed 1/24/2024, Normal      !! Adempas 2 MG TABS Take 1.25 tablets (2.5 mg total) by mouth 3 (three) times a day, Starting Wed 1/24/2024, Normal      !! Adempas 2.5 MG TABS Take 1 tablet (2.5 mg total) by mouth 3 (three) times a day, Starting Wed  1/24/2024, Normal      !! albuterol (ProAir HFA) 90 mcg/act inhaler Inhale 2 puffs every 6 (six) hours as needed for wheezing, Starting Wed 7/26/2023, Normal      !! albuterol (PROVENTIL HFA,VENTOLIN HFA) 90 mcg/act inhaler Inhale 2 puffs every 6 (six) hours as needed for wheezing, Historical Med      ALPRAZolam (XANAX) 0.25 mg tablet Starting Sun 10/6/2019, Historical Med      aspirin 81 MG tablet Take by mouth, Historical Med      cyanocobalamin (VITAMIN B-12) 500 MCG tablet Take 1 tablet by mouth daily, Starting Tue 12/21/2021, Historical Med      Eliquis 5 MG Take 1 tablet (5 mg total) by mouth 2 (two) times a day, Starting Tue 9/26/2023, Normal      fluorouracil (EFUDEX) 5 % cream Historical Med      furosemide (LASIX) 40 mg tablet Take 1 tablet (40 mg total) by mouth daily, Starting Tue 12/12/2023, Normal      hydroxyurea (HYDREA) 500 mg capsule Take 1 capsule (500 mg total) by mouth 3 (three) times a day Take 500 mg in AM and 1000 mg in PM, Starting Tue 9/26/2023, Normal      lisinopril (ZESTRIL) 10 mg tablet Take 10 mg by mouth, Historical Med      loratadine (CLARITIN) 10 mg tablet Take 10 mg by mouth daily, Historical Med      meclizine (ANTIVERT) 25 mg tablet Take 1 tablet (25 mg total) by mouth 3 (three) times a day as needed for dizziness, Starting Sat 5/6/2023, Normal      Multiple Vitamin tablet Take 1 tablet by mouth daily, Historical Med      venlafaxine (EFFEXOR-XR) 75 mg 24 hr capsule Historical Med       !! - Potential duplicate medications found. Please discuss with provider.        No discharge procedures on file.    PDMP Review       None             ED Provider  Attending physically available and evaluated Obdulio Mccauley. I managed the patient along with the ED Attending.    Electronically Signed by           Pavel Garcia,   02/08/24 0006

## 2024-02-07 NOTE — ED ATTENDING ATTESTATION
2/7/2024  I, Mason Horvath MD, saw and evaluated the patient. I have discussed the patient with the resident/non-physician practitioner and agree with the resident's/non-physician practitioner's findings, Plan of Care, and MDM as documented in the resident's/non-physician practitioner's note, except where noted. All available labs and Radiology studies were reviewed.  I was present for key portions of any procedure(s) performed by the resident/non-physician practitioner and I was immediately available to provide assistance.       At this point I agree with the current assessment done in the Emergency Department.  I have conducted an independent evaluation of this patient a history and physical is as follows:    83-year-old male with a history of hypertension, hyperlipidemia, pulmonary embolism on Eliquis presents to the emergency department for evaluation of cough and chest pain.  Symptoms started a few days ago.  Also had recent COVID-19 infection.  Cough is nonproductive.  No hemoptysis.  No fevers or chills.  No abdominal pain, nausea or vomiting.  Has been taking Mucinex.    On exam he is resting comfortably in bed in no acute distress, head is normocephalic atraumatic, pupils equal round reactive to light, neck is supple without meningismus signs, heart is regular rate and rhythm with intact distal pulses, no increased work of breathing, respiratory distress, or stridor.  Abdomen is soft, nontender nondistended.  Does have pitting edema in bilateral lower extremities.  Patient states this is improved from prior.    Differential diagnosis includes but is not limited to arrhythmia, viral illness symptomatic anemia, ACS, pneumonia, pneumothorax, unlikely to represent acute pulmonary embolism as patient is anticoagulated.  Initiate cardiac workup including chest x-ray.    Labs grossly unremarkable.  Chest x-ray negative for any acute findings per my interpretation.  Given patient's age and risk factor, will check  2-hour troponin.  If normal, plan to discharge home as he is not requiring any supplemental oxygen.    ED Course  ED Course as of 02/07/24 0623   Wed Feb 07, 2024   0215 EKG interpreted by myself demonstrates normal sinus rhythm with a rate 81, left axis deviation, bifascicular block, when compared to prior EKG, no significant change was found   0405 No acute cardiopulmonary abnormality per my interpretation of chest x-ray, appears similar to prior   0544 Delta 2hr hsTnI: 0         Critical Care Time  Procedures

## 2024-02-08 ENCOUNTER — OFFICE VISIT (OUTPATIENT)
Dept: PULMONOLOGY | Facility: CLINIC | Age: 84
End: 2024-02-08
Payer: COMMERCIAL

## 2024-02-08 VITALS
DIASTOLIC BLOOD PRESSURE: 72 MMHG | BODY MASS INDEX: 34.12 KG/M2 | WEIGHT: 217.4 LBS | SYSTOLIC BLOOD PRESSURE: 110 MMHG | HEIGHT: 67 IN | OXYGEN SATURATION: 97 % | HEART RATE: 88 BPM | TEMPERATURE: 96.8 F

## 2024-02-08 DIAGNOSIS — I27.24 CTEPH (CHRONIC THROMBOEMBOLIC PULMONARY HYPERTENSION) (HCC): ICD-10-CM

## 2024-02-08 DIAGNOSIS — G47.33 OBSTRUCTIVE SLEEP APNEA: Primary | ICD-10-CM

## 2024-02-08 DIAGNOSIS — U07.1 COVID-19 VIRUS INFECTION: ICD-10-CM

## 2024-02-08 DIAGNOSIS — D47.3 ESSENTIAL THROMBOCYTOSIS (HCC): ICD-10-CM

## 2024-02-08 PROCEDURE — 99214 OFFICE O/P EST MOD 30 MIN: CPT | Performed by: INTERNAL MEDICINE

## 2024-02-08 NOTE — PROGRESS NOTES
Pulmonary Follow Up Note   Obdulio Mccauley 83 y.o. male MRN: [id number]  2/8/2024      Assessment:    1. Obstructive sleep apnea  Moderate ELODIA based on sleep study  Will order auto pap  F/up in 3 months for compliance  -     CPAP Auto New DME    2. CTEPH (chronic thromboembolic pulmonary hypertension) (HCC)  Pulmonary pressures remain elevated on repeat echo but improved  VQ scan with concern for persistent filling defects  Following with Dr. Lees with plan to initiate Riociguat    3. Essential thrombocytosis (HCC)  Following with hematology  On hydroxyurea  Given extensive PE, will be on lifelong anticoagulation    4. COVID-19 virus infection  CXR is normal  He has a cough, fatigue and body aches  Recommend conservative management with OTC treatment      Plan:    Diagnoses and all orders for this visit:    Obstructive sleep apnea  -     CPAP Auto New DME    CTEPH (chronic thromboembolic pulmonary hypertension) (HCC)    Essential thrombocytosis (HCC)    COVID-19 virus infection        No follow-ups on file.    History of Present Illness   HPI:  Obdulio Mccauley is a 83 y.o. male who presents for follow up of shortness of breath and prior pulmonary emboli. He continues to be on eliquis. He had a V/Q scan that showed concern for PE. He was started on riociguat. He also had a home sleep study that did show moderate ELODIA but he has not started PAP therapy yet. He was diagnosed with covid 5 days ago and reports having a cough, fatigue and body aches    Review of Systems   Constitutional:  Positive for fatigue. Negative for chills and fever.   HENT:  Negative for congestion, postnasal drip and rhinorrhea.    Eyes:  Negative for itching.   Respiratory:  Positive for cough and shortness of breath. Negative for wheezing and stridor.    Cardiovascular:  Negative for chest pain, palpitations and leg swelling.   Gastrointestinal:  Negative for abdominal distention, abdominal pain, nausea and vomiting.   Genitourinary:  Negative  for dysuria and flank pain.   Musculoskeletal:  Negative for arthralgias and myalgias.   Skin:  Negative for color change.   Neurological:  Negative for dizziness, light-headedness and headaches.   Psychiatric/Behavioral: Negative.         Historical Information   Past Medical History:   Diagnosis Date    Cancer (HCC)     prostate    History of pulmonary embolus (PE)     Hypercholesterolemia     Hypertension     Personal history of DVT (deep vein thrombosis)     Pneumonia     Stroke (HCC)      Past Surgical History:   Procedure Laterality Date    PROSTATECTOMY      TOTAL SHOULDER REPLACEMENT       History reviewed. No pertinent family history.      Meds/Allergies     Current Outpatient Medications:     acetaminophen (TYLENOL) 500 mg tablet, Take 500 mg by mouth every 4 (four) hours, Disp: , Rfl:     ALPRAZolam (XANAX) 0.25 mg tablet, , Disp: , Rfl:     aspirin 81 MG tablet, Take by mouth, Disp: , Rfl:     cyanocobalamin (VITAMIN B-12) 500 MCG tablet, Take 1 tablet by mouth daily, Disp: , Rfl:     Eliquis 5 MG, Take 1 tablet (5 mg total) by mouth 2 (two) times a day, Disp: 180 tablet, Rfl: 1    furosemide (LASIX) 40 mg tablet, Take 1 tablet (40 mg total) by mouth daily, Disp: 30 tablet, Rfl: 4    hydroxyurea (HYDREA) 500 mg capsule, Take 1 capsule (500 mg total) by mouth 3 (three) times a day Take 500 mg in AM and 1000 mg in PM, Disp: 270 capsule, Rfl: 3    lisinopril (ZESTRIL) 10 mg tablet, Take 10 mg by mouth, Disp: , Rfl:     meclizine (ANTIVERT) 25 mg tablet, Take 1 tablet (25 mg total) by mouth 3 (three) times a day as needed for dizziness, Disp: 30 tablet, Rfl: 0    Multiple Vitamin tablet, Take 1 tablet by mouth daily, Disp: , Rfl:     venlafaxine (EFFEXOR-XR) 75 mg 24 hr capsule, 37.5mg once per day, Disp: , Rfl:     Adempas 1 MG TABS, Take 1 tablet (1 mg total) by mouth 3 (three) times a day Increase every 2 weeks goal 2.5 (Patient not taking: Reported on 2/8/2024), Disp: 90 tablet, Rfl: 11    Adempas 1.5 MG  "TABS, Take 1.5 tablets (2.25 mg total) by mouth 3 (three) times a day Increase every 2 weeks goal 2.5mg tid (Patient not taking: Reported on 2/8/2024), Disp: 90 tablet, Rfl: 11    Adempas 2 MG TABS, Take 1.25 tablets (2.5 mg total) by mouth 3 (three) times a day (Patient not taking: Reported on 2/8/2024), Disp: 90 tablet, Rfl: 11    Adempas 2.5 MG TABS, Take 1 tablet (2.5 mg total) by mouth 3 (three) times a day (Patient not taking: Reported on 2/8/2024), Disp: 90 tablet, Rfl: 11    albuterol (ProAir HFA) 90 mcg/act inhaler, Inhale 2 puffs every 6 (six) hours as needed for wheezing (Patient not taking: Reported on 8/13/2023), Disp: 8.5 g, Rfl: 0    albuterol (PROVENTIL HFA,VENTOLIN HFA) 90 mcg/act inhaler, Inhale 2 puffs every 6 (six) hours as needed for wheezing (Patient not taking: Reported on 7/26/2023), Disp: , Rfl:     fluorouracil (EFUDEX) 5 % cream, , Disp: , Rfl: 0    loratadine (CLARITIN) 10 mg tablet, Take 10 mg by mouth daily (Patient not taking: Reported on 7/26/2023), Disp: , Rfl:   Allergies   Allergen Reactions    Misc. Sulfonamide Containing Compounds Rash    Penicillins Rash    Sulfa Antibiotics Rash     Other reaction(s): rash    Sulfa Antibiotics Rash       Vitals: Blood pressure 110/72, pulse 88, temperature (!) 96.8 °F (36 °C), temperature source Tympanic, height 5' 7\" (1.702 m), weight 98.6 kg (217 lb 6.4 oz), SpO2 97%. Body mass index is 34.05 kg/m². Oxygen Therapy  SpO2: 97 %      Physical Exam  Physical Exam  Constitutional:       General: He is not in acute distress.     Appearance: He is not diaphoretic.   HENT:      Head: Normocephalic and atraumatic.      Nose: Nose normal.      Mouth/Throat:      Pharynx: No oropharyngeal exudate.   Eyes:      General: No scleral icterus.     Conjunctiva/sclera: Conjunctivae normal.      Pupils: Pupils are equal, round, and reactive to light.   Neck:      Thyroid: No thyromegaly.      Vascular: No JVD.      Trachea: No tracheal deviation. " "  Cardiovascular:      Rate and Rhythm: Normal rate and regular rhythm.      Heart sounds: Normal heart sounds. No murmur heard.     No friction rub. No gallop.   Pulmonary:      Effort: Pulmonary effort is normal. No respiratory distress.      Breath sounds: Normal breath sounds. No stridor. No wheezing or rales.   Abdominal:      General: Bowel sounds are normal. There is no distension.      Palpations: Abdomen is soft.      Tenderness: There is no abdominal tenderness. There is no guarding or rebound.   Musculoskeletal:         General: No deformity. Normal range of motion.      Cervical back: Normal range of motion and neck supple.      Right lower leg: Edema present.      Left lower leg: Edema present.   Lymphadenopathy:      Cervical: No cervical adenopathy.   Skin:     General: Skin is warm.      Findings: No erythema or rash.   Neurological:      Mental Status: He is alert and oriented to person, place, and time.      Cranial Nerves: No cranial nerve deficit.      Sensory: No sensory deficit.         Labs: I have personally reviewed pertinent lab results.  Lab Results   Component Value Date    WBC 3.87 (L) 02/07/2024    HGB 12.8 02/07/2024    HCT 36.2 (L) 02/07/2024     (H) 02/07/2024     02/07/2024     Lab Results   Component Value Date    GLUCOSE 154 (H) 04/30/2023    CALCIUM 9.1 02/07/2024     10/01/2015    K 4.3 02/07/2024    CO2 26 02/07/2024     02/07/2024    BUN 23 02/07/2024    CREATININE 1.08 02/07/2024     No results found for: \"IGE\"  Lab Results   Component Value Date    ALT 21 02/07/2024    AST 30 02/07/2024    ALKPHOS 54 02/07/2024    BILITOT 0.70 10/01/2015         Imaging and other studies: I have personally reviewed pertinent reports.   and I have personally reviewed pertinent films in PACS  CXR 02/07/2024- clear bilateral lung fields    Pulmonary function testing: none on file    EKG, Pathology, and Other Studies: I have personally reviewed pertinent reports.   and " I have personally reviewed pertinent films in PACS    Michoacano Downey MD  Pulmonary and Critical Care   [facility]

## 2024-02-09 ENCOUNTER — DOCUMENTATION (OUTPATIENT)
Dept: PULMONOLOGY | Facility: CLINIC | Age: 84
End: 2024-02-09

## 2024-02-09 LAB

## 2024-02-12 LAB

## 2024-02-16 ENCOUNTER — TELEPHONE (OUTPATIENT)
Dept: CARDIOLOGY CLINIC | Facility: CLINIC | Age: 84
End: 2024-02-16

## 2024-02-16 NOTE — TELEPHONE ENCOUNTER
[id number]  [facility]   Adempas     [address]  Patient was instructed to call [facility]   [phone]

## 2024-02-27 PROBLEM — I27.24 CTEPH (CHRONIC THROMBOEMBOLIC PULMONARY HYPERTENSION) (HCC): Status: ACTIVE | Noted: 2024-02-27

## 2024-02-27 NOTE — PROGRESS NOTES
Outpatient Cardiology Note - Obdulio Mccauley 83 y.o. 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/11/2022    Urge incontinence 03/11/2022    Nocturia 03/11/2022    Bladder neck contracture 03/14/2019    Prostate cancer ([facility]) 12/22/2017    Cerebrovascular accident (CVA) ([facility]) 01/06/2015    Obstructive sleep apnea 02/08/2024       Assessment:  # PAH- CTEPH / Chronic HFpEF  Impression: Acute PE Feb 2023- VQ c/w CTEPH  PAH Rx: Adempas to start  Diuretic: lasix 20 mg daily  Weight: 226 lbs--> 216 lbs  BNP:    Studies- personally reviewed by me  EKG- SR 87 bpm, RBBB, LAFB    VQ Scan 12/20/23: bilateral perfusion defects suspicious for pulmonary emboli, likely chronic    Echo 12/8/23  LVEF: 60%  LVEDd: 3.9 cm  RV: dilated  PASP: 38 mmHg  RVOT:   Moderate TR    Echo 2/14/23: LVHN  LVEF: 65%  RV: moderately dilated  PASP: 74 mmHg  RVOT:   Moderate TR    CTA 2/13/23:Large filling defects are noted within the main pulmonary arteries bilaterally   extending into lobar arteries of all 5 lobes of the lungs. This is compatible   with extensive bilateral pulmonary emboli     # Bilateral PE Feb 2023  CTA as above  AC: Eliquis  # Acute DVT  LE duplex 2/2023 with DVT  # essential thrombocytosis  Positive JAK2 mutation  Hydroxyurea and asa  # right osteoarthritis knee  Considering TKR  Consider IVC filter  # basal cell skin cancer    Today's Plan:  Lifelong AC given essential thrombocytosis/ myeloproliferative disorder  Starting on Adempas  Examines fluid overloaded, needs more diuresis  Continue lasix to 40 mg daily  Cut back on sodium intake    HPI:      84 yo male with acute  bilateral PE Feb 2023 referred for evaluation of CTEPH. Echo done on PE admit showed indirect PA pressures around 75 mmHg. Has been on AC for 9 months. He had presented with chest pressure.     He has been maintained on Eliquis. Follow up echo 12/8 PASP: 38 mmHg down from PASP: 74 mmHg. He is back to his baseline breathing. He does get some LOVE with stairs but had this before his PE. No prior hx of pulmonary embolism. No smoking. No recent orthopedic injuries.     Interim:  VQ Scan 12/20/23: bilateral perfusion defects suspicious for pulmonary emboli, likely chronic    Last time doubled lasix to 40 mg daily      Past Medical History:   Diagnosis Date    Cancer (HCC)     prostate    History of pulmonary embolus (PE)     Hypercholesterolemia     Hypertension     Personal history of DVT (deep vein thrombosis)     Pneumonia     Stroke (HCC)        Review of Systems   Constitutional:  Negative for activity change, appetite change, fatigue and unexpected weight change (down 10 lbs).   HENT:  Negative for congestion and nosebleeds.    Eyes: Negative.    Respiratory:  Positive for shortness of breath. Negative for cough and chest tightness.    Cardiovascular:  Positive for leg swelling. Negative for chest pain and palpitations.   Gastrointestinal:  Negative for abdominal distention.   Endocrine: Negative.    Genitourinary: Negative.    Musculoskeletal: Negative.    Skin: Negative.    Neurological:  Negative for dizziness, syncope and weakness.   Hematological: Negative.    Psychiatric/Behavioral: Negative.         Allergies   Allergen Reactions    Misc. Sulfonamide Containing Compounds Rash    Penicillins Rash    Sulfa Antibiotics Rash     Other reaction(s): rash    Sulfa Antibiotics Rash     .    Current Outpatient Medications:     acetaminophen (TYLENOL) 500 mg tablet, Take 500 mg by mouth every 4 (four) hours, Disp: , Rfl:     Adempas 1 MG TABS, Take 1 tablet (1 mg total) by mouth 3 (three) times a day Increase every 2  weeks goal 2.5 (Patient not taking: Reported on 2/8/2024), Disp: 90 tablet, Rfl: 11    Adempas 1.5 MG TABS, Take 1.5 tablets (2.25 mg total) by mouth 3 (three) times a day Increase every 2 weeks goal 2.5mg tid (Patient not taking: Reported on 2/8/2024), Disp: 90 tablet, Rfl: 11    Adempas 2 MG TABS, Take 1.25 tablets (2.5 mg total) by mouth 3 (three) times a day (Patient not taking: Reported on 2/8/2024), Disp: 90 tablet, Rfl: 11    Adempas 2.5 MG TABS, Take 1 tablet (2.5 mg total) by mouth 3 (three) times a day (Patient not taking: Reported on 2/8/2024), Disp: 90 tablet, Rfl: 11    albuterol (ProAir HFA) 90 mcg/act inhaler, Inhale 2 puffs every 6 (six) hours as needed for wheezing (Patient not taking: Reported on 8/13/2023), Disp: 8.5 g, Rfl: 0    albuterol (PROVENTIL HFA,VENTOLIN HFA) 90 mcg/act inhaler, Inhale 2 puffs every 6 (six) hours as needed for wheezing (Patient not taking: Reported on 7/26/2023), Disp: , Rfl:     ALPRAZolam (XANAX) 0.25 mg tablet, , Disp: , Rfl:     aspirin 81 MG tablet, Take by mouth, Disp: , Rfl:     cyanocobalamin (VITAMIN B-12) 500 MCG tablet, Take 1 tablet by mouth daily, Disp: , Rfl:     Eliquis 5 MG, Take 1 tablet (5 mg total) by mouth 2 (two) times a day, Disp: 180 tablet, Rfl: 1    fluorouracil (EFUDEX) 5 % cream, , Disp: , Rfl: 0    furosemide (LASIX) 40 mg tablet, Take 1 tablet (40 mg total) by mouth daily, Disp: 30 tablet, Rfl: 4    hydroxyurea (HYDREA) 500 mg capsule, Take 1 capsule (500 mg total) by mouth 3 (three) times a day Take 500 mg in AM and 1000 mg in PM, Disp: 270 capsule, Rfl: 3    lisinopril (ZESTRIL) 10 mg tablet, Take 10 mg by mouth, Disp: , Rfl:     loratadine (CLARITIN) 10 mg tablet, Take 10 mg by mouth daily (Patient not taking: Reported on 7/26/2023), Disp: , Rfl:     meclizine (ANTIVERT) 25 mg tablet, Take 1 tablet (25 mg total) by mouth 3 (three) times a day as needed for dizziness, Disp: 30 tablet, Rfl: 0    Multiple Vitamin tablet, Take 1 tablet by mouth  daily, Disp: , Rfl:     venlafaxine (EFFEXOR-XR) 75 mg 24 hr capsule, 37.5mg once per day, Disp: , Rfl:     Social History     Socioeconomic History    Marital status: /Civil Union     Spouse name: Not on file    Number of children: Not on file    Years of education: Not on file    Highest education level: Not on file   Occupational History    Not on file   Tobacco Use    Smoking status: Never    Smokeless tobacco: Never   Vaping Use    Vaping status: Never Used   Substance and Sexual Activity    Alcohol use: No    Drug use: No    Sexual activity: Not on file   Other Topics Concern    Not on file   Social History Narrative    ** Merged History Encounter **          Social Determinants of Health     Financial Resource Strain: Not on file   Food Insecurity: Not on file   Transportation Needs: Not on file   Physical Activity: Not on file   Stress: Not on file   Social Connections: Not on file   Intimate Partner Violence: Not on file   Housing Stability: Not on file       No family history on file.    Physical Exam:    Vitals: There were no vitals taken for this visit., There is no height or weight on file to calculate BMI.,   Wt Readings from Last 3 Encounters:   02/08/24 98.6 kg (217 lb 6.4 oz)   12/20/23 103 kg (226 lb)   12/12/23 103 kg (226 lb)       Physical Exam:  There were no vitals filed for this visit.    Physical Exam  Constitutional:       Appearance: He is well-developed.   HENT:      Head: Normocephalic and atraumatic.   Eyes:      Pupils: Pupils are equal, round, and reactive to light.   Neck:      Vascular: No JVD.   Cardiovascular:      Rate and Rhythm: Normal rate and regular rhythm.      Heart sounds: No murmur heard.  Pulmonary:      Effort: Pulmonary effort is normal. No respiratory distress.      Breath sounds: Normal breath sounds.   Abdominal:      General: There is no distension.      Palpations: Abdomen is soft.      Tenderness: There is no abdominal tenderness.   Musculoskeletal:        "  General: Normal range of motion.      Cervical back: Normal range of motion.   Skin:     General: Skin is warm and dry.      Findings: No rash.   Neurological:      Mental Status: He is alert and oriented to person, place, and time.         Labs & Results:    Lab Results   Component Value Date    WBC 3.87 (L) 02/07/2024    HGB 12.8 02/07/2024    HCT 36.2 (L) 02/07/2024     (H) 02/07/2024     02/07/2024     Lab Results   Component Value Date    SODIUM 137 02/07/2024    K 4.3 02/07/2024     02/07/2024    CO2 26 02/07/2024    BUN 23 02/07/2024    CREATININE 1.08 02/07/2024    GLUC 105 02/07/2024    CALCIUM 9.1 02/07/2024     No results found for: \"BNP\"   Lab Results   Component Value Date    CHOLESTEROL 213 (H) 08/09/2022    CHOLESTEROL 226 (H) 02/07/2020    CHOLESTEROL 204 (H) 03/06/2017     Lab Results   Component Value Date    HDL 44 08/09/2022    HDL 48 02/07/2020    HDL 55 03/06/2017     Lab Results   Component Value Date    TRIG 135 08/09/2022    TRIG 179 (H) 02/07/2020    TRIG 157 (H) 03/06/2017     Lab Results   Component Value Date    NONHDLC 169 08/09/2022    NONHDLC 178 02/07/2020       EKG personally reviewed by Johnathon Damon DO.     Counseling / Coordination of Care  Time spent today 25 minutes.  Greater than 50% of total time was spent with the patient and / or family counseling and / or coordination of care. We discussed diagnoses, most recent studies and any changes in treatment  Thank you for the opportunity to participate in the care of this patient.    JOHNATHON DAMON D.O.  DIRECTOR OF HEART FAILURE/ PULMONARY HYPERTENSION  MEDICAL DIRECTOR OF LVAD PROGRAM  [facility]  "

## 2024-02-29 ENCOUNTER — OFFICE VISIT (OUTPATIENT)
Dept: CARDIOLOGY CLINIC | Facility: CLINIC | Age: 84
End: 2024-02-29
Payer: COMMERCIAL

## 2024-02-29 VITALS
BODY MASS INDEX: 33.9 KG/M2 | HEART RATE: 82 BPM | OXYGEN SATURATION: 98 % | WEIGHT: 216 LBS | DIASTOLIC BLOOD PRESSURE: 76 MMHG | HEIGHT: 67 IN | SYSTOLIC BLOOD PRESSURE: 128 MMHG

## 2024-02-29 DIAGNOSIS — G47.33 OSA (OBSTRUCTIVE SLEEP APNEA): Primary | ICD-10-CM

## 2024-02-29 DIAGNOSIS — I27.24 CTEPH (CHRONIC THROMBOEMBOLIC PULMONARY HYPERTENSION) (HCC): ICD-10-CM

## 2024-02-29 DIAGNOSIS — I27.21 PAH (PULMONARY ARTERY HYPERTENSION) (HCC): ICD-10-CM

## 2024-02-29 DIAGNOSIS — E66.01 OBESITY, MORBID (HCC): ICD-10-CM

## 2024-02-29 DIAGNOSIS — F41.9 ANXIETY: ICD-10-CM

## 2024-02-29 PROCEDURE — 99214 OFFICE O/P EST MOD 30 MIN: CPT | Performed by: INTERNAL MEDICINE

## 2024-02-29 RX ORDER — SERTRALINE HYDROCHLORIDE 25 MG/1
25 TABLET, FILM COATED ORAL DAILY
Qty: 30 TABLET | Refills: 0 | Status: SHIPPED | OUTPATIENT
Start: 2024-02-29

## 2024-02-29 NOTE — PATIENT INSTRUCTIONS
Start sertraline 12.5 mg for one week  Then 25 mg for a week  Then 37.5 mg for a week  Then 50 mg a day- stop there    This will help with anxiety    Starting on Adempas

## 2024-03-01 ENCOUNTER — HOSPITAL ENCOUNTER (EMERGENCY)
Facility: HOSPITAL | Age: 84
Discharge: HOME | End: 2024-03-01
Attending: EMERGENCY MEDICINE
Payer: COMMERCIAL

## 2024-03-01 ENCOUNTER — APPOINTMENT (EMERGENCY)
Dept: CT IMAGING | Facility: HOSPITAL | Age: 84
End: 2024-03-01
Payer: COMMERCIAL

## 2024-03-01 VITALS
RESPIRATION RATE: 18 BRPM | HEART RATE: 71 BPM | SYSTOLIC BLOOD PRESSURE: 143 MMHG | BODY MASS INDEX: 34.29 KG/M2 | OXYGEN SATURATION: 96 % | TEMPERATURE: 97.6 F | DIASTOLIC BLOOD PRESSURE: 72 MMHG | WEIGHT: 218.92 LBS

## 2024-03-01 DIAGNOSIS — S01.21XA LACERATION OF NOSE, INITIAL ENCOUNTER: ICD-10-CM

## 2024-03-01 DIAGNOSIS — S09.90XA INJURY OF HEAD, INITIAL ENCOUNTER: ICD-10-CM
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
## 2024-03-01 DIAGNOSIS — S00.81XA ABRASION OF FOREHEAD, INITIAL ENCOUNTER: ICD-10-CM

## 2024-03-01 PROCEDURE — 99284 EMERGENCY DEPT VISIT MOD MDM: CPT | Performed by: EMERGENCY MEDICINE

## 2024-03-01 PROCEDURE — 12011 RPR F/E/E/N/L/M 2.5 CM/<: CPT | Performed by: EMERGENCY MEDICINE

## 2024-03-01 PROCEDURE — 70450 CT HEAD/BRAIN W/O DYE: CPT

## 2024-03-01 PROCEDURE — 99284 EMERGENCY DEPT VISIT MOD MDM: CPT

## 2024-03-01 RX ORDER — GINSENG 100 MG
1 CAPSULE ORAL ONCE
Status: COMPLETED | OUTPATIENT
Start: 2024-03-01 | End: 2024-03-01

## 2024-03-01 RX ORDER — GINSENG 100 MG
1 CAPSULE ORAL ONCE
Status: DISCONTINUED | OUTPATIENT
Start: 2024-03-01 | End: 2024-03-01

## 2024-03-01 RX ADMIN — BACITRACIN 1 SMALL APPLICATION: 500 OINTMENT TOPICAL at 10:05

## 2024-03-01 NOTE — ED PROVIDER NOTES
"Emergency Department Trauma Note  Obdulio Mccauley 83 y.o. male MRN: [id number]  Unit/Bed#: ED 02/ED 02 Encounter: [id number]      Trauma Alert: Trauma Acuity: Trauma Evaluation  Model of Arrival: Mode of Arrival: Direct from scene via    Trauma Team: Current Providers  Attending Provider: Patrick Reynolds DO  Registered Nurse: Hoa Linares RN  Consultants:     None      History of Present Illness     Chief Complaint:   Chief Complaint   Patient presents with    Fall     Pt reports falling 30 minutes ago\" I tripped over the curb and hit my head\" Pt denies any LOC. On Eliquis for blood clots.     HPI:  Obdulio Mccauley is a 83 y.o. male who presents with fall.  Mechanism:Details of Incident: Tripped over a curb.+ Head strike Injury Date: 03/01/24 Injury Time: 0930      History from patient and medical records, past medical history PE takes Eliquis stroke pneumonia.  Patient brought in by wife with concern for fall 30 minutes ago tripped on a curb.  He did hit his head he has mild pain at the right forehead and nose he has a abrasion at the bruise at the right forehead and a laceration at the bridge of his nose.  He did not pass out no headache or confusion.  He was able to get up on his own.  There is a mild abrasion at the right elbow he is unsure of his last tetanus but records show it was 2024.  GCS 15.  Breath sounds equal. No crepitus or chest wall tenderness with compression over the chest. No pain or instability with compression over the pelvis. No bedside imaging required. Patient is stable to proceed to CT scan.        Review of Systems   Constitutional:  Negative for chills and fever.   HENT:  Negative for ear pain and sore throat.    Eyes:  Negative for pain and visual disturbance.   Respiratory:  Negative for cough and shortness of breath.    Cardiovascular:  Negative for chest pain and palpitations.   Gastrointestinal:  Negative for abdominal pain and vomiting.   Genitourinary:  Negative for dysuria and " hematuria.   Musculoskeletal:  Negative for arthralgias and back pain.   Skin:  Negative for color change and rash.   Neurological:  Negative for seizures and syncope.   All other systems reviewed and are negative.      Historical Information     Immunizations:   Immunization History   Administered Date(s) Administered    COVID-19 MODERNA VACC 0.5 ML IM 01/17/2021, 02/14/2021, 11/06/2021, 04/29/2022    COVID-19 Moderna Vac BIVALENT 12 Yr+ IM 0.5 ML 01/20/2023    COVID-19 Moderna vac 6-11y or adult booster 50 mcg/0.5 mL 01/13/2023    INFLUENZA 12/11/2013, 02/02/2015, 11/09/2015, 11/17/2017, 10/16/2018, 10/22/2019, 09/18/2020, 09/30/2021, 11/07/2022, 11/02/2023    Influenza Split 09/30/2014    Influenza Split High Dose Preservative Free IM 09/18/2020, 09/30/2021, 11/07/2022    Influenza, high dose seasonal 0.7 mL 11/02/2023    Pneumococcal Conjugate 13-Valent 11/09/2015    Pneumococcal Conjugate Vaccine 20-valent (Pcv20), Polysace 02/02/2024    Pneumococcal Polysaccharide PPV23 01/08/2015    Tdap 02/07/2020       Past Medical History:   Diagnosis Date    Cancer (HCC)     prostate    History of pulmonary embolus (PE)     Hypercholesterolemia     Hypertension     Personal history of DVT (deep vein thrombosis)     Pneumonia     Stroke (HCC)      History reviewed. No pertinent family history.  Past Surgical History:   Procedure Laterality Date    PROSTATECTOMY      TOTAL SHOULDER REPLACEMENT       Social History     Tobacco Use    Smoking status: Never    Smokeless tobacco: Never   Vaping Use    Vaping status: Never Used   Substance Use Topics    Alcohol use: No    Drug use: No     E-Cigarette/Vaping    E-Cigarette Use Never User      E-Cigarette/Vaping Substances    Nicotine No     THC No     CBD No     Flavoring No     Other No     Unknown No        Family History: non-contributory    Meds/Allergies   Prior to Admission Medications   Prescriptions Last Dose Informant Patient Reported? Taking?   ALPRAZolam (XANAX) 0.25  mg tablet  Self Yes No   Adempas 1 MG TABS   No No   Sig: Take 1 tablet (1 mg total) by mouth 3 (three) times a day Increase every 2 weeks goal 2.5   Patient not taking: Reported on 2024   Adempas 1.5 MG TABS   No No   Sig: Take 1.5 tablets (2.25 mg total) by mouth 3 (three) times a day Increase every 2 weeks goal 2.5mg tid   Patient not taking: Reported on 2024   Adempas 2 MG TABS   No No   Sig: Take 1.25 tablets (2.5 mg total) by mouth 3 (three) times a day   Patient not taking: Reported on 2024   Adempas 2.5 MG TABS   No No   Sig: Take 1 tablet (2.5 mg total) by mouth 3 (three) times a day   Patient not taking: Reported on 2024   Eliquis 5 MG  Self No No   Sig: Take 1 tablet (5 mg total) by mouth 2 (two) times a day   Multiple Vitamin tablet  Self Yes No   Sig: Take 1 tablet by mouth daily   acetaminophen (TYLENOL) 500 mg tablet  Self Yes No   Sig: Take 500 mg by mouth every 4 (four) hours   aspirin 81 MG tablet  Self Yes No   Sig: Take by mouth   cyanocobalamin (VITAMIN B-12) 500 MCG tablet  Self Yes No   Sig: Take 1 tablet by mouth daily   furosemide (LASIX) 40 mg tablet   No No   Sig: Take 1 tablet (40 mg total) by mouth daily   hydroxyurea (HYDREA) 500 mg capsule  Self No No   Sig: Take 1 capsule (500 mg total) by mouth 3 (three) times a day Take 500 mg in AM and 1000 mg in PM   lisinopril (ZESTRIL) 10 mg tablet  Self Yes No   Sig: Take 10 mg by mouth   meclizine (ANTIVERT) 25 mg tablet  Self No No   Sig: Take 1 tablet (25 mg total) by mouth 3 (three) times a day as needed for dizziness   sertraline (Zoloft) 25 mg tablet   No No   Sig: Take 1 tablet (25 mg total) by mouth daily   sertraline (Zoloft) 50 mg tablet   No No   Sig: Take 1 tablet (50 mg total) by mouth daily   venlafaxine (EFFEXOR-XR) 75 mg 24 hr capsule  Self Yes No   Si.5mg once per day      Facility-Administered Medications: None       Allergies   Allergen Reactions    Misc. Sulfonamide Containing Compounds Rash     Penicillins Rash    Sulfa Antibiotics Rash     Other reaction(s): rash    Sulfa Antibiotics Rash       PHYSICAL EXAM    PE limited by: nothing    Objective   Vitals:   First set: Temperature: 97.6 °F (36.4 °C) (03/01/24 0958)  Pulse: 76 (03/01/24 0958)  Respirations: 20 (03/01/24 0958)  Blood Pressure: 163/93 (03/01/24 0958)  SpO2: 98 % (03/01/24 0958)    Primary Survey:   (A) Airway: patent  (B) Breathing: clear  (C) Circulation: Pulses:   normal  (D) Disabliity:  GCS Total:  15  (E) Expose:  Completed    Secondary Survey: (Click on Physical Exam tab above)  Physical Exam  Vitals and nursing note reviewed.   Constitutional:       General: He is not in acute distress.     Appearance: He is well-developed.   HENT:      Head: Normocephalic.      Jaw: There is normal jaw occlusion.        Nose: Nasal tenderness present. No nasal deformity or septal deviation.      Right Nostril: No septal hematoma.      Left Nostril: No septal hematoma.      Right Sinus: Maxillary sinus tenderness and frontal sinus tenderness present.      Left Sinus: No maxillary sinus tenderness or frontal sinus tenderness.      Mouth/Throat:      Mouth: Mucous membranes are moist.      Pharynx: Oropharynx is clear.   Eyes:      Conjunctiva/sclera: Conjunctivae normal.   Cardiovascular:      Rate and Rhythm: Normal rate and regular rhythm.      Heart sounds: No murmur heard.  Pulmonary:      Effort: Pulmonary effort is normal. No respiratory distress.      Breath sounds: Normal breath sounds.   Chest:      Chest wall: No tenderness.   Abdominal:      Palpations: Abdomen is soft.      Tenderness: There is no abdominal tenderness.   Musculoskeletal:         General: No swelling.      Right elbow: Normal range of motion. No tenderness.      Cervical back: Neck supple. No bony tenderness.      Thoracic back: No bony tenderness.      Lumbar back: No bony tenderness.        Back:    Skin:     General: Skin is warm and dry.      Capillary Refill: Capillary  refill takes less than 2 seconds.   Neurological:      General: No focal deficit present.      Mental Status: He is alert and oriented to person, place, and time.   Psychiatric:         Mood and Affect: Mood normal.         Cervical spine cleared by clinical criteria? Yes     Invasive Devices       Peripheral Intravenous Line  Duration             Peripheral IV 12/20/23 Right Antecubital 72 days                    Lab Results:   Results Reviewed       None                   Imaging Studies:   Direct to CT: Yes  TRAUMA - CT head wo contrast   Final Result by Michael Benjamin MD (03/01 1035)      No intracranial hemorrhage or calvarial fracture.                  Workstation performed: [id number]               Procedures  Universal Protocol:  Consent: Verbal consent obtained.  Risks and benefits: risks, benefits and alternatives were discussed  Consent given by: patient  Patient understanding: patient states understanding of the procedure being performed  Patient identity confirmed: verbally with patient  Laceration repair    Date/Time: 3/1/2024 10:56 AM    Performed by: Patrick Reynolds DO  Authorized by: Patrick Reynolds DO  Body area: head/neck  Location details: nose  Laceration length: 1 cm  Tendon involvement: none    Wound Dehiscence:  Superficial Wound Dehiscence: simple closure      Procedure Details:  Irrigation solution: tap water  Irrigation method: tap  Amount of cleaning: extensive  Skin closure: glue  Technique: simple  Approximation: close  Patient tolerance: Patient tolerated the procedure well with no immediate complications               ED Course           Medical Decision Making  Diff includes mechanical fall with head injury, less likely intracranial hemorrhage or facial fracture    Amount and/or Complexity of Data Reviewed  Radiology: ordered.    Risk  OTC drugs.                Disposition  Priority One Transfer: No  Final diagnoses:   Fall, initial encounter   Injury of head, initial  encounter   Laceration of nose, initial encounter   Abrasion of forehead, initial encounter     Time reflects when diagnosis was documented in both MDM as applicable and the Disposition within this note       Time User Action Codes Description Comment    3/1/2024 10:50 AM Patrick Reynolds [W19.XXXA] Fall, initial encounter     3/1/2024 10:50 AM Patrick Reynolds [S09.90XA] Injury of head, initial encounter     3/1/2024 10:50 AM Patrick Reynolds [S01.21XA] Laceration of nose, initial encounter     3/1/2024 10:50 AM Patrick Reynolds [S00.81XA] Abrasion of forehead, initial encounter           ED Disposition       ED Disposition   Discharge    Condition   Stable    Date/Time   Fri Mar 1, 2024 10:50 AM    Comment   Obdulio Mccauley discharge to home/self care.                   Follow-up Information    None       Discharge Medication List as of 3/1/2024 10:51 AM        CONTINUE these medications which have NOT CHANGED    Details   acetaminophen (TYLENOL) 500 mg tablet Take 500 mg by mouth every 4 (four) hours, Starting Fri 11/17/2017, Historical Med      !! Adempas 1 MG TABS Take 1 tablet (1 mg total) by mouth 3 (three) times a day Increase every 2 weeks goal 2.5, Starting Wed 1/24/2024, Normal      !! Adempas 1.5 MG TABS Take 1.5 tablets (2.25 mg total) by mouth 3 (three) times a day Increase every 2 weeks goal 2.5mg tid, Starting Wed 1/24/2024, Normal      !! Adempas 2 MG TABS Take 1.25 tablets (2.5 mg total) by mouth 3 (three) times a day, Starting Wed 1/24/2024, Normal      !! Adempas 2.5 MG TABS Take 1 tablet (2.5 mg total) by mouth 3 (three) times a day, Starting Wed 1/24/2024, Normal      ALPRAZolam (XANAX) 0.25 mg tablet Starting Sun 10/6/2019, Historical Med      aspirin 81 MG tablet Take by mouth, Historical Med      cyanocobalamin (VITAMIN B-12) 500 MCG tablet Take 1 tablet by mouth daily, Starting Tue 12/21/2021, Historical Med      Eliquis 5 MG Take 1 tablet (5 mg total) by mouth 2 (two) times a day, Starting Tue  9/26/2023, Normal      furosemide (LASIX) 40 mg tablet Take 1 tablet (40 mg total) by mouth daily, Starting Tue 12/12/2023, Normal      hydroxyurea (HYDREA) 500 mg capsule Take 1 capsule (500 mg total) by mouth 3 (three) times a day Take 500 mg in AM and 1000 mg in PM, Starting Tue 9/26/2023, Normal      lisinopril (ZESTRIL) 10 mg tablet Take 10 mg by mouth, Historical Med      meclizine (ANTIVERT) 25 mg tablet Take 1 tablet (25 mg total) by mouth 3 (three) times a day as needed for dizziness, Starting Sat 5/6/2023, Normal      Multiple Vitamin tablet Take 1 tablet by mouth daily, Historical Med      !! sertraline (Zoloft) 25 mg tablet Take 1 tablet (25 mg total) by mouth daily, Starting u 2/29/2024, Normal      !! sertraline (Zoloft) 50 mg tablet Take 1 tablet (50 mg total) by mouth daily, Starting u 2/29/2024, Normal      venlafaxine (EFFEXOR-XR) 75 mg 24 hr capsule 37.5mg once per day, Historical Med       !! - Potential duplicate medications found. Please discuss with provider.        No discharge procedures on file.    PDMP Review       None            ED Provider  Electronically Signed by           Patrick Reynolds DO  03/01/24 1740

## 2024-03-05 ENCOUNTER — TELEPHONE (OUTPATIENT)
Dept: CARDIOLOGY CLINIC | Facility: CLINIC | Age: 84
End: 2024-03-05

## 2024-03-13 ENCOUNTER — TELEPHONE (OUTPATIENT)
Dept: CARDIOLOGY CLINIC | Facility: CLINIC | Age: 84
End: 2024-03-13

## 2024-03-13 NOTE — TELEPHONE ENCOUNTER
[facility] pharmacy called to inform that Johnathon Jensen was started on 1mg of Ademphis on March 11, 2024. He is tolerating the medication well. His BP was 116/63 and 120/70. He is on a 30 day supply. She said she will call back in a week or so to speak with Elle

## 2024-03-28 ENCOUNTER — TELEPHONE (OUTPATIENT)
Dept: CARDIOLOGY CLINIC | Facility: CLINIC | Age: 84
End: 2024-03-28

## 2024-03-28 NOTE — TELEPHONE ENCOUNTER
Hi, this is Asia from [facility]. Just wanted to give you an update on patient's Adempas and see if it's OK to titrate him.     His blood pressure has been great.   The lowest reading BP - 116 / 63.    The highest BP - 120's/ 79.    He's not having any side effects. His breathing is about the same so nothing concerning and hopefully we can go up to 1.5 milligrams for him next week.    Are you okay with this?    Left message for patient to return all to office to F/U.    Please advise.

## 2024-03-29 NOTE — TELEPHONE ENCOUNTER
Returned call to Asia with [facility] & read her Dr Lees's orders.    Asia verbalized understanding & will proceed to increase patient's Adempas to 1.5 mg BID, next week.

## 2024-04-01 NOTE — PROGRESS NOTES
HEMATOLOGY / ONCOLOGY CLINIC FOLLOW UP NOTE    Primary Care Provider: Ky Rubi MD  Referring Provider:    MRN: [id number]  : 1940    Reason for Encounter: follow up for DARIO 2 + ET        Oncology History Overview Note    - essential thrombocythemia, JAK2 positive    Current hydrea dose - 500 mg in AM and 1000 mg in PA    2023 - DVT/PE - eliquis 5 mg BID     Basal cell carcinoma of skin of other parts of face   3/11/2022 Initial Diagnosis    Basal cell carcinoma of skin of other parts of face         Interval History: Patient returns for follow-up visit of JAK2 positive thrombocytosis on Hydrea.  He is following closely with Dr. Lees and our heart failure team as well as pulmonology for pulmonary hypertension/chronic heart failure.  He continues on Hydrea 1 tab in AM and 2 tabs in PM and tolerates it well.  He has been on this dose for many years.  Most recent blood work reviewed.  White count normal.  Hemoglobin 14, , platelets 290.  Differential normal.  CMP normal  Denies any bleeding or complications with Eliquis.  Overall, he feels well.  He will be traveling to [place] to see his newest great grandchild      REVIEW OF SYSTEMS:  Please note that a 14-point review of systems was performed to include Constitutional, HEENT, Respiratory, CVS, GI, , Musculoskeletal, Integumentary, Neurologic, Rheumatologic, Endocrinologic, Psychiatric, Lymphatic, and Hematologic/Oncologic systems were reviewed and are negative unless otherwise stated in HPI. Positive and negative findings pertinent to this evaluation are incorporated into the history of present illness.      ECOG PS: 1    PROBLEM LIST:  Patient Active Problem List   Diagnosis    Prostate cancer (HCC)    Basal cell carcinoma of skin of other parts of face    Bladder neck contracture    Cerebrovascular accident (CVA) (HCC)    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HCC)    Urge incontinence    Nocturia    Other pulmonary embolism without acute cor  pulmonale (HCC)    Essential thrombocytosis (HCC)    Obesity, morbid (HCC)    ELODIA (obstructive sleep apnea)    Obstructive sleep apnea    CTEPH (chronic thromboembolic pulmonary hypertension) (HCC)       Assessment / Plan:  1. Essential thrombocytosis (HCC)    2. 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  His counts have been very stable on Hydrea.  He is taking 1500 mg daily.  Ideally, would like for him to be on the lowest effective dose.  I have recommended reducing his dose of Hydrea to 1000 mg daily.  He will take 1 tablet twice a day instead of 1 tablet in the morning and 2 tablets at bedtime.  I will closely monitor his labs with this dose reduction to ensure his counts stay in a safe range.  Patient is in agreement with this recommendation.  He will continue Eliquis indefinitely given his myeloproliferative disease and extent of blood clot he experienced.   Patient verbalizes understanding and agreement with this plan of care.  He will continue to follow closely with his heart failure team and pulmonology.  I will see him back in 3 months with repeat blood work.  He knows to call anytime with questions or concerns in the interim.      I spent 30 minutes on chart review, face to face counseling time, coordination of care and documentation.    Past Medical History:   has a past medical history of Cancer (HCC), History of pulmonary embolus (PE), Hypercholesterolemia, Hypertension, Personal history of DVT (deep vein thrombosis), Pneumonia, and Stroke (HCC).    PAST SURGICAL HISTORY:   has a past surgical history that includes Total shoulder replacement and Prostatectomy.    CURRENT MEDICATIONS  Current Outpatient Medications   Medication Sig Dispense Refill    acetaminophen (TYLENOL) 500 mg tablet Take 500 mg by mouth every 4 (four) hours      ALPRAZolam (XANAX) 0.25 mg tablet       aspirin 81 MG tablet Take by mouth       cyanocobalamin (VITAMIN B-12) 500 MCG tablet Take 1 tablet by mouth daily      Eliquis 5 MG Take 1 tablet (5 mg total) by mouth 2 (two) times a day 180 tablet 1    furosemide (LASIX) 40 mg tablet Take 1 tablet (40 mg total) by mouth daily 30 tablet 4    hydroxyurea (HYDREA) 500 mg capsule Take 1 capsule (500 mg total) by mouth 2 (two) times a day Take 500 mg in AM and 1000 mg in  capsule 1    lisinopril (ZESTRIL) 10 mg tablet Take 10 mg by mouth      meclizine (ANTIVERT) 25 mg tablet Take 1 tablet (25 mg total) by mouth 3 (three) times a day as needed for dizziness 30 tablet 0    Multiple Vitamin tablet Take 1 tablet by mouth daily      sertraline (Zoloft) 25 mg tablet Take 1 tablet (25 mg total) by mouth daily 30 tablet 0    venlafaxine (EFFEXOR-XR) 75 mg 24 hr capsule 37.5mg once per day      Adempas 1 MG TABS Take 1 tablet (1 mg total) by mouth 3 (three) times a day Increase every 2 weeks goal 2.5 (Patient not taking: Reported on 2/8/2024) 90 tablet 11    Adempas 1.5 MG TABS Take 1.5 tablets (2.25 mg total) by mouth 3 (three) times a day Increase every 2 weeks goal 2.5mg tid 90 tablet 11    Adempas 2 MG TABS Take 1.25 tablets (2.5 mg total) by mouth 3 (three) times a day 90 tablet 11    Adempas 2.5 MG TABS Take 1 tablet (2.5 mg total) by mouth 3 (three) times a day 90 tablet 11    sertraline (Zoloft) 50 mg tablet Take 1 tablet (50 mg total) by mouth daily 30 tablet 3     No current facility-administered medications for this visit.     [unfilled]    SOCIAL HISTORY:   reports that he has never smoked. He has never used smokeless tobacco. He reports that he does not drink alcohol and does not use drugs.     FAMILY HISTORY:  family history is not on file.     ALLERGIES:  is allergic to misc. sulfonamide containing compounds, penicillins, sulfa antibiotics, and sulfa antibiotics.      Physical Exam:  Vital Signs:   Visit Vitals  /70 (BP Location: Left arm, Patient Position: Sitting, Cuff Size: Standard)  "  Pulse 86   Temp 97.8 °F (36.6 °C) (Temporal)   Resp 18   Ht 5' 7\" (1.702 m)   Wt 98.4 kg (217 lb)   SpO2 96%   BMI 33.99 kg/m²   Smoking Status Never   BSA 2.09 m²     Body mass index is 33.99 kg/m².  Body surface area is 2.09 meters squared.    Physical Exam  Constitutional:       General: He is not in acute distress.     Appearance: He is well-developed.   HENT:      Head: Normocephalic and atraumatic.      Right Ear: External ear normal.      Left Ear: External ear normal.      Nose: Nose normal.   Eyes:      General: No scleral icterus.        Right eye: No discharge.         Left eye: No discharge.      Conjunctiva/sclera: Conjunctivae normal.   Neck:      Trachea: No tracheal deviation.   Cardiovascular:      Rate and Rhythm: Normal rate and regular rhythm.   Pulmonary:      Effort: Pulmonary effort is normal.      Breath sounds: Normal breath sounds.   Abdominal:      General: There is no distension.   Musculoskeletal:         General: Normal range of motion.      Cervical back: Normal range of motion.   Skin:     General: Skin is warm and dry.   Neurological:      General: No focal deficit present.      Mental Status: He is alert and oriented to person, place, and time.   Psychiatric:         Mood and Affect: Mood normal.         Behavior: Behavior normal.         Thought Content: Thought content normal.         Judgment: Judgment normal.         Labs:  Lab Results   Component Value Date    WBC 7.84 04/02/2024    HGB 14.0 04/02/2024    HCT 39.7 04/02/2024     (H) 04/02/2024     04/02/2024     Lab Results   Component Value Date     10/01/2015    SODIUM 142 04/02/2024    K 4.1 04/02/2024     04/02/2024    CO2 32 04/02/2024    ANIONGAP 6 10/01/2015    AGAP 6 04/02/2024    BUN 23 04/02/2024    CREATININE 0.97 04/02/2024    GLUC 107 04/02/2024    GLUF 210 (H) 12/07/2023    CALCIUM 9.5 04/02/2024    AST 22 04/02/2024    ALT 15 04/02/2024    ALKPHOS 55 04/02/2024    PROT 6.9 10/01/2015 "    TP 6.7 04/02/2024    BILITOT 0.70 10/01/2015    TBILI 0.74 04/02/2024    EGFR 70 04/02/2024

## 2024-04-02 ENCOUNTER — APPOINTMENT (OUTPATIENT)
Dept: LAB | Facility: CLINIC | Age: 84
End: 2024-04-02
Payer: COMMERCIAL

## 2024-04-02 ENCOUNTER — TELEPHONE (OUTPATIENT)
Age: 84
End: 2024-04-02

## 2024-04-02 DIAGNOSIS — I26.99 OTHER PULMONARY EMBOLISM WITHOUT ACUTE COR PULMONALE, UNSPECIFIED CHRONICITY (HCC): ICD-10-CM

## 2024-04-02 DIAGNOSIS — Z15.89 JAK-2 GENE MUTATION: ICD-10-CM

## 2024-04-02 DIAGNOSIS — D47.3 ESSENTIAL THROMBOCYTOSIS (HCC): ICD-10-CM

## 2024-04-02 LAB
ALBUMIN SERPL BCP-MCNC: 4.1 G/DL (ref 3.5–5)
ALP SERPL-CCNC: 55 U/L (ref 34–104)
ALT SERPL W P-5'-P-CCNC: 15 U/L (ref 7–52)
ANION GAP SERPL CALCULATED.3IONS-SCNC: 6 MMOL/L (ref 4–13)
AST SERPL W P-5'-P-CCNC: 22 U/L (ref 13–39)
BASOPHILS # BLD AUTO: 0.07 THOUSANDS/ÂΜL (ref 0–0.1)
BASOPHILS NFR BLD AUTO: 1 % (ref 0–1)
BILIRUB SERPL-MCNC: 0.74 MG/DL (ref 0.2–1)
BUN SERPL-MCNC: 23 MG/DL (ref 5–25)
CALCIUM SERPL-MCNC: 9.5 MG/DL (ref 8.4–10.2)
CHLORIDE SERPL-SCNC: 104 MMOL/L (ref 96–108)
CO2 SERPL-SCNC: 32 MMOL/L (ref 21–32)
CREAT SERPL-MCNC: 0.97 MG/DL (ref 0.6–1.3)
EOSINOPHIL # BLD AUTO: 0.19 THOUSAND/ÂΜL (ref 0–0.61)
EOSINOPHIL NFR BLD AUTO: 2 % (ref 0–6)
ERYTHROCYTE [DISTWIDTH] IN BLOOD BY AUTOMATED COUNT: 13.2 % (ref 11.6–15.1)
GFR SERPL CREATININE-BSD FRML MDRD: 70 ML/MIN/1.73SQ M
GLUCOSE SERPL-MCNC: 107 MG/DL (ref 65–140)
HCT VFR BLD AUTO: 39.7 % (ref 36.5–49.3)
HGB BLD-MCNC: 14 G/DL (ref 12–17)
IMM GRANULOCYTES # BLD AUTO: 0.03 THOUSAND/UL (ref 0–0.2)
IMM GRANULOCYTES NFR BLD AUTO: 0 % (ref 0–2)
LYMPHOCYTES # BLD AUTO: 1.55 THOUSANDS/ÂΜL (ref 0.6–4.47)
LYMPHOCYTES NFR BLD AUTO: 20 % (ref 14–44)
MCH RBC QN AUTO: 42.4 PG (ref 26.8–34.3)
MCHC RBC AUTO-ENTMCNC: 35.3 G/DL (ref 31.4–37.4)
MCV RBC AUTO: 120 FL (ref 82–98)
MONOCYTES # BLD AUTO: 0.61 THOUSAND/ÂΜL (ref 0.17–1.22)
MONOCYTES NFR BLD AUTO: 8 % (ref 4–12)
NEUTROPHILS # BLD AUTO: 5.39 THOUSANDS/ÂΜL (ref 1.85–7.62)
NEUTS SEG NFR BLD AUTO: 69 % (ref 43–75)
NRBC BLD AUTO-RTO: 0 /100 WBCS
PLATELET # BLD AUTO: 290 THOUSANDS/UL (ref 149–390)
PMV BLD AUTO: 9.9 FL (ref 8.9–12.7)
POTASSIUM SERPL-SCNC: 4.1 MMOL/L (ref 3.5–5.3)
PROT SERPL-MCNC: 6.7 G/DL (ref 6.4–8.4)
RBC # BLD AUTO: 3.3 MILLION/UL (ref 3.88–5.62)
SODIUM SERPL-SCNC: 142 MMOL/L (ref 135–147)
WBC # BLD AUTO: 7.84 THOUSAND/UL (ref 4.31–10.16)

## 2024-04-02 PROCEDURE — 80053 COMPREHEN METABOLIC PANEL: CPT

## 2024-04-02 PROCEDURE — 85025 COMPLETE CBC W/AUTO DIFF WBC: CPT

## 2024-04-02 PROCEDURE — 36415 COLL VENOUS BLD VENIPUNCTURE: CPT

## 2024-04-02 NOTE — TELEPHONE ENCOUNTER
Spoke to patient regarding having lab work completed for tomorrows appointment. Patient stated he will be going to the lab today.

## 2024-04-03 ENCOUNTER — OFFICE VISIT (OUTPATIENT)
Age: 84
End: 2024-04-03
Payer: COMMERCIAL

## 2024-04-03 VITALS
OXYGEN SATURATION: 96 % | WEIGHT: 217 LBS | SYSTOLIC BLOOD PRESSURE: 111 MMHG | TEMPERATURE: 97.8 F | RESPIRATION RATE: 18 BRPM | HEIGHT: 67 IN | HEART RATE: 86 BPM | BODY MASS INDEX: 34.06 KG/M2 | DIASTOLIC BLOOD PRESSURE: 70 MMHG

## 2024-04-03 DIAGNOSIS — D47.3 ESSENTIAL THROMBOCYTOSIS (HCC): Primary | ICD-10-CM

## 2024-04-03 DIAGNOSIS — Z15.89 JAK-2 GENE MUTATION: ICD-10-CM

## 2024-04-03 PROCEDURE — 99214 OFFICE O/P EST MOD 30 MIN: CPT | Performed by: NURSE PRACTITIONER

## 2024-04-03 RX ORDER — HYDROXYUREA 500 MG/1
500 CAPSULE ORAL 2 TIMES DAILY
Qty: 180 CAPSULE | Refills: 1 | Status: SHIPPED | OUTPATIENT
Start: 2024-04-03

## 2024-04-20 DIAGNOSIS — F41.9 ANXIETY: ICD-10-CM

## 2024-04-29 ENCOUNTER — TELEPHONE (OUTPATIENT)
Age: 84
End: 2024-04-29

## 2024-04-29 NOTE — TELEPHONE ENCOUNTER
Called [facility] & spoke with Bert N read ing Dr Lees's orders as she took a verbal order.    Also faxed [facility] phone encounter with Dr Lees's orders.  F: [phone].

## 2024-04-29 NOTE — TELEPHONE ENCOUNTER
Received a call from Asia @ [facility] regarding Adempas dose.    Adempas was increased to 1.5 mg TID on 4/11.    Asia said patient is tolerating that dose well, BP averaging 116/70.    Asia asked if patient can increase dose to 2 mg TID on 5/11.

## 2024-05-03 ENCOUNTER — TELEPHONE (OUTPATIENT)
Dept: CARDIOLOGY CLINIC | Facility: CLINIC | Age: 84
End: 2024-05-03

## 2024-05-03 NOTE — TELEPHONE ENCOUNTER
Called pt and LVM relaying message as  stated. Explained to callback if they have any other questions or concerns.

## 2024-05-03 NOTE — TELEPHONE ENCOUNTER
Patient's wife called in inquiring if patient does still need a sleep study done (per Dr. Lees) before they go ahead and pay for it. Patient's wife (Alma Delia) would like a call back if possible at [phone] and can leave a message if no answer.

## 2024-05-07 LAB
DME PARACHUTE DELIVERY DATE EXPECTED: NORMAL
DME PARACHUTE DELIVERY DATE REQUESTED: NORMAL
DME PARACHUTE DELIVERY NOTE: NORMAL
DME PARACHUTE ITEM DESCRIPTION: NORMAL
DME PARACHUTE ORDER STATUS: NORMAL
DME PARACHUTE SUPPLIER NAME: NORMAL
DME PARACHUTE SUPPLIER PHONE: NORMAL

## 2024-05-08 ENCOUNTER — OFFICE VISIT (OUTPATIENT)
Dept: PULMONOLOGY | Facility: CLINIC | Age: 84
End: 2024-05-08
Payer: COMMERCIAL

## 2024-05-08 VITALS
SYSTOLIC BLOOD PRESSURE: 118 MMHG | TEMPERATURE: 96.3 F | WEIGHT: 215 LBS | BODY MASS INDEX: 33.74 KG/M2 | HEART RATE: 92 BPM | DIASTOLIC BLOOD PRESSURE: 62 MMHG | OXYGEN SATURATION: 93 % | HEIGHT: 67 IN

## 2024-05-08 DIAGNOSIS — G47.33 OSA (OBSTRUCTIVE SLEEP APNEA): Primary | ICD-10-CM

## 2024-05-08 DIAGNOSIS — I27.24 CTEPH (CHRONIC THROMBOEMBOLIC PULMONARY HYPERTENSION) (HCC): ICD-10-CM

## 2024-05-08 PROCEDURE — 99213 OFFICE O/P EST LOW 20 MIN: CPT | Performed by: INTERNAL MEDICINE

## 2024-05-08 NOTE — PROGRESS NOTES
Pulmonary Follow Up Note   Obdulio Mccauley 83 y.o. male MRN: [id number]  5/8/2024      Assessment:    1. ELODIA (obstructive sleep apnea)  Scheduled to get a CPAP machine at the end of this month  Follow-up 3 months after he begins using it for compliance  I counseled him on the importance of using this regularly    2. CTEPH (chronic thromboembolic pulmonary hypertension) (HCC)  Has been uptitrated to 2 mg  He is following up with Dr. Lees with heart failure  Continue regular follow-up        Plan:    Diagnoses and all orders for this visit:    ELODIA (obstructive sleep apnea)    CTEPH (chronic thromboembolic pulmonary hypertension) (HCC)        Return in about 4 months (around 9/8/2024).    History of Present Illness   HPI:  Obdulio Mccauley is a 83 y.o. male who presents for follow-up of obstructive sleep apnea.  He has not received his CPAP machine yet but is scheduled to get it later this month.  He continues to follow-up with heart failure and is on Adempas for his chronic thromboembolic pulmonary pretension.  Reports her shortness of breath has improved significantly.  Denies any cough.    Review of Systems   Constitutional:  Negative for appetite change, chills and fever.   HENT:  Negative for congestion, ear pain, postnasal drip, rhinorrhea, sneezing, sore throat and trouble swallowing.    Eyes:  Negative for itching.   Respiratory:  Positive for shortness of breath. Negative for cough, wheezing and stridor.    Cardiovascular:  Negative for chest pain, palpitations and leg swelling.   Gastrointestinal:  Negative for abdominal distention, abdominal pain, nausea and vomiting.   Genitourinary:  Negative for dysuria and flank pain.   Musculoskeletal:  Positive for myalgias. Negative for arthralgias.   Skin:  Negative for color change.   Neurological:  Negative for dizziness, light-headedness and headaches.   Psychiatric/Behavioral: Negative.         Historical Information   Past Medical History:   Diagnosis Date     Cancer (HCC)     prostate    History of pulmonary embolus (PE)     Hypercholesterolemia     Hypertension     Personal history of DVT (deep vein thrombosis)     Pneumonia     Stroke (HCC)      Past Surgical History:   Procedure Laterality Date    PROSTATECTOMY      TOTAL SHOULDER REPLACEMENT       History reviewed. No pertinent family history.      Meds/Allergies     Current Outpatient Medications:     acetaminophen (TYLENOL) 500 mg tablet, Take 500 mg by mouth every 4 (four) hours, Disp: , Rfl:     Adempas 2 MG TABS, Take 1.25 tablets (2.5 mg total) by mouth 3 (three) times a day, Disp: 90 tablet, Rfl: 11    ALPRAZolam (XANAX) 0.25 mg tablet, , Disp: , Rfl:     aspirin 81 MG tablet, Take by mouth, Disp: , Rfl:     cyanocobalamin (VITAMIN B-12) 500 MCG tablet, Take 1 tablet by mouth daily, Disp: , Rfl:     Eliquis 5 MG, Take 1 tablet (5 mg total) by mouth 2 (two) times a day, Disp: 180 tablet, Rfl: 1    furosemide (LASIX) 40 mg tablet, Take 1 tablet (40 mg total) by mouth daily, Disp: 30 tablet, Rfl: 4    hydroxyurea (HYDREA) 500 mg capsule, Take 1 capsule (500 mg total) by mouth 2 (two) times a day Take 500 mg in AM and 1000 mg in PM, Disp: 180 capsule, Rfl: 1    lisinopril (ZESTRIL) 10 mg tablet, Take 10 mg by mouth, Disp: , Rfl:     meclizine (ANTIVERT) 25 mg tablet, Take 1 tablet (25 mg total) by mouth 3 (three) times a day as needed for dizziness, Disp: 30 tablet, Rfl: 0    Multiple Vitamin tablet, Take 1 tablet by mouth daily, Disp: , Rfl:     sertraline (ZOLOFT) 50 mg tablet, TAKE 1 TABLET BY MOUTH EVERY DAY, Disp: 90 tablet, Rfl: 2    Adempas 1 MG TABS, Take 1 tablet (1 mg total) by mouth 3 (three) times a day Increase every 2 weeks goal 2.5 (Patient not taking: Reported on 2/8/2024), Disp: 90 tablet, Rfl: 11    Adempas 1.5 MG TABS, Take 1.5 tablets (2.25 mg total) by mouth 3 (three) times a day Increase every 2 weeks goal 2.5mg tid (Patient not taking: Reported on 5/8/2024), Disp: 90 tablet, Rfl: 11     "Adempas 2.5 MG TABS, Take 1 tablet (2.5 mg total) by mouth 3 (three) times a day (Patient not taking: Reported on 5/8/2024), Disp: 90 tablet, Rfl: 11    sertraline (Zoloft) 25 mg tablet, Take 1 tablet (25 mg total) by mouth daily, Disp: 30 tablet, Rfl: 0    venlafaxine (EFFEXOR-XR) 75 mg 24 hr capsule, 37.5mg once per day, Disp: , Rfl:   Allergies   Allergen Reactions    Misc. Sulfonamide Containing Compounds Rash    Penicillins Rash    Sulfa Antibiotics Rash     Other reaction(s): rash    Sulfa Antibiotics Rash       Vitals: Blood pressure 118/62, pulse 92, temperature (!) 96.3 °F (35.7 °C), temperature source Tympanic, height 5' 7\" (1.702 m), weight 97.5 kg (215 lb), SpO2 93%. Body mass index is 33.67 kg/m². Oxygen Therapy  SpO2: 93 %  Oxygen Therapy: None (Room air)      Physical Exam  Physical Exam  Constitutional:       General: He is not in acute distress.     Appearance: He is not diaphoretic.   HENT:      Head: Normocephalic and atraumatic.      Nose: Nose normal.      Mouth/Throat:      Pharynx: No oropharyngeal exudate.   Eyes:      General: No scleral icterus.     Conjunctiva/sclera: Conjunctivae normal.      Pupils: Pupils are equal, round, and reactive to light.   Neck:      Thyroid: No thyromegaly.      Vascular: No JVD.      Trachea: No tracheal deviation.   Cardiovascular:      Rate and Rhythm: Normal rate and regular rhythm.      Heart sounds: Normal heart sounds. No murmur heard.     No friction rub. No gallop.   Pulmonary:      Effort: Pulmonary effort is normal. No respiratory distress.      Breath sounds: Normal breath sounds. No stridor. No wheezing or rales.   Abdominal:      General: Bowel sounds are normal. There is no distension.      Palpations: Abdomen is soft.      Tenderness: There is no abdominal tenderness. There is no guarding or rebound.   Musculoskeletal:         General: No deformity. Normal range of motion.      Cervical back: Normal range of motion and neck supple. " "  Lymphadenopathy:      Cervical: No cervical adenopathy.   Skin:     General: Skin is warm.      Findings: No erythema or rash.   Neurological:      Mental Status: He is alert and oriented to person, place, and time.      Cranial Nerves: No cranial nerve deficit.      Sensory: No sensory deficit.         Labs: I have personally reviewed pertinent lab results.  Lab Results   Component Value Date    WBC 7.84 04/02/2024    HGB 14.0 04/02/2024    HCT 39.7 04/02/2024     (H) 04/02/2024     04/02/2024     Lab Results   Component Value Date    GLUCOSE 154 (H) 04/30/2023    CALCIUM 9.5 04/02/2024     10/01/2015    K 4.1 04/02/2024    CO2 32 04/02/2024     04/02/2024    BUN 23 04/02/2024    CREATININE 0.97 04/02/2024     No results found for: \"IGE\"  Lab Results   Component Value Date    ALT 15 04/02/2024    AST 22 04/02/2024    ALKPHOS 55 04/02/2024    BILITOT 0.70 10/01/2015         Imaging and other studies: I have personally reviewed pertinent reports.   and I have personally reviewed pertinent films in PACS  Chest xray 02/07/2024- clear bilateral lung fields    Pulmonary function testing: none on file    EKG, Pathology, and Other Studies: I have personally reviewed pertinent reports.   and I have personally reviewed pertinent films in PACS    Michoacano Downey MD  Pulmonary and Critical Care   [facility]    Answers submitted by the patient for this visit:  Pulmonology Questionnaire (Submitted on 5/7/2024)  Chief Complaint: Primary symptoms  Do you have chest tightness?: Yes  Chronicity: chronic  When did you first notice your symptoms?: more than 1 year ago  How often do your symptoms occur?: daily  Since you first noticed this problem, how has it changed?: gradually improving  Do you have shortness of breath that occurs with effort or exertion?: Yes  Do you have ear congestion?: No  Do you have heartburn?: Yes  Do you have fatigue?: No  Do you have nasal " congestion?: No  Do you have shortness of breath when lying flat?: No  Do you have shortness of breath when you wake up?: No  Do you have sweats?: No  Have you experienced weight loss?: No  Which of the following makes your symptoms worse?: climbing stairs, exercise  Which of the following makes your symptoms better?: rest

## 2024-05-13 DIAGNOSIS — F41.9 ANXIETY: ICD-10-CM

## 2024-05-15 RX ORDER — SERTRALINE HYDROCHLORIDE 25 MG/1
25 TABLET, FILM COATED ORAL DAILY
Qty: 30 TABLET | Refills: 5 | Status: SHIPPED | OUTPATIENT
Start: 2024-05-15 | End: 2024-05-20 | Stop reason: SDUPTHER

## 2024-05-15 NOTE — PROGRESS NOTES
Outpatient Cardiology Note - Obdulio Mccauley 83 y.o. 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[facility]) 03/11/2022    Urge incontinence 03/11/2022    Nocturia 03/11/2022    Bladder neck contracture 03/14/2019    Prostate cancer ([facility]) 12/22/2017    Cerebrovascular accident (CVA) ([facility]) 01/06/2015    CTEPH (chronic thromboembolic pulmonary hypertension) ([facility]) 02/27/2024    Obstructive sleep apnea 02/08/2024       Assessment:  # PAH- CTEPH / Chronic HFpEF  Impression: Acute PE Feb 2023- VQ c/w CTEPH  PAH Rx: Adempas 2 mg TID  Side effects: GERD, taking tums  Diuretic: lasix 40 mg daily  Weight: 226 lbs--> 216 lbs  BNP:    Studies- personally reviewed by me  EKG- SR 87 bpm, RBBB, LAFB    VQ Scan 12/20/23: bilateral perfusion defects suspicious for pulmonary emboli, likely chronic    Echo 12/8/23  LVEF: 60%  LVEDd: 3.9 cm  RV: dilated  PASP: 38 mmHg  RVOT:   Moderate TR    Echo 2/14/23: LVHN  LVEF: 65%  RV: moderately dilated  PASP: 74 mmHg  RVOT:   Moderate TR    CTA 2/13/23:Large filling defects are noted within the main pulmonary arteries bilaterally   extending into lobar arteries of all 5 lobes of the lungs. This is compatible   with extensive bilateral pulmonary emboli     # Bilateral PE Feb 2023  CTA as above  AC: Eliquis  # Acute DVT  LE duplex 2/2023 with DVT  # essential thrombocytosis  Positive JAK2 mutation  Hydroxyurea and asa  # right osteoarthritis knee  Considering TKR  Consider IVC filter  # basal cell skin cancer    Today's Plan:  Lifelong AC given essential thrombocytosis/ myeloproliferative disorder  continue on Adempas- breathing has significantly improved  Go  up to adempas 2.5 mg TID  BNP for risk eval  Echo in 3 months  Continue lasix to 40 mg daily  Cut back on sodium intake  Increase sertraline to 75 mg daily    HPI:      84 yo male with acute bilateral PE Feb 2023 referred for evaluation of CTEPH. Echo done on PE admit showed indirect PA pressures around 75 mmHg. Has been on AC for 9 months. He had presented with chest pressure.     He has been maintained on Eliquis. Follow up echo 12/8 PASP: 38 mmHg down from PASP: 74 mmHg. He is back to his baseline breathing. He does get some LOVE with stairs but had this before his PE. No prior hx of pulmonary embolism. No smoking. No recent orthopedic injuries.   Pt had a VQ scan 12/2023 with bilateral perfusion defects suspicious for pulmonary emboli, likely chronic    Interim:  Adempas at 2 mg TID  Dyspnea has significantly improved  Scheduled to get CPAP machine at the end of the month  Has reflux from adempas, but breathing better- taking tums  Constipation    Past Medical History:   Diagnosis Date    Cancer (HCC)     prostate    History of pulmonary embolus (PE)     Hypercholesterolemia     Hypertension     Personal history of DVT (deep vein thrombosis)     Pneumonia     Stroke (HCC)        Review of Systems   Constitutional:  Negative for activity change, appetite change, fatigue and unexpected weight change (down 10 lbs).   HENT:  Negative for congestion and nosebleeds.    Eyes: Negative.    Respiratory:  Positive for shortness of breath. Negative for cough and chest tightness.    Cardiovascular:  Positive for leg swelling. Negative for chest pain and palpitations.   Gastrointestinal:  Negative for abdominal distention.   Endocrine: Negative.    Genitourinary: Negative.    Musculoskeletal: Negative.    Skin: Negative.    Neurological:  Negative for dizziness, syncope and weakness.   Hematological: Negative.    Psychiatric/Behavioral: Negative.         Allergies   Allergen Reactions    Misc. Sulfonamide Containing Compounds  Rash    Penicillins Rash    Sulfa Antibiotics Rash     Other reaction(s): rash    Sulfa Antibiotics Rash     .    Current Outpatient Medications:     acetaminophen (TYLENOL) 500 mg tablet, Take 500 mg by mouth every 4 (four) hours, Disp: , Rfl:     Adempas 1 MG TABS, Take 1 tablet (1 mg total) by mouth 3 (three) times a day Increase every 2 weeks goal 2.5 (Patient not taking: Reported on 2/8/2024), Disp: 90 tablet, Rfl: 11    Adempas 1.5 MG TABS, Take 1.5 tablets (2.25 mg total) by mouth 3 (three) times a day Increase every 2 weeks goal 2.5mg tid (Patient not taking: Reported on 5/8/2024), Disp: 90 tablet, Rfl: 11    Adempas 2 MG TABS, Take 1.25 tablets (2.5 mg total) by mouth 3 (three) times a day, Disp: 90 tablet, Rfl: 11    Adempas 2.5 MG TABS, Take 1 tablet (2.5 mg total) by mouth 3 (three) times a day (Patient not taking: Reported on 5/8/2024), Disp: 90 tablet, Rfl: 11    ALPRAZolam (XANAX) 0.25 mg tablet, , Disp: , Rfl:     aspirin 81 MG tablet, Take by mouth, Disp: , Rfl:     cyanocobalamin (VITAMIN B-12) 500 MCG tablet, Take 1 tablet by mouth daily, Disp: , Rfl:     Eliquis 5 MG, Take 1 tablet (5 mg total) by mouth 2 (two) times a day, Disp: 180 tablet, Rfl: 1    furosemide (LASIX) 40 mg tablet, Take 1 tablet (40 mg total) by mouth daily, Disp: 30 tablet, Rfl: 4    hydroxyurea (HYDREA) 500 mg capsule, Take 1 capsule (500 mg total) by mouth 2 (two) times a day Take 500 mg in AM and 1000 mg in PM, Disp: 180 capsule, Rfl: 1    lisinopril (ZESTRIL) 10 mg tablet, Take 10 mg by mouth, Disp: , Rfl:     meclizine (ANTIVERT) 25 mg tablet, Take 1 tablet (25 mg total) by mouth 3 (three) times a day as needed for dizziness, Disp: 30 tablet, Rfl: 0    Multiple Vitamin tablet, Take 1 tablet by mouth daily, Disp: , Rfl:     sertraline (ZOLOFT) 25 mg tablet, TAKE 1 TABLET (25 MG TOTAL) BY MOUTH DAILY., Disp: 30 tablet, Rfl: 5    sertraline (ZOLOFT) 50 mg tablet, TAKE 1 TABLET BY MOUTH EVERY DAY, Disp: 90 tablet, Rfl: 2     venlafaxine (EFFEXOR-XR) 75 mg 24 hr capsule, 37.5mg once per day, Disp: , Rfl:     Social History     Socioeconomic History    Marital status: /Civil Union     Spouse name: Not on file    Number of children: Not on file    Years of education: Not on file    Highest education level: Not on file   Occupational History    Not on file   Tobacco Use    Smoking status: Never    Smokeless tobacco: Never   Vaping Use    Vaping status: Never Used   Substance and Sexual Activity    Alcohol use: No    Drug use: No    Sexual activity: Not on file   Other Topics Concern    Not on file   Social History Narrative    ** Merged History Encounter **          Social Determinants of Health     Financial Resource Strain: Not on file   Food Insecurity: Not on file   Transportation Needs: Not on file   Physical Activity: Not on file   Stress: Not on file   Social Connections: Not on file   Intimate Partner Violence: Not on file   Housing Stability: Not on file       No family history on file.    Physical Exam:    Vitals: There were no vitals taken for this visit., There is no height or weight on file to calculate BMI.,   Wt Readings from Last 3 Encounters:   05/08/24 97.5 kg (215 lb)   04/03/24 98.4 kg (217 lb)   03/01/24 99.3 kg (218 lb 14.7 oz)       Physical Exam:  There were no vitals filed for this visit.    Physical Exam  Constitutional:       Appearance: He is well-developed.   HENT:      Head: Normocephalic and atraumatic.   Eyes:      Pupils: Pupils are equal, round, and reactive to light.   Neck:      Vascular: No JVD.   Cardiovascular:      Rate and Rhythm: Normal rate and regular rhythm.      Heart sounds: No murmur heard.  Pulmonary:      Effort: Pulmonary effort is normal. No respiratory distress.      Breath sounds: Normal breath sounds.   Abdominal:      General: There is no distension.      Palpations: Abdomen is soft.      Tenderness: There is no abdominal tenderness.   Musculoskeletal:         General: Normal  "range of motion.      Cervical back: Normal range of motion.   Skin:     General: Skin is warm and dry.      Findings: No rash.   Neurological:      Mental Status: He is alert and oriented to person, place, and time.         Labs & Results:    Lab Results   Component Value Date    WBC 7.84 04/02/2024    HGB 14.0 04/02/2024    HCT 39.7 04/02/2024     (H) 04/02/2024     04/02/2024     Lab Results   Component Value Date    SODIUM 142 04/02/2024    K 4.1 04/02/2024     04/02/2024    CO2 32 04/02/2024    BUN 23 04/02/2024    CREATININE 0.97 04/02/2024    GLUC 107 04/02/2024    CALCIUM 9.5 04/02/2024     No results found for: \"BNP\"   Lab Results   Component Value Date    CHOLESTEROL 213 (H) 08/09/2022    CHOLESTEROL 226 (H) 02/07/2020    CHOLESTEROL 204 (H) 03/06/2017     Lab Results   Component Value Date    HDL 44 08/09/2022    HDL 48 02/07/2020    HDL 55 03/06/2017     Lab Results   Component Value Date    TRIG 135 08/09/2022    TRIG 179 (H) 02/07/2020    TRIG 157 (H) 03/06/2017     Lab Results   Component Value Date    NONHDLC 169 08/09/2022    NONHDLC 178 02/07/2020       EKG personally reviewed by Johnathon Damon DO.     Counseling / Coordination of Care  Time spent today 25 minutes.  Greater than 50% of total time was spent with the patient and / or family counseling and / or coordination of care. We discussed diagnoses, most recent studies and any changes in treatment  Thank you for the opportunity to participate in the care of this patient.    JOHNATHON DAMON D.O.  DIRECTOR OF HEART FAILURE/ PULMONARY HYPERTENSION  MEDICAL DIRECTOR OF LVAD PROGRAM  [facility]  "

## 2024-05-20 ENCOUNTER — OFFICE VISIT (OUTPATIENT)
Dept: CARDIOLOGY CLINIC | Facility: CLINIC | Age: 84
End: 2024-05-20
Payer: COMMERCIAL

## 2024-05-20 VITALS
HEIGHT: 67 IN | HEART RATE: 68 BPM | OXYGEN SATURATION: 97 % | DIASTOLIC BLOOD PRESSURE: 58 MMHG | SYSTOLIC BLOOD PRESSURE: 118 MMHG | WEIGHT: 217 LBS | BODY MASS INDEX: 34.06 KG/M2

## 2024-05-20 DIAGNOSIS — I27.21 PAH (PULMONARY ARTERY HYPERTENSION) (HCC): ICD-10-CM

## 2024-05-20 DIAGNOSIS — I27.24 CTEPH (CHRONIC THROMBOEMBOLIC PULMONARY HYPERTENSION) (HCC): Primary | ICD-10-CM

## 2024-05-20 DIAGNOSIS — G47.33 OBSTRUCTIVE SLEEP APNEA: ICD-10-CM

## 2024-05-20 DIAGNOSIS — I26.99 OTHER ACUTE PULMONARY EMBOLISM WITHOUT ACUTE COR PULMONALE (HCC): ICD-10-CM

## 2024-05-20 PROCEDURE — 99214 OFFICE O/P EST MOD 30 MIN: CPT | Performed by: INTERNAL MEDICINE

## 2024-05-20 NOTE — PATIENT INSTRUCTIONS
For constipation  Fiber supplement and or colace    If needed I can write you something for the reflux like protonix    Can go up to 2.5 mg three times daily with adempas    Can take extra water pill as needed    Titrate up sertraline to 75 mg daily- 1.5 pills daily for now

## 2024-05-23 ENCOUNTER — APPOINTMENT (OUTPATIENT)
Dept: LAB | Facility: CLINIC | Age: 84
End: 2024-05-23
Payer: COMMERCIAL

## 2024-05-23 DIAGNOSIS — E78.00 PURE HYPERCHOLESTEROLEMIA: ICD-10-CM

## 2024-05-23 DIAGNOSIS — Z79.899 ENCOUNTER FOR LONG-TERM (CURRENT) USE OF OTHER MEDICATIONS: ICD-10-CM

## 2024-05-23 DIAGNOSIS — I27.24 CTEPH (CHRONIC THROMBOEMBOLIC PULMONARY HYPERTENSION) (HCC): ICD-10-CM

## 2024-05-23 LAB
BNP SERPL-MCNC: 74 PG/ML (ref 0–100)
CHOLEST SERPL-MCNC: 202 MG/DL
DME PARACHUTE DELIVERY DATE ACTUAL: NORMAL
DME PARACHUTE DELIVERY DATE EXPECTED: NORMAL
DME PARACHUTE DELIVERY DATE REQUESTED: NORMAL
DME PARACHUTE DELIVERY NOTE: NORMAL
DME PARACHUTE ITEM DESCRIPTION: NORMAL
DME PARACHUTE ORDER STATUS: NORMAL
DME PARACHUTE SUPPLIER NAME: NORMAL
DME PARACHUTE SUPPLIER PHONE: NORMAL
HDLC SERPL-MCNC: 36 MG/DL
LDLC SERPL CALC-MCNC: 133 MG/DL (ref 0–100)
NONHDLC SERPL-MCNC: 166 MG/DL
TRIGL SERPL-MCNC: 163 MG/DL

## 2024-05-23 PROCEDURE — 83880 ASSAY OF NATRIURETIC PEPTIDE: CPT

## 2024-05-23 PROCEDURE — 36415 COLL VENOUS BLD VENIPUNCTURE: CPT

## 2024-05-23 PROCEDURE — 80061 LIPID PANEL: CPT

## 2024-05-29 ENCOUNTER — TELEPHONE (OUTPATIENT)
Age: 84
End: 2024-05-29

## 2024-05-29 NOTE — TELEPHONE ENCOUNTER
Asia from [facility] called stating that she spoke to pt and he told her last week Dr. Lees increased Adempas to 2.5mg TID.    Advise per OV note on 5/20:  Today's Plan:  Lifelong AC given essential thrombocytosis/ myeloproliferative disorder  continue on Adempas- breathing has significantly improved  Go up to adempas 2.5 mg TID  BNP for risk eval  Echo in 3 months  Continue lasix to 40 mg daily  Cut back on sodium intake  Increase sertraline to 75 mg daily    Asia verbalized understanding. Call back #[phone]

## 2024-06-06 DIAGNOSIS — I26.99 OTHER PULMONARY EMBOLISM WITHOUT ACUTE COR PULMONALE, UNSPECIFIED CHRONICITY (HCC): ICD-10-CM

## 2024-06-07 RX ORDER — APIXABAN 5 MG/1
5 TABLET, FILM COATED ORAL 2 TIMES DAILY
Qty: 60 TABLET | Refills: 5 | Status: SHIPPED | OUTPATIENT
Start: 2024-06-07

## 2024-07-02 ENCOUNTER — TELEPHONE (OUTPATIENT)
Age: 84
End: 2024-07-02

## 2024-07-02 NOTE — TELEPHONE ENCOUNTER
Left message reminding patient to have labs completed for upcoming appointment 7/3/24.  Provided call back number for any questions/concerns [phone]

## 2024-07-03 ENCOUNTER — TELEPHONE (OUTPATIENT)
Age: 84
End: 2024-07-03

## 2024-09-10 ENCOUNTER — HOSPITAL ENCOUNTER (OUTPATIENT)
Dept: NON INVASIVE DIAGNOSTICS | Facility: CLINIC | Age: 84
Discharge: HOME | End: 2024-09-10
Payer: COMMERCIAL

## 2024-09-10 VITALS
WEIGHT: 216.93 LBS | HEIGHT: 67 IN | DIASTOLIC BLOOD PRESSURE: 58 MMHG | SYSTOLIC BLOOD PRESSURE: 118 MMHG | BODY MASS INDEX: 34.05 KG/M2 | HEART RATE: 68 BPM

## 2024-09-10 DIAGNOSIS — I27.24 CTEPH (CHRONIC THROMBOEMBOLIC PULMONARY HYPERTENSION) (HCC): ICD-10-CM

## 2024-09-10 LAB
AORTIC ROOT: 3.2 CM
AORTIC VALVE MEAN VELOCITY: 24.9 M/S
APICAL FOUR CHAMBER EJECTION FRACTION: 55 %
ASCENDING AORTA: 3.2 CM
AV AREA BY CONTINUOUS VTI: 2.3 CM2
AV AREA PEAK VELOCITY: 1.7 CM2
AV LVOT MEAN GRADIENT: 11 MMHG
AV LVOT PEAK GRADIENT: 16 MMHG
AV MEAN GRADIENT: 28 MMHG
AV PEAK GRADIENT: 54 MMHG
AV VALVE AREA: 2.29 CM2
AV VELOCITY RATIO: 0.56
BSA FOR ECHO PROCEDURE: 2.09 M2
DOP CALC AO PEAK VEL: 3.65 M/S
DOP CALC AO VTI: 71.02 CM
DOP CALC LVOT AREA: 3.14 CM2
DOP CALC LVOT CARDIAC OUTPUT: 12.9 L/MIN
DOP CALC LVOT DIAMETER: 2 CM
DOP CALC LVOT PEAK VEL VTI: 51.72 CM
DOP CALC LVOT PEAK VEL: 2.03 M/S
DOP CALC LVOT STROKE INDEX: 78 ML/M2
DOP CALC LVOT STROKE VOLUME: 163
E WAVE DECELERATION TIME: 220 MS
E/A RATIO: 0.65
FRACTIONAL SHORTENING: 35 (ref 28–44)
INTERVENTRICULAR SEPTUM IN DIASTOLE (PARASTERNAL SHORT AXIS VIEW): 1.3 CM
INTERVENTRICULAR SEPTUM: 1.3 CM (ref 0.6–1.1)
LAAS-AP2: 20.8 CM2
LAAS-AP4: 19.6 CM2
LEFT ATRIUM SIZE: 3.3 CM
LEFT ATRIUM VOLUME (MOD BIPLANE): 54 ML
LEFT ATRIUM VOLUME INDEX (MOD BIPLANE): 25.8 ML/M2
LEFT INTERNAL DIMENSION IN SYSTOLE: 2.6 CM (ref 2.1–4)
LEFT VENTRICULAR INTERNAL DIMENSION IN DIASTOLE: 4 CM (ref 3.5–6)
LEFT VENTRICULAR POSTERIOR WALL IN END DIASTOLE: 1.3 CM
LEFT VENTRICULAR STROKE VOLUME: 44 ML
LVSV (TEICH): 44 ML
MV E'TISSUE VEL-LAT: 8 CM/S
MV E'TISSUE VEL-SEP: 6 CM/S
MV PEAK A VEL: 1.27 M/S
MV PEAK E VEL: 82 CM/S
MV STENOSIS PRESSURE HALF TIME: 64 MS
MV VALVE AREA P 1/2 METHOD: 3.4
RA PRESSURE ESTIMATED: 3 MMHG
RIGHT ATRIUM AREA SYSTOLE A4C: 17.3 CM2
RIGHT VENTRICLE ID DIMENSION: 2.9 CM
RV PSP: 28 MMHG
SL CV ECHO LV DYNAMIC OBSTRUCTION PEAK GRADIENT (REST): 58 MMHG
SL CV LEFT ATRIUM LENGTH A2C: 6.3 CM
SL CV LV EF: 70
SL CV PED ECHO LEFT VENTRICLE DIASTOLIC VOLUME (MOD BIPLANE) 2D: 68 ML
SL CV PED ECHO LEFT VENTRICLE SYSTOLIC VOLUME (MOD BIPLANE) 2D: 24 ML
TR MAX PG: 25 MMHG
TR PEAK VELOCITY: 2.5 M/S
TRICUSPID ANNULAR PLANE SYSTOLIC EXCURSION: 2.4 CM
TRICUSPID VALVE PEAK REGURGITATION VELOCITY: 2.51 M/S

## 2024-09-10 PROCEDURE — 93306 TTE W/DOPPLER COMPLETE: CPT

## 2024-09-10 PROCEDURE — 93306 TTE W/DOPPLER COMPLETE: CPT | Performed by: STUDENT IN AN ORGANIZED HEALTH CARE EDUCATION/TRAINING PROGRAM

## 2024-09-17 NOTE — PROGRESS NOTES
Outpatient Cardiology Note - Obdulio Mccauley 84 y.o. male MRN: [id number]    @ Encounter: [id number]        Patient Active Problem List    Diagnosis Date Noted    ELODIA (obstructive sleep apnea) 01/03/2024    Obesity, morbid ([facility]) 11/30/2023    Other pulmonary embolism without acute cor pulmonale ([facility]) 09/26/2023    Essential thrombocytosis ([facility]) 09/26/2023    Basal cell carcinoma of skin of other parts of face 03/11/2022    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[facility]) 03/11/2022    Urge incontinence 03/11/2022    Nocturia 03/11/2022    Bladder neck contracture 03/14/2019    Prostate cancer ([facility]) 12/22/2017    Cerebrovascular accident (CVA) ([facility]) 01/06/2015    CTEPH (chronic thromboembolic pulmonary hypertension) ([facility]) 02/27/2024    Obstructive sleep apnea 02/08/2024       Assessment:  # 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  Impression: Acute PE Feb 2023- VQ c/w CTEPH  PAH Rx: Adempas 2 mg TID  Side effects: GERD, taking tums- will start omeprazole  Diuretic: lasix 40 mg daily  Weight: 226 lbs--> 216 lbs--> 198 lbs  BNP: 5/23/24: 74    Studies- personally reviewed by me  EKG- SR 87 bpm, RBBB, LAFB    Echo 9/10/24:  LVEF: > 70%, LVH  Outflow obstruction at rest with peak gradient of 58 mmHg  RV: dilated, normal function  PASP: 28 mmHg    VQ Scan 12/20/23: bilateral perfusion defects suspicious for pulmonary emboli, likely chronic    Echo 12/8/23  LVEF: 60%  LVEDd: 3.9 cm  RV: dilated  PASP: 38 mmHg  RVOT:   Moderate TR    Echo 2/14/23: LVHN  LVEF: 65%  RV: moderately dilated  PASP: 74 mmHg  RVOT:   Moderate TR    CTA 2/13/23:Large filling defects are noted within the main pulmonary arteries bilaterally   extending into lobar arteries of all 5 lobes of the lungs. This is compatible   with extensive bilateral pulmonary emboli     # Bilateral PE Feb 2023  CTA as above  AC: Eliquis  # Acute DVT  LE duplex 2/2023 with DVT  # essential thrombocytosis  Positive JAK2 mutation  Hydroxyurea and asa  # dyslipidemia  5/23/24: LDL  133, HDL 36  # right osteoarthritis knee  Considering TKR  Consider IVC filter  # basal cell skin cancer    Today's Plan:  Start omeprazole for GERD from Adempas  Lifelong AC given essential thrombocytosis/ myeloproliferative disorder  continue on Adempas- breathing has significantly improved  Continue adempas 2.5 mg TID  BNP for risk eval  Continue lasix to 40 mg daily  Cut back on sodium intake    Moving to [place] in senior home    HPI:      83 yo male with acute bilateral PE Feb 2023 referred for evaluation of CTEPH. Echo done on PE admit showed indirect PA pressures around 75 mmHg. Has been on AC for 9 months. He had presented with chest pressure.     He has been maintained on Eliquis. Follow up echo 12/8 PASP: 38 mmHg down from PASP: 74 mmHg. He is back to his baseline breathing. He does get some LOVE with stairs but had this before his PE. No prior hx of pulmonary embolism. No smoking. No recent orthopedic injuries.   Pt had a VQ scan 12/2023 with bilateral perfusion defects suspicious for pulmonary emboli, likely chronic    Interim:  Went up on Adempas to 2.5 mg TID  Increased sertraline to 75 mg daily    Echo 9/10/24:  LVEF: > 70%, LVH  Outflow obstruction at rest with peak gradient of 58 mmHg  RV: dilated, normal function  PASP: 28 mmHg    Weight is down 18 lbs    Feeling better, still with some LOVE    Past Medical History:   Diagnosis Date    Cancer (HCC)     prostate    History of pulmonary embolus (PE)     Hypercholesterolemia     Hypertension     Personal history of DVT (deep vein thrombosis)     Pneumonia     Stroke (HCC)        Review of Systems   Constitutional:  Negative for activity change, appetite change, fatigue and unexpected weight change (down 10 lbs).   HENT:  Negative for congestion and nosebleeds.    Eyes: Negative.    Respiratory:  Positive for shortness of breath. Negative for cough and chest tightness.    Cardiovascular:  Positive for leg swelling. Negative for chest pain and  palpitations.   Gastrointestinal:  Negative for abdominal distention.   Endocrine: Negative.    Genitourinary: Negative.    Musculoskeletal: Negative.    Skin: Negative.    Neurological:  Negative for dizziness, syncope and weakness.   Hematological: Negative.    Psychiatric/Behavioral: Negative.         Allergies   Allergen Reactions    Misc. Sulfonamide Containing Compounds Rash    Penicillins Rash    Sulfa Antibiotics Rash     .    Current Outpatient Medications:     Adempas 2.5 MG TABS, , Disp: , Rfl:     ALPRAZolam (XANAX) 0.25 mg tablet, , Disp: , Rfl:     aspirin 81 MG tablet, Take by mouth, Disp: , Rfl:     cyanocobalamin (VITAMIN B-12) 500 MCG tablet, Take 1 tablet by mouth daily, Disp: , Rfl:     Eliquis 5 MG, TAKE 1 TABLET BY MOUTH TWICE A DAY, Disp: 60 tablet, Rfl: 5    furosemide (LASIX) 40 mg tablet, Take 1 tablet (40 mg total) by mouth daily, Disp: 30 tablet, Rfl: 4    hydroxyurea (HYDREA) 500 mg capsule, Take 1 capsule (500 mg total) by mouth 2 (two) times a day Take 500 mg in AM and 1000 mg in PM (Patient taking differently: Take 500 mg by mouth 2 (two) times a day Take 500 mg in AM and 500mg in PM), Disp: 180 capsule, Rfl: 1    lisinopril (ZESTRIL) 10 mg tablet, Take 10 mg by mouth, Disp: , Rfl:     meclizine (ANTIVERT) 25 mg tablet, Take 1 tablet (25 mg total) by mouth 3 (three) times a day as needed for dizziness, Disp: 30 tablet, Rfl: 0    Multiple Vitamin tablet, Take 1 tablet by mouth daily, Disp: , Rfl:     sertraline (ZOLOFT) 50 mg tablet, TAKE 1 TABLET BY MOUTH EVERY DAY, Disp: 90 tablet, Rfl: 2    acetaminophen (TYLENOL) 500 mg tablet, Take 500 mg by mouth every 4 (four) hours, Disp: , Rfl:     Social History     Socioeconomic History    Marital status: /Civil Union     Spouse name: Not on file    Number of children: Not on file    Years of education: Not on file    Highest education level: Not on file   Occupational History    Not on file   Tobacco Use    Smoking status: Never     "Smokeless tobacco: Never   Vaping Use    Vaping status: Never Used   Substance and Sexual Activity    Alcohol use: No    Drug use: No    Sexual activity: Not on file   Other Topics Concern    Not on file   Social History Narrative    ** Merged History Encounter **          Social Determinants of Health     Financial Resource Strain: Not on file   Food Insecurity: Not on file   Transportation Needs: Not on file   Physical Activity: Not on file   Stress: Not on file   Social Connections: Not on file   Intimate Partner Violence: Not on file   Housing Stability: Not on file       No family history on file.    Physical Exam:    Vitals: Blood pressure 110/70, pulse 89, height 5' 7\" (1.702 m), weight 89.8 kg (198 lb), SpO2 97%., Body mass index is 31.01 kg/m².,   Wt Readings from Last 3 Encounters:   09/18/24 89.8 kg (198 lb)   09/10/24 98.4 kg (216 lb 14.9 oz)   05/20/24 98.4 kg (217 lb)       Physical Exam:  Vitals:    09/18/24 1558   BP: 110/70   BP Location: Left arm   Patient Position: Sitting   Cuff Size: Standard   Pulse: 89   SpO2: 97%   Weight: 89.8 kg (198 lb)   Height: 5' 7\" (1.702 m)       Physical Exam  Constitutional:       Appearance: He is well-developed.   HENT:      Head: Normocephalic and atraumatic.   Eyes:      Pupils: Pupils are equal, round, and reactive to light.   Neck:      Vascular: No JVD.   Cardiovascular:      Rate and Rhythm: Normal rate and regular rhythm.      Heart sounds: No murmur heard.  Pulmonary:      Effort: Pulmonary effort is normal. No respiratory distress.      Breath sounds: Normal breath sounds.   Abdominal:      General: There is no distension.      Palpations: Abdomen is soft.      Tenderness: There is no abdominal tenderness.   Musculoskeletal:         General: Normal range of motion.      Cervical back: Normal range of motion.   Skin:     General: Skin is warm and dry.      Findings: No rash.   Neurological:      Mental Status: He is alert and oriented to person, place, and " time.         Labs & Results:    Lab Results   Component Value Date    WBC 7.84 04/02/2024    HGB 14.0 04/02/2024    HCT 39.7 04/02/2024     (H) 04/02/2024     04/02/2024     Lab Results   Component Value Date    SODIUM 142 04/02/2024    K 4.1 04/02/2024     04/02/2024    CO2 32 04/02/2024    BUN 23 04/02/2024    CREATININE 0.97 04/02/2024    GLUC 107 04/02/2024    CALCIUM 9.5 04/02/2024     Lab Results   Component Value Date    BNP 74 05/23/2024      Lab Results   Component Value Date    CHOLESTEROL 202 (H) 05/23/2024    CHOLESTEROL 213 (H) 08/09/2022    CHOLESTEROL 226 (H) 02/07/2020     Lab Results   Component Value Date    HDL 36 (L) 05/23/2024    HDL 44 08/09/2022    HDL 48 02/07/2020     Lab Results   Component Value Date    TRIG 163 (H) 05/23/2024    TRIG 135 08/09/2022    TRIG 179 (H) 02/07/2020     Lab Results   Component Value Date    NONHDLC 166 05/23/2024    NONHDLC 169 08/09/2022    NONHDLC 178 02/07/2020       EKG personally reviewed by Johnathon Damon DO.     Counseling / Coordination of Care  Time spent today 25 minutes.  Greater than 50% of total time was spent with the patient and / or family counseling and / or coordination of care. We discussed diagnoses, most recent studies and any changes in treatment  Thank you for the opportunity to participate in the care of this patient.    JOHNATHON DAMON D.O.  DIRECTOR OF HEART FAILURE/ PULMONARY HYPERTENSION  MEDICAL DIRECTOR OF LVAD PROGRAM  [facility]

## 2024-09-18 ENCOUNTER — OFFICE VISIT (OUTPATIENT)
Dept: CARDIOLOGY CLINIC | Facility: CLINIC | Age: 84
End: 2024-09-18
Payer: COMMERCIAL

## 2024-09-18 VITALS
DIASTOLIC BLOOD PRESSURE: 70 MMHG | OXYGEN SATURATION: 97 % | HEART RATE: 89 BPM | HEIGHT: 67 IN | BODY MASS INDEX: 31.08 KG/M2 | WEIGHT: 198 LBS | SYSTOLIC BLOOD PRESSURE: 110 MMHG

## 2024-09-18 DIAGNOSIS — G47.33 OSA (OBSTRUCTIVE SLEEP APNEA): Primary | ICD-10-CM

## 2024-09-18 DIAGNOSIS — I27.24 CTEPH (CHRONIC THROMBOEMBOLIC PULMONARY HYPERTENSION) (HCC): ICD-10-CM

## 2024-09-18 DIAGNOSIS — I50.32 CHRONIC HEART FAILURE WITH PRESERVED EJECTION FRACTION (HFPEF) (HCC): ICD-10-CM

## 2024-09-18 DIAGNOSIS — K21.9 GERD WITHOUT ESOPHAGITIS: ICD-10-CM

## 2024-09-18 PROCEDURE — 99214 OFFICE O/P EST MOD 30 MIN: CPT | Performed by: INTERNAL MEDICINE

## 2024-09-18 RX ORDER — RIOCIGUAT 2.5 MG/1
TABLET, FILM COATED ORAL
COMMUNITY
Start: 2024-09-09

## 2024-10-16 DIAGNOSIS — K21.9 GERD WITHOUT ESOPHAGITIS: ICD-10-CM

## 2024-11-11 ENCOUNTER — TELEPHONE (OUTPATIENT)
Age: 84
End: 2024-11-11

## 2024-11-11 NOTE — TELEPHONE ENCOUNTER
Patient's son Kishor Jensen called ( I do see his name on a communication list).  He called to let you know that patient is currently hospitalized at [facility] in [place] .  A blockage was found and cardiology there wants to do a cardiac cath.    Son wants to discuss with you.    [phone]

## 2024-11-12 ENCOUNTER — TELEPHONE (OUTPATIENT)
Dept: GASTROENTEROLOGY | Facility: CLINIC | Age: 84
End: 2024-11-12

## 2024-11-12 NOTE — TELEPHONE ENCOUNTER
Patient seen in [facility].  Hospitalized with atypical chest pain.  CT angiogram negative for PE but showed thickened esophagus.  Cardiac catheterization negative for coronary artery disease.  Having significant reflux symptoms.  Please reach out to patient and set up an EGD off of Eliquis.  Endocenter is fine

## 2024-11-14 ENCOUNTER — PREP FOR PROCEDURE (OUTPATIENT)
Dept: GASTROENTEROLOGY | Facility: CLINIC | Age: 84
End: 2024-11-14

## 2024-11-14 DIAGNOSIS — K21.9 GASTROESOPHAGEAL REFLUX DISEASE, UNSPECIFIED WHETHER ESOPHAGITIS PRESENT: Primary | ICD-10-CM

## 2024-11-14 NOTE — TELEPHONE ENCOUNTER
Called patient to schedule EGD off Eliquis. Unable to leave message on cell number due to mailbox full and home number is not in service.

## 2024-11-18 ENCOUNTER — TELEPHONE (OUTPATIENT)
Age: 84
End: 2024-11-18

## 2024-11-18 NOTE — LETTER
James Vernon,    Attached are your prep instructions for your upcoming procedure on 12/11/24. If you have any questions, please give us a call at [phone].    Thank you,     [facility]

## 2024-11-20 ENCOUNTER — OFFICE VISIT (OUTPATIENT)
Dept: CARDIOLOGY CLINIC | Facility: CLINIC | Age: 84
End: 2024-11-20
Payer: COMMERCIAL

## 2024-11-20 VITALS
DIASTOLIC BLOOD PRESSURE: 72 MMHG | WEIGHT: 199 LBS | HEART RATE: 72 BPM | OXYGEN SATURATION: 97 % | HEIGHT: 67 IN | BODY MASS INDEX: 31.23 KG/M2 | SYSTOLIC BLOOD PRESSURE: 142 MMHG

## 2024-11-20 DIAGNOSIS — I27.21 PAH (PULMONARY ARTERY HYPERTENSION) (HCC): ICD-10-CM

## 2024-11-20 DIAGNOSIS — R07.9 CHEST PAIN DUE TO GERD: ICD-10-CM

## 2024-11-20 DIAGNOSIS — G47.33 OSA (OBSTRUCTIVE SLEEP APNEA): ICD-10-CM

## 2024-11-20 DIAGNOSIS — K21.9 CHEST PAIN DUE TO GERD: ICD-10-CM

## 2024-11-20 DIAGNOSIS — I27.24 CTEPH (CHRONIC THROMBOEMBOLIC PULMONARY HYPERTENSION) (HCC): Primary | ICD-10-CM

## 2024-11-20 PROCEDURE — 99214 OFFICE O/P EST MOD 30 MIN: CPT | Performed by: INTERNAL MEDICINE

## 2024-11-20 RX ORDER — FAMOTIDINE 40 MG/1
40 TABLET, FILM COATED ORAL EVERY 12 HOURS
COMMUNITY
Start: 2024-11-19

## 2024-11-20 RX ORDER — DILTIAZEM HYDROCHLORIDE 120 MG/1
120 CAPSULE, COATED, EXTENDED RELEASE ORAL DAILY
COMMUNITY
Start: 2024-11-12

## 2024-11-20 NOTE — PROGRESS NOTES
Outpatient Cardiology Note - Obdulio Mccauley 84 y.o. male MRN: [id number]    @ Encounter: [id number]        Patient Active Problem List    Diagnosis Date Noted    Obstructive sleep apnea 02/08/2024    ELODIA (obstructive sleep apnea) 01/03/2024    Obesity, morbid ([facility]) 11/30/2023    Other pulmonary embolism without acute cor pulmonale ([facility]) 09/26/2023    Essential thrombocytosis ([facility]) 09/26/2023    Basal cell carcinoma of skin of other parts of face 03/11/2022    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[facility]) 03/11/2022    Urge incontinence 03/11/2022    Nocturia 03/11/2022    Bladder neck contracture 03/14/2019    Prostate cancer ([facility]) 12/22/2017    Cerebrovascular accident (CVA) ([facility]) 01/06/2015    CTEPH (chronic thromboembolic pulmonary hypertension) ([facility]) 02/27/2024       Assessment:  # 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  Impression: Acute PE Feb 2023- VQ c/w CTEPH  PAH Rx: Adempas 2 mg TID  Side effects: GERD, taking tums- will start omeprazole  Diuretic: lasix 40 mg daily  Weight: 226 lbs--> 216 lbs--> 198 lbs--> 199 lbs  BNP: 5/23/24: 72    Studies- personally reviewed by me  EKG- SR 87 bpm, RBBB, LAFB    Echo 9/10/24:  LVEF: > 70%, LVH  Outflow obstruction at rest with peak gradient of 58 mmHg  RV: dilated, normal function  PASP: 28 mmHg    VQ Scan 12/20/23: bilateral perfusion defects suspicious for pulmonary emboli, likely chronic    Echo 12/8/23  LVEF: 60%  LVEDd: 3.9 cm  RV: dilated  PASP: 38 mmHg  RVOT:   Moderate TR    Echo 2/14/23: LVHN  LVEF: 65%  RV: moderately dilated  PASP: 74 mmHg  RVOT:   Moderate TR    CTA 2/13/23:Large filling defects are noted within the main pulmonary arteries bilaterally   extending into lobar arteries of all 5 lobes of the lungs. This is compatible   with extensive bilateral pulmonary emboli     # Chest pain admit Nov 2024- [facility]  [facility] 11/11/24: no obstructive lesions, could not exclude bridging  # Bilateral PE Feb 2023  CTA as above  AC: Eliquis  # Acute DVT  LE duplex  2/2023 with DVT  # essential thrombocytosis  Positive JAK2 mutation  Hydroxyurea and asa  # dyslipidemia  5/23/24: , HDL 36  # right osteoarthritis knee  Considering TKR  Consider IVC filter  # basal cell skin cancer    Today's Plan:  Given trop 75 and  on chest pain admit and LHC negative, more likely due to HF or GERD from Adempas  omeprazole for GERD from Adempas  Lifelong AC given essential thrombocytosis/ myeloproliferative disorder  continue on Adempas- breathing has significantly improved  Continue adempas 2.5 mg TID  BNP for risk eval  Continue lasix to 40 mg daily  Cut back on sodium intake    Moving to [place] in [facility]    HPI:      85 yo male with acute bilateral PE Feb 2023 referred for evaluation of CTEPH. Echo done on PE admit showed indirect PA pressures around 75 mmHg. Has been on AC for 9 months. He had presented with chest pressure.     He has been maintained on Eliquis. Follow up echo 12/8 PASP: 38 mmHg down from PASP: 74 mmHg. He is back to his baseline breathing. He does get some LOVE with stairs but had this before his PE. No prior hx of pulmonary embolism. No smoking. No recent orthopedic injuries.   Pt had a VQ scan 12/2023 with bilateral perfusion defects suspicious for pulmonary emboli, likely chronic    Interim:  Admitted to [facility] with chest pain  Trop 75,   LHC 11/11/24: no obstructive lesions, could not exclude bridging  Possible esophagitis, EGD  Was told he should not take adempas which is wrong information  Past Medical History:   Diagnosis Date    Cancer (HCC)     prostate    History of pulmonary embolus (PE)     Hypercholesterolemia     Hypertension     Personal history of DVT (deep vein thrombosis)     Pneumonia     Stroke (HCC)        Review of Systems   Constitutional:  Negative for activity change, appetite change, fatigue and unexpected weight change (down 10 lbs).   HENT:  Negative for congestion and nosebleeds.    Eyes: Negative.     Respiratory:  Positive for shortness of breath. Negative for cough and chest tightness.    Cardiovascular:  Positive for leg swelling. Negative for chest pain and palpitations.   Gastrointestinal:  Negative for abdominal distention.   Endocrine: Negative.    Genitourinary: Negative.    Musculoskeletal: Negative.    Skin: Negative.    Neurological:  Negative for dizziness, syncope and weakness.   Hematological: Negative.    Psychiatric/Behavioral: Negative.         Allergies   Allergen Reactions    Misc. Sulfonamide Containing Compounds Rash    Penicillins Rash    Sulfa Antibiotics Rash     .    Current Outpatient Medications:     acetaminophen (TYLENOL) 500 mg tablet, Take 500 mg by mouth every 4 (four) hours, Disp: , Rfl:     Adempas 2.5 MG TABS, , Disp: , Rfl:     ALPRAZolam (XANAX) 0.25 mg tablet, , Disp: , Rfl:     aspirin 81 MG tablet, Take by mouth, Disp: , Rfl:     cyanocobalamin (VITAMIN B-12) 500 MCG tablet, Take 1 tablet by mouth daily, Disp: , Rfl:     diltiazem (CARDIZEM CD) 120 mg 24 hr capsule, Take 120 mg by mouth daily, Disp: , Rfl:     Eliquis 5 MG, TAKE 1 TABLET BY MOUTH TWICE A DAY, Disp: 60 tablet, Rfl: 5    famotidine (PEPCID) 40 MG tablet, Take 40 mg by mouth every 12 (twelve) hours, Disp: , Rfl:     furosemide (LASIX) 40 mg tablet, Take 1 tablet (40 mg total) by mouth daily, Disp: 30 tablet, Rfl: 4    lisinopril (ZESTRIL) 10 mg tablet, Take 10 mg by mouth, Disp: , Rfl:     meclizine (ANTIVERT) 25 mg tablet, Take 1 tablet (25 mg total) by mouth 3 (three) times a day as needed for dizziness, Disp: 30 tablet, Rfl: 0    Multiple Vitamin tablet, Take 1 tablet by mouth daily, Disp: , Rfl:     omeprazole (PriLOSEC) 20 mg delayed release capsule, TAKE 1 CAPSULE BY MOUTH EVERY DAY, Disp: 90 capsule, Rfl: 1    sertraline (ZOLOFT) 50 mg tablet, TAKE 1 TABLET BY MOUTH EVERY DAY, Disp: 90 tablet, Rfl: 2    hydroxyurea (HYDREA) 500 mg capsule, Take 1 capsule (500 mg total) by mouth 2 (two) times a day  "Take 500 mg in AM and 1000 mg in PM (Patient taking differently: Take 500 mg by mouth 2 (two) times a day Take 500 mg in AM and 500mg in PM), Disp: 180 capsule, Rfl: 1    Social History     Socioeconomic History    Marital status: /Civil Union     Spouse name: Not on file    Number of children: Not on file    Years of education: Not on file    Highest education level: Not on file   Occupational History    Not on file   Tobacco Use    Smoking status: Never    Smokeless tobacco: Never   Vaping Use    Vaping status: Never Used   Substance and Sexual Activity    Alcohol use: No    Drug use: No    Sexual activity: Not on file   Other Topics Concern    Not on file   Social History Narrative    ** Merged History Encounter **          Social Drivers of Health     Financial Resource Strain: Not on file   Food Insecurity: Not on file   Transportation Needs: Not on file   Physical Activity: Not on file   Stress: Not on file   Social Connections: Not on file   Intimate Partner Violence: Not on file   Housing Stability: Not on file       No family history on file.    Physical Exam:    Vitals: Blood pressure 142/72, pulse 72, height 5' 7\" (1.702 m), weight 90.3 kg (199 lb), SpO2 97%., Body mass index is 31.17 kg/m².,   Wt Readings from Last 3 Encounters:   11/20/24 90.3 kg (199 lb)   09/18/24 89.8 kg (198 lb)   09/10/24 98.4 kg (216 lb 14.9 oz)       Physical Exam:  Vitals:    11/20/24 1333   BP: 142/72   BP Location: Left arm   Patient Position: Sitting   Cuff Size: Standard   Pulse: 72   SpO2: 97%   Weight: 90.3 kg (199 lb)   Height: 5' 7\" (1.702 m)         Physical Exam  Constitutional:       Appearance: He is well-developed.   HENT:      Head: Normocephalic and atraumatic.   Eyes:      Pupils: Pupils are equal, round, and reactive to light.   Neck:      Vascular: No JVD.   Cardiovascular:      Rate and Rhythm: Normal rate and regular rhythm.      Heart sounds: No murmur heard.  Pulmonary:      Effort: Pulmonary effort " is normal. No respiratory distress.      Breath sounds: Normal breath sounds.   Abdominal:      General: There is no distension.      Palpations: Abdomen is soft.      Tenderness: There is no abdominal tenderness.   Musculoskeletal:         General: Normal range of motion.      Cervical back: Normal range of motion.   Skin:     General: Skin is warm and dry.      Findings: No rash.   Neurological:      Mental Status: He is alert and oriented to person, place, and time.         Labs & Results:    Lab Results   Component Value Date    WBC 7.84 04/02/2024    HGB 14.0 04/02/2024    HCT 39.7 04/02/2024     (H) 04/02/2024     04/02/2024     Lab Results   Component Value Date    SODIUM 142 04/02/2024    K 4.1 04/02/2024     04/02/2024    CO2 32 04/02/2024    BUN 23 04/02/2024    CREATININE 0.97 04/02/2024    GLUC 107 04/02/2024    CALCIUM 9.5 04/02/2024     Lab Results   Component Value Date    BNP 74 05/23/2024      Lab Results   Component Value Date    CHOLESTEROL 202 (H) 05/23/2024    CHOLESTEROL 213 (H) 08/09/2022    CHOLESTEROL 226 (H) 02/07/2020     Lab Results   Component Value Date    HDL 36 (L) 05/23/2024    HDL 44 08/09/2022    HDL 48 02/07/2020     Lab Results   Component Value Date    TRIG 163 (H) 05/23/2024    TRIG 135 08/09/2022    TRIG 179 (H) 02/07/2020     Lab Results   Component Value Date    NONHDLC 166 05/23/2024    NONHDLC 169 08/09/2022    NONHDLC 178 02/07/2020       EKG personally reviewed by Johnathon Damon DO.     Counseling / Coordination of Care  Time spent today 25 minutes.  Greater than 50% of total time was spent with the patient and / or family counseling and / or coordination of care. We discussed diagnoses, most recent studies and any changes in treatment  Thank you for the opportunity to participate in the care of this patient.    JOHNATHON DAMON D.O.  DIRECTOR OF HEART FAILURE/ PULMONARY HYPERTENSION  MEDICAL DIRECTOR OF LVAD PROGRAM  [facility]

## 2024-11-25 ENCOUNTER — TELEPHONE (OUTPATIENT)
Dept: GASTROENTEROLOGY | Facility: CLINIC | Age: 84
End: 2024-11-25

## 2024-11-25 NOTE — TELEPHONE ENCOUNTER
VOICE MAIL IS FULL. OTHER NUMBER IS NOT IN SERVICE.    Left message on son's number asking that he call back to confirm receipt of confirmation and Eliquis hold.

## 2024-11-27 ENCOUNTER — ANESTHESIA (OUTPATIENT)
Dept: ANESTHESIOLOGY | Facility: AMBULATORY SURGERY CENTER | Age: 84
End: 2024-11-27

## 2024-11-27 ENCOUNTER — ANESTHESIA EVENT (OUTPATIENT)
Dept: ANESTHESIOLOGY | Facility: AMBULATORY SURGERY CENTER | Age: 84
End: 2024-11-27

## 2024-11-29 ENCOUNTER — APPOINTMENT (EMERGENCY)
Dept: RADIOLOGY | Facility: HOSPITAL | Age: 84
End: 2024-11-29
Payer: COMMERCIAL

## 2024-11-29 ENCOUNTER — HOSPITAL ENCOUNTER (EMERGENCY)
Facility: HOSPITAL | Age: 84
Discharge: HOME | End: 2024-11-29
Attending: EMERGENCY MEDICINE
Payer: COMMERCIAL

## 2024-11-29 ENCOUNTER — APPOINTMENT (EMERGENCY)
Dept: CT IMAGING | Facility: HOSPITAL | Age: 84
End: 2024-11-29
Payer: COMMERCIAL

## 2024-11-29 VITALS
HEIGHT: 67 IN | RESPIRATION RATE: 19 BRPM | DIASTOLIC BLOOD PRESSURE: 62 MMHG | TEMPERATURE: 98.7 F | HEART RATE: 82 BPM | BODY MASS INDEX: 31.08 KG/M2 | OXYGEN SATURATION: 96 % | WEIGHT: 198 LBS | SYSTOLIC BLOOD PRESSURE: 129 MMHG

## 2024-11-29 DIAGNOSIS — K56.7 ILEUS (HCC): Primary | ICD-10-CM

## 2024-11-29 DIAGNOSIS — R11.2 NAUSEA AND VOMITING: ICD-10-CM

## 2024-11-29 LAB
2HR DELTA HS TROPONIN: -1 NG/L
ALBUMIN SERPL BCG-MCNC: 3.9 G/DL (ref 3.5–5)
ALP SERPL-CCNC: 66 U/L (ref 34–104)
ALT SERPL W P-5'-P-CCNC: 10 U/L (ref 7–52)
ANION GAP SERPL CALCULATED.3IONS-SCNC: 5 MMOL/L (ref 4–13)
AST SERPL W P-5'-P-CCNC: 16 U/L (ref 13–39)
BACTERIA UR QL AUTO: ABNORMAL /HPF
BASOPHILS # BLD AUTO: 0.06 THOUSANDS/ΜL (ref 0–0.1)
BASOPHILS NFR BLD AUTO: 1 % (ref 0–1)
BILIRUB SERPL-MCNC: 1.06 MG/DL (ref 0.2–1)
BILIRUB UR QL STRIP: NEGATIVE
BNP SERPL-MCNC: 147 PG/ML (ref 0–100)
BUN SERPL-MCNC: 21 MG/DL (ref 5–25)
CALCIUM SERPL-MCNC: 9 MG/DL (ref 8.4–10.2)
CARDIAC TROPONIN I PNL SERPL HS: 27 NG/L (ref ?–50)
CARDIAC TROPONIN I PNL SERPL HS: 28 NG/L (ref ?–50)
CHLORIDE SERPL-SCNC: 106 MMOL/L (ref 96–108)
CLARITY UR: CLEAR
CO2 SERPL-SCNC: 30 MMOL/L (ref 21–32)
COLOR UR: YELLOW
CREAT SERPL-MCNC: 1.02 MG/DL (ref 0.6–1.3)
EOSINOPHIL # BLD AUTO: 0.07 THOUSAND/ΜL (ref 0–0.61)
EOSINOPHIL NFR BLD AUTO: 1 % (ref 0–6)
ERYTHROCYTE [DISTWIDTH] IN BLOOD BY AUTOMATED COUNT: 14.8 % (ref 11.6–15.1)
FLUAV AG UPPER RESP QL IA.RAPID: NEGATIVE
FLUBV AG UPPER RESP QL IA.RAPID: NEGATIVE
GFR SERPL CREATININE-BSD FRML MDRD: 66 ML/MIN/1.73SQ M
GLUCOSE SERPL-MCNC: 120 MG/DL (ref 65–140)
GLUCOSE UR STRIP-MCNC: NEGATIVE MG/DL
HCT VFR BLD AUTO: 44.9 % (ref 36.5–49.3)
HGB BLD-MCNC: 14.4 G/DL (ref 12–17)
HGB UR QL STRIP.AUTO: ABNORMAL
HYALINE CASTS #/AREA URNS LPF: ABNORMAL /LPF
IMM GRANULOCYTES # BLD AUTO: 0.08 THOUSAND/UL (ref 0–0.2)
IMM GRANULOCYTES NFR BLD AUTO: 1 % (ref 0–2)
KETONES UR STRIP-MCNC: NEGATIVE MG/DL
LEUKOCYTE ESTERASE UR QL STRIP: NEGATIVE
LIPASE SERPL-CCNC: 10 U/L (ref 11–82)
LYMPHOCYTES # BLD AUTO: 0.67 THOUSANDS/ΜL (ref 0.6–4.47)
LYMPHOCYTES NFR BLD AUTO: 5 % (ref 14–44)
MCH RBC QN AUTO: 28.2 PG (ref 26.8–34.3)
MCHC RBC AUTO-ENTMCNC: 32.1 G/DL (ref 31.4–37.4)
MCV RBC AUTO: 88 FL (ref 82–98)
MONOCYTES # BLD AUTO: 0.7 THOUSAND/ΜL (ref 0.17–1.22)
MONOCYTES NFR BLD AUTO: 5 % (ref 4–12)
MUCOUS THREADS UR QL AUTO: ABNORMAL
NEUTROPHILS # BLD AUTO: 11.48 THOUSANDS/ΜL (ref 1.85–7.62)
NEUTS SEG NFR BLD AUTO: 87 % (ref 43–75)
NITRITE UR QL STRIP: NEGATIVE
NON-SQ EPI CELLS URNS QL MICRO: ABNORMAL /HPF
NRBC BLD AUTO-RTO: 0 /100 WBCS
PH UR STRIP.AUTO: 5 [PH]
PLATELET # BLD AUTO: 487 THOUSANDS/UL (ref 149–390)
PMV BLD AUTO: 9.8 FL (ref 8.9–12.7)
POTASSIUM SERPL-SCNC: 4.5 MMOL/L (ref 3.5–5.3)
PROT SERPL-MCNC: 6.3 G/DL (ref 6.4–8.4)
PROT UR STRIP-MCNC: ABNORMAL MG/DL
RBC # BLD AUTO: 5.11 MILLION/UL (ref 3.88–5.62)
RBC #/AREA URNS AUTO: ABNORMAL /HPF
SARS-COV+SARS-COV-2 AG RESP QL IA.RAPID: NEGATIVE
SODIUM SERPL-SCNC: 141 MMOL/L (ref 135–147)
SP GR UR STRIP.AUTO: 1.02 (ref 1–1.03)
UROBILINOGEN UR STRIP-ACNC: <2 MG/DL
WBC # BLD AUTO: 13.06 THOUSAND/UL (ref 4.31–10.16)
WBC #/AREA URNS AUTO: ABNORMAL /HPF

## 2024-11-29 PROCEDURE — 74176 CT ABD & PELVIS W/O CONTRAST: CPT

## 2024-11-29 PROCEDURE — 96372 THER/PROPH/DIAG INJ SC/IM: CPT

## 2024-11-29 PROCEDURE — 96375 TX/PRO/DX INJ NEW DRUG ADDON: CPT

## 2024-11-29 PROCEDURE — 87804 INFLUENZA ASSAY W/OPTIC: CPT | Performed by: EMERGENCY MEDICINE

## 2024-11-29 PROCEDURE — 85025 COMPLETE CBC W/AUTO DIFF WBC: CPT | Performed by: EMERGENCY MEDICINE

## 2024-11-29 PROCEDURE — 84484 ASSAY OF TROPONIN QUANT: CPT | Performed by: EMERGENCY MEDICINE

## 2024-11-29 PROCEDURE — 80053 COMPREHEN METABOLIC PANEL: CPT | Performed by: EMERGENCY MEDICINE

## 2024-11-29 PROCEDURE — 96361 HYDRATE IV INFUSION ADD-ON: CPT

## 2024-11-29 PROCEDURE — 71045 X-RAY EXAM CHEST 1 VIEW: CPT

## 2024-11-29 PROCEDURE — 87811 SARS-COV-2 COVID19 W/OPTIC: CPT | Performed by: EMERGENCY MEDICINE

## 2024-11-29 PROCEDURE — 99285 EMERGENCY DEPT VISIT HI MDM: CPT

## 2024-11-29 PROCEDURE — 83690 ASSAY OF LIPASE: CPT | Performed by: EMERGENCY MEDICINE

## 2024-11-29 PROCEDURE — 93005 ELECTROCARDIOGRAM TRACING: CPT

## 2024-11-29 PROCEDURE — 81001 URINALYSIS AUTO W/SCOPE: CPT | Performed by: EMERGENCY MEDICINE

## 2024-11-29 PROCEDURE — 36415 COLL VENOUS BLD VENIPUNCTURE: CPT | Performed by: EMERGENCY MEDICINE

## 2024-11-29 PROCEDURE — 96374 THER/PROPH/DIAG INJ IV PUSH: CPT

## 2024-11-29 PROCEDURE — 83880 ASSAY OF NATRIURETIC PEPTIDE: CPT | Performed by: EMERGENCY MEDICINE

## 2024-11-29 PROCEDURE — 99285 EMERGENCY DEPT VISIT HI MDM: CPT | Performed by: EMERGENCY MEDICINE

## 2024-11-29 RX ORDER — PANTOPRAZOLE SODIUM 40 MG/10ML
40 INJECTION, POWDER, LYOPHILIZED, FOR SOLUTION INTRAVENOUS ONCE
Status: COMPLETED | OUTPATIENT
Start: 2024-11-29 | End: 2024-11-29

## 2024-11-29 RX ORDER — ONDANSETRON 2 MG/ML
4 INJECTION INTRAMUSCULAR; INTRAVENOUS ONCE
Status: COMPLETED | OUTPATIENT
Start: 2024-11-29 | End: 2024-11-29

## 2024-11-29 RX ORDER — ONDANSETRON 4 MG/1
4 TABLET, FILM COATED ORAL EVERY 8 HOURS PRN
Qty: 15 TABLET | Refills: 0 | Status: SHIPPED | OUTPATIENT
Start: 2024-11-29

## 2024-11-29 RX ADMIN — SODIUM CHLORIDE 1000 ML: 0.9 INJECTION, SOLUTION INTRAVENOUS at 16:18

## 2024-11-29 RX ADMIN — ONDANSETRON 4 MG: 2 INJECTION INTRAMUSCULAR; INTRAVENOUS at 19:25

## 2024-11-29 RX ADMIN — ONDANSETRON 4 MG: 2 INJECTION INTRAMUSCULAR; INTRAVENOUS at 16:18

## 2024-11-29 RX ADMIN — PANTOPRAZOLE SODIUM 40 MG: 40 INJECTION, POWDER, FOR SOLUTION INTRAVENOUS at 16:18

## 2024-11-29 NOTE — ED PROVIDER NOTES
Time reflects when diagnosis was documented in both MDM as applicable and the Disposition within this note       Time User Action Codes Description Comment    11/29/2024  5:53 PM Sky Almanza Add [K56.7] Ileus (HCC)     11/29/2024  5:53 PM Sky Almanza [R11.2] Nausea and vomiting           ED Disposition       ED Disposition   Discharge    Condition   Stable    Date/Time   Fri Nov 29, 2024  7:07 PM    Comment   Obdulio Mccauley discharge to home/self care.                   Assessment & Plan       Medical Decision Making  Epigastric pain differential includes cholecystitis versus gastritis pancreatitis workup in progress including lab work and CAT scan    Amount and/or Complexity of Data Reviewed  Labs: ordered.  Radiology: ordered.    Risk  Prescription drug management.        ED Course as of 11/29/24 1944   Fri Nov 29, 2024   1734 Patient feeling better will attempt to try ginger ale he does have an appointment with gastroenterology from [facility] for esophageal dilation he was instructed to call them tomorrow to arrange closer follow-up consider possible EGD.  Return to the ER if he has gross hematemesis black or bloody stools and to continue his current antacid   1756 Patient does not have any shortness of breath or cough chest x-ray finding is most likely atelectasis   1904 Delta troponin is -1 okay for discharge outpatient follow-up   1905 Patient feeling better tolerating liquids       Medications   sodium chloride 0.9 % bolus 1,000 mL (0 mL Intravenous Stopped 11/29/24 1718)   ondansetron (ZOFRAN) injection 4 mg (4 mg Intravenous Given 11/29/24 1618)   pantoprazole (PROTONIX) injection 40 mg (40 mg Intravenous Given 11/29/24 1618)   ondansetron (ZOFRAN) injection 4 mg (4 mg Intramuscular Given 11/29/24 1925)       ED Risk Strat Scores                           SBIRT 20yo+      Flowsheet Row Most Recent Value   Initial Alcohol Screen: US AUDIT-C     1. How often do you have a drink containing  alcohol? 0 Filed at: 11/29/2024 1900   2. How many drinks containing alcohol do you have on a typical day you are drinking?  0 Filed at: 11/29/2024 1900   3b. FEMALE Any Age, or MALE 65+: How often do you have 4 or more drinks on one occassion? 0 Filed at: 11/29/2024 1900   Audit-C Score 0 Filed at: 11/29/2024 1900   MARISSA: How many times in the past year have you...    Used an illegal drug or used a prescription medication for non-medical reasons? Never Filed at: 11/29/2024 1900                            History of Present Illness       Chief Complaint   Patient presents with    Vomiting     Pt was seen yesterday at [facility] for the same issue and had a full work up. Pt states that he started yesterday with vomiting. Pt states that the vomit was dark. Pt states that he is having generalized ab pain. Pt denies diarrhea, fevers or chills        Past Medical History:   Diagnosis Date    Cancer (HCC)     prostate    History of pulmonary embolus (PE)     Hypercholesterolemia     Hypertension     Personal history of DVT (deep vein thrombosis)     Pneumonia     Stroke (HCC)       Past Surgical History:   Procedure Laterality Date    KNEE SURGERY Left     PROSTATECTOMY      TOTAL SHOULDER REPLACEMENT        History reviewed. No pertinent family history.   Social History     Tobacco Use    Smoking status: Never    Smokeless tobacco: Never   Vaping Use    Vaping status: Never Used   Substance Use Topics    Alcohol use: No    Drug use: No      E-Cigarette/Vaping    E-Cigarette Use Never User       E-Cigarette/Vaping Substances    Nicotine No     THC No     CBD No     Flavoring No     Other No     Unknown No       I have reviewed and agree with the history as documented.     84-year-old male presents with epigastric bloating and pain that started yesterday he did have some hard cider.  Had brown vomitus denies any melena or hematochezia he is currently on Eliquis for a history of DVT and PE denies any chest pain or shortness  of breath.  Patient was seen at [facility] for same complaints yesterday evaluated and discharged.      History provided by:  Patient and spouse  Medical Problem  Location:  Epigastric  Quality:  Bloating and pain  Severity:  Moderate  Onset quality:  Gradual  Duration:  1 day  Timing:  Constant  Progression:  Waxing and waning  Chronicity:  New  Context:  Epigastric pain with nausea vomiting  Associated symptoms: abdominal pain, nausea and vomiting    Associated symptoms: no chest pain and no shortness of breath        Review of Systems   Respiratory:  Negative for shortness of breath.    Cardiovascular:  Negative for chest pain.   Gastrointestinal:  Positive for abdominal pain, nausea and vomiting.   All other systems reviewed and are negative.          Objective       ED Triage Vitals [11/29/24 1527]   Temperature Pulse Blood Pressure Respirations SpO2 Patient Position - Orthostatic VS   98.7 °F (37.1 °C) 96 143/80 18 97 % Sitting      Temp Source Heart Rate Source BP Location FiO2 (%) Pain Score    Temporal Monitor Right arm -- --      Vitals      Date and Time Temp Pulse SpO2 Resp BP Pain Score FACES Pain Rating User   11/29/24 1830 -- 82 96 % 19 129/62 -- --    11/29/24 1700 -- 79 96 % 19 133/60 -- --    11/29/24 1527 98.7 °F (37.1 °C) 96 97 % 18 143/80 -- -- LD            Physical Exam  Vitals and nursing note reviewed.   Constitutional:       General: He is not in acute distress.     Appearance: He is not ill-appearing, toxic-appearing or diaphoretic.   HENT:      Head: Normocephalic and atraumatic.      Right Ear: Tympanic membrane, ear canal and external ear normal.      Left Ear: Tympanic membrane, ear canal and external ear normal.      Mouth/Throat:      Mouth: Mucous membranes are dry.   Eyes:      General:         Right eye: No discharge.         Left eye: No discharge.      Extraocular Movements: Extraocular movements intact.      Pupils: Pupils are equal, round, and reactive to light.    Cardiovascular:      Rate and Rhythm: Normal rate and regular rhythm.      Pulses: Normal pulses.      Heart sounds: No murmur heard.     No friction rub. No gallop.   Pulmonary:      Effort: No respiratory distress.      Breath sounds: No stridor. No wheezing, rhonchi or rales.   Abdominal:      General: There is no distension.      Palpations: Abdomen is soft.      Tenderness: There is abdominal tenderness. There is no guarding or rebound.      Comments: Mild epigastric tenderness   Musculoskeletal:      Cervical back: Normal range of motion and neck supple.      Right lower leg: Edema present.      Left lower leg: Edema present.   Skin:     General: Skin is warm and dry.      Coloration: Skin is not jaundiced.      Findings: No bruising.   Neurological:      General: No focal deficit present.      Mental Status: He is alert and oriented to person, place, and time.      Cranial Nerves: No cranial nerve deficit.      Sensory: No sensory deficit.      Coordination: Coordination normal.   Psychiatric:         Mood and Affect: Mood normal.         Behavior: Behavior normal.         Thought Content: Thought content normal.         Results Reviewed       Procedure Component Value Units Date/Time    Urine Microscopic [[id number]]  (Abnormal) Collected: 11/29/24 1827    Lab Status: Final result Specimen: Urine, Clean Catch Updated: 11/29/24 1933     RBC, UA 1-2 /hpf      WBC, UA 4-10 /hpf      Epithelial Cells Occasional /hpf      Bacteria, UA Innumerable /hpf      MUCUS THREADS Moderate     Hyaline Casts, UA 2-4 /lpf     UA w Reflex to Microscopic w Reflex to Culture [[id number]]  (Abnormal) Collected: 11/29/24 1827    Lab Status: Final result Specimen: Urine, Clean Catch Updated: 11/29/24 1919     Color, UA Yellow     Clarity, UA Clear     Specific Gravity, UA 1.025     pH, UA 5.0     Leukocytes, UA Negative     Nitrite, UA Negative     Protein, UA 30 (1+) mg/dl      Glucose, UA Negative mg/dl      Ketones, UA Negative  mg/dl      Urobilinogen, UA <2.0 mg/dl      Bilirubin, UA Negative     Occult Blood, UA Small    HS Troponin I 2hr [[id number]]  (Normal) Collected: 11/29/24 1829    Lab Status: Final result Specimen: Blood from Arm, Right Updated: 11/29/24 1857     hs TnI 2hr 27 ng/L      Delta 2hr hsTnI -1 ng/L     HS Troponin I 4hr [[id number]]     Lab Status: No result Specimen: Blood     B-Type Natriuretic Peptide(BNP) [[id number]]  (Abnormal) Collected: 11/29/24 1624    Lab Status: Final result Specimen: Blood from Arm, Right Updated: 11/29/24 1658      pg/mL     HS Troponin 0hr (reflex protocol) [[id number]]  (Normal) Collected: 11/29/24 1624    Lab Status: Final result Specimen: Blood from Arm, Right Updated: 11/29/24 1653     hs TnI 0hr 28 ng/L     FLU/COVID Rapid Antigen (30 min. TAT) - Preferred screening test in ED [[id number]]  (Normal) Collected: 11/29/24 1624    Lab Status: Final result Specimen: Nares from Nose Updated: 11/29/24 1649     SARS COV Rapid Antigen Negative     Influenza A Rapid Antigen Negative     Influenza B Rapid Antigen Negative    Narrative:      This test has been performed using the [employer] Janel 2 FLU+SARS Antigen test under the Emergency Use Authorization (EUA). This test has been validated by the  and verified by the performing laboratory. The Janel uses lateral flow immunofluorescent sandwich assay to detect SARS-COV, Influenza A and Influenza B Antigen.     The [employer] Janel 2 SARS Antigen test does not differentiate between SARS-CoV and SARS-CoV-2.     Negative results are presumptive and may be confirmed with a molecular assay, if necessary, for patient management. Negative results do not rule out SARS-CoV-2 or influenza infection and should not be used as the sole basis for treatment or patient management decisions. A negative test result may occur if the level of antigen in a sample is below the limit of detection of this test.     Positive results are indicative of the  presence of viral antigens, but do not rule out bacterial infection or co-infection with other viruses.     All test results should be used as an adjunct to clinical observations and other information available to the provider.    FOR PEDIATRIC PATIENTS - copy/paste COVID Guidelines URL to browser: [email or web address]    Comprehensive metabolic panel [[id number]]  (Abnormal) Collected: 11/29/24 1624    Lab Status: Final result Specimen: Blood from Arm, Right Updated: 11/29/24 1646     Sodium 141 mmol/L      Potassium 4.5 mmol/L      Chloride 106 mmol/L      CO2 30 mmol/L      ANION GAP 5 mmol/L      BUN 21 mg/dL      Creatinine 1.02 mg/dL      Glucose 120 mg/dL      Calcium 9.0 mg/dL      AST 16 U/L      ALT 10 U/L      Alkaline Phosphatase 66 U/L      Total Protein 6.3 g/dL      Albumin 3.9 g/dL      Total Bilirubin 1.06 mg/dL      eGFR 66 ml/min/1.73sq m     Narrative:      National Kidney Disease Foundation guidelines for Chronic Kidney Disease (CKD):     Stage 1 with normal or high GFR (GFR > 90 mL/min/1.73 square meters)    Stage 2 Mild CKD (GFR = 60-89 mL/min/1.73 square meters)    Stage 3A Moderate CKD (GFR = 45-59 mL/min/1.73 square meters)    Stage 3B Moderate CKD (GFR = 30-44 mL/min/1.73 square meters)    Stage 4 Severe CKD (GFR = 15-29 mL/min/1.73 square meters)    Stage 5 End Stage CKD (GFR <15 mL/min/1.73 square meters)  Note: GFR calculation is accurate only with a steady state creatinine    Lipase [[id number]]  (Abnormal) Collected: 11/29/24 1624    Lab Status: Final result Specimen: Blood from Arm, Right Updated: 11/29/24 1646     Lipase 10 u/L     CBC and differential [[id number]]  (Abnormal) Collected: 11/29/24 1624    Lab Status: Final result Specimen: Blood from Arm, Right Updated: 11/29/24 1631     WBC 13.06 Thousand/uL      RBC 5.11 Million/uL      Hemoglobin 14.4 g/dL      Hematocrit 44.9 %      MCV 88 fL      MCH 28.2 pg      MCHC 32.1 g/dL       RDW 14.8 %      MPV 9.8 fL      Platelets 487 Thousands/uL      nRBC 0 /100 WBCs      Segmented % 87 %      Immature Grans % 1 %      Lymphocytes % 5 %      Monocytes % 5 %      Eosinophils Relative 1 %      Basophils Relative 1 %      Absolute Neutrophils 11.48 Thousands/µL      Absolute Immature Grans 0.08 Thousand/uL      Absolute Lymphocytes 0.67 Thousands/µL      Absolute Monocytes 0.70 Thousand/µL      Eosinophils Absolute 0.07 Thousand/µL      Basophils Absolute 0.06 Thousands/µL             CT abdomen pelvis wo contrast   Final Interpretation by Jared Echeverria MD (11/29 1657)      Diffuse mild small bowel dilation with findings suggesting an ileus. No evidence of obstruction.      The study was marked in EPIC for immediate notification.      Workstation performed: [id number]         XR chest 1 view portable   Final Interpretation by Michael Benjamin MD (11/29 1619)      Left basilar opacity in keeping with atelectasis or pneumonia.            Workstation performed: [id number]             ECG 12 Lead Documentation Only    Date/Time: 11/29/2024 3:44 PM    Performed by: Sky Almanza DO  Authorized by: Sky Almanza DO    ECG reviewed by me, the ED Provider: yes    Patient location:  ED  Rate:     ECG rate:  91  Rhythm:     Rhythm: sinus rhythm    Conduction:     Conduction: abnormal      Abnormal conduction: complete RBBB    ST segments:     ST segments:  Non-specific      ED Medication and Procedure Management   Prior to Admission Medications   Prescriptions Last Dose Informant Patient Reported? Taking?   ALPRAZolam (XANAX) 0.25 mg tablet  Self Yes No   Adempas 2.5 MG TABS   Yes No   Eliquis 5 MG   No No   Sig: TAKE 1 TABLET BY MOUTH TWICE A DAY   Multiple Vitamin tablet  Self Yes No   Sig: Take 1 tablet by mouth daily   acetaminophen (TYLENOL) 500 mg tablet  Self Yes No   Sig: Take 500 mg by mouth every 4 (four) hours   aspirin 81 MG tablet  Self Yes No   Sig: Take by mouth    cyanocobalamin (VITAMIN B-12) 500 MCG tablet  Self Yes No   Sig: Take 1 tablet by mouth daily   diltiazem (CARDIZEM CD) 120 mg 24 hr capsule   Yes No   Sig: Take 120 mg by mouth daily   famotidine (PEPCID) 40 MG tablet   Yes No   Sig: Take 40 mg by mouth every 12 (twelve) hours   furosemide (LASIX) 40 mg tablet   No No   Sig: Take 1 tablet (40 mg total) by mouth daily   hydroxyurea (HYDREA) 500 mg capsule   No No   Sig: Take 1 capsule (500 mg total) by mouth 2 (two) times a day Take 500 mg in AM and 1000 mg in PM   Patient taking differently: Take 500 mg by mouth 2 (two) times a day Take 500 mg in AM and 500mg in PM   lisinopril (ZESTRIL) 10 mg tablet  Self Yes No   Sig: Take 10 mg by mouth   meclizine (ANTIVERT) 25 mg tablet  Self No No   Sig: Take 1 tablet (25 mg total) by mouth 3 (three) times a day as needed for dizziness   omeprazole (PriLOSEC) 20 mg delayed release capsule   No No   Sig: TAKE 1 CAPSULE BY MOUTH EVERY DAY   sertraline (ZOLOFT) 50 mg tablet   No No   Sig: TAKE 1 TABLET BY MOUTH EVERY DAY      Facility-Administered Medications: None     Discharge Medication List as of 11/29/2024  7:12 PM        START taking these medications    Details   ondansetron (ZOFRAN) 4 mg tablet Take 1 tablet (4 mg total) by mouth every 8 (eight) hours as needed for nausea or vomiting, Starting Fri 11/29/2024, Normal           CONTINUE these medications which have NOT CHANGED    Details   acetaminophen (TYLENOL) 500 mg tablet Take 500 mg by mouth every 4 (four) hours, Starting Fri 11/17/2017, Historical Med      Adempas 2.5 MG TABS Historical Med      ALPRAZolam (XANAX) 0.25 mg tablet Starting Sun 10/6/2019, Historical Med      aspirin 81 MG tablet Take by mouth, Historical Med      cyanocobalamin (VITAMIN B-12) 500 MCG tablet Take 1 tablet by mouth daily, Starting Tue 12/21/2021, Historical Med      diltiazem (CARDIZEM CD) 120 mg 24 hr capsule Take 120 mg by mouth daily, Starting Tue 11/12/2024, Historical Med       Eliquis 5 MG TAKE 1 TABLET BY MOUTH TWICE A DAY, Starting Fri 6/7/2024, Normal      famotidine (PEPCID) 40 MG tablet Take 40 mg by mouth every 12 (twelve) hours, Starting Tue 11/19/2024, Historical Med      furosemide (LASIX) 40 mg tablet Take 1 tablet (40 mg total) by mouth daily, Starting Tue 12/12/2023, Normal      hydroxyurea (HYDREA) 500 mg capsule Take 1 capsule (500 mg total) by mouth 2 (two) times a day Take 500 mg in AM and 1000 mg in PM, Starting Wed 4/3/2024, Normal      lisinopril (ZESTRIL) 10 mg tablet Take 10 mg by mouth, Historical Med      meclizine (ANTIVERT) 25 mg tablet Take 1 tablet (25 mg total) by mouth 3 (three) times a day as needed for dizziness, Starting Sat 5/6/2023, Normal      Multiple Vitamin tablet Take 1 tablet by mouth daily, Historical Med      omeprazole (PriLOSEC) 20 mg delayed release capsule TAKE 1 CAPSULE BY MOUTH EVERY DAY, Starting Thu 10/17/2024, Normal      sertraline (ZOLOFT) 50 mg tablet TAKE 1 TABLET BY MOUTH EVERY DAY, Starting Mon 4/22/2024, Normal           No discharge procedures on file.  ED SEPSIS DOCUMENTATION   Time reflects when diagnosis was documented in both MDM as applicable and the Disposition within this note       Time User Action Codes Description Comment    11/29/2024  5:53 PM Sky Almanza [K56.7] Ileus (HCC)     11/29/2024  5:53 PM Sky Almanaz [R11.2] Nausea and vomiting                  Sky Almanza DO  11/29/24 1944

## 2024-11-29 NOTE — DISCHARGE INSTRUCTIONS
Return to the ER with any vomiting of blood or black tarry stools follow-up with your gastroenterologist call them tomorrow to arrange close follow-up

## 2024-11-30 LAB
ATRIAL RATE: 91 BPM
P AXIS: 69 DEGREES
PR INTERVAL: 162 MS
QRS AXIS: 103 DEGREES
QRSD INTERVAL: 150 MS
QT INTERVAL: 398 MS
QTC INTERVAL: 489 MS
T WAVE AXIS: -17 DEGREES
VENTRICULAR RATE: 91 BPM

## 2024-11-30 PROCEDURE — 93010 ELECTROCARDIOGRAM REPORT: CPT | Performed by: INTERNAL MEDICINE

## 2024-12-05 ENCOUNTER — TELEPHONE (OUTPATIENT)
Age: 84
End: 2024-12-05

## 2024-12-09 ENCOUNTER — TELEPHONE (OUTPATIENT)
Dept: GASTROENTEROLOGY | Facility: AMBULATORY SURGERY CENTER | Age: 84
End: 2024-12-09

## 2024-12-09 NOTE — PROGRESS NOTES
Attempt to reach regarding appointment 12/11 with arrival time of 0930. Voicemail full, sent my chart message.

## 2024-12-11 ENCOUNTER — TELEPHONE (OUTPATIENT)
Age: 84
End: 2024-12-11

## 2024-12-11 NOTE — TELEPHONE ENCOUNTER
Pt called to r/s his 12/11/24 EGD to 1/2/25 due to a family emergency. I spoke to Candida at [facility] for an FYI

## 2024-12-13 ENCOUNTER — TELEPHONE (OUTPATIENT)
Dept: CARDIOLOGY CLINIC | Facility: CLINIC | Age: 84
End: 2024-12-13

## 2024-12-13 DIAGNOSIS — I27.21 PAH (PULMONARY ARTERY HYPERTENSION) (HCC): Primary | ICD-10-CM

## 2024-12-16 RX ORDER — RIOCIGUAT 2.5 MG/1
2.5 TABLET, FILM COATED ORAL 3 TIMES DAILY
Qty: 90 TABLET | Refills: 12 | Status: SHIPPED | OUTPATIENT
Start: 2024-12-16

## 2024-12-16 NOTE — TELEPHONE ENCOUNTER
PA for Adempas 2.5 mg SUBMITTED to [employer] [facility]    via    [x]CMM-KEY: [id number]  []Surescripts-Case ID #   []Availity-Auth ID # NDC #   []Faxed to plan   []Other website   []Phone call Case ID #     []PA sent as URGENT    All office notes, labs and other pertaining documents and studies sent. Clinical questions answered. Awaiting determination from insurance company.     Turnaround time for your insurance to make a decision on your Prior Authorization can take 7-21 business days.

## 2024-12-17 NOTE — TELEPHONE ENCOUNTER
PA for Adempas 2.5 mg SUBMITTED to [facility] [facility]    via    []CMM-KEY:   []Surescripts-Case ID #   []Availity-Auth ID # NDC #   [x]Faxed to plan [phone]  []Other website   []Phone call Case ID #     [x]PA sent as URGENT    All office notes, labs and other pertaining documents and studies sent. Clinical questions answered. Awaiting determination from insurance company.     Turnaround time for your insurance to make a decision on your Prior Authorization can take 7-21 business days.

## 2024-12-17 NOTE — TELEPHONE ENCOUNTER
Returned call due to Prior auth being submitted at 7:24 today, additional information form received in right fax at 10:12 today with a deadline to reply of 12/18/24 by 12:39 am and a phone call to deny prior auth at 10:42 am

## 2024-12-17 NOTE — TELEPHONE ENCOUNTER
PA for Adempas 2.5 mg APPROVED     Date(s) approved 1/1/25-12/31/25    Case #[id number]    Patient advised by          [x]CBIT A/Shart Message  []Phone call   []LMOM  []L/M to call office as no active Communication consent on file  [x]Unable to leave detailed message as VM not approved on Communication consent       Pharmacy advised by    [x]Fax  []Phone call    Approval letter scanned into Media No

## 2024-12-17 NOTE — TELEPHONE ENCOUNTER
Received call from [employer] regarding prior auth.  They have not received additional information requested so they are denying medication, and an appeal will be needed.  Call back # [phone]

## 2025-02-25 ENCOUNTER — TELEPHONE (OUTPATIENT)
Dept: CARDIOLOGY CLINIC | Facility: CLINIC | Age: 85
End: 2025-02-25

## 2025-02-25 NOTE — TELEPHONE ENCOUNTER
Attempted to call pt and his spouse. Was not able to get through to either phone #.  Hilaria sent email about his adempas and is he still on it.

## 2025-04-22 ENCOUNTER — TELEPHONE (OUTPATIENT)
Dept: NEUROSURGERY | Facility: CLINIC | Age: 85
End: 2025-04-22

## 2025-04-22 ENCOUNTER — TELEPHONE (OUTPATIENT)
Age: 85
End: 2025-04-22

## 2025-04-22 NOTE — TELEPHONE ENCOUNTER
04/22/2025 RECEIVED MEDICAL RECORDS FROM [facility] FAMILY HISTORY, LAB REPORTS, RADIOLOGY REPORTS PROGRESS NOTES MEDICAL RECORDS SENT TO [facility]

## 2025-04-22 NOTE — TELEPHONE ENCOUNTER
Patients daughter called in to let us know that patients records are uploaded to   [email or web address]    And images are being sent VIA mail to our office so when he comes back to [place] he can make appt.

## 2025-04-23 NOTE — TELEPHONE ENCOUNTER
4/23/25 AFTER REVIEWING RECORDS FROM [place], [place]; PT NEEDS TO F/U WITH NEUROLOGY. ALL THE NUMBERS IN THE CHART DO NOT WORK. CALLED PT SON WILFRED, ON CONSENT, AND LMOM FOR HIM TO CB TO UPDATE ALL CONTACT INFO FOR PT AND ADVISE PT NEEDS TO F/U WITH NEUROLOGY NOT NEUROSX.

## 2025-05-04 NOTE — PROGRESS NOTES
Advanced Heart Failure / Pulmonary Hypertension Outpatient Visit    Obdulio Mccauley 84 y.o. male   MRN: [id number]  Encounter: [id number]    Assessment:  Patient Active Problem List    Diagnosis Date Noted    CTEPH (chronic thromboembolic pulmonary hypertension) ([facility]) 02/27/2024    Obstructive sleep apnea 02/08/2024    ELODIA (obstructive sleep apnea) 01/03/2024    Obesity, morbid ([facility]) 11/30/2023    Other pulmonary embolism without acute cor pulmonale ([facility]) 09/26/2023    Essential thrombocytosis ([facility]) 09/26/2023    Basal cell carcinoma of skin of other parts of face 03/11/2022    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[facility]) 03/11/2022    Urge incontinence 03/11/2022    Nocturia 03/11/2022    Bladder neck contracture 03/14/2019    Prostate cancer ([facility]) 12/22/2017    Cerebrovascular accident (CVA) ([facility]) 01/06/2015       Today's Plan:  Will restart Adempas at 1 mg TID.   Lasix 40 QD--euvolemic, weights down.    Continue Eliquis 5 BID  RTO with Dr. Lees in next 3 months    Plan:  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  Impression: Acute PE Feb 2023- VQ c/w CTEPH  PAH Rx: Adempas 2 mg TID-->Restart at Adempas 1 mg TID  Side effects: GERD, taking tums- will start omeprazole  Diuretic: lasix 40 mg daily  Weight: 226 lbs--> 216 lbs--> 198 lbs--> 199 lbs--192 lbs today  BNP: 5/23/24: 74     Cardiac imaging  EKG- SR 87 bpm, RBBB, LAFB     Echo 9/10/24:  LVEF: > 70%, LVH  Outflow obstruction at rest with peak gradient of 58 mmHg  RV: dilated, normal function  PASP: 28 mmHg     VQ Scan 12/20/23: bilateral perfusion defects suspicious for pulmonary emboli, likely chronic     Echo 12/8/23  LVEF: 60%  LVEDd: 3.9 cm  RV: dilated  PASP: 38 mmHg  RVOT:   Moderate TR     Echo 2/14/23: LVHN  LVEF: 65%  RV: moderately dilated  PASP: 74 mmHg  RVOT:   Moderate TR     CTA 2/13/23:Large filling defects are noted within the main pulmonary arteries bilaterally   extending into lobar arteries of all 5 lobes of the lungs. This is compatible   with  extensive bilateral pulmonary emboli     CVA--   Diagnosed while traveling to [place] this April   Embolic infarct with visual disturbances   Rx: Eliquis 5 BID--restarted at d/c    Chest pain admit Nov 2024- [facility] 11/11/24: no obstructive lesions, could not exclude bridging    Bilateral PE Feb 2023  CTA as above  AC: Eliquis    Acute DVT  LE duplex 2/2023 with DVT    essential thrombocytosis  Positive JAK2 mutation  Hydroxyurea and asa    dyslipidemia  5/23/24: , HDL 36  # right osteoarthritis knee  Considering TKR  Consider IVC filter  # basal cell skin cancer     HPI:      83 yo male with acute bilateral PE Feb 2023 referred for evaluation of CTEPH. Echo done on PE admit showed indirect PA pressures around 75 mmHg. Has been on AC for 9 months. He had presented with chest pressure.     He has been maintained on Eliquis. Follow up echo 12/8 PASP: 38 mmHg down from PASP: 74 mmHg. He is back to his baseline breathing. He does get some LOVE with stairs but had this before his PE. No prior hx of pulmonary embolism. No smoking. No recent orthopedic injuries.   Pt had a VQ scan 12/2023 with bilateral perfusion defects suspicious for pulmonary emboli, likely chronic     Interim:  Admitted to [place] with chest pain  Trop 75,   [facility] 11/11/24: no obstructive lesions, could not exclude bridging  Possible esophagitis, EGD  Was told he should not take adempas which is wrong information    4/9/2025: [facility] admit; Obdulio Mccauley presented with symptoms of a stroke, with imaging revealing an embolic infarct. Due to thrombus in the left internal carotid artery, he was started on a heparin drip per neurology's recommendation for 7 days, with a plan to transition back to his home medication, Eliquis. He experienced chronic pulmonary embolism and pulmonary hypertension, managed without mechanical intervention. Dr. Abdulaziz Vasquez, pulmonary provider, entered a note on the chart about holding cristina Cuenca  for CTEPH given can worsen hypotension recently after stroke. I have made referral to the pulmonary hypertension clinic locally. He lives in [place]. I educated him and his family that he can go to clinic appointments either here if he is still in the area or established with providers in the same specialty in [place]. A permissive hypertension strategy was applied, holding home antihypertensive medications. He showed improvement but required ongoing monitoring for potential neurological changes without significant acute interventions. Hematology consulted regarding JAK2 mutation and he was managed with hydroxyurea. His condition stabilized, and he was cleared for discharge with recommendations for outpatient follow-up in hematology and neurology.     5/6/25: F/U after hospitalization in [place] for +CVA.  Pt reports his vision is still off and nystagmus remains.  Feeling well otherwise.  Has chronic SOB that is at baseline.  Weights down at home--188 lbs today.  Has a good appetite, drinking well during the day.  Discussed restarting Adempas at lower dose.      Past Medical History:   Diagnosis Date    Cancer (HCC)     prostate    History of pulmonary embolus (PE)     Hypercholesterolemia     Hypertension     Personal history of DVT (deep vein thrombosis)     Pneumonia     Stroke (HCC)        Review of Systems   Constitutional:  Negative for activity change, appetite change and unexpected weight change.   Eyes:  Positive for visual disturbance.        Reports Nystagmus since CVA while reading   Respiratory:  Positive for shortness of breath.         Chronic SOB--at [facility]   Cardiovascular:  Positive for palpitations. Negative for chest pain and leg swelling.        Occasional with SOB   Gastrointestinal: Negative.    Endocrine: Negative.    Genitourinary: Negative.    Musculoskeletal: Negative.    Skin: Negative.    Allergic/Immunologic: Negative.    Neurological: Negative.    Hematological: Negative.     Psychiatric/Behavioral: Negative.         Allergies   Allergen Reactions    Misc. Sulfonamide Containing Compounds Rash    Penicillins Rash    Sulfa Antibiotics Rash         Current Outpatient Medications:     acetaminophen (TYLENOL) 500 mg tablet, Take 500 mg by mouth every 4 (four) hours, Disp: , Rfl:     ALPRAZolam (XANAX) 0.25 mg tablet, , Disp: , Rfl:     aspirin 81 MG tablet, Take by mouth, Disp: , Rfl:     cyanocobalamin (VITAMIN B-12) 500 MCG tablet, Take 1 tablet by mouth daily, Disp: , Rfl:     diltiazem (CARDIZEM CD) 120 mg 24 hr capsule, Take 120 mg by mouth daily, Disp: , Rfl:     Eliquis 5 MG, TAKE 1 TABLET BY MOUTH TWICE A DAY, Disp: 60 tablet, Rfl: 5    famotidine (PEPCID) 40 MG tablet, Take 40 mg by mouth every 12 (twelve) hours, Disp: , Rfl:     furosemide (LASIX) 40 mg tablet, Take 1 tablet (40 mg total) by mouth daily, Disp: 30 tablet, Rfl: 4    hydroxyurea (HYDREA) 500 mg capsule, Take 1 capsule (500 mg total) by mouth 2 (two) times a day Take 500 mg in AM and 1000 mg in PM (Patient taking differently: Take 500 mg by mouth 2 (two) times a day Take 500 mg in AM and 500mg in PM), Disp: 180 capsule, Rfl: 1    lisinopril (ZESTRIL) 10 mg tablet, Take 10 mg by mouth, Disp: , Rfl:     meclizine (ANTIVERT) 25 mg tablet, Take 1 tablet (25 mg total) by mouth 3 (three) times a day as needed for dizziness, Disp: 30 tablet, Rfl: 0    Multiple Vitamin tablet, Take 1 tablet by mouth daily, Disp: , Rfl:     omeprazole (PriLOSEC) 20 mg delayed release capsule, TAKE 1 CAPSULE BY MOUTH EVERY DAY, Disp: 90 capsule, Rfl: 1    ondansetron (ZOFRAN) 4 mg tablet, Take 1 tablet (4 mg total) by mouth every 8 (eight) hours as needed for nausea or vomiting, Disp: 15 tablet, Rfl: 0    Riociguat (Adempas) 1 MG TABS, Take 1 tablet (1 mg total) by mouth 3 (three) times a day, Disp: , Rfl:     sertraline (ZOLOFT) 50 mg tablet, TAKE 1 TABLET BY MOUTH EVERY DAY, Disp: 90 tablet, Rfl: 2    Social History     Socioeconomic History     Marital status: /Civil Union     Spouse name: Not on file    Number of children: Not on file    Years of education: Not on file    Highest education level: Not on file   Occupational History    Not on file   Tobacco Use    Smoking status: Never    Smokeless tobacco: Never   Vaping Use    Vaping status: Never Used   Substance and Sexual Activity    Alcohol use: No    Drug use: No    Sexual activity: Not on file   Other Topics Concern    Not on file   Social History Narrative    ** Merged History Encounter **          Social Drivers of Health     Financial Resource Strain: Not on file   Food Insecurity: Low Risk  (4/9/2025)    Received from [facility]    Food Insecurity     Within the past 12 months, did the food you bought just not last and you didn't have money to get more?: No     Within the past 12 months, did you worry that your food would run out before you got money to buy more?: No   Transportation Needs: Low Risk  (4/9/2025)    Received from [facility]    Transportation Needs     Within the past 12 months, has a lack of transportation kept you from medical appointments or from doing things needed for daily living?: No   Physical Activity: Not on file   Stress: Not on file   Social Connections: Not on file   Intimate Partner Violence: Not on file   Housing Stability: Low Risk  (4/9/2025)    Received from [facility]    Housing Stability     Within the past 12 months, have you ever stayed: outside, in a car, in a tent, in an overnight shelter, or temporarily in someone else's home (i.e. couch-surfing)?: No     Are you worried about losing your housing? : No     No family history on file.    Vitals:   Blood pressure 122/70, pulse 94, weight 87.1 kg (192 lb), SpO2 96%.    Wt Readings from Last 10 Encounters:   05/06/25 87.1 kg (192 lb)   11/29/24 89.8 kg (198 lb)   11/20/24 90.3 kg (199 lb)   09/18/24 89.8 kg (198 lb)   09/10/24 98.4 kg (216 lb 14.9 oz)    05/20/24 98.4 kg (217 lb)   05/08/24 97.5 kg (215 lb)   04/03/24 98.4 kg (217 lb)   03/01/24 99.3 kg (218 lb 14.7 oz)   02/29/24 98 kg (216 lb)     Vitals:    05/06/25 1321   BP: 122/70   BP Location: Left arm   Patient Position: Sitting   Cuff Size: Standard   Pulse: 94   SpO2: 96%   Weight: 87.1 kg (192 lb)       Physical Exam  Constitutional:       Appearance: He is normal weight.   Cardiovascular:      Rate and Rhythm: Normal rate and regular rhythm.      Heart sounds: S1 normal and S2 normal.      Comments: Trace LE NP  Pulmonary:      Effort: No respiratory distress.      Breath sounds: Normal air entry. Decreased breath sounds present.   Musculoskeletal:      Right lower leg: No edema.      Left lower leg: No edema.   Skin:     General: Skin is warm and dry.   Neurological:      Mental Status: He is alert and oriented to person, place, and time. Mental status is at baseline.   Psychiatric:         Behavior: Behavior is cooperative.         Cognition and Memory: Cognition normal.       Labs & Results:  Lab Results   Component Value Date    WBC 13.06 (H) 11/29/2024    HGB 14.4 11/29/2024    HCT 44.9 11/29/2024    MCV 88 11/29/2024     (H) 11/29/2024     Lab Results   Component Value Date    SODIUM 141 11/29/2024    K 4.5 11/29/2024     11/29/2024    CO2 30 11/29/2024    BUN 21 11/29/2024    CREATININE 1.02 11/29/2024    GLUC 120 11/29/2024    CALCIUM 9.0 11/29/2024     Lab Results   Component Value Date    INR 1.0 02/13/2023    INR 1.0 02/15/2019    INR 1.07 01/06/2015    PROTIME 14.1 01/06/2015     Lab Results   Component Value Date     (H) 11/29/2024        LUZ MARIA Rea

## 2025-05-06 ENCOUNTER — OFFICE VISIT (OUTPATIENT)
Dept: CARDIOLOGY CLINIC | Facility: CLINIC | Age: 85
End: 2025-05-06
Payer: COMMERCIAL

## 2025-05-06 VITALS
OXYGEN SATURATION: 96 % | WEIGHT: 192 LBS | DIASTOLIC BLOOD PRESSURE: 70 MMHG | BODY MASS INDEX: 30.07 KG/M2 | HEART RATE: 94 BPM | SYSTOLIC BLOOD PRESSURE: 122 MMHG

## 2025-05-06 DIAGNOSIS — I50.32 CHRONIC HEART FAILURE WITH PRESERVED EJECTION FRACTION (HFPEF) (HCC): ICD-10-CM

## 2025-05-06 DIAGNOSIS — I27.21 PAH (PULMONARY ARTERY HYPERTENSION) (HCC): Primary | ICD-10-CM

## 2025-05-06 DIAGNOSIS — I27.21 PAH (PULMONARY ARTERY HYPERTENSION) (HCC): ICD-10-CM

## 2025-05-06 PROCEDURE — 99214 OFFICE O/P EST MOD 30 MIN: CPT

## 2025-05-06 RX ORDER — RIOCIGUAT 1 MG/1
1 TABLET, FILM COATED ORAL 3 TIMES DAILY
Start: 2025-05-06 | End: 2025-05-06 | Stop reason: SDUPTHER

## 2025-05-06 NOTE — PATIENT INSTRUCTIONS
Hold Diltiazem until reviewed--will call you this week for further instruction    Will follow up regarding Adempas 1 mg three times/day    Please weigh yourself every day (after emptying your bladder) and keep a detailed log of weights.     Contact Cardiology at [phone] if you gain 3+ lbs overnight or 5+ lbs in 5-7 days.    Limit daily sodium/salt intake to 2000 mg daily to prevent fluid retention.    Avoid canned foods, fast food/Chinese food, and processed meats (hot dogs, lunch meat, and sausage etc.). Caution with condiments.    Limit fluid intake to 2000 mL or 2 liters (about 60-65 ounces) daily.    Avoid electrolyte replacement drinks (such as Gatorade, Pedialyte, Propel, Liquid IV, etc.).  Bring complete list of medications and log of daily weights to your follow-up appointment.

## 2025-05-07 RX ORDER — RIOCIGUAT 1 MG/1
1 TABLET, FILM COATED ORAL 3 TIMES DAILY
Qty: 90 TABLET | Refills: 3 | Status: SHIPPED | OUTPATIENT
Start: 2025-05-07

## 2025-05-09 ENCOUNTER — TELEPHONE (OUTPATIENT)
Age: 85
End: 2025-05-09

## 2025-05-09 DIAGNOSIS — I63.9 CEREBROVASCULAR ACCIDENT (CVA), UNSPECIFIED MECHANISM (HCC): Primary | ICD-10-CM

## 2025-05-09 NOTE — TELEPHONE ENCOUNTER
Pt's wife Alma Delia called stating pt missed call from office re: appointment.  Advised a Zio monitor was ordered and he is scheduled for visit 5/13/25 at 1pm at [address] [place] [address] for Zio monitor placement. He will will the monitor for 14 days. She had no further questions at this time.

## 2025-05-19 ENCOUNTER — TELEPHONE (OUTPATIENT)
Dept: CARDIOLOGY CLINIC | Facility: CLINIC | Age: 85
End: 2025-05-19

## 2025-05-19 NOTE — TELEPHONE ENCOUNTER
Unable to reach patient to see if he is receiving Adempas.  I reached out to patient's daughter Fabiola and provided the # for [facility] so she can help patient follow-up on this.

## 2025-05-23 NOTE — TELEPHONE ENCOUNTER
Patient returned call stating he tried reaching out to [facility] who told him they are waiting for further information from our team. Patient requesting call back from Liza to discuss at his new phone number [phone]

## 2025-05-23 NOTE — TELEPHONE ENCOUNTER
I will reach out to [facility] on Tuesday, as it was recommended that patient call them as they were unable to reach.

## 2025-05-27 DIAGNOSIS — Z15.89 JAK-2 GENE MUTATION: ICD-10-CM

## 2025-05-28 ENCOUNTER — TELEPHONE (OUTPATIENT)
Dept: NEUROLOGY | Facility: CLINIC | Age: 85
End: 2025-05-28

## 2025-05-28 RX ORDER — HYDROXYUREA 500 MG/1
CAPSULE ORAL
Refills: 1 | OUTPATIENT
Start: 2025-05-28

## 2025-06-02 ENCOUNTER — TELEPHONE (OUTPATIENT)
Dept: CARDIOLOGY CLINIC | Facility: CLINIC | Age: 85
End: 2025-06-02

## 2025-06-02 NOTE — TELEPHONE ENCOUNTER
Was instructed by Adempas that a new enrollment is need with patient's new demographics.      I will contact patient via [employer] (as I see he is active there) and ask him to confirm his address,phone and insurance.

## 2025-06-04 ENCOUNTER — OFFICE VISIT (OUTPATIENT)
Dept: NEUROLOGY | Facility: CLINIC | Age: 85
End: 2025-06-04
Payer: COMMERCIAL

## 2025-06-04 VITALS
TEMPERATURE: 98 F | WEIGHT: 191.2 LBS | DIASTOLIC BLOOD PRESSURE: 66 MMHG | OXYGEN SATURATION: 98 % | HEART RATE: 77 BPM | SYSTOLIC BLOOD PRESSURE: 128 MMHG | HEIGHT: 67 IN | BODY MASS INDEX: 30.01 KG/M2

## 2025-06-04 DIAGNOSIS — G47.33 OBSTRUCTIVE SLEEP APNEA: ICD-10-CM

## 2025-06-04 DIAGNOSIS — I26.99 OTHER PULMONARY EMBOLISM WITHOUT ACUTE COR PULMONALE, UNSPECIFIED CHRONICITY (HCC): ICD-10-CM

## 2025-06-04 DIAGNOSIS — I63.9 CEREBROVASCULAR ACCIDENT (CVA) (HCC): Primary | ICD-10-CM

## 2025-06-04 DIAGNOSIS — D47.3 ESSENTIAL THROMBOCYTOSIS (HCC): ICD-10-CM

## 2025-06-04 PROCEDURE — 99204 OFFICE O/P NEW MOD 45 MIN: CPT | Performed by: PSYCHIATRY & NEUROLOGY

## 2025-06-04 RX ORDER — APIXABAN 5 MG/1
5 TABLET, FILM COATED ORAL 2 TIMES DAILY
Qty: 60 TABLET | Refills: 5 | Status: ON HOLD | OUTPATIENT
Start: 2025-06-04 | End: 2025-06-12

## 2025-06-04 NOTE — ASSESSMENT & PLAN NOTE
Lifelong AC given essential thrombocytosis/ myeloproliferative disorder   Patient follows hematology as well

## 2025-06-04 NOTE — ASSESSMENT & PLAN NOTE
Wears CPAP mask most night he states         06/23/20: Patient was seen today AM, was not able to redirect, so she received PRN IM Thorazine/Benadryl for stability. She has less fidgetiness now, speech seems less slurry or less rapid, still has difficulty to understand her language. Due to her hx she agreed to have a COVID-19 repeat and HIV test. Thorazine increased to Thorazine 100 mg TID and Seroquel increased to Seroquel 100 mg HS. To continue this way for stability, if not may need a mood stabilizer for stability and safety. Even if she is diabetic, she is requesting a Regular diet as she eats a lot and her FSG is well within reasonable limit. She requested if she could leave before July 4th.    Plan: Thorazine 100 mg TID          Seroquel 100 mg HS          Regular Diet.

## 2025-06-04 NOTE — ASSESSMENT & PLAN NOTE
Secondary Prevention -   -for medications - recommend continuation of combination of aspirin, eliquis 5mg PO BID (hx of PE and essential thrombocytosis), unsure why patient not on a statin, would consider starting but will defer to cardiology    -Blood Pressure goal < 130/80, BP is currently at goal   -LDL goal <70, last LDL is 116, check lipid panel   -ELODIA screening -wears CPAP mask most night     Start patient on PT and OT. Will place referral for both    I would like to repeat CTA Head and neck to look at the Left ICA thrombus to see if clot has improved.     Counseling/stroke education -   -I advised patient to avoid using NSAIDs for headaches or other pain and to stick to tylenol if needed  -Recommend lifestyle modifications such as mediterranean diet & regular exercise regimen (brisk walking) atleast 4-5 times a week for 20-30 minutes.   -I educated patient/family regarding medication compliance  -encourage control of hypertension; defer management to primary     Orders:    CTA head and neck w wo contrast; Future    Ambulatory Referral to Physical Therapy; Future    Ambulatory Referral to Occupational Therapy; Future

## 2025-06-04 NOTE — PROGRESS NOTES
Name: Obdulio Mccauley      : 1940      MRN: [id number]  Encounter Provider: Kimber Rodriguez MD  Encounter Date: 2025   Encounter department: [facility] [place]  :  Assessment & Plan  Cerebrovascular accident (CVA) (HCC)    Secondary Prevention -   -for medications - recommend continuation of combination of aspirin, eliquis 5mg PO BID (hx of PE and essential thrombocytosis), unsure why patient not on a statin, would consider starting but will defer to cardiology    -Blood Pressure goal < 130/80, BP is currently at goal   -LDL goal <70, last LDL is 116, check lipid panel   -ELODIA screening -wears CPAP mask most night     Start patient on PT and OT. Will place referral for both    I would like to repeat CTA Head and neck to look at the Left ICA thrombus to see if clot has improved.     Counseling/stroke education -   -I advised patient to avoid using NSAIDs for headaches or other pain and to stick to tylenol if needed  -Recommend lifestyle modifications such as mediterranean diet & regular exercise regimen (brisk walking) atleast 4-5 times a week for 20-30 minutes.   -I educated patient/family regarding medication compliance  -encourage control of hypertension; defer management to primary     Orders:    CTA head and neck w wo contrast; Future    Ambulatory Referral to Physical Therapy; Future    Ambulatory Referral to Occupational Therapy; Future    Obstructive sleep apnea  Wears CPAP mask most night he states        Essential thrombocytosis (HCC)  Lifelong AC given essential thrombocytosis/ myeloproliferative disorder   Patient follows hematology as well        Other pulmonary embolism without acute cor pulmonale, unspecified chronicity (HCC)    Orders:    Eliquis 5 MG; Take 1 tablet (5 mg total) by mouth 2 (two) times a day      Follow up in 6 months     I would be happy to see the patient sooner if any new questions/concerns arise.  Patient/Guardian was advised to the call the  "office if they have any questions and concerns in the meantime.     We discussed \"red flag\" headache symptoms including symptoms such as facial droop on one side, weakness/paralysis on either side, speech trouble, numbness on one side, balance issues, any vision changes, extreme dizziness or significant increase in severity of headache or a sudden onset severe headache, patient is to call 9-1-1 immediately or to proceed to the nearest ER.           History of Present Illness     HPI     84 y.o. right handed male history of DVT/PE on Eliquis, JAK2 mutation on hydroxyurea, pulmonary arterial hypertension, hyperlipidemia here as a new patient to establish care with us.    Patient presented to [facility] in 4/9/25 and I reviewed the records in care everywhere and to summarize, where he had stroke like symptoms (facial droop and aphasia). Patient had acute CVA with R sided weakness and speech changes. MRI brain confirmed multiple embolic strokes in the L hemisphere, and CTA head and neck revealed L ICA thrombus. Patient was deemed not to be a mechanical thrombectomy candidate. He was admitted for workup. He is maintained on aspirin, eliquis for stroke prevention. He follows with hematology/oncology as well.     Patient is overall doing ok he states. He does struggle with words, and sometimes he cannot say what he wants to say. He is retired 2 years ago he states. No weakness noted. He is on eliquis for the stroke, and has been doing well, he is not missing any doses at the moment. No residual deficits.     Review of Systems   Constitutional: Negative.    HENT: Negative.     Eyes: Negative.    Respiratory: Negative.     Cardiovascular: Negative.    Gastrointestinal: Negative.    Endocrine: Negative.    Genitourinary: Negative.    Musculoskeletal: Negative.    Skin: Negative.    Allergic/Immunologic: Negative.    Neurological: Negative.    Hematological: Negative.    Psychiatric/Behavioral: Negative.     All " "other systems reviewed and are negative.   I have personally reviewed the MA's review of systems and made changes as necessary.         Objective   /66 (Patient Position: Sitting, Cuff Size: Standard)   Pulse 77   Temp 98 °F (36.7 °C) (Temporal)   Ht 5' 7\" (1.702 m)   Wt 86.7 kg (191 lb 3.2 oz)   SpO2 98%   BMI 29.95 kg/m²     Physical Exam  General - patient is alert, awake follows commands   Speech - no dysarthria noted, no aphasia noted.     Neuro:   Cranial nerves: PERRL, EOMI, facial sensation intact to soft touch in V1, V2 and V3, no facial asymmetry noted, uvula/palate midline, tongue midline.   Motor: 5/5 throughout, normal tone, no pronator drift noted.   Sensory - intact to soft touch throughout  Reflexes - 2+ throughout  Coordination - no ataxia/dysmetria noted  Gait - normal    Neurological Exam          "

## 2025-06-07 ENCOUNTER — APPOINTMENT (EMERGENCY)
Dept: RADIOLOGY | Facility: HOSPITAL | Age: 85
DRG: 064 | End: 2025-06-07
Payer: COMMERCIAL

## 2025-06-07 ENCOUNTER — APPOINTMENT (INPATIENT)
Dept: RADIOLOGY | Facility: HOSPITAL | Age: 85
DRG: 064 | End: 2025-06-07
Payer: COMMERCIAL

## 2025-06-07 ENCOUNTER — HOSPITAL ENCOUNTER (INPATIENT)
Facility: HOSPITAL | Age: 85
LOS: 5 days | Discharge: HOME HEALTH | DRG: 064 | End: 2025-06-12
Attending: EMERGENCY MEDICINE | Admitting: INTERNAL MEDICINE
Payer: COMMERCIAL

## 2025-06-07 DIAGNOSIS — K59.00 CONSTIPATION: ICD-10-CM

## 2025-06-07 DIAGNOSIS — I26.99 OTHER PULMONARY EMBOLISM WITHOUT ACUTE COR PULMONALE, UNSPECIFIED CHRONICITY (HCC): ICD-10-CM

## 2025-06-07 DIAGNOSIS — I63.9 STROKE (HCC): Primary | ICD-10-CM

## 2025-06-07 DIAGNOSIS — R29.90 STROKE-LIKE SYMPTOMS: ICD-10-CM

## 2025-06-07 PROBLEM — I65.22 CAROTID STENOSIS, LEFT: Status: ACTIVE | Noted: 2025-06-07

## 2025-06-07 PROBLEM — I10 PRIMARY HYPERTENSION: Status: ACTIVE | Noted: 2025-06-07

## 2025-06-07 PROBLEM — I49.8 BIGEMINY: Status: ACTIVE | Noted: 2025-06-07

## 2025-06-07 LAB
2HR DELTA HS TROPONIN: 3 NG/L
ANION GAP SERPL CALCULATED.3IONS-SCNC: 7 MMOL/L (ref 4–13)
APTT PPP: 35 SECONDS (ref 23–34)
ATRIAL RATE: 68 BPM
BUN SERPL-MCNC: 26 MG/DL (ref 5–25)
CALCIUM SERPL-MCNC: 9.5 MG/DL (ref 8.4–10.2)
CARDIAC TROPONIN I PNL SERPL HS: 35 NG/L (ref ?–50)
CARDIAC TROPONIN I PNL SERPL HS: 38 NG/L (ref ?–50)
CHLORIDE SERPL-SCNC: 107 MMOL/L (ref 96–108)
CHOLEST SERPL-MCNC: 182 MG/DL (ref ?–200)
CO2 SERPL-SCNC: 27 MMOL/L (ref 21–32)
CREAT SERPL-MCNC: 1.14 MG/DL (ref 0.6–1.3)
ERYTHROCYTE [DISTWIDTH] IN BLOOD BY AUTOMATED COUNT: 18.7 % (ref 11.6–15.1)
EST. AVERAGE GLUCOSE BLD GHB EST-MCNC: 114 MG/DL
GFR SERPL CREATININE-BSD FRML MDRD: 57 ML/MIN/1.73SQ M
GLUCOSE SERPL-MCNC: 150 MG/DL (ref 65–140)
GLUCOSE SERPL-MCNC: 161 MG/DL (ref 65–140)
HBA1C MFR BLD: 5.6 %
HCT VFR BLD AUTO: 41.8 % (ref 36.5–49.3)
HDLC SERPL-MCNC: 32 MG/DL
HGB BLD-MCNC: 13.2 G/DL (ref 12–17)
INR PPP: 1.1 (ref 0.85–1.19)
LDLC SERPL CALC-MCNC: 105 MG/DL (ref 0–100)
MCH RBC QN AUTO: 26.6 PG (ref 26.8–34.3)
MCHC RBC AUTO-ENTMCNC: 31.6 G/DL (ref 31.4–37.4)
MCV RBC AUTO: 84 FL (ref 82–98)
P AXIS: 60 DEGREES
PLATELET # BLD AUTO: 643 THOUSANDS/UL (ref 149–390)
PMV BLD AUTO: 10.3 FL (ref 8.9–12.7)
POTASSIUM SERPL-SCNC: 4 MMOL/L (ref 3.5–5.3)
PR INTERVAL: 188 MS
PROTHROMBIN TIME: 14.5 SECONDS (ref 12.3–15)
QRS AXIS: 136 DEGREES
QRSD INTERVAL: 160 MS
QT INTERVAL: 462 MS
QTC INTERVAL: 491 MS
RBC # BLD AUTO: 4.96 MILLION/UL (ref 3.88–5.62)
SODIUM SERPL-SCNC: 141 MMOL/L (ref 135–147)
T WAVE AXIS: -11 DEGREES
TRIGL SERPL-MCNC: 227 MG/DL (ref ?–150)
VENTRICULAR RATE: 68 BPM
WBC # BLD AUTO: 14.43 THOUSAND/UL (ref 4.31–10.16)

## 2025-06-07 PROCEDURE — 93010 ELECTROCARDIOGRAM REPORT: CPT | Performed by: INTERNAL MEDICINE

## 2025-06-07 PROCEDURE — 85027 COMPLETE CBC AUTOMATED: CPT

## 2025-06-07 PROCEDURE — 99223 1ST HOSP IP/OBS HIGH 75: CPT | Performed by: INTERNAL MEDICINE

## 2025-06-07 PROCEDURE — 85610 PROTHROMBIN TIME: CPT

## 2025-06-07 PROCEDURE — 85730 THROMBOPLASTIN TIME PARTIAL: CPT

## 2025-06-07 PROCEDURE — 80048 BASIC METABOLIC PNL TOTAL CA: CPT

## 2025-06-07 PROCEDURE — 84484 ASSAY OF TROPONIN QUANT: CPT

## 2025-06-07 PROCEDURE — NC001 PR NO CHARGE: Performed by: PSYCHIATRY & NEUROLOGY

## 2025-06-07 PROCEDURE — RECHECK: Performed by: INTERNAL MEDICINE

## 2025-06-07 PROCEDURE — 83036 HEMOGLOBIN GLYCOSYLATED A1C: CPT

## 2025-06-07 PROCEDURE — 99285 EMERGENCY DEPT VISIT HI MDM: CPT

## 2025-06-07 PROCEDURE — 80061 LIPID PANEL: CPT

## 2025-06-07 PROCEDURE — 82948 REAGENT STRIP/BLOOD GLUCOSE: CPT

## 2025-06-07 PROCEDURE — 70498 CT ANGIOGRAPHY NECK: CPT

## 2025-06-07 PROCEDURE — 93005 ELECTROCARDIOGRAM TRACING: CPT

## 2025-06-07 PROCEDURE — 92610 EVALUATE SWALLOWING FUNCTION: CPT

## 2025-06-07 PROCEDURE — 99223 1ST HOSP IP/OBS HIGH 75: CPT | Performed by: PSYCHIATRY & NEUROLOGY

## 2025-06-07 PROCEDURE — 99223 1ST HOSP IP/OBS HIGH 75: CPT | Performed by: SURGERY

## 2025-06-07 PROCEDURE — 36415 COLL VENOUS BLD VENIPUNCTURE: CPT

## 2025-06-07 PROCEDURE — 70551 MRI BRAIN STEM W/O DYE: CPT

## 2025-06-07 PROCEDURE — 99285 EMERGENCY DEPT VISIT HI MDM: CPT | Performed by: EMERGENCY MEDICINE

## 2025-06-07 PROCEDURE — 70496 CT ANGIOGRAPHY HEAD: CPT

## 2025-06-07 PROCEDURE — 84484 ASSAY OF TROPONIN QUANT: CPT | Performed by: INTERNAL MEDICINE

## 2025-06-07 PROCEDURE — 92523 SPEECH SOUND LANG COMPREHEN: CPT

## 2025-06-07 RX ORDER — ATORVASTATIN CALCIUM 80 MG/1
80 TABLET, FILM COATED ORAL EVERY EVENING
Status: DISCONTINUED | OUTPATIENT
Start: 2025-06-07 | End: 2025-06-12 | Stop reason: HOSPADM

## 2025-06-07 RX ORDER — ASPIRIN 81 MG/1
81 TABLET, CHEWABLE ORAL DAILY
Status: DISCONTINUED | OUTPATIENT
Start: 2025-06-07 | End: 2025-06-12 | Stop reason: HOSPADM

## 2025-06-07 RX ORDER — ACETAMINOPHEN 325 MG/1
650 TABLET ORAL EVERY 6 HOURS PRN
Status: DISCONTINUED | OUTPATIENT
Start: 2025-06-07 | End: 2025-06-12 | Stop reason: HOSPADM

## 2025-06-07 RX ORDER — ONDANSETRON 2 MG/ML
4 INJECTION INTRAMUSCULAR; INTRAVENOUS EVERY 6 HOURS PRN
Status: DISCONTINUED | OUTPATIENT
Start: 2025-06-07 | End: 2025-06-12 | Stop reason: HOSPADM

## 2025-06-07 RX ORDER — ALPRAZOLAM 0.25 MG
0.25 TABLET ORAL DAILY PRN
Status: DISCONTINUED | OUTPATIENT
Start: 2025-06-07 | End: 2025-06-12 | Stop reason: HOSPADM

## 2025-06-07 RX ORDER — ATORVASTATIN CALCIUM 40 MG/1
40 TABLET, FILM COATED ORAL EVERY EVENING
Status: DISCONTINUED | OUTPATIENT
Start: 2025-06-07 | End: 2025-06-07

## 2025-06-07 RX ORDER — FAMOTIDINE 20 MG/1
40 TABLET, FILM COATED ORAL DAILY
Status: DISCONTINUED | OUTPATIENT
Start: 2025-06-07 | End: 2025-06-12 | Stop reason: HOSPADM

## 2025-06-07 RX ORDER — HYDROXYUREA 500 MG/1
500 CAPSULE ORAL 2 TIMES DAILY
Status: DISCONTINUED | OUTPATIENT
Start: 2025-06-07 | End: 2025-06-12 | Stop reason: HOSPADM

## 2025-06-07 RX ADMIN — APIXABAN 5 MG: 5 TABLET, FILM COATED ORAL at 09:06

## 2025-06-07 RX ADMIN — CYANOCOBALAMIN TAB 500 MCG 500 MCG: 500 TAB at 09:06

## 2025-06-07 RX ADMIN — IOHEXOL 85 ML: 350 INJECTION, SOLUTION INTRAVENOUS at 03:10

## 2025-06-07 RX ADMIN — ASPIRIN 81 MG CHEWABLE TABLET 81 MG: 81 TABLET CHEWABLE at 09:06

## 2025-06-07 RX ADMIN — ATORVASTATIN CALCIUM 80 MG: 80 TABLET, FILM COATED ORAL at 18:06

## 2025-06-07 RX ADMIN — HYDROXYUREA 500 MG: 500 CAPSULE ORAL at 10:48

## 2025-06-07 RX ADMIN — SERTRALINE HYDROCHLORIDE 50 MG: 50 TABLET ORAL at 09:06

## 2025-06-07 RX ADMIN — FAMOTIDINE 40 MG: 20 TABLET, FILM COATED ORAL at 09:06

## 2025-06-07 RX ADMIN — Medication 1 TABLET: at 09:06

## 2025-06-07 RX ADMIN — APIXABAN 5 MG: 5 TABLET, FILM COATED ORAL at 18:06

## 2025-06-07 NOTE — PROGRESS NOTES
Post admit note      Patient presents with speech difficulties and strokelike symptoms.  Patient is admitted home stroke pathway    History chart labs medications reviewed  Comfortably sitting up in bed    Vitals:    06/07/25 0800 06/07/25 0900 06/07/25 0936 06/07/25 1100   BP: 143/65 138/63 164/84 116/57   BP Location: Right arm  Right arm    Pulse: 79 76 85 69   Resp: 18 22 20 19   Temp:       TempSrc:       SpO2: 96% 97% 95% 94%   Weight:          Comfortably in bed.  Obese.  Short thick neck.  Lungs diminished breath sounds.  Heart sounds S1-S2 noted.  Abdomen soft.  Abdominal obesity noted.  Awake follows commands.  Facial droop noted.  No pedal edema, no rash.    A/P    Strokelike symptoms patient is placed on stroke pathway, CT head no acute intracranial malady.  CTA head and neck no intracranial large vessel occlusion aneurysm dissection or high-grade stenosis.  Follow-up on MRI brain.  Continue aspirin Eliquis statin.  Neurology following.  Physical therapy  History of stroke continue Eliquis statin aspirin physical therapy  History of essential thrombocytosis continue aspirin hydroxyurea monitor counts  History of chronic thromboembolic pulm hypertension continue Eliquis  Essential hypertension presently admitted and placed on stroke pathway permissive hypertension for 24 to 48 hours  Obese therapeutic lifestyle modification discussed and encouraged      Discussed with the patient, called updated son Kishor questions answered    Durga

## 2025-06-07 NOTE — ASSESSMENT & PLAN NOTE
83yo M w/ a PMH of chronic PE on eliquis, recent stroke in 04/2025 (L parietal and L occipital found to have L ICA thrombus), essential thrombosis w/ JAK2 mutation, HTN, HLD coming in as a stroke alert on 6/7/2025  2:53 AM with initial NIHSS of 5 and LKW 1:45am 06/07/2025, initial Blood Pressure: 154/70. Initial presenting deficits were R facial droop, slurred speech and mild aphasia. EMS found on EKG runs of bigeminy and noted to be on telemetry as well  As a result of currently on eliquis, pt was determined to not be a candidate for thrombolysis (TNK).   Exam on 06/07/25: R facial droop, dysarthria, and mild aphasia  Current Blood Pressure: 137/76, BP over 24 hours: BP  Min: 101/55  Max: 188/93   Vascular risk factors: HTN, HLD, past stroke   Home meds: eliquis 5mg BID and ASA 81mg     Past workup:   - 4/14/2025 CTA H/N: Clot in the proximal LEFT internal carotid artery is less pronounced and the central clot is no longer present.  No intracranial branch occlusion.  Minimal hypodensity in the LEFT frontal lobe corresponding to the infarcts on MRI. No intraparenchymal hematoma, mass effect, or midline shift.   - MRI Brain 4/11/2025: Multiple areas of restricted diffusion involving the LEFT cerebral hemisphere, most pronounced in the LEFT anterior frontal lobe. There is involvement of the LEFT parietal lobe and LEFT occipital lobe. Scattered areas of underlying susceptibility artifact consistent with petechial hemorrhage. No space-occupying hematoma, mass effect, or midline shift.  Scattered areas of increased T2/FLAIR signal within the white matter suggestive of chronic small vessel ischemic changes. Flow voids at the skull base visualized. Basal cisterns appear normal. Old small infarcts in the bilateral cerebellar hemispheres.    Workup:   CTH: No acute intracranial abnormality. Chronic microangiopathic changes.   CTA: No intracranial LVO, aneurysm, dissection, or high-grade stenosis. Moderate atherosclerotic  plaque L proximal cervical ICA resulting in 60% stenosis. This plaque was noted on prior examinations with few areas of eccentric thrombus/loose plaque. No central thrombus identified at this time, however, this may be a source of emboli   MRI Brain: Pending   TTE: Pending   Telemetry: Noted multiple PVCs and runs of bigeminy   , A1c 5.6%    Pertinent scores:  - NIHSS: 5  Stroke Modified [place] Score: 0 (No baseline symptoms/disability)    Impression: New onset R facial droop, slurred speech and expressive aphasia in setting of known L ICA thrombus as suspected cause of symptoms and requires further work up.    Plan:  Case discussed with neurology attending Dr. Yancey  Stroke pathway  BP: Permissive hypertension for first 24 hours up to 220 SBP with gradual reduction to normotension over days  Increased atorvastatin from 40 mg to 80 mg qHS given elevated LDL (goal LDL <70)  Maintain glucose <180, SSI for coverage if indicated  Antiplatelet agents: ASA 81 mg   AC: Continue home eliquis 5 mg BID (takes for chronic PEs)  TTE pending  MRI brain pending  DVT ppx and SCDs  Monitor on telemetry  PT/OT/ST/PMR input appreciated  Stroke education  Recommend vascular surgery consultation for evaluation of L ICA thrombus  Medicine team to work up noted bigeminy and rest of care as per primary team

## 2025-06-07 NOTE — ASSESSMENT & PLAN NOTE
Holding PTA antihypertensives first 24 hours to allow permissive hypertension, can provide as needed IV antihypertensive for SBP sustained above 220 during this time  Monitor blood pressure per protocol

## 2025-06-07 NOTE — PROGRESS NOTES
Pastoral Care Progress Note           responded to stroke alert. No family is present, but he expects his wife. Welcomed prayer.  remains available.       06/07/25 0320   Clinical Encounter Type   Visited With Patient   Crisis Visit ED   Hinduism Encounters   Hinduism Needs Prayer   Patient Spiritual Encounters   Spiritual Encounter Notes  responded to stroke alert. No family present. Patient welcomed prayer.  remains available.

## 2025-06-07 NOTE — ASSESSMENT & PLAN NOTE
Longstanding history of this following with [facility] hematology, continue PTA ASA and hydroxyurea

## 2025-06-07 NOTE — QUICK NOTE
NEUROLOGY RESIDENCY OVERNIGHT Stroke Alert QUICK NOTE     Name: Obdulio Mccauley   Age & Sex: 84 y.o. male   MRN: [id number]  Unit/Bed#: ED 01   Encounter: [id number]  Length of Stay: 0    ASSESSMENT & PLAN     Stroke-like symptoms  Assessment & Plan  83yo M w/ a PMH of chronic PE on eliquis, recent stroke in 04/2025 (L parietal and L occipital found to have L ICA thrombus), essential thrombosis w/ JAK2 mutation, HTN, HLD coming in as a stroke alert on 6/7/2025  2:53 AM with initial NIHSS of 5 and LKW 1:45am 06/07/2025, initial Blood Pressure: 154/70. Initial presenting deficits were R facial droop, slurred speech and mild aphasia. EMS found on EKG runs of bigeminy and noted to be on telemetry as well  As a result of currently on eliquis, pt was determined to not be a candidate for thrombolysis (TNK).   Exam on 06/07/25: R facial droop, dysarthria, and mild aphasia  Current Blood Pressure: (!) 181/84, BP over 24 hours: BP  Min: 154/70  Max: 181/84   Vascular risk factors: HTN, HLD, past stroke   Home meds: eliquis 5mg BID and ASA 81mg     Workup:  Lab Results   Component Value Date    HGBA1C 5.6 12/07/2023    CHOLESTEROL 202 (H) 05/23/2024    LDLCALC 133 (H) 05/23/2024    TRIG 163 (H) 05/23/2024    INR 1.10 06/07/2025      CTH: negative for any bleeds or acute stroke  CTA: negative for LVOs, aneurysms, dissection or AVMs; there is noted previous L ICA appreciated   MRI: pending   Echocardiogram: pending   Telemetry: noted multiple PVCs and runs of bigeminy     Past w/u:   04/14/2025 CTA H/N: Clot in the proximal LEFT internal carotid artery is less pronounced and the central clot is no longer present.  No intracranial branch occlusion.  Minimal hypodensity in the LEFT frontal lobe corresponding to the infarcts on MRI. No intraparenchymal hematoma, mass effect, or midline shift.     Last MRI  04/11/2025: Multiple areas of restricted diffusion involving the LEFT cerebral hemisphere, most pronounced in the LEFT  anterior frontal lobe. There is involvement of the LEFT parietal lobe and LEFT occipital lobe. Scattered areas of underlying susceptibility artifact consistent with petechial hemorrhage. No space-occupying hematoma, mass effect, or midline shift.  Scattered areas of increased T2/FLAIR signal within the white matter suggestive of chronic small vessel ischemic changes. Flow voids at the skull base visualized. Basal cisterns appear normal. Old small infarcts in the bilateral cerebellar hemispheres.    Pertinent scores:  - NIHSS: 5  Stroke Modified [place] Score: 0 (No baseline symptoms/disability)    Impression: new onset R facial droop, slurred speech and aphasia in setting of known L ICA thrombus as suspected cause of symptoms and requires further w/u    Plan:  - Stroke pathway  - Discussed plan with neurology attending, Dr. Langley via EpicChat  - BP: Permissive hypertension for first 24 hours up to 220 SBP  - Obtain lipids and A1c  - atorvastatin 40mg qhs  - Maintain glucose <180, SSI for coverage if indicated  - Medical management as per primary team appreciated  - Antiplatelet agents: ASA 81 mg   - AC: ok to continue home eliquis 5mg BID (takes for chronic Pes)  - TTE  - MRI brain pending  - DVT ppx and SCDs  - Monitor on telemetry  - PT/OT/Speech/PM&R input appreciated  - Stroke education     Cerebrovascular accident (CVA) (Formerly [facility])  Assessment & Plan  Noted hx of stroke 04/2025 Patient had acute CVA with R sided weakness and speech changes (facial droop and aphasia).   MRI brain confirmed multiple embolic strokes in the L hemisphere, and CTA head and neck revealed L ICA thrombus.   Patient was deemed not to be a mechanical thrombectomy candidate. He was admitted for workup. He is maintained on aspirin, eliquis for stroke prevention. He follows with hematology/oncology as well.       As per stroke like symptoms plan   Continue home ASA 81mg and lipitor    Thrombolytic Decision: Patient not a candidate. Bleeding risk  since takes eliquis 5mg BID         SUBJECTIVE       Hx and PE limited by: none  Patient last known well: 1:45am 06/07/2025  Stroke alert called: 2:39am 06/07/2025  Neurology time of arrival: 2:42am 06/07/2025    HPI: Obdulio Mccauley is a 84 y.o. male w/ a PMH of chronic PE on eliquis, recent stroke in 04/2025 (L parietal and L occipital found to have L ICA thrombus), essential thrombosis w/ JAK2 mutation, HTN, HLD coming in as a stroke alert on 6/7/2025  2:53 AM with initial NIHSS of 5 and LKW 1:45am 06/07/2025, initial Blood Pressure: 154/70. Initial presenting deficits were R facial droop, slurred speech and mild aphasia.       Patient was noted to be watching television with his wife when all of a sudden he started having trouble speaking and initially had a hard time moving his body that he does not remember all that well.  His wife who is a former nurse was concerned for a stroke and called 911.  When EMS arrived they had noted that patient was able to speak and had a weakness but was noted to have right facial droop, slurred speech and noted aphasia.  EMS found on EKG runs of bigeminy and noted to be on telemetry as well    Discussed with patient given he is on his Eliquis at baseline and has been fine with that he is not candidate for TN K.  He has been taking diligently his aspirin and Lipitor.  He does note that he had a previous stroke about a few months ago in [place] in April 2025.  He was found at that time to have left parietal and left occipital strokes with a left ICA thrombus.  At that time he was noted to be on documentation presenting with right-sided weakness and facial droop and slurred speech.  He does not remember very well that there was weakness when asked about this.  He does state that he no longer had residual deficits until confirmed on recent outpatient stroke visit when he establish care.      Historical Information   Past Medical History[1]  Past Surgical History[2]  Social  History   Social History     Substance and Sexual Activity   Alcohol Use No     Social History     Substance and Sexual Activity   Drug Use No     E-Cigarette/Vaping    E-Cigarette Use Never User      E-Cigarette/Vaping Substances    Nicotine No     THC No     CBD No     Flavoring No     Other No     Unknown No      Tobacco Use History[3]      OBJECTIVE     Patient ID: Obdulio Mccauley is a 84 y.o. male.    Vitals:   Vitals:    25 0304 25 0313 25 0314 25 0330   BP: 154/70  (!) 181/84 (!) 188/93   Pulse: 70  67 75   Resp: 18  18 18   Temp:  (!) 97.4 °F (36.3 °C)     TempSrc:  Oral     SpO2: 94%  96% 96%   Weight:          Body mass index is 30.49 kg/m².   No intake or output data in the 24 hours ending 25 0348    Temperature:   Temp (24hrs), Av.4 °F (36.3 °C), Min:97.4 °F (36.3 °C), Max:97.4 °F (36.3 °C)    Temperature: (!) 97.4 °F (36.3 °C)    Neurologic Exam     Mental Status   Speech: speech is normal   Able to perform simple calculations.   Able to name object. Able to repeat. Normal comprehension.   Oriented to persona and place     Cranial Nerves     CN II   Visual fields full to confrontation.     CN III, IV, VI   Pupils are equal, round, and reactive to light.  Extraocular motions are normal.   CN III: no CN III palsy  CN VI: no CN VI palsy  Nystagmus: none     CN V   Facial sensation intact.     CN VII   Facial expression full, symmetric.     CN VIII   CN VIII normal.     CN IX, X   CN IX normal.   CN X normal.     CN XI   CN XI normal.     CN XII   CN XII normal.     Motor Exam   Right arm pronator drift: absent  Left arm pronator drift: absent    Strength   Strength 5/5 throughout.     Sensory Exam   Light touch normal.     Gait, Coordination, and Reflexes     Coordination   Finger to nose coordination: normal  Heel to shin coordination: normal    Tremor   Resting tremor: absent  Intention tremor: absent  Action tremor: absent    Reflexes   Right brachioradialis: 2+  Left  brachioradialis: 2+  Right biceps: 2+  Left biceps: 2+  Right triceps: 2+  Left triceps: 2+  Right patellar: 2+  Left patellar: 2+  Right achilles: 2+  Left achilles: 2+  Right plantar: normal  Left plantar: normal             NIHSS:  1a.Level of Consciousness: 0 = Alert   1b. LOC Questions: 2 = Answers neither correctly   1c. LOC Commands: 0 = Obeys both correctly   2. Best Gaze: 0 = Normal   3. Visual: 0 = No visual field loss   4. Facial Palsy: 1=Minor paralysis (flattened nasolabial fold, asymmetric on smiling)   5a. Motor Right Arm: 0=No drift, limb holds 90 (or 45) degrees for full 10 seconds   5b. Motor Left Arm: 0=No drift, limb holds 90 (or 45) degrees for full 10 seconds   6a. Motor Right Le=No drift, limb holds 90 (or 45) degrees for full 10 seconds   6b. Motor Left Le=No drift, limb holds 90 (or 45) degrees for full 10 seconds   7. Limb Ataxia:  0=Absent   8. Sensory: 0=Normal; no sensory loss   9. Best Language:  1=Mild to moderate aphasia; some obvious loss of fluency or facility of comprehension without significant limitation on ideas expressed or form of expression.   10. Dysarthria: 1=Mild to moderate, patient slurs at least some words and at worst, can be understood with some difficulty   11. Extinction and Inattention (formerly Neglect): 0=No abnormality   Total Score: 5     Time NIHSS was completed: 2:39am 2025    Modified [place] Score:  0 (No baseline symptoms/disability)      LABORATORY DATA     Results from last 7 days   Lab Units 25  0300   WBC Thousand/uL 14.43*   HEMOGLOBIN g/dL 13.2   HEMATOCRIT % 41.8   PLATELETS Thousands/uL 643*      Results from last 7 days   Lab Units 25  0300   POTASSIUM mmol/L 4.0   CHLORIDE mmol/L 107   CO2 mmol/L 27   BUN mg/dL 26*   CREATININE mg/dL 1.14   CALCIUM mg/dL 9.5              Results from last 7 days   Lab Units 25  0300   INR  1.10   PTT seconds 35*               Miah Palacio, DO   [facility],  PGY-4         [1]   Past Medical History:  Diagnosis Date    Cancer (HCC)     prostate    History of pulmonary embolus (PE)     Hypercholesterolemia     Hypertension     Personal history of DVT (deep vein thrombosis)     Pneumonia     Stroke (HCC)    [2]   Past Surgical History:  Procedure Laterality Date    KNEE SURGERY Left     PROSTATECTOMY      TOTAL SHOULDER REPLACEMENT     [3]   Social History  Tobacco Use   Smoking Status Never   Smokeless Tobacco Never

## 2025-06-07 NOTE — ASSESSMENT & PLAN NOTE
"Noted hx of stroke 4/2025 Patient had acute CVA with R sided weakness and speech changes (facial droop and aphasia).   MRI brain confirmed multiple embolic strokes in the L hemisphere, and CTA head and neck revealed L ICA thrombus.   Patient was deemed not to be a mechanical thrombectomy candidate. He was admitted for workup. He is maintained on aspirin, eliquis for stroke prevention. He follows with hematology/oncology as well.     Plan  Refer to plan above under \"stroke-like symptoms\"  Continue home ASA 81 mg and Lipitor  "

## 2025-06-07 NOTE — ASSESSMENT & PLAN NOTE
Patient recently hospitalized 4/2025 at an outside hospital with acute CVA and right-sided weakness/speech changes with facial droop and aphasia.  MRI brain confirmed multiple embolic strokes in the left hemisphere with CT head/neck revealing left ICA thrombus.    Not TNK candidate at that time due to Eliquis use, not mechanical thrombectomy candidate at that time  Now with recurrence of the right facial droop and aphasia, management as per primary problem.  Appreciate ongoing neurology recommendations.

## 2025-06-07 NOTE — H&P
NEUROLOGY RESIDENCY CONSULT NOTE     Name: Obdulio Mccauley   Age & Sex: 84 y.o. male   MRN: [id number]  Unit/Bed#: ED 01   Encounter: [id number]  Length of Stay: 0    ASSESSMENT & PLAN     * Stroke-like symptoms  Assessment & Plan  85yo M w/ a PMH of chronic PE on eliquis, recent stroke in 04/2025 (L parietal and L occipital found to have L ICA thrombus), essential thrombosis w/ JAK2 mutation, HTN, HLD coming in as a stroke alert on 6/7/2025  2:53 AM with initial NIHSS of 5 and LKW 1:45am 06/07/2025, initial Blood Pressure: 154/70. Initial presenting deficits were R facial droop, slurred speech and mild aphasia. EMS found on EKG runs of bigeminy and noted to be on telemetry as well  As a result of currently on eliquis, pt was determined to not be a candidate for thrombolysis (TNK).   Exam on 06/07/25: R facial droop, dysarthria, and mild aphasia  Current Blood Pressure: 116/57, BP over 24 hours: BP  Min: 116/57  Max: 188/93   Vascular risk factors: HTN, HLD, past stroke   Home meds: eliquis 5mg BID and ASA 81mg     Past workup:   - 4/14/2025 CTA H/N: Clot in the proximal LEFT internal carotid artery is less pronounced and the central clot is no longer present.  No intracranial branch occlusion.  Minimal hypodensity in the LEFT frontal lobe corresponding to the infarcts on MRI. No intraparenchymal hematoma, mass effect, or midline shift.   - MRI Brain 4/11/2025: Multiple areas of restricted diffusion involving the LEFT cerebral hemisphere, most pronounced in the LEFT anterior frontal lobe. There is involvement of the LEFT parietal lobe and LEFT occipital lobe. Scattered areas of underlying susceptibility artifact consistent with petechial hemorrhage. No space-occupying hematoma, mass effect, or midline shift.  Scattered areas of increased T2/FLAIR signal within the white matter suggestive of chronic small vessel ischemic changes. Flow voids at the skull base visualized. Basal cisterns appear normal. Old small  infarcts in the bilateral cerebellar hemispheres.    Workup:   CTH: No acute intracranial abnormality. Chronic microangiopathic changes.   CTA: No intracranial LVO, aneurysm, dissection, or high-grade stenosis. Moderate atherosclerotic plaque L proximal cervical ICA resulting in 60% stenosis. This plaque was noted on prior examinations with few areas of eccentric thrombus/loose plaque. No central thrombus identified at this time, however, this may be a source of emboli   MRI Brain: Pending   TTE: Pending   Telemetry: Noted multiple PVCs and runs of bigeminy   , A1c 5.6%    Pertinent scores:  - NIHSS: 5  Stroke Modified Wharton Score: 0 (No baseline symptoms/disability)    Impression: New onset R facial droop, slurred speech and expressive aphasia in setting of known L ICA thrombus as suspected cause of symptoms and requires further work up.    Plan:  Case discussed with neurology attending Dr. Yancey  Stroke pathway  BP: Permissive hypertension for first 24 hours up to 220 SBP with gradual reduction to normotension over days  Increased atorvastatin from 40 mg to 80 mg qHS given elevated LDL (goal LDL <70)  Maintain glucose <180, SSI for coverage if indicated  Antiplatelet agents: ASA 81 mg   AC: Continue home eliquis 5 mg BID (takes for chronic PEs)  TTE pending  MRI brain pending  DVT ppx and SCDs  Monitor on telemetry  PT/OT/ST/PMR input appreciated  Stroke education  Recommend vascular surgery consultation for evaluation of L ICA thrombus  Medicine team to work up noted bigeminy and rest of care as per primary team    Cerebrovascular accident (CVA) ([facility])  Assessment & Plan  Noted hx of stroke 4/2025 Patient had acute CVA with R sided weakness and speech changes (facial droop and aphasia).   MRI brain confirmed multiple embolic strokes in the L hemisphere, and CTA head and neck revealed L ICA thrombus.   Patient was deemed not to be a mechanical thrombectomy candidate. He was admitted for workup. He is  "maintained on aspirin, eliquis for stroke prevention. He follows with hematology/oncology as well.     Plan  Refer to plan above under \"stroke-like symptoms\"  Continue home ASA 81 mg and Lipitor      Recommendations for outpatient neurological follow up have yet to be determined.    SUBJECTIVE     Reason for Consult / Principal Problem: R facial droop, slurred speech, and mild aphasia  Hx and PE limited by: mild aphasia and slurred speech   Patient last known well: 1:45am 06/07/2025  Stroke alert called: 2:39am 06/07/2025  Neurology time of arrival: 2:42am 06/07/2025    HPI: Obdulio Mccauley is a 84 y.o. le w/ a PMH of chronic PE on eliquis, recent stroke in 04/2025 (L parietal and L occipital found to have L ICA thrombus), essential thrombosis w/ JAK2 mutation, HTN, HLD coming in as a stroke alert on 6/7/2025  2:53 AM with initial NIHSS of 5 and LKW 1:45am 06/07/2025, initial Blood Pressure: 154/70. Initial presenting deficits were R facial droop, slurred speech and mild aphasia.      Patient was noted to be watching television with his wife when all of a sudden he started having trouble speaking and initially had a hard time moving his body that he does not remember all that well.  His wife who is a former nurse was concerned for a stroke and called 911.  When EMS arrived they had noted that patient was able to speak and had a weakness but was noted to have right facial droop, slurred speech and noted aphasia.  EMS found on EKG runs of bigeminy and noted to be on telemetry as well     Discussed with patient given he is on his Eliquis at baseline and has been fine with that he is not candidate for TN K.  He has been taking diligently his aspirin and Lipitor.  He does note that he had a previous stroke about a few months ago in [place] in April 2025.  He was found at that time to have left parietal and left occipital strokes with a left ICA thrombus.  At that time he was noted to be on documentation presenting " with right-sided weakness and facial droop and slurred speech.  He does not remember very well that there was weakness when asked about this.  He does state that he no longer had residual deficits until confirmed on recent outpatient stroke visit when he establish care.    Patient seen and examined at the bedside this morning. Patient continues to have right facial droop, dysarthria, and expressive aphasia. He confirms that there were multiple weeks within the past month that he was not taking Eliquis due to running out of supply. Discussed plan to obtain MRI brain, consult vascular surgery for L ICA thrombus, and increased atorvastatin from 40 to 80 mg which he acknowledges.     Consult to Neurology  Consult performed by: Alberto Sterling DO  Consult ordered by: Triage Protocol Emergency, MD        Historical Information   Past Medical History[1]  Past Surgical History[2]  Social History   Social History     Substance and Sexual Activity   Alcohol Use No     Social History     Substance and Sexual Activity   Drug Use No     E-Cigarette/Vaping    E-Cigarette Use Never User      E-Cigarette/Vaping Substances    Nicotine No     THC No     CBD No     Flavoring No     Other No     Unknown No      Tobacco Use History[3]  Family History: Family History[4]  Meds/Allergies   all current active meds have been reviewed, current meds:   Current Facility-Administered Medications:     acetaminophen (TYLENOL) tablet 650 mg, Q6H PRN    ALPRAZolam (XANAX) tablet 0.25 mg, Daily PRN    apixaban (ELIQUIS) tablet 5 mg, BID    aspirin chewable tablet 81 mg, Daily    atorvastatin (LIPITOR) tablet 80 mg, QPM    cyanocobalamin (VITAMIN B-12) tablet 500 mcg, Daily    famotidine (PEPCID) tablet 40 mg, Daily    hydroxyurea (HYDREA) capsule 500 mg, BID    multivitamin-minerals (CENTRUM) tablet 1 tablet, Daily    ondansetron (ZOFRAN) injection 4 mg, Q6H PRN    sertraline (ZOLOFT) tablet 50 mg, Daily, and PTA meds:   Prior to Admission Medications    Prescriptions Last Dose Informant Patient Reported? Taking?   ALPRAZolam (XANAX) 0.25 mg tablet  Self Yes No   Eliquis 5 MG   No No   Sig: Take 1 tablet (5 mg total) by mouth 2 (two) times a day   Multiple Vitamin tablet  Self Yes No   Sig: Take 1 tablet by mouth in the morning.   Riociguat (Adempas) 1 MG TABS   No No   Sig: Take 1 tablet (1 mg total) by mouth 3 (three) times a day   acetaminophen (TYLENOL) 500 mg tablet  Self Yes No   Sig: Take 500 mg by mouth every 4 (four) hours   aspirin 81 MG tablet  Self Yes No   Sig: Take by mouth   cyanocobalamin (VITAMIN B-12) 500 MCG tablet  Self Yes No   Sig: Take 1 tablet by mouth in the morning.   diltiazem (CARDIZEM CD) 120 mg 24 hr capsule   Yes No   Sig: Take 120 mg by mouth in the morning.   famotidine (PEPCID) 40 MG tablet   Yes No   Sig: Take 40 mg by mouth every 12 (twelve) hours   furosemide (LASIX) 40 mg tablet   No No   Sig: Take 1 tablet (40 mg total) by mouth daily   hydroxyurea (HYDREA) 500 mg capsule   No No   Sig: Take 1 capsule (500 mg total) by mouth 2 (two) times a day Take 500 mg in AM and 1000 mg in PM   Patient taking differently: Take 500 mg by mouth in the morning and 500 mg in the evening. Take 500 mg in AM and 500mg in PM.   lisinopril (ZESTRIL) 10 mg tablet  Self Yes No   Sig: Take 10 mg by mouth   meclizine (ANTIVERT) 25 mg tablet  Self No No   Sig: Take 1 tablet (25 mg total) by mouth 3 (three) times a day as needed for dizziness   omeprazole (PriLOSEC) 20 mg delayed release capsule   No No   Sig: TAKE 1 CAPSULE BY MOUTH EVERY DAY   Patient not taking: Reported on 6/4/2025   ondansetron (ZOFRAN) 4 mg tablet   No No   Sig: Take 1 tablet (4 mg total) by mouth every 8 (eight) hours as needed for nausea or vomiting   sertraline (ZOLOFT) 50 mg tablet   No No   Sig: TAKE 1 TABLET BY MOUTH EVERY DAY      Facility-Administered Medications: None     Allergies[5]    Review of previous medical records was completed.     Review of Systems  A 12 point  ROS was completed. Other than the above mentioned  complaints, all remaining systems were negative.    OBJECTIVE     Patient ID: Obdulio Mccauley is a 84 y.o. male.    Vitals:   Vitals:    25 0800 25 0900 25 0936 25 1100   BP: 143/65 138/63 164/84 116/57   BP Location: Right arm  Right arm    Pulse: 79 76 85 69   Resp: 18 22 20 19   Temp:       TempSrc:       SpO2: 96% 97% 95% 94%   Weight:          Body mass index is 30.49 kg/m².   No intake or output data in the 24 hours ending 25 1219    Temperature:   Temp (24hrs), Av.4 °F (36.3 °C), Min:97.4 °F (36.3 °C), Max:97.4 °F (36.3 °C)    Temperature: (!) 97.4 °F (36.3 °C)    Invasive Devices:   Invasive Devices       Peripheral Intravenous Line  Duration             Peripheral IV 25 Distal;Right;Upper;Ventral (anterior) Arm <1 day                    Physical Exam    Eyes:      Extraocular Movements: EOM normal.      Pupils: Pupils are equal, round, and reactive to light.       Neurological:      Coordination: Finger-Nose-Finger Test normal.     Psychiatric:         Speech: Speech is slurred.          Neurologic Exam     Mental Status   Attention: normal. Concentration: normal.   Speech: slurred   Oriented to place  Disoriented to month (December), year (), age (75)  Severe dysarthria  Mild expressive aphasia  Able to correctly identify glasses, pen, watch  Shows 2 fingers and thumbs up on command  Unable to follow complex 4-step command     Cranial Nerves     CN II   Right visual field deficit: none  Left visual field deficit: none     CN III, IV, VI   Pupils are equal, round, and reactive to light.  Extraocular motions are normal.     CN V   Right facial sensation deficit: none  Left facial sensation deficit: none    CN VII   Right facial weakness: central  Left facial weakness: none    CN VIII   Hearing: intact    CN IX, X   Palate: symmetric    CN XI   Right sternocleidomastoid strength: normal  Left sternocleidomastoid  strength: normal  Right trapezius strength: normal  Left trapezius strength: normal    CN XII   Tongue: not atrophic  Tongue deviation: none    Motor Exam   Muscle bulk: normal  Overall muscle tone: normal    Strength   Strength 5/5 except as noted.   R shoulder abduction 4+/5  R hip flexion 5-/5     Sensory Exam   Light touch normal.     Gait, Coordination, and Reflexes     Coordination   Finger to nose coordination: normal  Deferred gait testing        LABORATORY DATA     Labs: Results Review Statement: No pertinent imaging studies reviewed.  Results from last 7 days   Lab Units 06/07/25  0300   WBC Thousand/uL 14.43*   HEMOGLOBIN g/dL 13.2   HEMATOCRIT % 41.8   PLATELETS Thousands/uL 643*      Results from last 7 days   Lab Units 06/07/25  0300   POTASSIUM mmol/L 4.0   CHLORIDE mmol/L 107   CO2 mmol/L 27   BUN mg/dL 26*   CREATININE mg/dL 1.14   CALCIUM mg/dL 9.5              Results from last 7 days   Lab Units 06/07/25  0300   INR  1.10   PTT seconds 35*               IMAGING & DIAGNOSTIC TESTING     Radiology Results: Results Review Statement: I personally reviewed the following image studies/reports in PACS and discussed pertinent findings with Radiology: CTH, CTA H/N. My interpretation of the radiology images/reports is: agree with radiology.  CTA stroke alert (head/neck)   Final Result by Virgilio Manjarrez DO (06/07 0344)      No intracranial large vessel occlusion, aneurysm, dissection, or high-grade stenosis.      Moderate atherosclerotic plaque left proximal cervical ICA resulting in 60% stenosis. This plaque was noted on prior examinations with few areas of eccentric thrombus/loose plaque. No central thrombus identified at this time, however, this may be a    source of emboli               I personally discussed this study with Dr. Palacio on 6/7/2025 3:43 AM.               Workstation performed: [id number]         CT stroke alert brain   Final Result by Virgilio Manjarrez DO (06/07 4279)      No acute  intracranial abnormality.  Chronic microangiopathic changes.            I personally discussed this study with Dr. Palacio on 6/7/2025 3:33 AM.            Workstation performed: [id number]         MRI Inpatient Order    (Results Pending)       Other Diagnostic Testing: Results Review Statement: No pertinent imaging studies reviewed.    ACTIVE MEDICATIONS       Current Facility-Administered Medications:     acetaminophen (TYLENOL) tablet 650 mg, Q6H PRN    ALPRAZolam (XANAX) tablet 0.25 mg, Daily PRN    apixaban (ELIQUIS) tablet 5 mg, BID    aspirin chewable tablet 81 mg, Daily    atorvastatin (LIPITOR) tablet 80 mg, QPM    cyanocobalamin (VITAMIN B-12) tablet 500 mcg, Daily    famotidine (PEPCID) tablet 40 mg, Daily    hydroxyurea (HYDREA) capsule 500 mg, BID    multivitamin-minerals (CENTRUM) tablet 1 tablet, Daily    ondansetron (ZOFRAN) injection 4 mg, Q6H PRN    sertraline (ZOLOFT) tablet 50 mg, Daily    Prior to Admission medications    Medication Sig Start Date End Date Taking? Authorizing Provider   acetaminophen (TYLENOL) 500 mg tablet Take 500 mg by mouth every 4 (four) hours 11/17/17   Historical Provider, MD   ALPRAZolam (XANAX) 0.25 mg tablet  10/6/19   Historical Provider, MD   aspirin 81 MG tablet Take by mouth    Historical Provider, MD   cyanocobalamin (VITAMIN B-12) 500 MCG tablet Take 1 tablet by mouth in the morning. 12/21/21   Historical Provider, MD   diltiazem (CARDIZEM CD) 120 mg 24 hr capsule Take 120 mg by mouth in the morning. 11/12/24   Historical Provider, MD   Eliquis 5 MG Take 1 tablet (5 mg total) by mouth 2 (two) times a day 6/4/25   Kimber Rodriguez MD   famotidine (PEPCID) 40 MG tablet Take 40 mg by mouth every 12 (twelve) hours 11/19/24   Historical Provider, MD   furosemide (LASIX) 40 mg tablet Take 1 tablet (40 mg total) by mouth daily 12/12/23   Johnathon Lees DO   hydroxyurea (HYDREA) 500 mg capsule Take 1 capsule (500 mg total) by mouth 2 (two) times a day Take 500 mg in AM and  1000 mg in PM  Patient taking differently: Take 500 mg by mouth in the morning and 500 mg in the evening. Take 500 mg in AM and 500mg in PM. 4/3/24   LUZ MARIA Pablo   lisinopril (ZESTRIL) 10 mg tablet Take 10 mg by mouth    Historical Provider, MD   meclizine (ANTIVERT) 25 mg tablet Take 1 tablet (25 mg total) by mouth 3 (three) times a day as needed for dizziness 5/6/23   Winnie Soliz,    Multiple Vitamin tablet Take 1 tablet by mouth in the morning.    Historical Provider, MD   omeprazole (PriLOSEC) 20 mg delayed release capsule TAKE 1 CAPSULE BY MOUTH EVERY DAY  Patient not taking: Reported on 6/4/2025 10/17/24   Johnathon Lees DO   ondansetron (ZOFRAN) 4 mg tablet Take 1 tablet (4 mg total) by mouth every 8 (eight) hours as needed for nausea or vomiting 11/29/24   Sky Almanza,    Riociguat (Adempas) 1 MG TABS Take 1 tablet (1 mg total) by mouth 3 (three) times a day 5/7/25   LUZ MARIA Rea   sertraline (ZOLOFT) 50 mg tablet TAKE 1 TABLET BY MOUTH EVERY DAY 4/22/24   Johnathon Lees DO   DULoxetine (CYMBALTA) 30 mg delayed release capsule Take 30 mg by mouth 10/10/17 1/21/19  Historical Provider, MD         CODE STATUS & ADVANCED DIRECTIVES     Code Status: Level 1 - Full Code  Advance Directive and Living Will:      Power of :    POLST:        VTE Pharmacologic Prophylaxis: Apixaban (Eliquis)  VTE Mechanical Prophylaxis: sequential compression device    ==  Alberto Sterling DO   [facility] Neurology Res[facility], PGY-II         [1]   Past Medical History:  Diagnosis Date    Cancer (HCC)     prostate    History of pulmonary embolus (PE)     Hypercholesterolemia     Hypertension     Personal history of DVT (deep vein thrombosis)     Pneumonia     Stroke (HCC)    [2]   Past Surgical History:  Procedure Laterality Date    KNEE SURGERY Left     PROSTATECTOMY      TOTAL SHOULDER REPLACEMENT     [3]   Social History  Tobacco Use   Smoking Status Never   Smokeless Tobacco Never   [4] No  family history on file.  [5]   Allergies  Allergen Reactions    Misc. Sulfonamide Containing Compounds Rash    Penicillins Rash    Sulfa Antibiotics Rash

## 2025-06-07 NOTE — ED ATTENDING ATTESTATION
Final Diagnoses:     1. Stroke (HCC)    2. Stroke-like symptoms      ED Course as of 06/08/25 0303   Sat Jun 07, 2025   0315 WBC(!): 14.43   0316 Hemoglobin: 13.2   0316 Platelet Count(!): 643       Antonio BUNN MD, saw and evaluated the patient. All available labs and X-rays were ordered by me or the resident / non-physician and have been reviewed by myself. I discussed the patient with the resident / non-physician and agree with the resident's / non-physician practitioner's findings and plan as documented in the resident's / non-physician practicitioner's note, except where noted.   At this point, I agree with the current assessment done in the ED.   I was present during key portions of all procedures performed unless otherwise stated.     HPI:  NURSING TRIAGE:    This is a 84 y.o. male presenting for evaluation of possible stroke.  Perhapse 30 minutes PTA had onset of difficulty speaking (worse than baseline which is always slow 2/2 previous stroke) as well as facial assymetry (worsE?) and slurring.   No f/ch/n/v/cp/sob  Nothing prompted this.    Hx of CVA, on eliquis and is compliant.   No falls/trauma.    +ASA Chief Complaint   Patient presents with    CVA/TIA-like Symptoms     PT brought in by EMS from home after wife called 911 bc pt was experiencing slurred speech and right side facial droop around 2am today.       PHYSICAL: ASSESSMENT + PLAN:   Pertinent: aphasia  R facial droop  Slurred speech  No obvious head trauma.   2+ radial pulses.     General: VS reviewed  Appears in NAD  awake, alert.   Well-nourished, well-developed. Appears stated age.   Head: Normocephalic, atraumatic  Eyes: EOM-I. No diplopia.   No hyphema.   No subconjunctival hemorrhages.  Symmetrical lids.   ENT: Atraumatic external nose and ears.    MMM  No malocclusion. No stridor. No drooling. Normal swallowing.   Neck: No JVD.  CV: No pallor noted  Lungs:   No tachypnea  No respiratory distress  Abd: soft nt nd no  rebound/guarding  MSK:   FROM spontaneously  Skin: Dry, intact.   Psychiatric/Behavioral: interacting normally; appropriate mood/affect.    Exam: deferred    Vitals:    06/07/25 1744 06/07/25 1921 06/1938 06/07/25 2201   BP: 137/76 132/77 132/74 135/74   BP Location: Left arm Left arm  Left arm   Pulse: 76 71 69 72   Resp: 18 18     Temp: 98.1 °F (36.7 °C) 98.2 °F (36.8 °C)  97.6 °F (36.4 °C)   TempSrc: Oral Oral  Oral   SpO2: 94% 96% 95% 98%   Weight:        - Will get an EKG for evaluation of dysrrhythmia, AF. Troponin for strain.   - Will get CBC for anemia; check electrolytes as alternative cause for AMS  - Will check urine for infection.   - I think getting a CTH for r/o bleed would be reasonable.  - I think getting CTA for r/o LVO would be reasonable as well.   - The patient does NOT need initiation of IV thrombolytics: On AC.       There are no obvious limitations to social determinants of care.   Nursing note reviewed.   Vitals reviewed.   Orders placed by myself and/or advanced practitioner / resident.    Previous chart was reviewed  History obtained from: EMS and Patient  Language barrier: None  Limitations to the history obtained: None Critical Care Time:      Past Medical: Past Surgical:    has a past medical history of Cancer (HCC), History of pulmonary embolus (PE), Hypercholesterolemia, Hypertension, Personal history of DVT (deep vein thrombosis), Pneumonia, and Stroke (HCC).  has a past surgical history that includes Total shoulder replacement; Prostatectomy; and Knee surgery (Left).   Social: Cardiac (Echo/Cath)   Social History     Substance and Sexual Activity   Alcohol Use No     Tobacco Use History[1]  Social History     Substance and Sexual Activity   Drug Use No    No results found for this or any previous visit.    No results found for this or any previous visit.    No results found for this or any previous visit.     Labs: Imaging:   Labs Reviewed   BASIC METABOLIC PANEL - Abnormal        Result Value Ref Range Status    Sodium 141  135 - 147 mmol/L Final    Potassium 4.0  3.5 - 5.3 mmol/L Final    Chloride 107  96 - 108 mmol/L Final    CO2 27  21 - 32 mmol/L Final    ANION GAP 7  4 - 13 mmol/L Final    BUN 26 (*) 5 - 25 mg/dL Final    Creatinine 1.14  0.60 - 1.30 mg/dL Final    Comment: Standardized to IDMS reference method    Glucose 150 (*) 65 - 140 mg/dL Final    Comment: If the patient is fasting, the ADA then defines impaired fasting glucose as > 100 mg/dL and diabetes as > or equal to 123 mg/dL.    Calcium 9.5  8.4 - 10.2 mg/dL Final    eGFR 57  ml/min/1.73sq m Final    Narrative:     National Kid[employer] Foundation guidelines for Chronic Kidney Disease (CKD):     Stage 1 with normal or high GFR (GFR > 90 mL/min/1.73 square meters)    Stage 2 Mild CKD (GFR = 60-89 mL/min/1.73 square meters)    Stage 3A Moderate CKD (GFR = 45-59 mL/min/1.73 square meters)    Stage 3B Moderate CKD (GFR = 30-44 mL/min/1.73 square meters)    Stage 4 Severe CKD (GFR = 15-29 mL/min/1.73 square meters)    Stage 5 End Stage CKD (GFR <15 mL/min/1.73 square meters)  Note: GFR calculation is accurate only with a steady state creatinine   CBC AND PLATELET - Abnormal    WBC 14.43 (*) 4.31 - 10.16 Thousand/uL Final    RBC 4.96  3.88 - 5.62 Million/uL Final    Hemoglobin 13.2  12.0 - 17.0 g/dL Final    Hematocrit 41.8  36.5 - 49.3 % Final    MCV 84  82 - 98 fL Final    MCH 26.6 (*) 26.8 - 34.3 pg Final    MCHC 31.6  31.4 - 37.4 g/dL Final    RDW 18.7 (*) 11.6 - 15.1 % Final    Platelets 643 (*) 149 - 390 Thousands/uL Final    MPV 10.3  8.9 - 12.7 fL Final   APTT - Abnormal    PTT 35 (*) 23 - 34 seconds Final    Comment: Therapeutic Heparin Range =  60-90 seconds   LIPID PANEL WITH DIRECT LDL REFLEX - Abnormal    Cholesterol 182  See Comment mg/dL Final    Comment: Cholesterol:         Pediatric <18 Years        Desirable          <170 mg/dL      Borderline High    170-199 mg/dL      High               >=200  mg/dL        Adult >=18 Years            Desirable         <200 mg/dL      Borderline High   200-239 mg/dL      High              >239 mg/dL      Triglycerides 227 (*) See Comment mg/dL Final    Comment: Triglyceride:     0-9Y            <75mg/dL     10Y-17Y         <90 mg/dL       >=18Y     Normal          <150 mg/dL     Borderline High 150-199 mg/dL     High            200-499 mg/dL        Very High       >499 mg/dL    Specimen collection should occur prior to Metamizole administration due to the potential for falsely depressed results.    HDL, Direct 32 (*) >=40 mg/dL Final    LDL Calculated 105 (*) 0 - 100 mg/dL Final    Comment: LDL Cholesterol:     Optimal           <100 mg/dl     Near Optimal      100-129 mg/dl     Above Optimal       Borderline High 130-159 mg/dl       High            160-189 mg/dl       Very High       >189 mg/dl         This screening LDL is a calculated result.   It does not have the accuracy of the Direct Measured LDL in the monitoring of patients with hyperlipidemia and/or statin therapy.   Direct Measure LDL ([id number]) must be ordered separately in these patients.   POCT GLUCOSE - Abnormal    POC Glucose 161 (*) 65 - 140 mg/dl Final   PROTIME-INR - Normal    Protime 14.5  12.3 - 15.0 seconds Final    INR 1.10  0.85 - 1.19 Final    Narrative:     INR Therapeutic Range    Indication                                             INR Range      Atrial Fibrillation                                               2.0-3.0  Hypercoagulable State                                    2.0.2.3  Left Ventricular Asist Device                            2.0-3.0  Mechanical Heart Valve                                  -    Aortic(with afib, MI, embolism, HF, LA enlargement,    and/or coagulopathy)                                     2.0-3.0 (2.5-3.5)     Mitral                                                             2.5-3.5  Prosthetic/Bioprosthetic Heart Valve               2.0-3.0  Venous  "thromboembolism (VTE: VT, PE        2.0-3.0   HS TROPONIN I 0HR - Normal    hs TnI 0hr 35  \"Refer to ACS Flowchart\"- see link ng/L Final    Comment:                                              Initial (time 0) result  If >=50 ng/L, Myocardial injury suggested ;  Type of myocardial injury and treatment strategy  to be determined.  If 5-49 ng/L, a delta result at 2 hours will be needed to further evaluate.  If <4 ng/L, and chest pain has been >3 hours since onset, patient may qualify for discharge based on the HEART score in the ED.  If <5 ng/L and <3hours since onset of chest pain, a delta result at 2 hours will be needed to further evaluate.    HS Troponin 99th Percentile URL of a Health Population=12 ng/L with a 95% Confidence Interval of 8-18 ng/L.    Second Troponin (time 2 hours)  If calculated delta >= 20 ng/L,  Myocardial injury suggested ; Type of myocardial injury and treatment strategy to be determined.  If 5-49 ng/L and the calculated delta is 5-19 ng/L, consult medical service for evaluation.  Continue evaluation for ischemia on ecg and other possible etiology and repeat hs troponin at 4 hours.  If delta is <5 ng/L at 2 hours, consider discharge based on risk stratification via the HEART score (if in ED), or OMI risk score in IP/Observation.    HS Troponin 99th Percentile URL of a Health Population=12 ng/L with a 95% Confidence Interval of 8-18 ng/L.   HS TROPONIN I 2HR - Normal    hs TnI 2hr 38  \"Refer to ACS Flowchart\"- see link ng/L Final    Comment:                                              Initial (time 0) result  If >=50 ng/L, Myocardial injury suggested ;  Type of myocardial injury and treatment strategy  to be determined.  If 5-49 ng/L, a delta result at 2 hours will be needed to further evaluate.  If <4 ng/L, and chest pain has been >3 hours since onset, patient may qualify for discharge based on the HEART score in the ED.  If <5 ng/L and <3hours since onset of chest pain, a delta result at " 2 hours will be needed to further evaluate.    HS Troponin 99th Percentile URL of a Health Population=12 ng/L with a 95% Confidence Interval of 8-18 ng/L.    Second Troponin (time 2 hours)  If calculated delta >= 20 ng/L,  Myocardial injury suggested ; Type of myocardial injury and treatment strategy to be determined.  If 5-49 ng/L and the calculated delta is 5-19 ng/L, consult medical service for evaluation.  Continue evaluation for ischemia on ecg and other possible etiology and repeat hs troponin at 4 hours.  If delta is <5 ng/L at 2 hours, consider discharge based on risk stratification via the HEART score (if in ED), or OMI risk score in IP/Observation.    HS Troponin 99th Percentile URL of a Health Population=12 ng/L with a 95% Confidence Interval of 8-18 ng/L.    Delta 2hr hsTnI 3  <20 ng/L Final   HEMOGLOBIN A1C    Hemoglobin A1C 5.6  Normal 4.0-5.6%; PreDiabetic 5.7-6.4%; Diabetic >=6.5%; Glycemic control for adults with diabetes <7.0% % Final      mg/dl Final    CTA stroke alert (head/neck)   Final Result      No intracranial large vessel occlusion, aneurysm, dissection, or high-grade stenosis.      Moderate atherosclerotic plaque left proximal cervical ICA resulting in 60% stenosis. This plaque was noted on prior examinations with few areas of eccentric thrombus/loose plaque. No central thrombus identified at this time, however, this may be a    source of emboli               I personally discussed this study with Dr. Palacio on 6/7/2025 3:43 AM.               Workstation performed: [id number]         CT stroke alert brain   Final Result      No acute intracranial abnormality.  Chronic microangiopathic changes.            I personally discussed this study with Dr. Palacio on 6/7/2025 3:33 AM.            Workstation performed: [id number]         MRI brain wo contrast    (Results Pending)   VAS carotid complete study    (Results Pending)      Medications: Code Status:   Medications   aspirin chewable  tablet 81 mg (81 mg Oral Given 6/7/25 0906)   apixaban (ELIQUIS) tablet 5 mg (5 mg Oral Given 6/7/25 1806)   acetaminophen (TYLENOL) tablet 650 mg (has no administration in time range)   ALPRAZolam (XANAX) tablet 0.25 mg (has no administration in time range)   cyanocobalamin (VITAMIN B-12) tablet 500 mcg (500 mcg Oral Given 6/7/25 0906)   famotidine (PEPCID) tablet 40 mg (40 mg Oral Given 6/7/25 0906)   hydroxyurea (HYDREA) capsule 500 mg (500 mg Oral Given 6/8/25 0025)   multivitamin-minerals (CENTRUM) tablet 1 tablet (1 tablet Oral Given 6/7/25 0906)   sertraline (ZOLOFT) tablet 50 mg (50 mg Oral Given 6/7/25 0906)   ondansetron (ZOFRAN) injection 4 mg (has no administration in time range)   atorvastatin (LIPITOR) tablet 80 mg (80 mg Oral Given 6/7/25 1806)   iohexol (OMNIPAQUE) 350 MG/ML injection (SINGLE-DOSE) 85 mL (85 mL Intravenous Given 6/7/25 0310)    Code Status: Level 1 - Full Code  Advance Directive and Living Will:      Power of :    POLST:       Orders Placed This Encounter   Procedures    CT stroke alert brain    CTA stroke alert (head/neck)    MRI brain wo contrast    Basic metabolic panel    CBC and Platelet    Protime-INR    APTT    HS Troponin 0hr (reflex protocol)    HS Troponin I 2hr    Lipid Panel with Direct LDL reflex    Hemoglobin A1c w/EAG Estimation    Diet Cardiovascular; Cardiac    Continuous cardiac monitoring    Continuous pulse oximetry    Weigh patient and record    Nursing dysphagia Screen    Fingerstick Glucose (POCT)    Vital Signs q 1hour x 4 hours, then q 2hours x 8 hours, then q 4hours x 72 hours    Notify physician    I/O    Neuro checks    Provide Stroke Education to Patients    Nursing dysphagia screen prior to starting diet    Baseline NIH stroke scale on Admission    Reassess NIH stroke scale every 24 hours for 2 days    NIH stroke scale at Discharge    Activity as Tolerated    Out of Bed to Chair    24 Hour Telemetry Monitoring    NIH stroke scale at Discharge     Activity as Tolerated    Out of Bed to Chair    Apply SCD or Foot pumps    Level 1-Full Code: all life saving measures are indicated    Consult to Neurology    Inpatient consult to Physical Medicine Rehab    Inpatient consult to Case Management    Inpatient Consult to Nutrition Services    Inpatient consult to Vascular Surgery    Inpatient consult to Neurology    OT eval and treat    PT eval and treat    SPEECH Language eval and treatment    ECG 12 lead    ECG 12 lead    Echo complete w/ contrast if indicated    INPATIENT ADMISSION     Time reflects when diagnosis was documented in both MDM as applicable and the Disposition within this note       Time User Action Codes Description Comment    6/7/2025  2:51 AM Jade Mcgovern Add [I63.9] Stroke (HCC)     6/7/2025  3:12 AM Cody Mccrary Add [R29.90] Stroke-like symptoms           ED Disposition       ED Disposition   Admit    Condition   Stable    Date/Time   Sat Jun 7, 2025  3:41 AM    Comment   --             Follow-up Information    None       Current Discharge Medication List        CONTINUE these medications which have NOT CHANGED    Details   acetaminophen (TYLENOL) 500 mg tablet Take 500 mg by mouth every 4 (four) hours      ALPRAZolam (XANAX) 0.25 mg tablet       aspirin 81 MG tablet Take by mouth      cyanocobalamin (VITAMIN B-12) 500 MCG tablet Take 1 tablet by mouth in the morning.      diltiazem (CARDIZEM CD) 120 mg 24 hr capsule Take 120 mg by mouth in the morning.      Eliquis 5 MG Take 1 tablet (5 mg total) by mouth 2 (two) times a day  Qty: 60 tablet, Refills: 5    Associated Diagnoses: Other pulmonary embolism without acute cor pulmonale, unspecified chronicity (HCC)      famotidine (PEPCID) 40 MG tablet Take 40 mg by mouth every 12 (twelve) hours      furosemide (LASIX) 40 mg tablet Take 1 tablet (40 mg total) by mouth daily  Qty: 30 tablet, Refills: 4    Associated Diagnoses: Pulmonary arterial hypertension (HCC)      hydroxyurea (HYDREA) 500  mg capsule Take 1 capsule (500 mg total) by mouth 2 (two) times a day Take 500 mg in AM and 1000 mg in PM  Qty: 180 capsule, Refills: 1    Associated Diagnoses: DARIO-2 gene mutation      lisinopril (ZESTRIL) 10 mg tablet Take 10 mg by mouth      meclizine (ANTIVERT) 25 mg tablet Take 1 tablet (25 mg total) by mouth 3 (three) times a day as needed for dizziness  Qty: 30 tablet, Refills: 0    Associated Diagnoses: Dizziness      Multiple Vitamin tablet Take 1 tablet by mouth in the morning.      omeprazole (PriLOSEC) 20 mg delayed release capsule TAKE 1 CAPSULE BY MOUTH EVERY DAY  Qty: 90 capsule, Refills: 1    Associated Diagnoses: GERD without esophagitis      ondansetron (ZOFRAN) 4 mg tablet Take 1 tablet (4 mg total) by mouth every 8 (eight) hours as needed for nausea or vomiting  Qty: 15 tablet, Refills: 0    Associated Diagnoses: Nausea and vomiting      Riociguat (Adempas) 1 MG TABS Take 1 tablet (1 mg total) by mouth 3 (three) times a day  Qty: 90 tablet, Refills: 3    Associated Diagnoses: PAH (pulmonary artery hypertension) ([facility]); Chronic heart failure with preserved ejection fraction (HFpEF) ([facility])      sertraline (ZOLOFT) 50 mg tablet TAKE 1 TABLET BY MOUTH EVERY DAY  Qty: 90 tablet, Refills: 2    Associated Diagnoses: Anxiety           No discharge procedures on file.  Prior to Admission Medications   Prescriptions Last Dose Informant Patient Reported? Taking?   ALPRAZolam (XANAX) 0.25 mg tablet  Self Yes No   Eliquis 5 MG   No No   Sig: Take 1 tablet (5 mg total) by mouth 2 (two) times a day   Multiple Vitamin tablet  Self Yes No   Sig: Take 1 tablet by mouth in the morning.   Riociguat (Adempas) 1 MG TABS   No No   Sig: Take 1 tablet (1 mg total) by mouth 3 (three) times a day   acetaminophen (TYLENOL) 500 mg tablet  Self Yes No   Sig: Take 500 mg by mouth every 4 (four) hours   aspirin 81 MG tablet  Self Yes No   Sig: Take by mouth   cyanocobalamin (VITAMIN B-12) 500 MCG tablet  Self Yes No   Sig: Take 1  "tablet by mouth in the morning.   diltiazem (CARDIZEM CD) 120 mg 24 hr capsule   Yes No   Sig: Take 120 mg by mouth in the morning.   famotidine (PEPCID) 40 MG tablet   Yes No   Sig: Take 40 mg by mouth every 12 (twelve) hours   furosemide (LASIX) 40 mg tablet   No No   Sig: Take 1 tablet (40 mg total) by mouth daily   hydroxyurea (HYDREA) 500 mg capsule   No No   Sig: Take 1 capsule (500 mg total) by mouth 2 (two) times a day Take 500 mg in AM and 1000 mg in PM   Patient taking differently: Take 500 mg by mouth in the morning and 500 mg in the evening. Take 500 mg in AM and 500mg in PM.   lisinopril (ZESTRIL) 10 mg tablet  Self Yes No   Sig: Take 10 mg by mouth   meclizine (ANTIVERT) 25 mg tablet  Self No No   Sig: Take 1 tablet (25 mg total) by mouth 3 (three) times a day as needed for dizziness   omeprazole (PriLOSEC) 20 mg delayed release capsule   No No   Sig: TAKE 1 CAPSULE BY MOUTH EVERY DAY   Patient not taking: Reported on 6/4/2025   ondansetron (ZOFRAN) 4 mg tablet   No No   Sig: Take 1 tablet (4 mg total) by mouth every 8 (eight) hours as needed for nausea or vomiting   sertraline (ZOLOFT) 50 mg tablet   No No   Sig: TAKE 1 TABLET BY MOUTH EVERY DAY      Facility-Administered Medications: None                        Portions of the record may have been created with voice recognition software. Occasional wrong word or \"sound a like\" substitutions may have occurred due to the inherent limitations of voice recognition software. Read the chart carefully and recognize, using context, where substitutions have occurred.    Electronically signed by:  Antonio Orozco       [1]   Social History  Tobacco Use   Smoking Status Never   Smokeless Tobacco Never     "

## 2025-06-07 NOTE — CONSULTS
Consultation - Neurology   Name: Obdulio Mccauley 84 y.o. male I MRN: [id number]  Unit/Bed#: [facility] 712-01 I Date of Admission: 6/7/2025   Date of Service: 6/7/2025 I Hospital Day: 0   Inpatient consult to Neurology  Consult performed by: Alberto Sterling DO  Consult ordered by: Alberto Sterling DO        Physician Requesting Evaluation: Fernando Calixto, *   Reason for Evaluation / Principal Problem: Stroke alert    Assessment & Plan  Stroke-like symptoms  85yo M w/ a PMH of chronic PE on eliquis, recent stroke in 04/2025 (L parietal and L occipital found to have L ICA thrombus), essential thrombosis w/ JAK2 mutation, HTN, HLD coming in as a stroke alert on 6/7/2025  2:53 AM with initial NIHSS of 5 and LKW 1:45am 06/07/2025, initial Blood Pressure: 154/70. Initial presenting deficits were R facial droop, slurred speech and mild aphasia. EMS found on EKG runs of bigeminy and noted to be on telemetry as well  As a result of currently on eliquis, pt was determined to not be a candidate for thrombolysis (TNK).   Exam on 06/07/25: R facial droop, dysarthria, and mild aphasia  Current Blood Pressure: 137/76, BP over 24 hours: BP  Min: 101/55  Max: 188/93   Vascular risk factors: HTN, HLD, past stroke   Home meds: eliquis 5mg BID and ASA 81mg     Past workup:   - 4/14/2025 CTA H/N: Clot in the proximal LEFT internal carotid artery is less pronounced and the central clot is no longer present.  No intracranial branch occlusion.  Minimal hypodensity in the LEFT frontal lobe corresponding to the infarcts on MRI. No intraparenchymal hematoma, mass effect, or midline shift.   - MRI Brain 4/11/2025: Multiple areas of restricted diffusion involving the LEFT cerebral hemisphere, most pronounced in the LEFT anterior frontal lobe. There is involvement of the LEFT parietal lobe and LEFT occipital lobe. Scattered areas of underlying susceptibility artifact consistent with petechial hemorrhage. No space-occupying hematoma, mass effect, or  midline shift.  Scattered areas of increased T2/FLAIR signal within the white matter suggestive of chronic small vessel ischemic changes. Flow voids at the skull base visualized. Basal cisterns appear normal. Old small infarcts in the bilateral cerebellar hemispheres.    Workup:   CTH: No acute intracranial abnormality. Chronic microangiopathic changes.   CTA: No intracranial LVO, aneurysm, dissection, or high-grade stenosis. Moderate atherosclerotic plaque L proximal cervical ICA resulting in 60% stenosis. This plaque was noted on prior examinations with few areas of eccentric thrombus/loose plaque. No central thrombus identified at this time, however, this may be a source of emboli   MRI Brain: Pending   TTE: Pending   Telemetry: Noted multiple PVCs and runs of bigeminy   , A1c 5.6%    Pertinent scores:  - NIHSS: 5  Stroke Modified [place] Score: 0 (No baseline symptoms/disability)    Impression: New onset R facial droop, slurred speech and expressive aphasia in setting of known L ICA thrombus as suspected cause of symptoms and requires further work up.    Plan:  Case discussed with neurology attending Dr. Yancey  Stroke pathway  BP: Permissive hypertension for first 24 hours up to 220 SBP with gradual reduction to normotension over days  Increased atorvastatin from 40 mg to 80 mg qHS given elevated LDL (goal LDL <70)  Maintain glucose <180, SSI for coverage if indicated  Antiplatelet agents: ASA 81 mg   AC: Continue home eliquis 5 mg BID (takes for chronic PEs)  TTE pending  MRI brain pending  DVT ppx and SCDs  Monitor on telemetry  PT/OT/ST/PMR input appreciated  Stroke education  Recommend vascular surgery consultation for evaluation of L ICA thrombus  Medicine team to work up noted bigeminy and rest of care as per primary team  Cerebrovascular accident (CVA) (HCC)  Noted hx of stroke 4/2025 Patient had acute CVA with R sided weakness and speech changes (facial droop and aphasia).   MRI brain confirmed  "multiple embolic strokes in the L hemisphere, and CTA head and neck revealed L ICA thrombus.   Patient was deemed not to be a mechanical thrombectomy candidate. He was admitted for workup. He is maintained on aspirin, eliquis for stroke prevention. He follows with hematology/oncology as well.     Plan  Refer to plan above under \"stroke-like symptoms\"  Continue home ASA 81 mg and Lipitor      Thrombolytic Decision: Patient not a candidate. Bleeding risk. On Eliquis.    Recommendations for outpatient neurological follow up have yet to be determined.    History of Present Illness   Reason for Consult / Principal Problem: R facial droop, slurred speech, and mild aphasia  Hx and PE limited by: mild aphasia and slurred speech   Patient last known well: 1:45am 06/07/2025  Stroke alert called: 2:39am 06/07/2025  Neurology time of arrival: 2:42am 06/07/2025     HPI: Obdulio Broley is a 84 y.o. le w/ a PMH of chronic PE on eliquis, recent stroke in 04/2025 (L parietal and L occipital found to have L ICA thrombus), essential thrombosis w/ JAK2 mutation, HTN, HLD coming in as a stroke alert on 6/7/2025  2:53 AM with initial NIHSS of 5 and LKW 1:45am 06/07/2025, initial Blood Pressure: 154/70. Initial presenting deficits were R facial droop, slurred speech and mild aphasia.      Patient was noted to be watching television with his wife when all of a sudden he started having trouble speaking and initially had a hard time moving his body that he does not remember all that well.  His wife who is a former nurse was concerned for a stroke and called 911.  When EMS arrived they had noted that patient was able to speak and had a weakness but was noted to have right facial droop, slurred speech and noted aphasia.  EMS found on EKG runs of bigeminy and noted to be on telemetry as well     Discussed with patient given he is on his Eliquis at baseline and has been fine with that he is not candidate for TN K.  He has been taking diligently " his aspirin and Lipitor.  He does note that he had a previous stroke about a few months ago in [place] in April 2025.  He was found at that time to have left parietal and left occipital strokes with a left ICA thrombus.  At that time he was noted to be on documentation presenting with right-sided weakness and facial droop and slurred speech.  He does not remember very well that there was weakness when asked about this.  He does state that he no longer had residual deficits until confirmed on recent outpatient stroke visit when he establish care.     Patient seen and examined at the bedside this morning. Patient continues to have right facial droop, dysarthria, and expressive aphasia. He confirms that there were multiple weeks within the past month that he was not taking Eliquis due to running out of supply. Discussed plan to obtain MRI brain, consult vascular surgery for L ICA thrombus, and increased atorvastatin from 40 to 80 mg which he acknowledges.     Review of Systems  A 12 point ROS was completed. Other than the above mentioned  complaints, all remaining systems were negative.    Medical History Review: I have reviewed the patient's PMH, PSH, Social History, Family History, Meds, and Allergies     Objective :  Temp:  [97.4 °F (36.3 °C)-98.1 °F (36.7 °C)] 98.1 °F (36.7 °C)  HR:  [62-85] 76  BP: (101-188)/(55-93) 137/76  Resp:  [18-22] 18  SpO2:  [90 %-97 %] 94 %  O2 Device: None (Room air)    Physical Exam     Eyes:      Extraocular Movements: EOM normal.      Pupils: Pupils are equal, round, and reactive to light.         Neurological:      Coordination: Finger-Nose-Finger Test normal.      Psychiatric:         Speech: Speech is slurred.            Neurologic Exam      Mental Status   Attention: normal. Concentration: normal.   Speech: slurred   Oriented to place  Disoriented to month (December), year (2024), age (75)  Severe dysarthria  Mild expressive aphasia  Able to correctly identify glasses, pen,  watch  Shows 2 fingers and thumbs up on command  Unable to follow complex 4-step command      Cranial Nerves      CN II   Right visual field deficit: none  Left visual field deficit: none      CN III, IV, VI   Pupils are equal, round, and reactive to light.  Extraocular motions are normal.      CN V   Right facial sensation deficit: none  Left facial sensation deficit: none     CN VII   Right facial weakness: central  Left facial weakness: none     CN VIII   Hearing: intact     CN IX, X   Palate: symmetric     CN XI   Right sternocleidomastoid strength: normal  Left sternocleidomastoid strength: normal  Right trapezius strength: normal  Left trapezius strength: normal     CN XII   Tongue: not atrophic  Tongue deviation: none     Motor Exam   Muscle bulk: normal  Overall muscle tone: normal     Strength   Strength 5/5 except as noted.   R shoulder abduction 4+/5  R hip flexion 5-/5      Sensory Exam   Light touch normal.      Gait, Coordination, and Reflexes      Coordination   Finger to nose coordination: normal  Deferred gait testing     Per resident present during stroke alert:  NIHSS:  1a.Level of Consciousness: 0 = Alert   1b. LOC Questions: 2 = Answers neither correctly   1c. LOC Commands: 0 = Obeys both correctly   2. Best Gaze: 0 = Normal   3. Visual: 0 = No visual field loss   4. Facial Palsy: 1=Minor paralysis (flattened nasolabial fold, asymmetric on smiling)   5a. Motor Right Arm: 0=No drift, limb holds 90 (or 45) degrees for full 10 seconds   5b. Motor Left Arm: 0=No drift, limb holds 90 (or 45) degrees for full 10 seconds   6a. Motor Right Le=No drift, limb holds 90 (or 45) degrees for full 10 seconds   6b. Motor Left Le=No drift, limb holds 90 (or 45) degrees for full 10 seconds   7. Limb Ataxia:  0=Absent   8. Sensory: 0=Normal; no sensory loss   9. Best Language:  1=Mild to moderate aphasia; some obvious loss of fluency or facility of comprehension without significant limitation on ideas  expressed or form of expression.   10. Dysarthria: 1=Mild to moderate, patient slurs at least some words and at worst, can be understood with some difficulty   11. Extinction and Inattention (formerly Neglect): 0=No abnormality   Total Score: 5      Time NIHSS was completed: 2:39am 06/07/2025     Modified [place] Score:  0 (No baseline symptoms/disability)      Lab Results: I have reviewed the following results:CBC/BMP:   .     06/07/25  0300   WBC 14.43*   HGB 13.2   HCT 41.8   *   SODIUM 141   K 4.0      CO2 27   BUN 26*   CREATININE 1.14   GLUC 150*        Imaging Results Review: I personally reviewed the following image studies in PACS and associated radiology reports: CTH, CTA H/N. My interpretation of the radiology images/reports is: agree with radiology.  Other Study Results Review: EKG was reviewed.     VTE Prophylaxis: VTE covered by:  apixaban, Oral, 5 mg at 06/07/25 1806    and Sequential compression device (Venodyne)     Code Status: Level 1 - Full Code  Advance Directive and Living Will:      Power of :    POLST:          Alberto Sterling DO  [facility]  Neurology Residency PGY-II

## 2025-06-07 NOTE — ASSESSMENT & PLAN NOTE
With history of acute pulmonary [place] February 2023, follows with [facility]'s heart failure/pulmonary hypertension team  Appears fairly euvolemic at this time.  Majority of medications for this currently on hold however will continue PTA Eliquis.  Monitor volume status closely

## 2025-06-07 NOTE — H&P
H&P - Hospitalist   Name: Obdulio Mccauley 84 y.o. male I MRN: [id number]  Unit/Bed#: ED 01 I Date of Admission: 6/7/2025   Date of Service: 6/7/2025 I Hospital Day: 0     Assessment & Plan  Stroke-like symptoms  With recent diagnosis of CVA 4/2025 at an outside health system with MRI at that time revealing multiple embolic strokes in the left hemisphere with CTA head/neck demonstrating left ICA thrombus  Presented this evening with right sided facial droop and slurred speech/aphasia, last known well 0145H 6/7/2025  Patient stroke alert on arrival, CT head negative for acute intracranial pathology, CTA head/neck negative for LVO/aneurysm/dissection or AVMs.  Previous left ICA thrombus again noted  Seen by overnight neurology resident on-call who discussed with his attending, not TNK candidate due to bleeding risk from Eliquis use additionally with recent CVA.  Advised admission under stroke pathway for further workup.  Await formal neurology consultation.  Obtain MRI brain, TTE.  FLP/A1c obtained and reviewed.  Monitor neurochecks/telemetry  Continue ASA and Eliquis.  Started on atorvastatin 40 mg nightly, continue  Allow permissive hypertension for 24 hours up to   PT/OT/speech evaluations as appropriate  Cerebrovascular accident (CVA) (HCC)  Patient recently hospitalized 4/2025 at an outside hospital with acute CVA and right-sided weakness/speech changes with facial droop and aphasia.  MRI brain confirmed multiple embolic strokes in the left hemisphere with CT head/neck revealing left ICA thrombus.    Not TNK candidate at that time due to Eliquis use, not mechanical thrombectomy candidate at that time  Now with recurrence of the right facial droop and aphasia, management as per primary problem.  Appreciate ongoing neurology recommendations.  Essential thrombocytosis (HCC)  Longstanding history of this following with [facility] hematology, continue PTA ASA and hydroxyurea  CTEPH (chronic thromboembolic  pulmonary hypertension) (HCC)  With history of acute pulmonary [place] February 2023, follows with [facility] heart failure/pulmonary hypertension team  Appears fairly euvolemic at this time.  Majority of medications for this currently on hold however will continue PTA Eliquis.  Monitor volume status closely  Primary hypertension  Holding PTA antihypertensives first 24 hours to allow permissive hypertension, can provide as needed IV antihypertensive for SBP sustained above 220 during this time  Monitor blood pressure per protocol  Bigeminy  Bigeminy/trigeminy noted on telemetry monitoring, patient denying any chest pain, dizziness/lightheadedness, or other associated symptoms  Uncertain significance of finding, will monitor on telemetry for now.  Follow-up TTE.    ADDENDUM 0510H: Updated patient's daughter at bedside.  Of note, she states patient has not been fully compliant with Eliquis, states he had not refilled this medication for at least a 2-week period before 6/2/2025.  Patient eventually confirm this, however states he has taken Eliquis since refill.  Discussed with neurology overnight resident on-call, will continue workup on stroke pathway as above.  Concerns also from same daughter that patient may not be fully compliant with other medications, and expressed concerns for increasing forgetfulnes in the patient and additionally his wife.  Will consult CM to assist with ultimate disposition once patient medically stable for discharge.    VTE Prophylaxis: Apixaban (Eliquis)  / sequential compression device   Code Status: Level 1 - Full Code   Discussion with family: Offered however patient declines at this time    Anticipated Length of Stay:  Patient will be admitted on an Inpatient basis with an anticipated length of stay of greater than 2 midnights.   Justification for Hospital Stay: Strokelike symptoms with concern for recurrent CVA requiring further workup on stroke pathway including MRI brain, TTE,  formal neurology consultation, medication titration, clinical monitoring    Chief Complaint:     Right facial droop and speech difficulties  History of Present Illness:  Obdulio Mccauley is a 84 y.o. male who has a past medical history significant for CVA hospitalized in the [facility] 4/2025 with this with MRI imaging demonstrating multiple left hemisphere embolic infarctions secondary to thrombus in the left ICA, CTEPH following now with [facility] heart failure team and anticoagulated on Eliquis, essential thrombocytosis with JAK2 mutation, hypertension, hyperlipidemia who presented as a stroke alert with right-sided facial droop, slurred speech and mild aphasia.  Patient states he was watching television with his wife this evening when he suddenly had difficulty speaking and difficulty moving which prompted call from wife to EMS.  He denied any fever/chills, headache, focal weakness, numbness/ting/paresthesias, dizziness/lightheadedness, chest pain/pressure, shortness of breath, abdominal pain/nausea/vomiting/diarrhea, dysuria, or other systemic symptoms.      On EMS arrival patient was found with right facial droop and slurred speech/aphasia, and he was made a prehospital stroke alert.  On ED arrival he was taken immediately to CT scan with CT head noted negative for acute intracranial pathology and CTA head/neck negative for LVO/aneurysms/dissection/AVMs but with left ICA thrombus redemonstrated.  Patient was seen by the overnight neurology resident on-call who discussed with his attending, patient not TNK candidate due to use of Eliquis.  Given presenting symptoms admission under stroke pathway was advised and patient admitted to hospitalist service for facilitation of this/further management.    Review of Systems:    Constitutional:  Denies fever or chills   Eyes:  Denies change in visual acuity   HENT:  Denies nasal congestion or sore throat   Respiratory:  Denies cough or shortness of breath    Cardiovascular:  Denies chest pain or edema   GI:  Denies abdominal pain, nausea, vomiting, bloody stools or diarrhea   :  Denies dysuria   Musculoskeletal:  Denies back pain or joint pain   Integument:  Denies rash   Neurologic:  Denies headache, focal weakness or sensory changes but reported facial droop and speech difficulties  Endocrine:  Denies polyuria or polydipsia   Lymphatic:  Denies swollen glands   Psychiatric:  Denies depression or anxiety     Past Medical and Surgical History:   Past Medical History[1]  Past Surgical History[2]    Meds/Allergies:  Current Outpatient Medications   Medication Instructions    acetaminophen (TYLENOL) 500 mg, Every 4 hours    Adempas 1 mg, Oral, 3 times daily    ALPRAZolam (XANAX) 0.25 mg tablet No dose, route, or frequency recorded.    aspirin 81 MG tablet Take by mouth    cyanocobalamin (VITAMIN B-12) 500 MCG tablet 1 tablet, Daily    diltiazem (CARDIZEM CD) 120 mg, Daily    Eliquis 5 mg, Oral, 2 times daily    famotidine (PEPCID) 40 mg, Every 12 hours    furosemide (LASIX) 40 mg, Oral, Daily    hydroxyurea (HYDREA) 500 mg, Oral, 2 times daily, Take 500 mg in AM and 1000 mg in PM    lisinopril (ZESTRIL) 10 mg    meclizine (ANTIVERT) 25 mg, Oral, 3 times daily PRN    Multiple Vitamin tablet 1 tablet, Daily    omeprazole (PRILOSEC) 20 mg, Oral, Daily    ondansetron (ZOFRAN) 4 mg, Oral, Every 8 hours PRN    sertraline (ZOLOFT) 50 mg, Oral, Daily         Allergies: Allergies[3]  History:  Marital Status: /Civil Union     Substance Use History:   Social History     Substance and Sexual Activity   Alcohol Use No     Tobacco Use History[4]  Social History     Substance and Sexual Activity   Drug Use No       Family History:  Family History[5]    Physical Exam:     Vitals:   Blood Pressure: 158/74 (06/07/25 0400)  Pulse: 75 (06/07/25 0330)  Temperature: (!) 97.4 °F (36.3 °C) (06/07/25 0313)  Temp Source: Oral (06/07/25 0313)  Respirations: 18 (06/07/25 0400)  Weight -  Scale: 88.3 kg (194 lb 10.7 oz) (06/07/25 0258)  SpO2: 95 % (06/07/25 0400)    Constitutional:  Well developed, well nourished, no acute distress, non-toxic appearance   Eyes:  PERRL, conjunctiva normal   HENT:  Atraumatic, external ears normal, nose normal, oropharynx moist, no pharyngeal exudates. Neck- normal range of motion, no tenderness, supple   Respiratory:  No respiratory distress, normal breath sounds, no rales, no wheezing   Cardiovascular:  Normal rate, normal rhythm with occasional extrasystole, no murmurs, no gallops, no rubs   GI:  Soft, nondistended, normal bowel sounds, nontender, no organomegaly, no mass, no rebound, no guarding   :  No costovertebral angle tenderness   Musculoskeletal:  No edema, no tenderness, no deformities. Back- no tenderness  Integument:  Well hydrated, no rash   Lymphatic:  No lymphadenopathy noted   Neurologic:  Alert &awake, communicative, CN 2-12 normal except right facial droop present and dysarthria with mild expressive aphasia present, normal motor function, normal sensory function  Psychiatric:  Speech and behavior appropriate       Lab Results: I have reviewed all lab data below:    Results from last 7 days   Lab Units 06/07/25  0300   WBC Thousand/uL 14.43*   HEMOGLOBIN g/dL 13.2   HEMATOCRIT % 41.8   PLATELETS Thousands/uL 643*     Results from last 7 days   Lab Units 06/07/25  0300   SODIUM mmol/L 141   POTASSIUM mmol/L 4.0   CHLORIDE mmol/L 107   CO2 mmol/L 27   BUN mg/dL 26*   CREATININE mg/dL 1.14   ANION GAP mmol/L 7   CALCIUM mg/dL 9.5   GLUCOSE RANDOM mg/dL 150*     Results from last 7 days   Lab Units 06/07/25  0300   INR  1.10     Results from last 7 days   Lab Units 06/07/25  0259   POC GLUCOSE mg/dl 161*     Results from last 7 days   Lab Units 06/07/25  0300   HEMOGLOBIN A1C % 5.6             EKG: Personally reviewed, normal sinus rhythm HR 68, possible LAE, RBBB, LPFB.    Imaging: Results Review Statement: I reviewed radiology reports from this  admission including: CTA head and neck and CT head.    CT stroke alert brain  Result Date: 6/7/2025  Narrative: CT BRAIN - STROKE ALERT PROTOCOL INDICATION:   Stroke Alert. COMPARISON: Multiple priors, most recently CTA report from outside institution 4/14/2025 TECHNIQUE:  CT examination of the brain was performed.  In addition to axial images, coronal reformatted images were created and submitted for interpretation. Radiation dose length product (DLP) for this visit:  1017 mGy-cm. .  This examination, like all CT scans performed in the [facility], was performed utilizing techniques to minimize radiation dose exposure, including the use of iterative reconstruction and automated exposure control. IMAGE QUALITY:  Diagnostic. FINDINGS: PARENCHYMA: Decreased attenuation is noted in periventricular and subcortical white matter demonstrating an appearance that is statistically most likely to represent mild microangiopathic change. No CT signs of acute infarction.  No intracranial mass, mass effect or midline shift.  No acute parenchymal hemorrhage. Normal intracranial vasculature. VENTRICLES AND EXTRA-AXIAL SPACES:  Ventricles and extra-axial CSF spaces are prominent commensurate with the degree of volume loss.  No hydrocephalus.  No acute extra-axial hemorrhage. VISUALIZED ORBITS: Aphakic PARANASAL SINUSES: Normal visualized paranasal sinuses. CALVARIUM AND EXTRACRANIAL SOFT TISSUES:   Normal.     Impression: No acute intracranial abnormality.  Chronic microangiopathic changes. I personally discussed this study with Dr. Palacio on 6/7/2025 3:33 AM. Workstation performed: [id number]     CTA stroke alert (head/neck)  Result Date: 6/7/2025  Narrative: CTA NECK AND BRAIN WITH CONTRAST INDICATION: Stroke Alert COMPARISON:   Multiple priors most recently report from CTA 4/14/2025 TECHNIQUE:  Post contrast imaging was performed after administration of iodinated contrast through the neck and brain. Post contrast  axial 0.625 mm images timed to opacify the arterial system.  3D rendering was performed on an independent workstation.   MIP reconstructions performed. Coronal and sagittal reconstructions were performed of the non contrast portion of the brain. Radiation dose length product (DLP) for this visit:  570 mGy-cm. .  This examination, like all CT scans performed in the [facility], was performed utilizing techniques to minimize radiation dose exposure, including the use of iterative reconstruction and automated exposure control. IV Contrast:  85 mL of iohexol (OMNIPAQUE) IMAGE QUALITY:   Diagnostic FINDINGS: CTA NECK ARCH AND GREAT VESSELS: Visualized arch and great vessels are normal. VERTEBRAL ARTERIES: Mild atherosclerosis at the left vertebral artery origin without high-grade stenosis. Right vertebral artery is patent. RIGHT CAROTID: Mild atherosclerosis. No stenosis.    No dissection. LEFT CAROTID: Moderate atherosclerotic plaque resulting in 60% stenosis, as noted on prior examinations, with few areas of eccentric thrombus. No centrally located thrombi identified at this time. This appearance is similar to findings described on CTA from 4/14/2025. NASCET criteria was used to determine the degree of internal carotid artery diameter stenosis. CTA BRAIN: INTERNAL CAROTID ARTERIES: No stenosis or occlusion. ANTERIOR CEREBRAL ARTERY CIRCULATION:  No stenosis or occlusion. MIDDLE CEREBRAL ARTERY CIRCULATION:  No stenosis or occlusion. DISTAL VERTEBRAL ARTERIES:  No stenosis or occlusion. BASILAR ARTERY:  No stenosis or occlusion. POSTERIOR CEREBRAL ARTERIES: No stenosis or occlusion. VENOUS STRUCTURES:  Normal. NON VASCULAR ANATOMY BRAIN: Post contrast imaging in the arterial phase is unremarkable.  No delayed imaging of the brain was obtained. BONY STRUCTURES:  No acute osseous abnormality. SOFT TISSUES OF THE NECK:  Normal. THORACIC INLET:  Unremarkable.     Impression: No intracranial large vessel  occlusion, aneurysm, dissection, or high-grade stenosis. Moderate atherosclerotic plaque left proximal cervical ICA resulting in 60% stenosis. This plaque was noted on prior examinations with few areas of eccentric thrombus/loose plaque. No central thrombus identified at this time, however, this may be a source of emboli I personally discussed this study with Dr. Palacio on 6/7/2025 3:43 AM. Workstation performed: [id number]         ** Please Note: Dragon 360 Dictation voice to text software was used in the creation of this document. **         [1]   Past Medical History:  Diagnosis Date    Cancer (HCC)     prostate    History of pulmonary embolus (PE)     Hypercholesterolemia     Hypertension     Personal history of DVT (deep vein thrombosis)     Pneumonia     Stroke (HCC)    [2]   Past Surgical History:  Procedure Laterality Date    KNEE SURGERY Left     PROSTATECTOMY      TOTAL SHOULDER REPLACEMENT     [3]   Allergies  Allergen Reactions    Misc. Sulfonamide Containing Compounds Rash    Penicillins Rash    Sulfa Antibiotics Rash   [4]   Social History  Tobacco Use   Smoking Status Never   Smokeless Tobacco Never   [5] No family history on file.

## 2025-06-07 NOTE — ASSESSMENT & PLAN NOTE
Bigeminy/trigeminy noted on telemetry monitoring, patient denying any chest pain, dizziness/lightheadedness, or other associated symptoms  Uncertain significance of finding, will monitor on telemetry for now.  Follow-up TTE.

## 2025-06-07 NOTE — SPEECH THERAPY NOTE
Speech-Language Pathology Bedside Swallow Evaluation      Patient Name: Obdulio Mccauley    Today's Date: 6/7/2025     Dysphagia evaluation [phone]  Language evaluation [phone]    Summary   Pt presented with s/s suggestive of minimal oropharyngeal dysphagia.  Symptoms or concerns included anterior loss/spillage of regular solids, with suspected decreased oral control with thin liquids with cough on 1 of 3 sips. Despite this, patient ate am meal of regular solids and thin liquids and denied concerns for swallowing. RN reported good tolerance of meds with water.    Risk/s for Aspiration: low     Recommended Diet: regular diet and thin liquids   Recommended Form of Meds: whole with liquid   Aspiration precautions and swallowing strategies: upright posture, small bites/sips, and lingual sweep to clear any right side residue  Other Recommendations: Continue frequent oral care; assist with tray set up    Current Medical Status  Chief Complaint:     Right facial droop and speech difficulties  History of Present Illness:  Obdulio Mccauley is a 84 y.o. male who has a past medical history significant for CVA hospitalized in the [facility] 4/2025 with this with MRI imaging demonstrating multiple left hemisphere embolic infarctions secondary to thrombus in the left ICA, CTEPH following now with [facility] heart failure team and anticoagulated on Eliquis, essential thrombocytosis with JAK2 mutation, hypertension, hyperlipidemia who presented as a stroke alert with right-sided facial droop, slurred speech and mild aphasia.  Patient states he was watching television with his wife this evening when he suddenly had difficulty speaking and difficulty moving which prompted call from wife to EMS.  He denied any fever/chills, headache, focal weakness, numbness/ting/paresthesias, dizziness/lightheadedness, chest pain/pressure, shortness of breath, abdominal pain/nausea/vomiting/diarrhea, dysuria, or other systemic symptoms.      On EMS arrival patient was found with right facial droop and slurred speech/aphasia, and he was made a prehospital stroke alert.  On ED arrival he was taken immediately to CT scan with CT head noted negative for acute intracranial pathology and CTA head/neck negative for LVO/aneurysms/dissection/AVMs but with left ICA thrombus redemonstrated.  Patient was seen by the overnight neurology resident on-call who discussed with his attending, patient not TNK candidate due to use of Eliquis.  Given presenting symptoms admission under stroke pathway was advised and patient admitted to hospitalist service for facilitation of this/further management.    Current Precautions: Fall  Aspiration    Allergies:  No known food allergies  Past medical history:  Please see H&P for details    Special Studies:  CT head 6/7/25:   No acute intracranial abnormality. Chronic microangiopathic changes.   MRI is pending    Social/Education/Vocational Hx:  Pt lives with family    Swallow Information   Current Risks for Dysphagia & Aspiration: h/o CVA and r/o CVA. Right side facial droop and dysarthria   Current Symptoms/Concerns: coughing with po  Current Diet: regular diet and thin liquids   Baseline Diet: regular diet and thin liquids    Baseline Assessment   Behavior/Cognition: alert  Speech/Language Status: able to participate in conversation and able to follow commands  Patient Positioning: upright in bed  Pain Status/Interventions/Response to Interventions: No report of or nonverbal indications of pain.     Swallow Mechanism Exam  Facial: right facial droop  Labial: decreased ROM right side  Lingual: WFL  Velum: unable to visualize  Mandible: decreased ROM right side  Dentition: adequate  Vocal quality:clear/adequate   Respiratory Status: on RA       Consistencies Assessed and Performance   Consistencies Administered: thin liquids and hard solids  Materials administered included cookie and thin liquids    Oral Stage: minimal  Mastication  was min prolonged with the materials administered today. Oral containment was reduced on right side. No pocketing observed. Suspect patient had decreased oral control with thin liquids.      Pharyngeal Stage: minimal  Swallow Mechanics:  Swallowing initiation appeared prompt.  Laryngeal rise was palpated and judged to be within functional limits.  Cough observed on 1 of 3 sips thin liquids.     Esophageal Concerns: none reported    Summary and Recommendations (see above)    Results Reviewed with: patient     Treatment Recommended: dysphagia therapy      Frequency of treatment: 1-3 f/u sessions     Patient Stated Goal: none stated     Dysphagia LTG  -Patient will demonstrate safe and effective oral intake (without overt s/s significant oral/pharyngeal dysphagia including s/s penetration or aspiration) for the highest appropriate diet level.     Short Term Goals:  -Pt will tolerate regular solids and thin liquid with no significant s/s oral or pharyngeal dysphagia across 1-3 diagnostic session/s    Speech Therapy Prognosis   Prognosis: fair    Prognosis Considerations: medical status      Language Evaluation     Summary   The patient presents with adequate appearing expressive and receptive language skills. Cognitive status appears overall adequate, but patient was disoriented to month, year and current age. The patient presents with a severe dysarthria and will need more formal evaluation. Speech intelligibility is significantly reduced and it is difficult to understand patient.     Recommendation:  ST in acute care with f/u motor speech evaluation     Patient's goal:     None stated     Language goals not warranted.  LONG TERM GOALS:  -The patient will demonstrate intelligible speech in all activities of daily living including dynamic conversation    SHORT TERM GOALS:  Respiration and Phonation  -Patient will imitate appropriate phrasing and coordination of respiration with speech attempts in formal and informal  speech tasks at the sentence and conversation level  with 90% accuracy.    Articulation  -Patient will use trained strategies (eg slowed rate, over articulation, increased oral opening, writing key word, increased loudness, phrasing)  to improve speech intelligibility in word,  phrases, sentences and  conversation with 90% accuracy.     General Cognitive Status:  Alert with adequate attention    Auditory Comprehension:  Body part ID: [facility]  Left vs Right Body part: initially impaired, but corrected with cues  One step commands: [facility]  Two step commands: [facility]  Complex or 3 step commands: n/a  Picture ID: n/a  Letter ID: n/a  Personal y/n ?'s: [facility]  Simple y/n ?'s: n/a  Complex y/n ?'s: n/a  Paragraph Level Comprehension: n/a  Comprehension of Conversation: [facility]    Reading Comprehension:  Functional comprehension: [facility]    Speech Mechanism Examination:   Facial: right facial droop  Labial: decreased ROM right side  Lingual: [facility]  Velum: unable to visualize  Mandible: decreased ROM right side  Dentition: adequate  Vocal quality:rough     Oral Expression:  Auto Sequences:   Count Forwards: [facility]  Automatic Social Utterances:: [facility]  Word Repetition: n/a  Phrase Completion: [facility]  Confrontation Naming: [facility]  Responsive Naming: [facility]  Divergent Naming: [facility]  Picture Description: n/a  Conversation: [facility]  Able to make basic needs known: yes, but intelligibility is decreased     Written Expression:  Impaired     Motor Speech:  Dysarthria:   Imprecise artic: yes   Rate: [facility]   Nasality: [facility]   Breath support: [facility]   Volume: [facility]  Apraxia: n/a   Oral:   Verbal:  Needs further motor speech evaluation:    Orientation:  Person: [facility]  Place: Hospital, specifically [facility]: with cues  City: impaired  Time of Day: [facility]  Month: impaired (December)  SUSI: n/a  Year: impaired (2024)  Reason for hospitalization: [facility]    Cognitive -linguistic skills:  Grossly intact    Also noted:  Aware of deficits    Results discussed with:  Patient

## 2025-06-07 NOTE — ED PROVIDER NOTES
Time reflects when diagnosis was documented in both MDM as applicable and the Disposition within this note       Time User Action Codes Description Comment    6/7/2025  2:51 AM Jade Mcgovern Add [I63.9] Stroke (HCC)     6/7/2025  3:12 AM Cody Mccrary Add [R29.90] Stroke-like symptoms           ED Disposition       ED Disposition   Admit    Condition   Stable    Date/Time   Sat Jun 7, 2025  3:41 AM    Comment   --             Assessment & Plan       Medical Decision Making  Patient is an 84-year-old male presenting as a prehospital stroke alert.  Having some right facial droop slurring of speech and word finding difficulty.  No focal weakness of the extremities.  Has history of prior CVA.  Last known well 2 AM.  No signs of trauma.  CT scans reviewed and radiology no bleed, does not appear to show occlusion, pending radiology read.  See ED course for EKG.  Has had some ectopy on the monitor is not having any chest pain or shortness of breath.  Will obtain labs, likely admission for stroke pathway, neurology at bedside as well for stroke alert.  See their separate documentation    In discussion with neurology patient to be admitted to medicine for stroke pathway.  Remains hemodynamically stable    Amount and/or Complexity of Data Reviewed  ECG/medicine tests:  Decision-making details documented in ED Course.    Risk  Prescription drug management.  Decision regarding hospitalization.        ED Course as of 06/07/25 0341   Sat Jun 07, 2025   0323 ECG 12 lead  NSR 68 LPFB RBBB BFB no ischemic changes no BENEDICT/STD no change from prior       Medications   atorvastatin (LIPITOR) tablet 40 mg (has no administration in time range)   aspirin chewable tablet 81 mg (has no administration in time range)   apixaban (ELIQUIS) tablet 5 mg (has no administration in time range)   iohexol (OMNIPAQUE) 350 MG/ML injection (SINGLE-DOSE) 85 mL (85 mL Intravenous Given 6/7/25 0310)       ED Risk Strat Scores                    No data  recorded                            History of Present Illness       Chief Complaint   Patient presents with    CVA/TIA-like Symptoms     PT brought in by EMS from home after wife called 911 bc pt was experiencing slurred speech and right side facial droop around 2am today.        Past Medical History[1]   Past Surgical History[2]   Family History[3]   Social History[4]   E-Cigarette/Vaping    E-Cigarette Use Never User       E-Cigarette/Vaping Substances    Nicotine No     THC No     CBD No     Flavoring No     Other No     Unknown No       I have reviewed and agree with the history as documented.     See MDM          Review of Systems   Constitutional:  Negative for chills and fever.   HENT:  Negative for ear pain and sore throat.    Eyes:  Negative for pain and visual disturbance.   Respiratory:  Negative for cough and shortness of breath.    Cardiovascular:  Negative for chest pain and palpitations.   Gastrointestinal:  Negative for abdominal pain and vomiting.   Genitourinary:  Negative for dysuria and hematuria.   Musculoskeletal:  Negative for arthralgias and back pain.   Skin:  Negative for color change and rash.   Neurological:  Positive for speech difficulty. Negative for seizures, syncope, weakness and headaches.   All other systems reviewed and are negative.          Objective       ED Triage Vitals   Temperature Pulse Blood Pressure Respirations SpO2 Patient Position - Orthostatic VS   06/07/25 0313 06/07/25 0258 06/07/25 0304 06/07/25 0258 06/07/25 0258 06/07/25 0304   (!) 97.4 °F (36.3 °C) 81 154/70 18 95 % Lying      Temp Source Heart Rate Source BP Location FiO2 (%) Pain Score    06/07/25 0313 06/07/25 0258 -- -- --    Oral Monitor         Vitals      Date and Time Temp Pulse SpO2 Resp BP Pain Score FACES Pain Rating User   06/07/25 0314 -- 67 96 % 18 181/84 -- -- AG   06/07/25 0313 97.4 °F (36.3 °C) -- -- -- -- -- -- AG   06/07/25 0304 -- 70 94 % 18 154/70 -- -- AG   06/07/25 0258 -- 81 95 % 18 --  -- -- AG            Physical Exam  Vitals and nursing note reviewed.   Constitutional:       General: He is not in acute distress.     Appearance: He is well-developed. He is not ill-appearing.   HENT:      Head: Normocephalic and atraumatic.      Mouth/Throat:      Mouth: Mucous membranes are moist.     Eyes:      Extraocular Movements: Extraocular movements intact.      Conjunctiva/sclera: Conjunctivae normal.       Cardiovascular:      Rate and Rhythm: Normal rate and regular rhythm.      Heart sounds: No murmur heard.  Pulmonary:      Effort: Pulmonary effort is normal. No respiratory distress.      Breath sounds: Normal breath sounds.   Abdominal:      Palpations: Abdomen is soft.      Tenderness: There is no abdominal tenderness.     Musculoskeletal:         General: Normal range of motion.      Cervical back: Normal range of motion and neck supple.      Right lower leg: No edema.      Left lower leg: No edema.     Skin:     General: Skin is warm and dry.      Capillary Refill: Capillary refill takes less than 2 seconds.     Neurological:      General: No focal deficit present.      Mental Status: He is alert.      Cranial Nerves: Dysarthria and facial asymmetry present.      Sensory: No sensory deficit.      Motor: Motor function is intact. No weakness.         Results Reviewed       Procedure Component Value Units Date/Time    Lipid Panel with Direct LDL reflex [[id number]]     Lab Status: No result Specimen: Blood     Hemoglobin A1c w/EAG Estimation [[id number]]     Lab Status: No result Specimen: Blood     HS Troponin I 2hr [[id number]]     Lab Status: No result Specimen: Blood     HS Troponin 0hr (reflex protocol) [[id number]]  (Normal) Collected: 06/07/25 0300    Lab Status: Final result Specimen: Blood from Arm, Right Updated: 06/07/25 0328     hs TnI 0hr 35 ng/L     Basic metabolic panel [[id number]]  (Abnormal) Collected: 06/07/25 0300    Lab Status: Final result Specimen: Blood from Arm, Right Updated:  06/07/25 0327     Sodium 141 mmol/L      Potassium 4.0 mmol/L      Chloride 107 mmol/L      CO2 27 mmol/L      ANION GAP 7 mmol/L      BUN 26 mg/dL      Creatinine 1.14 mg/dL      Glucose 150 mg/dL      Calcium 9.5 mg/dL      eGFR 57 ml/min/1.73sq m     Narrative:      National Kid[employer] Foundation guidelines for Chronic Kidney Disease (CKD):     Stage 1 with normal or high GFR (GFR > 90 mL/min/1.73 square meters)    Stage 2 Mild CKD (GFR = 60-89 mL/min/1.73 square meters)    Stage 3A Moderate CKD (GFR = 45-59 mL/min/1.73 square meters)    Stage 3B Moderate CKD (GFR = 30-44 mL/min/1.73 square meters)    Stage 4 Severe CKD (GFR = 15-29 mL/min/1.73 square meters)    Stage 5 End Stage CKD (GFR <15 mL/min/1.73 square meters)  Note: GFR calculation is accurate only with a steady state creatinine    Protime-INR [[id number]]  (Normal) Collected: 06/07/25 0300    Lab Status: Final result Specimen: Blood from Arm, Right Updated: 06/07/25 0319     Protime 14.5 seconds      INR 1.10    Narrative:      INR Therapeutic Range    Indication                                             INR Range      Atrial Fibrillation                                               2.0-3.0  Hypercoagulable State                                    2.0.2.3  Left Ventricular Asist Device                            2.0-3.0  Mechanical Heart Valve                                  -    Aortic(with afib, MI, embolism, HF, LA enlargement,    and/or coagulopathy)                                     2.0-3.0 (2.5-3.5)     Mitral                                                             2.5-3.5  Prosthetic/Bioprosthetic Heart Valve               2.0-3.0  Venous thromboembolism (VTE: VT, PE        2.0-3.0    APTT [[id number]]  (Abnormal) Collected: 06/07/25 0300    Lab Status: Final result Specimen: Blood from Arm, Right Updated: 06/07/25 0319     PTT 35 seconds     CBC and Platelet [[id number]]  (Abnormal) Collected: 06/07/25 0300    Lab Status: Final  result Specimen: Blood from Arm, Right Updated: 06/07/25 0307     WBC 14.43 Thousand/uL      RBC 4.96 Million/uL      Hemoglobin 13.2 g/dL      Hematocrit 41.8 %      MCV 84 fL      MCH 26.6 pg      MCHC 31.6 g/dL      RDW 18.7 %      Platelets 643 Thousands/uL      MPV 10.3 fL     Fingerstick Glucose (POCT) [[id number]]  (Abnormal) Collected: 06/07/25 0259    Lab Status: Final result Specimen: Blood Updated: 06/07/25 0259     POC Glucose 161 mg/dl             CT stroke alert brain    (Results Pending)   CTA stroke alert (head/neck)    (Results Pending)   MRI Inpatient Order    (Results Pending)       Procedures    ED Medication and Procedure Management   Prior to Admission Medications   Prescriptions Last Dose Informant Patient Reported? Taking?   ALPRAZolam (XANAX) 0.25 mg tablet  Self Yes No   Eliquis 5 MG   No No   Sig: Take 1 tablet (5 mg total) by mouth 2 (two) times a day   Multiple Vitamin tablet  Self Yes No   Sig: Take 1 tablet by mouth in the morning.   Riociguat (Adempas) 1 MG TABS   No No   Sig: Take 1 tablet (1 mg total) by mouth 3 (three) times a day   acetaminophen (TYLENOL) 500 mg tablet  Self Yes No   Sig: Take 500 mg by mouth every 4 (four) hours   aspirin 81 MG tablet  Self Yes No   Sig: Take by mouth   cyanocobalamin (VITAMIN B-12) 500 MCG tablet  Self Yes No   Sig: Take 1 tablet by mouth in the morning.   diltiazem (CARDIZEM CD) 120 mg 24 hr capsule   Yes No   Sig: Take 120 mg by mouth in the morning.   famotidine (PEPCID) 40 MG tablet   Yes No   Sig: Take 40 mg by mouth every 12 (twelve) hours   furosemide (LASIX) 40 mg tablet   No No   Sig: Take 1 tablet (40 mg total) by mouth daily   hydroxyurea (HYDREA) 500 mg capsule   No No   Sig: Take 1 capsule (500 mg total) by mouth 2 (two) times a day Take 500 mg in AM and 1000 mg in PM   Patient taking differently: Take 500 mg by mouth in the morning and 500 mg in the evening. Take 500 mg in AM and 500mg in PM.   lisinopril (ZESTRIL) 10 mg tablet   Self Yes No   Sig: Take 10 mg by mouth   meclizine (ANTIVERT) 25 mg tablet  Self No No   Sig: Take 1 tablet (25 mg total) by mouth 3 (three) times a day as needed for dizziness   omeprazole (PriLOSEC) 20 mg delayed release capsule   No No   Sig: TAKE 1 CAPSULE BY MOUTH EVERY DAY   Patient not taking: Reported on 6/4/2025   ondansetron (ZOFRAN) 4 mg tablet   No No   Sig: Take 1 tablet (4 mg total) by mouth every 8 (eight) hours as needed for nausea or vomiting   sertraline (ZOLOFT) 50 mg tablet   No No   Sig: TAKE 1 TABLET BY MOUTH EVERY DAY      Facility-Administered Medications: None     Patient's Medications   Discharge Prescriptions    No medications on file     No discharge procedures on file.  ED SEPSIS DOCUMENTATION   Time reflects when diagnosis was documented in both MDM as applicable and the Disposition within this note       Time User Action Codes Description Comment    6/7/2025  2:51 AM Jade Mcgovern [I63.9] Stroke (HCC)     6/7/2025  3:12 AM Cody Mccrary [R29.90] Stroke-like symptoms                      [1]   Past Medical History:  Diagnosis Date    Cancer (HCC)     prostate    History of pulmonary embolus (PE)     Hypercholesterolemia     Hypertension     Personal history of DVT (deep vein thrombosis)     Pneumonia     Stroke (HCC)    [2]   Past Surgical History:  Procedure Laterality Date    KNEE SURGERY Left     PROSTATECTOMY      TOTAL SHOULDER REPLACEMENT     [3] No family history on file.  [4]   Social History  Tobacco Use    Smoking status: Never    Smokeless tobacco: Never   Vaping Use    Vaping status: Never Used   Substance Use Topics    Alcohol use: No    Drug use: No        Cody Mccrary DO  06/08/25 0546

## 2025-06-07 NOTE — ASSESSMENT & PLAN NOTE
With recent diagnosis of CVA 4/2025 at an outside health system with MRI at that time revealing multiple embolic strokes in the left hemisphere with CTA head/neck demonstrating left ICA thrombus  Presented this evening with right sided facial droop and slurred speech/aphasia, last known well 0145H 6/7/2025  Patient stroke alert on arrival, CT head negative for acute intracranial pathology, CTA head/neck negative for LVO/aneurysm/dissection or AVMs.  Previous left ICA thrombus again noted  Seen by overnight neurology resident on-call who discussed with his attending, not TNK candidate due to bleeding risk from Eliquis use additionally with recent CVA.  Advised admission under stroke pathway for further workup.  Await formal neurology consultation.  Obtain MRI brain, TTE.  FLP/A1c obtained and reviewed.  Monitor neurochecks/telemetry  Continue ASA and Eliquis.  Started on atorvastatin 40 mg nightly, continue  Allow permissive hypertension for 24 hours up to   PT/OT/speech evaluations as appropriate

## 2025-06-08 ENCOUNTER — APPOINTMENT (INPATIENT)
Dept: NON INVASIVE DIAGNOSTICS | Facility: HOSPITAL | Age: 85
DRG: 064 | End: 2025-06-08
Payer: COMMERCIAL

## 2025-06-08 PROBLEM — R90.89 ABNORMAL FINDING ON MRI OF BRAIN: Status: ACTIVE | Noted: 2025-06-08

## 2025-06-08 PROBLEM — I63.9 STROKE (CEREBRUM) (HCC): Status: ACTIVE | Noted: 2025-06-07

## 2025-06-08 PROCEDURE — 93308 TTE F-UP OR LMTD: CPT | Performed by: INTERNAL MEDICINE

## 2025-06-08 PROCEDURE — 93325 DOPPLER ECHO COLOR FLOW MAPG: CPT | Performed by: INTERNAL MEDICINE

## 2025-06-08 PROCEDURE — 97163 PT EVAL HIGH COMPLEX 45 MIN: CPT

## 2025-06-08 PROCEDURE — 93880 EXTRACRANIAL BILAT STUDY: CPT

## 2025-06-08 PROCEDURE — 93321 DOPPLER ECHO F-UP/LMTD STD: CPT | Performed by: INTERNAL MEDICINE

## 2025-06-08 PROCEDURE — 99232 SBSQ HOSP IP/OBS MODERATE 35: CPT | Performed by: INTERNAL MEDICINE

## 2025-06-08 PROCEDURE — 93880 EXTRACRANIAL BILAT STUDY: CPT | Performed by: SURGERY

## 2025-06-08 PROCEDURE — 99233 SBSQ HOSP IP/OBS HIGH 50: CPT | Performed by: PSYCHIATRY & NEUROLOGY

## 2025-06-08 PROCEDURE — 93308 TTE F-UP OR LMTD: CPT

## 2025-06-08 PROCEDURE — 93325 DOPPLER ECHO COLOR FLOW MAPG: CPT

## 2025-06-08 PROCEDURE — 93321 DOPPLER ECHO F-UP/LMTD STD: CPT

## 2025-06-08 PROCEDURE — 97167 OT EVAL HIGH COMPLEX 60 MIN: CPT

## 2025-06-08 RX ADMIN — SERTRALINE HYDROCHLORIDE 50 MG: 50 TABLET ORAL at 08:42

## 2025-06-08 RX ADMIN — CYANOCOBALAMIN TAB 500 MCG 500 MCG: 500 TAB at 08:42

## 2025-06-08 RX ADMIN — APIXABAN 5 MG: 5 TABLET, FILM COATED ORAL at 08:42

## 2025-06-08 RX ADMIN — FAMOTIDINE 40 MG: 20 TABLET, FILM COATED ORAL at 08:42

## 2025-06-08 RX ADMIN — Medication 1 TABLET: at 08:42

## 2025-06-08 RX ADMIN — ASPIRIN 81 MG CHEWABLE TABLET 81 MG: 81 TABLET CHEWABLE at 08:42

## 2025-06-08 RX ADMIN — HYDROXYUREA 500 MG: 500 CAPSULE ORAL at 00:25

## 2025-06-08 RX ADMIN — ATORVASTATIN CALCIUM 80 MG: 80 TABLET, FILM COATED ORAL at 17:33

## 2025-06-08 RX ADMIN — HYDROXYUREA 500 MG: 500 CAPSULE ORAL at 08:42

## 2025-06-08 RX ADMIN — APIXABAN 5 MG: 5 TABLET, FILM COATED ORAL at 17:33

## 2025-06-08 RX ADMIN — HYDROXYUREA 500 MG: 500 CAPSULE ORAL at 17:33

## 2025-06-08 NOTE — PROGRESS NOTES
Progress Note - Neurology   Name: Obdulio Mccauley 84 y.o. male I MRN: [id number]  Unit/Bed#: [facility] 712-01 I Date of Admission: 6/7/2025   Date of Service: 6/8/2025 I Hospital Day: 1    Assessment & Plan  Stroke (cerebrum) (HCC)  83yo M w/ a PMH of chronic PE on eliquis, recent stroke in 04/2025 (L parietal and L occipital found to have L ICA thrombus), essential thrombosis w/ JAK2 mutation, HTN, HLD coming in as a stroke alert on 6/7/2025  2:53 AM with initial NIHSS of 5 and LKW 1:45am 06/07/2025, initial Blood Pressure: 154/70. Initial presenting deficits were R facial droop, slurred speech and mild aphasia. EMS found on EKG runs of bigeminy and noted to be on telemetry as well  As a result of currently on eliquis, pt was determined to not be a candidate for thrombolysis (TNK).   Exam on 06/08/25: R facial droop, dysarthria, and mild aphasia  Current Blood Pressure: 148/80, BP over 24 hours: BP  Min: 113/63  Max: 148/80   Vascular risk factors: HTN, HLD, past stroke   Home meds: eliquis 5mg BID and ASA 81mg     Past workup:   - 4/14/2025 CTA H/N: Clot in the proximal LEFT internal carotid artery is less pronounced and the central clot is no longer present.  No intracranial branch occlusion.  Minimal hypodensity in the LEFT frontal lobe corresponding to the infarcts on MRI. No intraparenchymal hematoma, mass effect, or midline shift.   - MRI Brain 4/11/2025: Multiple areas of restricted diffusion involving the LEFT cerebral hemisphere, most pronounced in the LEFT anterior frontal lobe. There is involvement of the LEFT parietal lobe and LEFT occipital lobe. Scattered areas of underlying susceptibility artifact consistent with petechial hemorrhage. No space-occupying hematoma, mass effect, or midline shift.  Scattered areas of increased T2/FLAIR signal within the white matter suggestive of chronic small vessel ischemic changes. Flow voids at the skull base visualized. Basal cisterns appear normal. Old small infarcts in  the bilateral cerebellar hemispheres.    Workup:   CTH: No acute intracranial abnormality. Chronic microangiopathic changes.   CTA: No intracranial LVO, aneurysm, dissection, or high-grade stenosis. Moderate atherosclerotic plaque L proximal cervical ICA resulting in 60% stenosis. This plaque was noted on prior examinations with few areas of eccentric thrombus/loose plaque. No central thrombus identified at this time, however, this may be a source of emboli   MRI Brain: Multifocal recent infarcts scattered throughout the left cerebral hemisphere, with mild associated mass effect and evidence of recent hemorrhage.  Abnormal flow void of the left transverse sinus extending into the sigmoid sinus and left internal jugular vein, though noting these areas were patent on the recent CTA. While findings could be related to slow flow, dural venous sinus thrombosis is not excluded   Carotid US: Less than 50% stenosis b/l  TTE: Pending   Telemetry: Noted multiple PVCs and runs of bigeminy   , A1c 5.6%    Pertinent scores:  - NIHSS: 5  Stroke Modified Medway Score: 0 (No baseline symptoms/disability)    Impression: New onset R facial droop, slurred speech and expressive aphasia in setting of known L ICA thrombus as suspected cause of symptoms and requires further work up.    Plan:  Case discussed with neurology attending Dr. Yancey  Stroke pathway  BP: Permissive hypertension for first 24 hours up to 220 SBP with gradual reduction to normotension over days  Increased atorvastatin from 40 mg to 80 mg qHS given elevated LDL (goal LDL <70)  Maintain glucose <180, SSI for coverage if indicated  Antiplatelet agents: ASA 81 mg   AC: Continue home eliquis 5 mg BID (takes for chronic PEs)  TTE pending  CTV pending  DVT ppx and SCDs  Monitor on telemetry  PT/OT/ST/PMR input appreciated  Stroke education  Recommend vascular surgery consultation for evaluation of L ICA thrombus  Medicine team to work up noted bigeminy and rest of  care as per primary team  Cerebrovascular accident (CVA) ([facility])  Noted hx of stroke 4/2025 Patient had acute CVA with R sided weakness and speech changes (facial droop and aphasia).   MRI brain confirmed multiple embolic strokes in the L hemisphere, and CTA head and neck revealed L ICA thrombus.   Patient was deemed not to be a mechanical thrombectomy candidate. He was admitted for workup. He is maintained on aspirin, eliquis for stroke prevention. He follows with hematology/oncology as well.     Plan  Refer to plan above   Continue home ASA 81 mg and Lipitor  Carotid stenosis, left    Abnormal finding on MRI of brain      Recommendations for outpatient neurological follow up have yet to be determined.  I have discussed with Dr. Yancey the above plan to treat pt. She agrees with the plan.    Subjective   Patient seen and evaluated while he was in the recliner.  Continues to have dysarthria and some mild word finding difficulties.  Right facial droop still present.  No new symptoms at this time.    Review of Systems  negative    Objective :  Temp:  [97.6 °F (36.4 °C)-98.7 °F (37.1 °C)] 98.1 °F (36.7 °C)  HR:  [68-78] 76  BP: (113-148)/(63-80) 148/80  Resp:  [16-20] 20  SpO2:  [93 %-98 %] 93 %  O2 Device: None (Room air)    Physical Exam  Constitutional:       General: He is awake. He is not in acute distress.  HENT:      Head: Normocephalic.      Mouth/Throat:      Mouth: Mucous membranes are moist.     Eyes:      Extraocular Movements: Extraocular movements intact.      Conjunctiva/sclera: Conjunctivae normal.       Neurological:      Mental Status: He is alert.      Cranial Nerves: Dysarthria present.     Neurological Exam  Mental Status  Awake and alert. Severe dysarthria present. Expressive aphasia present. Mild word-finding difficulties.    Cranial Nerves  CN II: Left eye appears to have intact visual fields, right eye appears to have some right superior and inferior temporal visual field deficit..  CN III, IV, VI:  Extraocular movements intact bilaterally.  CN VII:  Right: There is central facial weakness.  Left: There is no facial weakness.    Motor   Strength is 5/5 in all four extremities except as noted. Right pronator drift.                                             Right                     Left   Shoulder abduction               4                                 Lab Results: I have reviewed the following results:  Imaging Results Review: I personally reviewed the following image studies in PACS and associated radiology reports: MRI brain. My interpretation of the radiology images/reports is: As above.  Other Study Results Review: No additional pertinent studies reviewed.    VTE Pharmacologic Prophylaxis: VTE covered by:  apixaban, Oral, 5 mg at 06/08/25 1734

## 2025-06-08 NOTE — ASSESSMENT & PLAN NOTE
Bigeminy/trigeminy noted on telemetry monitoring, patient denying any chest pain, dizziness/lightheadedness, or other associated symptoms  Uncertain significance of finding, will monitor on telemetry for now.  Follow-up on 2D echo

## 2025-06-08 NOTE — PLAN OF CARE
Problem: OCCUPATIONAL THERAPY ADULT  Goal: Performs self-care activities at highest level of function for planned discharge setting.  See evaluation for individualized goals.  Description: Treatment Interventions: ADL retraining, Functional transfer training, Endurance training, Cognitive reorientation, Compensatory technique education, Continued evaluation, Energy conservation          See flowsheet documentation for full assessment, interventions and recommendations.   Outcome: Progressing  Note: Limitation: Decreased ADL status, Decreased cognition, Decreased endurance, Decreased Safe judgement during ADL, Decreased self-care trans, Decreased high-level ADLs, Decreased UE strength  Prognosis: Fair  Assessment: Pt is a 84 y.o. male who was admitted to [facility] on 6/7/2025 with Stroke-like symptoms, w/ R facial droop, slurred speech and aphasia, undergoing workup, MRI pending. Pt seen for an OT evaluation per active OT orders.  Pt  has a past medical history of Cancer (HCC), History of pulmonary embolus (PE), Hypercholesterolemia, Hypertension, Personal history of DVT (deep vein thrombosis), Pneumonia, and Stroke ([facility]). Pt reports living w/ spouse in 2 , no BENEDICT, FFSU, walk-in shower w/ GB, standard toilet w/ GB. Pta, pt was independent w/ functional mobility, receives assistance from wife for ADLs and IADLs, was not using any DME at baseline, and is (+) . Currently, pt is Min Ax1 for UB ADL, Mod Ax1 for LB ADL, performed STS transfers w/ supervision, and functional mobility w/ Min Ax1 w/ HHA. Pt currently presents impairments including -decreased cognition, difficulty performing ADLS, difficulty performing IADLS , decreased UE strengh and limited insight into deficits activity tolerance, endurance, standing balance/tolerance, sitting balance/tolerance, GMC, insight, and safety . These impairments, as well as pt's fatigue, and UE impairments limit pt's ability to safely engage in baseline areas  of occupation such as bathing, dressing, toileting, and functional mobility/transfers. The patient's raw score on the AM-PAC Daily Activity Inpatient Short Form is 17. A raw score of less than 19 suggests the patient may benefit from discharge to post-acute rehabilitation services. Please refer to the recommendation of the Occupational Therapist for safe discharge planning.Pt would benefit from continued acute OT services throughout hospital course and following D/C. Moving forward, OT interventions 2-3x per week to increase safety and participation in ADLs, transfers, and functional mobility. From OT standpoint, recommend pt to return home with increased social support and level III services upon D/C pending progress and support at home. Pt was left seated in bedside chair with chair alarm on and all needs within reach.     Rehab Resource Intensity Level, OT: III (Minimum Resource Intensity) (pending progress and support at home)

## 2025-06-08 NOTE — ASSESSMENT & PLAN NOTE
With recent diagnosis of CVA 4/2025 at an outside health system with MRI at that time revealing multiple embolic strokes in the left hemisphere with CTA head/neck demonstrating left ICA thrombus  Patient presented with right-sided facial droop slurred speech  Patient was a stroke alert on arrival  CT head negative for acute intracranial pathology, CTA head/neck negative for LVO/aneurysm/dissection or AVMs.  Previous left ICA thrombus again noted  Patient is placed on stroke pathway  MRI brain reveals multifocal recent infarcts scattered throughout the left cerebral hemisphere  Continue aspirin, atorvastatin  Neurology following  Physical therapy

## 2025-06-08 NOTE — PLAN OF CARE
Problem: PHYSICAL THERAPY ADULT  Goal: Performs mobility at highest level of function for planned discharge setting.  See evaluation for individualized goals.  Description: Treatment/Interventions: Functional transfer training, LE strengthening/ROM, Elevations, Therapeutic exercise, Endurance training, Cognitive reorientation, Equipment eval/education, Bed mobility, Gait training, Spoke to case management, OT  Equipment Recommended:  (TBD)       See flowsheet documentation for full assessment, interventions and recommendations.  Note: Prognosis: Good  Problem List: Decreased strength, Decreased endurance, Impaired balance, Decreased mobility  Assessment: Pt is 84 y.o. male admitted with hx of right facial droop and speech difficulties and Dx of Stroke-like symptoms; undergoing w/u w/ vasc sx following ((L) ICA stenosis 60% w/ history of (L) ICA thrombus and recurrent Left hemispheric CVA). Pt 's comorbidities affecting POC include: recent CVA 4/2025, Hypertension, and DVT (deep vein thrombosis) and personal factors of: advanced age and steps in the house. Pt's clinical presentation is currently  unstable/unpredictable which is evident in ongoing telem monitoring, abn lab values, and ongoing management of current Dx. Pt presents w/ min overall weakness (no overt focal strength deficit noted in the LE), decreased functional endurance and inconsistent amb balance and gait patterns (may need to consider an AD). Will cont to follow pt in PT for progressive mobilization to address above functional deficits and to max level of (I), endurance, and safety. D/C recommendations are outlined below. Will cont to follow until then.        Rehab Resource Intensity Level, PT: III (Minimum Resource Intensity)    See flowsheet documentation for full assessment.

## 2025-06-08 NOTE — PHYSICAL THERAPY NOTE
Physical Therapy Evaluation     Patient's Name: Obdulio Mccauley    Admitting Diagnosis  Stroke (Formerly [facility]) [I63.9]  Stroke-like symptoms [R29.90]    Problem List  Problem List[1]    Past Medical History  Past Medical History[2]    Past Surgical History  Past Surgical History[3]       06/08/25 0830   PT Last Visit   PT Visit Date 06/08/25   Note Type   Note type Evaluation   Pain Assessment   Pain Assessment Tool 0-10   Pain Score No Pain   Restrictions/Precautions   Other Precautions Cognitive;Chair Alarm;Bed Alarm;Telemetry;Fall Risk   Home Living   Type of Home House   Home Layout Two level;Able to live on main level with bedroom/bathroom  (no BENEDICT)   Prior Function   Level of [place] Independent with functional mobility  (amb w/o AD; reports holds on to furniture)   Lives With Spouse   Receives Help From Family   General   Additional Pertinent History cleared for assessment by marit   Cognition   Overall Cognitive Status WFL   Arousal/Participation Cooperative   Orientation Level Oriented to person;Oriented to place;Oriented to situation   Memory Decreased recall of recent events;Decreased recall of precautions   Following Commands Follows one step commands with increased time or repetition   Subjective   Subjective Alert; in bed; mod dysarthria and some word finding difficulty noted (reports to be new but EMR also indicates hx of (L) CVA); agreeable to mobilize   RUE Assessment   RUE Assessment WFL  (AROM)   LUE Assessment   LUE Assessment WFL  (AROM)   RLE Assessment   RLE Assessment WFL  (AROM)   Strength RLE   RLE Overall Strength   (good -/ good)   LLE Assessment   LLE Assessment WFL  (AROM)   Strength LLE   LLE Overall Strength   (good -/ good)   Bed Mobility   Supine to Sit 5  Supervision   Additional items Verbal cues   Transfers   Sit to Stand 5  Supervision   Additional items Verbal cues   Stand to Sit 5  Supervision   Additional items Verbal cues   Ambulation/Elevation   Gait pattern Excessively  slow;Short stride;Inconsistent kiana;Shuffling;Foward flexed   Gait Assistance 4  Minimal assist   Additional items Assist x 1;Verbal cues;Tactile cues   Assistive Device None;Other (Comment)  (hand hold (A) x 2 -->1; r/o rw vs SPC next visit)   Distance 20 ft   Balance   Static Sitting Fair   Dynamic Sitting Fair -   Static Standing Fair -   Dynamic Standing Poor +   Ambulatory Poor   Activity Tolerance   Activity Tolerance Patient limited by fatigue   Medical Staff Made Aware Co-eval performed w/ OTR due to complexity of medical status and multiple comorbidities   Assessment   Prognosis Good   Problem List Decreased strength;Decreased endurance;Impaired balance;Decreased mobility   Assessment Pt is 84 y.o. male admitted with hx of right facial droop and speech difficulties and Dx of Stroke-like symptoms; undergoing w/u w/ vasc sx following ((L) ICA stenosis 60% w/ history of (L) ICA thrombus and recurrent Left hemispheric CVA). Pt 's comorbidities affecting POC include: recent CVA 4/2025, Hypertension, and DVT (deep vein thrombosis) and personal factors of: advanced age and steps in the house. Pt's clinical presentation is currently  unstable/unpredictable which is evident in ongoing telem monitoring, abn lab values, and ongoing management of current Dx. Pt presents w/ min overall weakness (no overt focal strength deficit noted in the LE), decreased functional endurance and inconsistent amb balance and gait patterns (may need to consider an AD). Will cont to follow pt in PT for progressive mobilization to address above functional deficits and to max level of (I), endurance, and safety. D/C recommendations are outlined below. Will cont to follow until then.   Goals   Patient Goals to get better   STG Expiration Date 06/18/25   Short Term Goal #1 7-10 days. Pt will amb 300 ft w/ least restrictive assistive device PRN, mod (I) in order to facilitate safe return to premorbid environment and community amb status. Pt  will negotiate 12 steps w/ hand rail and SPC PRN, mod (I) in order to navigate between levels of home environment safely. Pt will achieve mod (I) level w/ bed mob in order to facilitate safety with OOB and back to bed transitions in own living environment. Pt will perform transfers w/ mod (I) to assure (I) and safety w/ functional mobility/transitions w/ all aspects of mobility/locomotion.  Pt will participate in LE therex and balance activities to max progression w/ mobility skills.   PT Treatment Day 0   Plan   Treatment/Interventions Functional transfer training;LE strengthening/ROM;Elevations;Therapeutic exercise;Endurance training;Cognitive reorientation;Equipment eval/education;Bed mobility;Gait training;Spoke to case management;OT   PT Frequency 3-5x/wk   Discharge Recommendation   Rehab Resource Intensity Level, PT III (Minimum Resource Intensity)   Equipment Recommended   (TBD)   AM-PAC Basic Mobility Inpatient   Turning in Flat Bed Without Bedrails 4   Lying on Back to Sitting on Edge of Flat Bed Without Bedrails 3   Moving Bed to Chair 3   Standing Up From Chair Using Arms 3   Walk in Room 3   Climb 3-5 Stairs With Railing 2   Basic Mobility Inpatient Raw Score 18   Basic Mobility Standardized Score 41.05   [facility] Highest Level Of Mobility   -HLM Goal 6: Walk 10 steps or more   JH-HLM Achieved 6: Walk 10 steps or more   Modified Winn Scale   Modified Rosangela Scale 4   Barthel Index   Feeding 10   Bathing 0   Grooming Score 0   Dressing Score 5   Bladder Score 10   Bowels Score 10   Toilet Use Score 5   Transfers (Bed/Chair) Score 10   Mobility (Level Surface) Score 0   Stairs Score 0   Barthel Index Score 50   End of Consult   Patient Position at End of Consult Bedside chair;Bed/Chair alarm activated;All needs within reach         Rich Argueta, PT         [1]   Patient Active Problem List  Diagnosis    Prostate cancer (HCC)    Basal cell carcinoma of skin of other parts of face    Bladder neck  contracture    Cerebrovascular accident (CVA) (HCC)    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HCC)    Urge incontinence    Nocturia    Other pulmonary embolism without acute cor pulmonale (HCC)    Essential thrombocytosis (HCC)    Obesity, morbid (HCC)    ELODIA (obstructive sleep apnea)    Obstructive sleep apnea    CTEPH (chronic thromboembolic pulmonary hypertension) (HCC)    Stroke-like symptoms    Primary hypertension    Bigeminy    Carotid stenosis, left   [2]   Past Medical History:  Diagnosis Date    Cancer (HCC)     prostate    History of pulmonary embolus (PE)     Hypercholesterolemia     Hypertension     Personal history of DVT (deep vein thrombosis)     Pneumonia     Stroke (HCC)    [3]   Past Surgical History:  Procedure Laterality Date    KNEE SURGERY Left     PROSTATECTOMY      TOTAL SHOULDER REPLACEMENT

## 2025-06-08 NOTE — ASSESSMENT & PLAN NOTE
Noted hx of stroke 4/2025 Patient had acute CVA with R sided weakness and speech changes (facial droop and aphasia).   MRI brain confirmed multiple embolic strokes in the L hemisphere, and CTA head and neck revealed L ICA thrombus.   Patient was deemed not to be a mechanical thrombectomy candidate. He was admitted for workup. He is maintained on aspirin, eliquis for stroke prevention. He follows with hematology/oncology as well.     Plan  Refer to plan above   Continue home ASA 81 mg and Lipitor

## 2025-06-08 NOTE — OCCUPATIONAL THERAPY NOTE
"    Occupational Therapy Evaluation     Patient Name: Obdulio Mccauley  Today's Date: 6/8/2025  Problem List  Principal Problem:    Stroke-like symptoms  Active Problems:    Cerebrovascular accident (CVA) (HCC)    Essential thrombocytosis (HCC)    CTEPH (chronic thromboembolic pulmonary hypertension) (HCC)    Primary hypertension    Bigeminy    Carotid stenosis, left    Past Medical History  Past Medical History[1]  Past Surgical History  Past Surgical History[2]        06/08/25 0829   OT Last Visit   OT Visit Date 06/08/25   Note Type   Note type Evaluation   Pain Assessment   Pain Assessment Tool 0-10   Pain Score No Pain   Restrictions/Precautions   Weight Bearing Precautions Per Order No   Other Precautions Cognitive;Chair Alarm;Bed Alarm;Telemetry;Fall Risk;Hard of hearing  (Dysarthria)   Home Living   Type of Home House   Home Layout Two level;Able to live on main level with bedroom/bathroom;Performs ADLs on one level  (no BENEDICT)   Bathroom Shower/Tub Walk-in shower   Bathroom Toilet Standard   Bathroom Equipment Grab bars in shower;Grab bars around toilet   Bathroom Accessibility Accessible   Additional Comments Pt reports living w/ spouse in 2 SH, no BENEDICT, FFSU, walk-in shower w/ GB, standard toilet w/ GB.   Prior Function   Level of [place] Needs assistance with ADLs;Needs assistance with IADLS;Independent with functional mobility   Lives With Spouse   Receives Help From Family   IADLs Independent with driving;Family/Friend/Other provides meals;Independent with medication management   Falls in the last 6 months 0   Vocational Retired   Lifestyle   Autonomy Pta pt reports being independent in functional mobility, receiving assistance from wife w/ ADLs and IADLs, (+) .   Reciprocal Relationships spouse   Service to Others retired    Intrinsic Gratification boating   General   Family/Caregiver Present No   Subjective   Subjective \"My wife helps me out\"   ADL   Where Assessed Chair   Eating " Assistance 5  Supervision/Setup   Grooming Assistance 5  Supervision/Setup   UB Bathing Assistance 4  Minimal Assistance   LB Bathing Assistance 3  Moderate Assistance   UB Dressing Assistance 4  Minimal Assistance   LB Dressing Assistance 3  Moderate Assistance   Toileting Assistance  4  Minimal Assistance   Functional Assistance 4  Minimal Assistance   Additional Comments Pt reports wife does/is able to assist as needed.   Bed Mobility   Supine to Sit 5  Supervision   Additional items Increased time required;Verbal cues   Additional Comments Pt was greeted supine in bed, left OOB in chair with alarm on and all needs within reach.   Transfers   Sit to Stand 5  Supervision   Additional items Verbal cues   Stand to Sit 5  Supervision   Additional items Verbal cues   Additional Comments w/out DME   Functional Mobility   Functional Mobility 4  Minimal assistance   Additional Comments Pt performed short household distances w/ Jr x1 w/ HHA.   Additional items Hand hold assistance   Balance   Static Sitting Fair   Dynamic Sitting Fair -   Static Standing Fair -   Dynamic Standing Poor +   Ambulatory Poor +   Activity Tolerance   Activity Tolerance Patient limited by fatigue   Medical Staff Made Aware Co-eval with PT due to medical complexity and co-morbidities. OTR present.   Nurse Made Aware RN cleared for therapy.   RUE Assessment   RUE Assessment X  (Pt demonstrated limitations in RUE AROM and slightly decreased strength.)   LUE Assessment   LUE Assessment WFL   Hand Function   Gross Motor Coordination Impaired  (Pt demonstrated mild gross motor impairment in b/l UE, w/ R > L.)   Fine Motor Coordination Functional   Sensation   Light Touch No apparent deficits   Proprioception   Proprioception Partial deficits in the RUE   Vision-Basic Assessment   Current Vision Wears glasses all the time   Cognition   Arousal/Participation Alert;Cooperative   Attention Attends with cues to redirect   Orientation Level Oriented to  person;Oriented to place;Oriented to situation   Memory Decreased recall of precautions;Decreased short term memory   Following Commands Follows one step commands without difficulty   Comments Pt pleasant and cooperative to therapy, although possibly a questionable historian, decreased insight and verbal cues to direct.  Pt w/ some deficits in speech w/ dysarthria, will continue to assess. May benefit from a formal cog eval.   Assessment   Limitation Decreased ADL status;Decreased cognition;Decreased endurance;Decreased Safe judgement during ADL;Decreased self-care trans;Decreased high-level ADLs;Decreased UE strength   Prognosis Fair   Assessment Pt is a 84 y.o. male who was admitted to [facility] on 6/7/2025 with Stroke-like symptoms, w/ R facial droop, slurred speech and aphasia, undergoing workup, MRI pending. Pt seen for an OT evaluation per active OT orders.  Pt  has a past medical history of Cancer (HCC), History of pulmonary embolus (PE), Hypercholesterolemia, Hypertension, Personal history of DVT (deep vein thrombosis), Pneumonia, and Stroke (HCC). Pt reports living w/ spouse in 2 , no BENEDICT, FFSU, walk-in shower w/ GB, standard toilet w/ GB. Pta, pt was independent w/ functional mobility, receives assistance from wife for ADLs and IADLs, was not using any DME at baseline, and is (+) . Currently, pt is Min Ax1 for UB ADL, Mod Ax1 for LB ADL, performed STS transfers w/ supervision, and functional mobility w/ Min Ax1 w/ HHA. Pt currently presents impairments including -decreased cognition, difficulty performing ADLS, difficulty performing IADLS , decreased UE strengh and limited insight into deficits activity tolerance, endurance, standing balance/tolerance, sitting balance/tolerance, GMC, insight, and safety . These impairments, as well as pt's fatigue, and UE impairments limit pt's ability to safely engage in baseline areas of occupation such as bathing, dressing, toileting, and functional  mobility/transfers. The patient's raw score on the -PAC Daily Activity Inpatient Short Form is 17. A raw score of less than 19 suggests the patient may benefit from discharge to post-acute rehabilitation services. Please refer to the recommendation of the Occupational Therapist for safe discharge planning.Pt would benefit from continued acute OT services throughout hospital course and following D/C. Moving forward, OT interventions 2-3x per week to increase safety and participation in ADLs, transfers, and functional mobility. From OT standpoint, recommend pt to return home with increased social support and level III services upon D/C pending progress and support at home. Pt was left seated in bedside chair with chair alarm on and all needs within reach.   Goals   Patient Goals to go home   LTG Time Frame 10-14   Long Term Goal #1 see below   Plan   Treatment Interventions ADL retraining;Functional transfer training;Endurance training;Cognitive reorientation;Compensatory technique education;Continued evaluation;Energy conservation   Goal Expiration Date 06/22/25   OT Frequency 2-3x/wk   Discharge Recommendation   Rehab Resource Intensity Level, OT III (Minimum Resource Intensity)  (pending progress and support at home)   [facility] Daily Activity Inpatient   Lower Body Dressing 2   Bathing 2   Toileting 3   Upper Body Dressing 3   Grooming 3   Eating 4   Daily Activity Raw Score 17   Daily Activity Standardized Score (Calc for Raw Score >=11) 37.26   AM-PAC Applied Cognition Inpatient   Following a Speech/Presentation 4   Understanding Ordinary Conversation 4   Taking Medications 3   Remembering Where Things Are Placed or Put Away 3   Remembering List of 4-5 Errands 3   Taking Care of Complicated Tasks 2   Applied Cognition Raw Score 19   Applied Cognition Standardized Score 39.77   Barthel Index   Feeding 5   Bathing 0   Grooming Score 0   Dressing Score 5   Bladder Score 10   Bowels Score 10   Toilet Use Score 5    Transfers (Bed/Chair) Score 10   Mobility (Level Surface) Score 0   Stairs Score 0   Barthel Index Score 45   Modified Rosangela Scale   Modified [place] Scale 4   End of Consult   Education Provided Yes   Patient Position at End of Consult Bedside chair;Bed/Chair alarm activated;All needs within reach   Nurse Communication Nurse aware of consult       OT Goals:     Pt will be supervision in UB ADLs by end of hospital stay.    Pt will be Jr LB ADLs by end of hospital stay.    Pt will be supervision in grooming tasks while standing at sink side.     Pt will perform functional transfers with Mod I and increased safety awareness by end of hospital stay.    Pt will increase standing tolerance to 5 minutes to safely engage in self-care ADLs at the sink.    Pt will be Mod I in functional mobility with/without any required AD to promote participation in self care tasks by end of hospital stay.    Pt will increase R UE MM score by 1 grade by end of hospital course.     Pt will follow two-step directions to complete ADL tasks.     Pt will attend to tasks for 10 minutes during therapy sessions with verbal cues.     Pt will undergo ongoing cognitive assessment during therapy session while in hospital.    CYNTHIA Enciso            [1]   Past Medical History:  Diagnosis Date    Cancer (HCC)     prostate    History of pulmonary embolus (PE)     Hypercholesterolemia     Hypertension     Personal history of DVT (deep vein thrombosis)     Pneumonia     Stroke (HCC)    [2]   Past Surgical History:  Procedure Laterality Date    KNEE SURGERY Left     PROSTATECTOMY      TOTAL SHOULDER REPLACEMENT

## 2025-06-08 NOTE — CONSULTS
Consultation - Vascular Surgery   Name: Obdulio Mccauley 84 y.o. 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 Pt was determined to not be a candidate for TNK due to Eliquis therapy. Bigeminy noted on admission. At this time he has persistent Right facial droop, slurred speech and aphasia.    Imaging:  CTH negative for acute process  CTA H/N Moderate atherosclerotic plaque left proximal cervical ICA resulting in 60% stenosis. This plaque was noted on prior examinations with few areas of eccentric thrombus/loose plaque. No central thrombus identified.    Plan:  -LICA stenosis 60% w/ history of LICA thrombus and recurrent Left hemispheric CVA; currently w/ significant persistent deficits   -Currently receiving Eliquis; noted by family to be noncompliant w/ medication for approx 2 weeks prior to 6/2   -Continue ASA, statin  -Neurology following   -MRI pending   -Obtain Carotid Duplex  -Reviewed imaging and D/W Dr. James, continue Eliquis as ordered for now, continue neurologic w/u and monitor for recovery. Pt w/ advanced age and significant cardio/pulmonary and hematologic risk factors. Will review w/ Dr. Colon in AM.  Stroke-like symptoms    Cerebrovascular accident (CVA) (HCC)    Essential  thrombocytosis (HCC)    CTEPH (chronic thromboembolic pulmonary hypertension) (HCC)    Primary hypertension    Bigeminy        History of Present Illness   Obdulio Mccauley is a 84 y.o. male with past medical history as noted above who presents with right-sided facial droop, slurred speech and aphasia which occurred while watching TV with his wife early this a.m.  He was brought to the hospital via EMS as stroke alert.  CT head was negative.  CTA of the head and neck reveals moderate atherosclerotic plaque of the left proximal cervical ICA resulting in 60% stenosis this plaque was noted on prior examinations with few areas of eccentric thrombus/loose plaque.  No central thrombus was identified on this exam.  Patient had previous left parietal/left occipital CVA in April 2025 with left ICA thrombus identified at that time.  He initially had right sided weakness facial droop and slurred speech which had completely resolved from prior stroke.  At this time his presenting symptoms from this event remain persistent.  He is currently receiving Eliquis which she is prescribed for history of PE/DVT with chronic thromboembolic pulmonary hypertension.  He also follows with hematology for myeloproliferative disease, essential thrombosis with JAK2 mutation for which she is on Hydrea.  According to the patient's family he was noncompliant with Eliquis therapy for approximately 2 weeks prior to June 2 when he ran out of his medications and did not obtain refill.  Majority of the information for this consultation was obtained via patient's medical record as he has significant aphasia and slurred speech at this time making communication difficult.    Review of Systems   Constitutional: Negative.    HENT: Negative.     Eyes: Negative.    Respiratory: Negative.     Cardiovascular: Negative.    Gastrointestinal: Negative.    Endocrine: Negative.    Genitourinary: Negative.    Musculoskeletal: Negative.    Allergic/Immunologic:  Negative.    Neurological:  Positive for facial asymmetry and speech difficulty.   Hematological: Negative.    Psychiatric/Behavioral: Negative.       Medical History Review: I have reviewed the patient's PMH, PSH, Social History, Family History, Meds, and Allergies     Objective :  Temp:  [97.4 °F (36.3 °C)-98.2 °F (36.8 °C)] 98.2 °F (36.8 °C)  HR:  [62-85] 69  BP: (101-188)/(55-93) 132/74  Resp:  [18-22] 18  SpO2:  [90 %-97 %] 95 %  O2 Device: None (Room air)    I/O       None            Physical Exam  Constitutional:       General: He is not in acute distress.     Appearance: He is ill-appearing.   HENT:      Head: Atraumatic.      Nose: Nose normal.     Eyes:      General:         Right eye: No discharge.         Left eye: No discharge.      Conjunctiva/sclera: Conjunctivae normal.     Neck:      Vascular: No carotid bruit.     Cardiovascular:      Rate and Rhythm: Normal rate. Rhythm irregular.      Pulses: Normal pulses.      Heart sounds: Normal heart sounds.   Pulmonary:      Effort: Pulmonary effort is normal. No respiratory distress.      Breath sounds: Normal breath sounds. No stridor.   Abdominal:      General: There is no distension.      Palpations: Abdomen is soft.      Tenderness: There is no abdominal tenderness.      Hernia: A hernia is present.     Musculoskeletal:         General: No swelling, tenderness or signs of injury. Normal range of motion.      Cervical back: Normal range of motion and neck supple.     Skin:     General: Skin is warm and dry.      Capillary Refill: Capillary refill takes less than 2 seconds.      Findings: No lesion or rash.     Neurological:      Mental Status: He is alert.      Cranial Nerves: Cranial nerve deficit, dysarthria and facial asymmetry present.      Sensory: Sensation is intact.      Motor: Motor function is intact.     Psychiatric:         Mood and Affect: Mood normal.            Lab Results: I have reviewed the following results:  Recent Labs      06/07/25  0300 06/07/25  0537   WBC 14.43*  --    HGB 13.2  --    HCT 41.8  --    *  --    SODIUM 141  --    K 4.0  --      --    CO2 27  --    BUN 26*  --    CREATININE 1.14  --    GLUC 150*  --    PTT 35*  --    INR 1.10  --    HSTNI0 35  --    HSTNI2  --  38       Imaging Results Review: I reviewed radiology reports from this admission including: CT A H/N.  Other Study Results Review: No additional pertinent studies reviewed.    VTE Prophylaxis: VTE covered by:  apixaban, Oral, 5 mg at 06/07/25 5958

## 2025-06-08 NOTE — ASSESSMENT & PLAN NOTE
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 Pt was determined to not be a candidate for TNK due to Eliquis therapy. Bigeminy noted on admission. At this time he has persistent Right facial droop, slurred speech and aphasia.    Imaging:  CTH negative for acute process  CTA H/N Moderate atherosclerotic plaque left proximal cervical ICA resulting in 60% stenosis. This plaque was noted on prior examinations with few areas of eccentric thrombus/loose plaque. No central thrombus identified.    Plan:  -LICA stenosis 60% w/ history of LICA thrombus and recurrent Left hemispheric CVA; currently w/ significant persistent deficits   -Currently receiving Eliquis; noted by family to be noncompliant w/ medication for approx 2 weeks prior to 6/2   -Continue ASA, statin  -Neurology following   -MRI pending   -Obtain Carotid Duplex  -Reviewed imaging and D/W Dr. James, continue Eliquis as ordered for now, continue neurologic w/u and monitor for recovery. Pt w/ advanced age and significant cardio/pulmonary and hematologic risk factors. Will review w/ Dr. Colon in AM.

## 2025-06-08 NOTE — ASSESSMENT & PLAN NOTE
MRI brain reveals abnormal flow in the left transverse sinus  Discussed with radiology and is recommended CT venogram  Obtain CT venogram

## 2025-06-08 NOTE — PROGRESS NOTES
Progress Note - Hospitalist   Name: Obdulio Mccauley 84 y.o. male I MRN: [id number]  Unit/Bed#: [facility] 712-01 I Date of Admission: 6/7/2025   Date of Service: 6/8/2025 I Hospital Day: 1     Assessment & Plan  Stroke (cerebrum) ([facility])  With recent diagnosis of CVA 4/2025 at an outside health system with MRI at that time revealing multiple embolic strokes in the left hemisphere with CTA head/neck demonstrating left ICA thrombus  Patient presented with right-sided facial droop slurred speech  Patient was a stroke alert on arrival  CT head negative for acute intracranial pathology, CTA head/neck negative for LVO/aneurysm/dissection or AVMs.  Previous left ICA thrombus again noted  Patient is placed on stroke pathway  MRI brain reveals multifocal recent infarcts scattered throughout the left cerebral hemisphere  Continue aspirin, atorvastatin  Neurology following  Physical therapy  Cerebrovascular accident (CVA) ([facility])  Patient recently hospitalized 4/2025 at an outside hospital with acute CVA and right-sided weakness/speech changes with facial droop and aphasia.  MRI brain confirmed multiple embolic strokes in the left hemisphere with CT head/neck revealing left ICA thrombus.    Continue aspirin, statin  Essential thrombocytosis ([facility])  Longstanding history of this following with [facility] hematology,   continue PTA ASA and hydroxyurea  CTEPH (chronic thromboembolic pulmonary hypertension) ([facility])  With history of acute pulmonary [place] February 2023, follows with [facility] heart failure/pulmonary hypertension team  Continue Eliquis  Primary hypertension  Monitor blood pressures  Avoid hypotension  Bigeminy  Bigeminy/trigeminy noted on telemetry monitoring, patient denying any chest pain, dizziness/lightheadedness, or other associated symptoms  Uncertain significance of finding, will monitor on telemetry for now.  Follow-up on 2D echo  Carotid stenosis, left  Patient eval by vascular surgeon and is recommended medical  management with plans for outpatient follow-up  Continue aspirin statin  Outpatient vascular surgery follow-up  Abnormal finding on MRI of brain  MRI brain reveals abnormal flow in the left transverse sinus  Discussed with radiology and is recommended CT venogram  Obtain CT venogram                  VTE Pharmacologic Prophylaxis: VTE Score: 9 High Risk (Score >/= 5) - Pharmacological DVT Prophylaxis Ordered: apixaban (Eliquis). Sequential Compression Devices Ordered.    Mobility:   Basic Mobility Inpatient Raw Score: 18  JH-HLM Goal: 6: Walk 10 steps or more  JH-HLM Achieved: 6: Walk 10 steps or more  JH-HLM Goal NOT achieved. Continue with multidisciplinary rounding and encourage appropriate mobility to improve upon JH-HLM goals.    Patient Centered Rounds: I performed bedside rounds with nursing staff today.   Discussions with Specialists or Other Care Team Provider: Radiology, case management    Education and Discussions with Family / Patient: Patient, updated son Kishor questions answered.     Current Length of Stay: 1 day(s)  Current Patient Status: Inpatient   Certification Statement: The patient will continue to require additional inpatient hospital stay due to stroke ongoing management neurology following physical therapy evaluation  Discharge Plan: Stroke ongoing management as outlined    Code Status: Level 1 - Full Code    Subjective     Comfortably in bed  Reports feeling okay  Encourage incentive spirometry  Discussed with RN      Objective :  Temp:  [97.6 °F (36.4 °C)-98.7 °F (37.1 °C)] 98.3 °F (36.8 °C)  HR:  [68-78] 68  BP: (113-137)/(63-79) 113/63  Resp:  [16-18] 16  SpO2:  [94 %-98 %] 94 %  O2 Device: None (Room air)    Body mass index is 30.49 kg/m².     Input and Output Summary (last 24 hours):     Intake/Output Summary (Last 24 hours) at 6/8/2025 1617  Last data filed at 6/7/2025 2301  Gross per 24 hour   Intake --   Output 475 ml   Net -475 ml       Physical Exam    Comfortably in  bed  Obese  Short thick neck  Lungs diminished breath sounds  Heart sounds S1-S2 noted  Abdomen soft  Awake follows commands  No pedal edema  No rash    Lines/Drains:        Telemetry:  Telemetry Orders (From admission, onward)               24 Hour Telemetry Monitoring  Continuous x 24 Hours (Telem)        Expiring   Question:  Reason for 24 Hour Telemetry  Answer:  TIA/Suspected CVA/ Confirmed CVA                     Telemetry Reviewed: Sinus rhythm  Indication for Continued Telemetry Use: Acute CVA               Lab Results: I have reviewed the following results:   Results from last 7 days   Lab Units 06/07/25  0300   WBC Thousand/uL 14.43*   HEMOGLOBIN g/dL 13.2   HEMATOCRIT % 41.8   PLATELETS Thousands/uL 643*     Results from last 7 days   Lab Units 06/07/25  0300   SODIUM mmol/L 141   POTASSIUM mmol/L 4.0   CHLORIDE mmol/L 107   CO2 mmol/L 27   BUN mg/dL 26*   CREATININE mg/dL 1.14   ANION GAP mmol/L 7   CALCIUM mg/dL 9.5   GLUCOSE RANDOM mg/dL 150*     Results from last 7 days   Lab Units 06/07/25  0300   INR  1.10     Results from last 7 days   Lab Units 06/07/25  0259   POC GLUCOSE mg/dl 161*     Results from last 7 days   Lab Units 06/07/25  0300   HEMOGLOBIN A1C % 5.6           Recent Cultures (last 7 days):         Imaging Results Review: I personally reviewed the following image studies/reports in PACS and discussed pertinent findings with Radiology: CT head, MRI brain, and Ultrasound(s). My interpretation of the radiology images/reports is: Lab results reviewed.      Last 24 Hours Medication List:     Current Facility-Administered Medications:     acetaminophen (TYLENOL) tablet 650 mg, Q6H PRN    ALPRAZolam (XANAX) tablet 0.25 mg, Daily PRN    apixaban (ELIQUIS) tablet 5 mg, BID    aspirin chewable tablet 81 mg, Daily    atorvastatin (LIPITOR) tablet 80 mg, QPM    cyanocobalamin (VITAMIN B-12) tablet 500 mcg, Daily    famotidine (PEPCID) tablet 40 mg, Daily    hydroxyurea (HYDREA) capsule 500 mg,  BID    multivitamin-minerals (CENTRUM) tablet 1 tablet, Daily    ondansetron (ZOFRAN) injection 4 mg, Q6H PRN    sertraline (ZOLOFT) tablet 50 mg, Daily    Administrative Statements   Today, Patient Was Seen By: Fernando Calixto MD  I have spent a total time of 33 minutes in caring for this patient on the day of the visit/encounter including Diagnostic results, Risks and benefits of tx options, Instructions for management, Patient and family education, Importance of tx compliance, Risk factor reductions, Impressions, Counseling / Coordination of care, Documenting in the medical record, Reviewing/placing orders in the medical record (including tests, medications, and/or procedures), Obtaining or reviewing history  , and Communicating with other healthcare professionals .    **Please Note: This note may have been constructed using a voice recognition system.**

## 2025-06-08 NOTE — ASSESSMENT & PLAN NOTE
85yo M w/ a PMH of chronic PE on eliquis, recent stroke in 04/2025 (L parietal and L occipital found to have L ICA thrombus), essential thrombosis w/ JAK2 mutation, HTN, HLD coming in as a stroke alert on 6/7/2025  2:53 AM with initial NIHSS of 5 and LKW 1:45am 06/07/2025, initial Blood Pressure: 154/70. Initial presenting deficits were R facial droop, slurred speech and mild aphasia. EMS found on EKG runs of bigeminy and noted to be on telemetry as well  As a result of currently on eliquis, pt was determined to not be a candidate for thrombolysis (TNK).   Exam on 06/08/25: R facial droop, dysarthria, and mild aphasia  Current Blood Pressure: 148/80, BP over 24 hours: BP  Min: 113/63  Max: 148/80   Vascular risk factors: HTN, HLD, past stroke   Home meds: eliquis 5mg BID and ASA 81mg     Past workup:   - 4/14/2025 CTA H/N: Clot in the proximal LEFT internal carotid artery is less pronounced and the central clot is no longer present.  No intracranial branch occlusion.  Minimal hypodensity in the LEFT frontal lobe corresponding to the infarcts on MRI. No intraparenchymal hematoma, mass effect, or midline shift.   - MRI Brain 4/11/2025: Multiple areas of restricted diffusion involving the LEFT cerebral hemisphere, most pronounced in the LEFT anterior frontal lobe. There is involvement of the LEFT parietal lobe and LEFT occipital lobe. Scattered areas of underlying susceptibility artifact consistent with petechial hemorrhage. No space-occupying hematoma, mass effect, or midline shift.  Scattered areas of increased T2/FLAIR signal within the white matter suggestive of chronic small vessel ischemic changes. Flow voids at the skull base visualized. Basal cisterns appear normal. Old small infarcts in the bilateral cerebellar hemispheres.    Workup:   CTH: No acute intracranial abnormality. Chronic microangiopathic changes.   CTA: No intracranial LVO, aneurysm, dissection, or high-grade stenosis. Moderate atherosclerotic  plaque L proximal cervical ICA resulting in 60% stenosis. This plaque was noted on prior examinations with few areas of eccentric thrombus/loose plaque. No central thrombus identified at this time, however, this may be a source of emboli   MRI Brain: Multifocal recent infarcts scattered throughout the left cerebral hemisphere, with mild associated mass effect and evidence of recent hemorrhage.  Abnormal flow void of the left transverse sinus extending into the sigmoid sinus and left internal jugular vein, though noting these areas were patent on the recent CTA. While findings could be related to slow flow, dural venous sinus thrombosis is not excluded   Carotid US: Less than 50% stenosis b/l  TTE: Pending   Telemetry: Noted multiple PVCs and runs of bigeminy   , A1c 5.6%    Pertinent scores:  - NIHSS: 5  Stroke Modified Turner Score: 0 (No baseline symptoms/disability)    Impression: New onset R facial droop, slurred speech and expressive aphasia in setting of known L ICA thrombus as suspected cause of symptoms and requires further work up.    Plan:  Case discussed with neurology attending Dr. Yancey  Stroke pathway  BP: Permissive hypertension for first 24 hours up to 220 SBP with gradual reduction to normotension over days  Increased atorvastatin from 40 mg to 80 mg qHS given elevated LDL (goal LDL <70)  Maintain glucose <180, SSI for coverage if indicated  Antiplatelet agents: ASA 81 mg   AC: Continue home eliquis 5 mg BID (takes for chronic PEs)  TTE pending  CTV pending  DVT ppx and SCDs  Monitor on telemetry  PT/OT/ST/PMR input appreciated  Stroke education  Recommend vascular surgery consultation for evaluation of L ICA thrombus  Medicine team to work up noted bigeminy and rest of care as per primary team

## 2025-06-08 NOTE — ASSESSMENT & PLAN NOTE
Longstanding history of this following with [facility] hematology,   continue PTA ASA and hydroxyurea

## 2025-06-08 NOTE — CASE MANAGEMENT
Case Management Assessment & Discharge Planning Note    Patient name Obdulio Mccauley  Location [facility] 712-01 MRN [id number]  : 1940 Date 2025       Current Admission Date: 2025  Current Admission Diagnosis:Stroke-like symptoms   Patient Active Problem List    Diagnosis Date Noted    Stroke-like symptoms 2025    Primary hypertension 2025    Bigeminy 2025    Carotid stenosis, left 2025    CTEPH (chronic thromboembolic pulmonary hypertension) ([facility]) 2024    Obstructive sleep apnea 2024    ELODIA (obstructive sleep apnea) 2024    Obesity, morbid ([facility]) 2023    Other pulmonary embolism without acute cor pulmonale ([facility]) 2023    Essential thrombocytosis ([facility]) 2023    Basal cell carcinoma of skin of other parts of face 2022    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    Urge incontinence 2022    Nocturia 2022    Bladder neck contracture 2019    Prostate cancer ([facility]) 2017    Cerebrovascular accident (CVA) ([facility]) 2015      LOS (days): 1  Geometric Mean LOS (GMLOS) (days):   Days to GMLOS:     OBJECTIVE:    Risk of Unplanned Readmission Score: 13.43         Current admission status: Inpatient       Preferred Pharmacy:   [facility]/pharmacy #[id number] - [place], [place] - [address] [address]  [address] [address]  Phone: [phone] Fax: [phone]    [facility] [place], [place] - [address] [address]  Phone: [phone] Fax: [phone]    Primary Care Provider: Ky Rubi MD    Primary Insurance: PRIYA  REP  Secondary Insurance:     ASSESSMENT:  Active Health Care Proxies       timothyroxana fox Health Care Representative - Daughter   Primary Phone: [phone] (Mobile)                 Patient Information  Admitted from:: Home  Mental Status: Alert  During Assessment patient was accompanied by: Not accompanied during  assessment  Assessment information provided by:: Patient  Primary Caregiver: Self  Support Systems: Self, Family members, Spouse/significant other  County of Residence: [place]  [address] [facility] do you live in?: [address]  Home entry access options. Select all that apply.: Stairs  Number of steps to enter home.: 1  Type of Current Residence: 2 story home  Upon entering residence, is there a bedroom on the main floor (no further steps)?: No  A bedroom is located on the following floor levels of residence (select all that apply):: 2nd Floor  Upon entering residence, is there a bathroom on the main floor (no further steps)?: Yes  Number of steps to 2nd floor from main floor: One Flight  Living Arrangements: Lives w/ Spouse/significant other  Is patient a ?: Yes  Is patient active with [facility] ([place])?: No    Activities of Daily Living Prior to Admission  Functional Status: Independent  Completes ADLs independently?: Yes  Ambulates independently?: Yes  Does patient use assisted devices?: No  Does patient currently own DME?: No  Does patient have a history of Outpatient Therapy (PT/OT)?: Yes  Does the patient have a history of Short-Term Rehab?: No  Does patient have a history of HHC?: No  Does patient currently have HHC?: No    Patient Information Continued  Income Source: Pension/[facility]  Does patient have prescription coverage?: Yes  Can the patient afford their medications and any related supplies (such as glucometers or test strips)?: Yes  Does patient receive dialysis treatments?: No  Does patient have a history of substance abuse?: No  Does patient have a history of Mental Health Diagnosis?: No    Means of Transportation  [facility] Self      DISCHARGE DETAILS:    Discharge planning discussed with:: Patient  Freedom of Choice: Yes  Comments - Freedom of Choice: Discussed FOC  CM contacted family/caregiver?: No- see comments (declined)  Were Treatment Team discharge  recommendations reviewed with patient/caregiver?: Yes  Did patient/caregiver verbalize understanding of patient care needs?: Yes  Were patient/caregiver advised of the risks associated with not following Treatment Team discharge recommendations?: Yes      Other Referral/Resources/Interventions Provided:  Referral Comments: This CM introduced self and role to patient, lives with spouse in 2 story home, [address] [facility], has 1/2 bath on first floor, full bath and bedroom located on 2nd floor (FFOS).  Independent with ADLS, no DME at home, patient is a , but not connected with [facility].  History noted of OP therapy, no HH or STR.  Patient agreeable to this CM setting up HH services for patient, referral sent via [facility].  Patient will need RW on DC

## 2025-06-08 NOTE — UTILIZATION REVIEW
Initial Clinical Review    Admission: Date/Time/Statement:   Admission Orders (From admission, onward)       Ordered        06/07/25 0347  INPATIENT ADMISSION  Once                          Orders Placed This Encounter   Procedures    INPATIENT ADMISSION     Standing Status:   Standing     Number of Occurrences:   1     Level of Care:   Med Surg [16]     Estimated length of stay:   More than 2 Midnights     Certification:   I certify that inpatient services are medically necessary for this patient for a duration of greater than two midnights. See H&P and MD Progress Notes for additional information about the patient's course of treatment.     ED Arrival Information       Expected   -    Arrival   6/7/2025 02:53    Acuity   Emergent              Means of arrival   Ambulance    Escorted by   [facility]    Service   Hospitalist    Admission type   Emergency              Arrival complaint   STROKE Alert             Chief Complaint   Patient presents with    CVA/TIA-like Symptoms     PT brought in by EMS from home after wife called 911 bc pt was experiencing slurred speech and right side facial droop around 2am today.        Initial Presentation: 84 y.o. male who presented by EMS to [facility] ED.  Pertinent PMHx: DVT/PE (on Eliquis), HTN, stroke. Presented w/ sudden onset of difficulty speaking and moving. EMS found pt to have R facial droop, slurred speech, aphasia. CT head unremarkable. CTA redemonstrated L ICA thrombus. Not TNK candidate s/t Eliquis use. Family reports pt has not refilled Eliquis for at least two week period before 6/2. Family also concerned w/ increasing forgetfulness and possibility of not being fully compliant w/ other meds. EXAM: occasional extrasystole, R facial droop, dysarthria, expressive aphasia. WBC 14.43. Telemetry: Bigeminy/trigeminy noted, asymptomatic. Admitted as Inpatient for evaluation and treatment of stroke. PLAN: check MRI brain, TTE, Baseline  NIH stroke scale on admission, reassess Q24H x 2 D; Nursing dysphagia assessment prior to staring diet; Neuro checks: q1h x4h, q2h x8h, q4h x72h. Vitals: q1h x4h, q2h x8h, q4h x72h. Telemetry; ASA, Eliquis, statin, permissive HTN, PT/OT/ST evals, continue current meds.  Neurology, Vascular Surgery consulted.     Anticipated Length of Stay/Certification Statement: Patient will be admitted on an Inpatient basis with an anticipated length of stay of greater than 2 midnights.   Justification for Hospital Stay: Strokelike symptoms with concern for recurrent CVA requiring further workup on stroke pathway including MRI brain, TTE, formal neurology consultation, medication titration, clinical monitoring     Neurology: NIHSS 5. Possible acute stroke. Neuro checks. Continue Eliquis, ASA. Increase atorvastatin to 80 mg, permissive HTN. MRI brain.     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 Currently w/ significant persistent deficits. ASA, statin, Eliquis. MRI. Check carotid duplex.       Date: 06/08/25   Day 2: MRI brain showed multifocal recent infarcts scattered throughout L cerebral hemisphere. Acute L hemispheric infarct. Suspect etiology symptomatic left ICA stenosis vs Eliquis noncompliance. Pt reports feeling okay. Diminished breath sounds. Telemetry: sinus rhythm. Plan: continue ASA & statin, continue other current meds, echo, CT venogram. Trend labs, replete electrolytes as needed. Neuro checks q4h. Monitor for headache or other neuro changes - if occur obtain CTH stat.       Date: 06/09/25  Day 3: Has surpassed a 2nd midnight with active treatments and services. Pt continues with expressive aphasia and dysarthria. R facial droop. RUE 4/5 strength. Home health services recommended after evaluations, case management arranging. Plan: telemetry, continue current meds, I&O, q4h neuro checks.       ED Treatment-Medication Administration from 06/07/2025 0250 to  06/07/2025 1738         Date/Time Order Dose Route Action     06/07/2025 0310 iohexol (OMNIPAQUE) 350 MG/ML injection (SINGLE-DOSE) 85 mL 85 mL Intravenous Given     06/07/2025 0906 aspirin chewable tablet 81 mg 81 mg Oral Given     06/07/2025 0906 apixaban (ELIQUIS) tablet 5 mg 5 mg Oral Given     06/07/2025 0906 cyanocobalamin (VITAMIN B-12) tablet 500 mcg 500 mcg Oral Given     06/07/2025 0906 famotidine (PEPCID) tablet 40 mg 40 mg Oral Given     06/07/2025 1048 hydroxyurea (HYDREA) capsule 500 mg 500 mg Oral Given     06/07/2025 0906 multivitamin-minerals (CENTRUM) tablet 1 tablet 1 tablet Oral Given     06/07/2025 0906 sertraline (ZOLOFT) tablet 50 mg 50 mg Oral Given            Scheduled Medications:  apixaban, 5 mg, Oral, BID  aspirin, 81 mg, Oral, Daily  atorvastatin, 80 mg, Oral, QPM  cyanocobalamin, 500 mcg, Oral, Daily  famotidine, 40 mg, Oral, Daily  hydroxyurea, 500 mg, Oral, BID  multivitamin-minerals, 1 tablet, Oral, Daily  sertraline, 50 mg, Oral, Daily    Continuous IV Infusions: none    PRN Meds:  acetaminophen, 650 mg, Oral, Q6H PRN  ALPRAZolam, 0.25 mg, Oral, Daily PRN  ondansetron, 4 mg, Intravenous, Q6H PRN      ED Triage Vitals   Temperature Pulse Respirations Blood Pressure SpO2 Pain Score   06/07/25 0313 06/07/25 0258 06/07/25 0258 06/07/25 0304 06/07/25 0258 06/07/25 2000   (!) 97.4 °F (36.3 °C) 81 18 154/70 95 % No Pain     Weight (last 2 days)       Date/Time Weight    06/08/25 1450 88.3 (194.67)    06/08/25 1400 88.3 (194.67)    06/07/25 0258 88.3 (194.67)            Vital Signs (last 3 days)       Date/Time Temp Pulse Resp BP MAP (mmHg) SpO2 O2 Device Patient Position - Orthostatic VS Jamel Coma Scale Score Pain    06/09/25 07:28:19 98 °F (36.7 °C) 89 18 125/84 98 94 % -- -- -- --    06/09/25 0400 -- -- -- -- -- -- -- -- 15 --    06/09/25 0000 -- -- -- -- -- -- -- -- 15 --    06/08/25 22:23:08 -- 84 -- 167/82 110 94 % -- -- -- No Pain    06/08/25 22:12:55 97.9 °F (36.6 °C) 94 20  159/96 117 94 % -- -- -- --    06/08/25 2000 -- -- -- -- -- -- -- -- 15 --    06/08/25 1910 98.1 °F (36.7 °C) 76 20 148/80 103 93 % -- -- -- --    06/08/25 15:53:44 98.3 °F (36.8 °C) 68 16 113/63 80 94 % -- -- -- --    06/08/25 1450 -- 78 -- 137/79 -- -- -- -- -- --    06/08/25 1400 -- 78 -- 137/79 -- -- -- -- -- --    06/08/25 0830 -- -- -- -- -- -- -- -- -- No Pain    06/08/25 0829 -- -- -- -- -- -- -- -- -- No Pain    06/08/25 0800 -- -- -- -- -- -- -- -- 15 --    06/08/25 07:12:01 98.7 °F (37.1 °C) 78 -- 137/79 98 95 % -- -- -- --    06/08/25 0400 -- -- -- -- -- -- -- -- 15 --    06/08/25 0000 -- -- -- -- -- -- -- -- 15 --    06/07/25 22:01:02 97.6 °F (36.4 °C) 72 -- 135/74 94 98 % None (Room air) Lying -- --    06/07/25 2000 -- -- -- -- -- -- None (Room air) -- 15 No Pain    06/07/25 19:38:42 -- 69 -- 132/74 93 95 % -- -- -- --    06/07/25 19:21:38 98.2 °F (36.8 °C) 71 18 132/77 95 96 % None (Room air) Lying -- --    06/07/25 17:44:24 98.1 °F (36.7 °C) 76 18 137/76 96 94 % None (Room air) Lying -- --    06/07/25 1500 -- 74 20 134/60 86 94 % None (Room air) -- -- --    06/07/25 1400 98.2 °F (36.8 °C) 71 19 116/56 80 96 % None (Room air) Sitting -- --    06/07/25 1300 -- 62 19 101/55 76 90 % None (Room air) Sitting 15 --    06/07/25 1100 -- 69 19 116/57 -- 94 % None (Room air) Sitting 15 --    06/07/25 0936 -- 85 20 164/84 117 95 % None (Room air) Sitting -- --    06/07/25 0932 -- -- -- -- -- -- -- -- 15 --    06/07/25 0900 -- 76 22 138/63 -- 97 % None (Room air) Sitting 15 --    06/07/25 0800 -- 79 18 143/65 93 96 % None (Room air) Sitting -- --    06/07/25 0730 -- 70 22 139/61 88 95 % None (Room air) Sitting -- --    06/07/25 0700 -- 78 18 135/64 -- 96 % None (Room air) Sitting 15 --    06/07/25 0600 -- -- -- -- -- -- -- -- 15 --    06/07/25 0530 -- 68 18 145/65 94 94 % -- -- -- --    06/07/25 0509 -- 72 18 162/76 -- 95 % None (Room air) -- 15 --    06/07/25 0430 -- 67 18 147/67 97 96 % None (Room air) -- -- --     06/07/25 0400 -- -- 18 158/74 -- 95 % None (Room air) -- 15 --    06/07/25 0330 -- 75 18 188/93 -- 96 % None (Room air) -- 15 --    06/07/25 03:14:46 -- 67 18 181/84 -- 96 % -- -- 15 --    06/07/25 0313 97.4 °F (36.3 °C) -- -- -- -- -- -- -- -- --    06/07/25 03:04:23 -- 70 18 154/70 -- 94 % None (Room air) Lying 14 --    06/07/25 02:58:25 -- 81 18 -- -- 95 % None (Room air) -- 14 --              Pertinent Labs/Diagnostic Test Results:   Radiology:  VAS carotid complete study   Final Interpretation by Lauro Colon DO (06/08 3916)     RIGHT:   There is <50% stenosis noted in the internal carotid artery.  Plaque is heterogenous and irregular.   Vertebral artery flow is antegrade.  There is no significant subclavian artery disease.   LEFT:   There is <50% stenosis noted in the internal carotid artery.  Plaque is heterogenous and irregular.   Vertebral artery flow is antegrade.  There is no significant subclavian artery disease.      MRI brain wo contrast   Final Interpretation by Sarthak Landrum MD (06/08 0846)   Addendum (preliminary) 1 of 1 by Sarthak Landrum MD (06/08 0846)   ADDENDUM:         I personally discussed this study with MORIAH MAHAJAN on 6/8/2025    8:46 AM.               Final      1.  Multifocal recent infarcts scattered throughout the left cerebral hemisphere, with mild associated mass effect and evidence of recent hemorrhage, as above.   2.  Abnormal flow void of the left transverse sinus extending into the sigmoid sinus and left internal jugular vein, though noting these areas were patent on the recent CTA. While findings could be related to slow flow, dural venous sinus thrombosis is    not excluded. Recommend further evaluation with CT venogram.      The study was marked in EPIC for immediate notification.      Workstation performed: [id number]         CTA stroke alert (head/neck)   Final Interpretation by Virgilio Manjarrez DO (06/07 3324)      No intracranial large vessel occlusion,  aneurysm, dissection, or high-grade stenosis.      Moderate atherosclerotic plaque left proximal cervical ICA resulting in 60% stenosis. This plaque was noted on prior examinations with few areas of eccentric thrombus/loose plaque. No central thrombus identified at this time, however, this may be a    source of emboli               I personally discussed this study with Dr. Palacio on 6/7/2025 3:43 AM.               Workstation performed: [id number]         CT stroke alert brain   Final Interpretation by Virgilio Manjarrez DO (06/07 0349)      No acute intracranial abnormality.  Chronic microangiopathic changes.            I personally discussed this study with Dr. Palacio on 6/7/2025 3:33 AM.            Workstation performed: [id number]           Cardiology:  Echo complete w/ contrast if indicated   Final Result by Raissa Mcpherson MD (06/09 0846)        Left Ventricle: The left ventricular ejection fraction is 70%. Systolic    function is hyperdynamic. Wall motion is normal.     Aortic Valve: There is aortic valve sclerosis.         ECG 12 lead   Final Result by Kyaode Kelly MD (06/07 4762)   Normal sinus rhythm   Possible Left atrial enlargement   Right bundle branch block   Left posterior fascicular block   Bifascicular block   T wave abnormality, consider inferior ischemia   Abnormal ECG   When compared with ECG of 29-Nov-2024 15:30,   Premature atrial complexes are no longer Present   Left posterior fascicular block is now Present   T wave inversion no longer evident in Anterior leads   Confirmed by Kayode Kelly ([id number]) on 6/7/2025 1:46:04 PM               Results from last 7 days   Lab Units 06/07/25  0300   WBC Thousand/uL 14.43*   HEMOGLOBIN g/dL 13.2   HEMATOCRIT % 41.8   PLATELETS Thousands/uL 643*         Results from last 7 days   Lab Units 06/09/25  0505 06/07/25  0300   SODIUM mmol/L 140 141   POTASSIUM mmol/L 4.2 4.0   CHLORIDE mmol/L 106 107   CO2 mmol/L 27 27   ANION GAP mmol/L 7 7   BUN mg/dL 22 26*    CREATININE mg/dL 1.06 1.14   EGFR ml/min/1.73sq m 63 57   CALCIUM mg/dL 9.9 9.5      Results from last 7 days   Lab Units 06/07/25  0259   POC GLUCOSE mg/dl 161*     Results from last 7 days   Lab Units 06/09/25  0505 06/07/25  0300   GLUCOSE RANDOM mg/dL 77 150*       Results from last 7 days   Lab Units 06/07/25  0300   HEMOGLOBIN A1C % 5.6   EAG mg/dl 114      Results from last 7 days   Lab Units 06/07/25  0537 06/07/25  0300   HS TNI 0HR ng/L  --  35   HS TNI 2HR ng/L 38  --    HSTNI D2 ng/L 3  --       Results from last 7 days   Lab Units 06/07/25  0300   PROTIME seconds 14.5   INR  1.10   PTT seconds 35*        Past Medical History[1]  Present on Admission:   Stroke (cerebrum) (HCC)   Cerebrovascular accident (CVA) (HCC)   CTEPH (chronic thromboembolic pulmonary hypertension) (HCC)   Essential thrombocytosis (HCC)   Primary hypertension   Bigeminy   Carotid stenosis, left      Admitting Diagnosis: Stroke (HCC) [I63.9]  Stroke-like symptoms [R29.90]  Age/Sex: 84 y.o. male    Network Utilization Review Department  ATTENTION: Please call with any questions or concerns to [phone] and carefully listen to the prompts so that you are directed to the right person. All voicemails are confidential.   For Discharge needs, contact Care Management DC Support Team at [phone] opt. 2  Send all requests for admission clinical reviews, approved or denied determinations and any other requests to dedicated fax number below belonging to the campus where the patient is receiving treatment. List of dedicated fax numbers for the Facilities:  FACILITY NAME UR FAX NUMBER   ADMISSION DENIALS (Administrative/Medical Necessity) [phone]   DISCHARGE SUPPORT TEAM (NETWORK) [phone]   PARENT CHILD HEALTH (Maternity/NICU/Pediatrics) [phone]   [facility] [phone]   [facility] [phone]   [facility] [phone]   [facility]  [facility] [phone]   [facility] [phone]   [facility] [phone]   [facility] [phone]   [facility] [phone]   [facility] [phone]   [facility] [phone]   [facility] [phone]   [facility] [phone]              [1]   Past Medical History:  Diagnosis Date    Cancer (HCC)     prostate    History of pulmonary embolus (PE)     Hypercholesterolemia     Hypertension     Personal history of DVT (deep vein thrombosis)     Pneumonia     Stroke (HCC)

## 2025-06-08 NOTE — ASSESSMENT & PLAN NOTE
Patient eval by vascular surgeon and is recommended medical management with plans for outpatient follow-up  Continue aspirin statin  Outpatient vascular surgery follow-up

## 2025-06-08 NOTE — ASSESSMENT & PLAN NOTE
Patient recently hospitalized 4/2025 at an outside hospital with acute CVA and right-sided weakness/speech changes with facial droop and aphasia.  MRI brain confirmed multiple embolic strokes in the left hemisphere with CT head/neck revealing left ICA thrombus.    Continue aspirin, statin

## 2025-06-08 NOTE — ASSESSMENT & PLAN NOTE
With history of acute pulmonary [place] February 2023, follows with [facility] heart failure/pulmonary hypertension team  Continue Eliquis

## 2025-06-09 ENCOUNTER — TELEPHONE (OUTPATIENT)
Age: 85
End: 2025-06-09

## 2025-06-09 ENCOUNTER — APPOINTMENT (INPATIENT)
Dept: RADIOLOGY | Facility: HOSPITAL | Age: 85
DRG: 064 | End: 2025-06-09
Payer: COMMERCIAL

## 2025-06-09 LAB
ANION GAP SERPL CALCULATED.3IONS-SCNC: 7 MMOL/L (ref 4–13)
BSA FOR ECHO PROCEDURE: 2 M2
BUN SERPL-MCNC: 22 MG/DL (ref 5–25)
CALCIUM SERPL-MCNC: 9.9 MG/DL (ref 8.4–10.2)
CHLORIDE SERPL-SCNC: 106 MMOL/L (ref 96–108)
CO2 SERPL-SCNC: 27 MMOL/L (ref 21–32)
CREAT SERPL-MCNC: 1.06 MG/DL (ref 0.6–1.3)
E WAVE DECELERATION TIME: 270 MS
E/A RATIO: 0.6
GFR SERPL CREATININE-BSD FRML MDRD: 63 ML/MIN/1.73SQ M
GLUCOSE SERPL-MCNC: 77 MG/DL (ref 65–140)
LV EF US.2D.A4C+ESTIMATED: 71 %
MV PEAK A VEL: 0.95 M/S
MV PEAK E VEL: 57 CM/S
MV STENOSIS PRESSURE HALF TIME: 78 MS
MV VALVE AREA P 1/2 METHOD: 2.82
POTASSIUM SERPL-SCNC: 4.2 MMOL/L (ref 3.5–5.3)
SL CV LV EF: 70
SODIUM SERPL-SCNC: 140 MMOL/L (ref 135–147)
TR MAX PG: 20 MMHG
TR PEAK VELOCITY: 2.2 M/S
TRICUSPID VALVE PEAK REGURGITATION VELOCITY: 2.24 M/S

## 2025-06-09 PROCEDURE — 70496 CT ANGIOGRAPHY HEAD: CPT

## 2025-06-09 PROCEDURE — 80048 BASIC METABOLIC PNL TOTAL CA: CPT | Performed by: INTERNAL MEDICINE

## 2025-06-09 PROCEDURE — 92522 EVALUATE SPEECH PRODUCTION: CPT

## 2025-06-09 PROCEDURE — 99222 1ST HOSP IP/OBS MODERATE 55: CPT | Performed by: NURSE PRACTITIONER

## 2025-06-09 PROCEDURE — 97116 GAIT TRAINING THERAPY: CPT

## 2025-06-09 PROCEDURE — 97112 NEUROMUSCULAR REEDUCATION: CPT

## 2025-06-09 PROCEDURE — 99232 SBSQ HOSP IP/OBS MODERATE 35: CPT | Performed by: INTERNAL MEDICINE

## 2025-06-09 RX ORDER — ATORVASTATIN CALCIUM 80 MG/1
80 TABLET, FILM COATED ORAL EVERY EVENING
Qty: 30 TABLET | Refills: 0 | Status: SHIPPED | OUTPATIENT
Start: 2025-06-09 | End: 2025-07-09

## 2025-06-09 RX ADMIN — APIXABAN 5 MG: 5 TABLET, FILM COATED ORAL at 08:40

## 2025-06-09 RX ADMIN — IOHEXOL 75 ML: 350 INJECTION, SOLUTION INTRAVENOUS at 09:38

## 2025-06-09 RX ADMIN — ASPIRIN 81 MG CHEWABLE TABLET 81 MG: 81 TABLET CHEWABLE at 08:40

## 2025-06-09 RX ADMIN — FAMOTIDINE 40 MG: 20 TABLET, FILM COATED ORAL at 08:40

## 2025-06-09 RX ADMIN — APIXABAN 5 MG: 5 TABLET, FILM COATED ORAL at 17:46

## 2025-06-09 RX ADMIN — SERTRALINE HYDROCHLORIDE 50 MG: 50 TABLET ORAL at 08:40

## 2025-06-09 RX ADMIN — HYDROXYUREA 500 MG: 500 CAPSULE ORAL at 17:46

## 2025-06-09 RX ADMIN — CYANOCOBALAMIN TAB 500 MCG 500 MCG: 500 TAB at 08:40

## 2025-06-09 RX ADMIN — HYDROXYUREA 500 MG: 500 CAPSULE ORAL at 08:51

## 2025-06-09 RX ADMIN — ATORVASTATIN CALCIUM 80 MG: 80 TABLET, FILM COATED ORAL at 17:46

## 2025-06-09 RX ADMIN — Medication 1 TABLET: at 08:40

## 2025-06-09 NOTE — ASSESSMENT & PLAN NOTE
Longstanding history of this following with [facility] hematology,   continue PTA ASA and hydroxyurea  Monitor counts

## 2025-06-09 NOTE — ASSESSMENT & PLAN NOTE
MRI brain reveals abnormal flow in the left transverse sinus  CT venogram no evidence of dural venous sinus thrombosis

## 2025-06-09 NOTE — ASSESSMENT & PLAN NOTE
With history of acute pulmonary [place] February 2023, follows with [facility] heart failure/pulmonary hypertension team  Continue Eliquis for anticoagulation

## 2025-06-09 NOTE — SPEECH THERAPY NOTE
SLP Motor Speech Evaluation      Patient Name: Obdulio Mccauley    Today's Date: 6/9/2025     Problem List  Principal Problem:    Stroke (cerebrum) (HCC)  Active Problems:    Cerebrovascular accident (CVA) (HCC)    Essential thrombocytosis (HCC)    CTEPH (chronic thromboembolic pulmonary hypertension) (HCC)    Primary hypertension    Bigeminy    Carotid stenosis, left    Abnormal finding on MRI of brain      Past Medical History  Past Medical History[1]    Past Surgical History  Past Surgical History[2]      Impressions:  The patient presents with severe dysarthria characterized by decreased articulatory precision with poor intelligibility during conversation. Patient continues with significant right side facial drop. He is able to achieve some ROM when smiling and laughing, but has difficulty moving on command. Patient observed with drooling on right side today.     Speech Therapy recommended:  yes,  as able in acute care and follow up in rehab setting     Patient's goal:  None stated     LONG TERM GOALS:  -The patient will demonstrate intelligible speech in all activities of daily living including dynamic conversation  -The patient will independently utilize compensatory strategies to maximize communication in all ADLs.    SHORT TERM GOALS:  Articulation  -Patient will demonstrate adequate posterior  lingual strength, ROM and control for k, g  and produce 20 intelligible (words, phrases, sentences) related to basic personal and medical needs.     -Patient will demonstrate adequate lingual strength, ROM and control for  fricatives and affricates and produce 20 understandable words, phrases, sentences) related to basic personal and medical needs    -Patient will use trained strategies (eg slowed rate, over articulation, increased oral opening, writing key word, increased loudness, phrasing)  to improve speech intelligibility in word,  phrases, sentences and  conversation with 90% accuracy.     History and Physical:      Chief Complaint:     Right facial droop and speech difficulties  History of Present Illness:  Obdulio Mccauley is a 84 y.o. male who has a past medical history significant for CVA hospitalized in the [facility] 4/2025 with this with MRI imaging demonstrating multiple left hemisphere embolic infarctions secondary to thrombus in the left ICA, CTEPH following now with [facility] heart failure team and anticoagulated on Eliquis, essential thrombocytosis with JAK2 mutation, hypertension, hyperlipidemia who presented as a stroke alert with right-sided facial droop, slurred speech and mild aphasia.  Patient states he was watching television with his wife this evening when he suddenly had difficulty speaking and difficulty moving which prompted call from wife to EMS.  He denied any fever/chills, headache, focal weakness, numbness/ting/paresthesias, dizziness/lightheadedness, chest pain/pressure, shortness of breath, abdominal pain/nausea/vomiting/diarrhea, dysuria, or other systemic symptoms.     On EMS arrival patient was found with right facial droop and slurred speech/aphasia, and he was made a prehospital stroke alert.  On ED arrival he was taken immediately to CT scan with CT head noted negative for acute intracranial pathology and CTA head/neck negative for LVO/aneurysms/dissection/AVMs but with left ICA thrombus redemonstrated.  Patient was seen by the overnight neurology resident on-call who discussed with his attending, patient not TNK candidate due to use of Eliquis.  Given presenting symptoms admission under stroke pathway was advised and patient admitted to hospitalist service for facilitation of this/further management.    Cognitive/Language Status:   Adequate     Speech and Swallowing Mechanism Exam   Facial: right facial droop  Labial: decreased ROM right side  Lingual: WFL  Velum: unable to visualize  Mandible: decreased ROM right side  Dentition: adequate and limited dentition  Respiratory  Support: on RA    Respiration and Phonation  Able to sustain phonation counting from 1 to 10 adequately  Vocal Quality:  clear/adequate     Articulation:  Impaired precision at all levels, but more intelligible with one syllable words and shorter phrases     Resonation: Normal nasality    S/S Oral apraxia:  None    S/S Verbal Apraxia:  None      Intelligibility:     -WORD LEVEL:  min impaired      -PHRASE LEVEL:  mod impaired    -IN CONVERSATIONAL mod impaired      Conversation:  Imprecise articulation  Impaired prosody                 [1]   Past Medical History:  Diagnosis Date    Cancer (HCC)     prostate    History of pulmonary embolus (PE)     Hypercholesterolemia     Hypertension     Personal history of DVT (deep vein thrombosis)     Pneumonia     Stroke (HCC)    [2]   Past Surgical History:  Procedure Laterality Date    KNEE SURGERY Left     PROSTATECTOMY      TOTAL SHOULDER REPLACEMENT

## 2025-06-09 NOTE — RESTORATIVE TECHNICIAN NOTE
Restorative Technician Note      Patient Name: Obdulio Mccauley     Note Type: Mobility  Patient Position Upon Consult: Supine  Activity Performed: Ambulated; Dangled; Stood  Assistive Device: Roller walker  Education Provided: Yes  Patient Position at End of Consult: Supine; All needs within reach; Bed/Chair alarm activated    Eleni RDZ, Restorative Technician,

## 2025-06-09 NOTE — CONSULTS
PHYSICAL MEDICINE AND REHABILITATION CONSULT NOTE  Obdulio Mccauley 84 y.o. male MRN: [id number]  Unit/Bed#: [facility] 712-01 Encounter: [id number]    Requested by (Physician/Service): Fernando Calixto, *  Reason for Consultation:  Assessment of rehabilitation needs      Assessment & Plan  Stroke (cerebrum) (Formerly [facility])  MRI with multifocal recent infarcts scattered throughout the left cerebral hemisphere  Had a two week period of missing Eliquis due to medication error.  Continued on Eliquis and lipitor  Continue PT/OT while on acute care  The patient may benefit from acute inpatient rehabilitation at this time pending progress.   Cerebrovascular accident (CVA) (Formerly [facility])  Recently hospitalized at an outside hospital on 4/2025 for acute CVA and right sided weakness/speech changes with facial droop and aphasia.   CTA with left ICA thrombus   Essential thrombocytosis (Formerly [facility])  Follow with hematology  Continue on ASA and hydroxyurea   CTEPH (chronic thromboembolic pulmonary hypertension) (Formerly [facility])  Follow with heart failure/pulmonary hypertension team   Continue Eliquis   Carotid stenosis, left  Vascular consulted and recommended medical management   Primary hypertension    Bigeminy    Abnormal finding on MRI of brain      Thank you for this consultation.  Do not hesitate to contact service with further questions.      LUZ MARIA Collins  PM&R    I have spent a total time of 30 minutes on 06/09/25 in caring for this patient including Patient and family education, Counseling / Coordination of care, Documenting in the medical record, Reviewing/placing orders in the medical record (including tests, medications, and/or procedures), Obtaining or reviewing history  , and Communicating with other healthcare professionals .    History of Present Illness:  Obdulio Mccauley is a 84 y.o. 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 He was watching television with his wife when he developed difficulty speaking and moving prompting his spouse to call EMS. CT head was negative for acute intracranial pathology. CTA was negative for LVO but did re-demonstrate left ICA thrombus. He was not given TNK due to use of Eliquis.  Vascular was consulted and left ICA thrombus. He was managed with Eliquis however had a two week period where he was not taking it due to a medication error. Medical management was recommended with outpatient follow up in 4 to 6 weeks. PM&R are consulted for rehabilitation recommendations.     The patient was seen in his room. He reports that he continues with expressive aphasia and this is the most frustrating thing for him. He does feel that it is getting better. He currently denies any weakness, numbness, tingling, blurred vision or double vision.     Review of Systems: 10 point ROS negative except for what is noted in HPI    Function:  Prior level of function and living situation: The patient lives in a two story home with 1 [address]ENED[address]. He lives with his spouse. There is a 1/2 bath on the first floor, full bath and bedroom on the 2nd floor. He was independent for functional mobility but received assistance from his wife with ADLs and IADLs.     Current level of function:  Physical Therapy: Supervision for bed mobility, transfers, minimal assist for ambulation.   Occupational Therapy: Supervision for eating, grooming, minimal assist UB bathing/dressing, moderate assist for LB bathing/dressing, minimal assist for toileting     Physical Exam:  /84   Pulse 89   Temp 98 °F (36.7 °C)   Resp 18   Ht 5' 7\" (1.702 m)   Wt 88.3 kg (194 lb 10.7 oz)   SpO2 94%   BMI 30.49 kg/m²      No intake or output data in the 24 hours ending 06/09/25 0939    Body mass index is 30.49 " kg/m².      Physical Exam  Constitutional:       General: He is not in acute distress.     Appearance: He is not toxic-appearing.   HENT:      Head: Atraumatic.      Comments: Right facial droop     Eyes:      Extraocular Movements: Extraocular movements intact.     Pulmonary:      Effort: Pulmonary effort is normal. No respiratory distress.   Abdominal:      General: There is no distension.     Musculoskeletal:      Comments: RUE: SAB 4/5 otherwise 5/5 throughout  RLE: 5/5 throughout  LUE/LLE: 5/5 throughout      Neurological:      Mental Status: He is alert.      Comments: Expressive aphasia and dysarthria    Psychiatric:         Mood and Affect: Mood normal.        Social History:    Social History[1]     Family History:    Family History[2]      Medications:   Current Medications[3]    Past Medical History:     Past Medical History[4]     Past Surgical History:     Past Surgical History[5]      Allergies:     Allergies   Allergen Reactions    Misc. Sulfonamide Containing Compounds Rash    Penicillins Rash    Sulfa Antibiotics Rash           LABORATORY RESULTS:      Lab Results   Component Value Date    HGB 13.2 06/07/2025    HGB 15.1 12/30/2015    HCT 41.8 06/07/2025    HCT 44.6 12/30/2015    WBC 14.43 (H) 06/07/2025    WBC 11.45 (H) 12/30/2015     Lab Results   Component Value Date    BUN 22 06/09/2025    BUN 28 02/02/2024     10/01/2015    K 4.2 06/09/2025    K 4.3 02/02/2024     06/09/2025     02/02/2024    GLUCOSE 154 (H) 04/30/2023    GLUCOSE 103 10/01/2015    CREATININE 1.06 06/09/2025    CREATININE 1.07 02/02/2024     Lab Results   Component Value Date    PROTIME 14.5 06/07/2025    PROTIME 14.1 01/06/2015    INR 1.10 06/07/2025    INR 1.0 02/13/2023        DIAGNOSTIC STUDIES: Reviewed  MRI brain wo contrast  Addendum Date: 6/8/2025  ADDENDUM: I personally discussed this study with MORIAH MAHAJAN on 6/8/2025 8:46 AM.     Result Date: 6/8/2025  Impression: 1.  Multifocal recent  infarcts scattered throughout the left cerebral hemisphere, with mild associated mass effect and evidence of recent hemorrhage, as above. 2.  Abnormal flow void of the left transverse sinus extending into the sigmoid sinus and left internal jugular vein, though noting these areas were patent on the recent CTA. While findings could be related to slow flow, dural venous sinus thrombosis is not excluded. Recommend further evaluation with CT venogram. The study was marked in EPIC for immediate notification. Workstation performed: [id number]     CT stroke alert brain  Result Date: 6/7/2025  Impression: No acute intracranial abnormality.  Chronic microangiopathic changes. I personally discussed this study with Dr. Palacio on 6/7/2025 3:33 AM. Workstation performed: [id number]     CTA stroke alert (head/neck)  Result Date: 6/7/2025  Impression: No intracranial large vessel occlusion, aneurysm, dissection, or high-grade stenosis. Moderate atherosclerotic plaque left proximal cervical ICA resulting in 60% stenosis. This plaque was noted on prior examinations with few areas of eccentric thrombus/loose plaque. No central thrombus identified at this time, however, this may be a source of emboli I personally discussed this study with Dr. Palacio on 6/7/2025 3:43 AM. Workstation performed: [id number]          [1]   Social History  Socioeconomic History    Marital status: /Civil Union   Tobacco Use    Smoking status: Never    Smokeless tobacco: Never   Vaping Use    Vaping status: Never Used   Substance and Sexual Activity    Alcohol use: No    Drug use: No   Social History Narrative    ** Merged History Encounter **          Social Drivers of Health     Food Insecurity: Low Risk  (4/9/2025)    Received from [facility]    Food Insecurity     Within the past 12 months, did the food you bought just not last and you didn't have money to get more?: No     Within the past 12 months, did you worry that your food  would run out before you got money to buy more?: No   Transportation Needs: Low Risk  (4/9/2025)    [facility]    Transportation Needs     Within the past 12 months, has a lack of transportation kept you from medical appointments or from doing things needed for daily living?: No   Housing Stability: Low Risk  (4/9/2025)    [facility]    Housing Stability     Within the past 12 months, have you ever stayed: outside, in a car, in a tent, in an overnight shelter, or temporarily in someone else's home (i.e. couch-surfing)?: No     Are you worried about losing your housing? : No   [2] No family history on file.  [3]   Current Facility-Administered Medications:     acetaminophen (TYLENOL) tablet 650 mg, 650 mg, Oral, Q6H PRN, Tan Aparicio DO    ALPRAZolam (XANAX) tablet 0.25 mg, 0.25 mg, Oral, Daily PRN, Tan Aparicio DO    apixaban (ELIQUIS) tablet 5 mg, 5 mg, Oral, BID, Tan Aparicio DO, 5 mg at 06/09/25 0840    aspirin chewable tablet 81 mg, 81 mg, Oral, Daily, Tan Aparicio DO, 81 mg at 06/09/25 0840    atorvastatin (LIPITOR) tablet 80 mg, 80 mg, Oral, QPM, Alberto Sterling, DO, 80 mg at 06/08/25 1733    cyanocobalamin (VITAMIN B-12) tablet 500 mcg, 500 mcg, Oral, Daily, Tan Aparicio DO, 500 mcg at 06/09/25 0840    famotidine (PEPCID) tablet 40 mg, 40 mg, Oral, Daily, Tan Aparicio, DO, 40 mg at 06/09/25 0840    hydroxyurea (HYDREA) capsule 500 mg, 500 mg, Oral, BID, Tan Aparicio DO, 500 mg at 06/09/25 0851    multivitamin-minerals (CENTRUM) tablet 1 tablet, 1 tablet, Oral, Daily, Tan Aparicio DO, 1 tablet at 06/09/25 0840    ondansetron (ZOFRAN) injection 4 mg, 4 mg, Intravenous, Q6H PRN, Tan Aparicio DO    sertraline (ZOLOFT) tablet 50 mg, 50 mg, Oral, Daily, Tan Aparicio DO, 50 mg at 06/09/25 0840  [4]   Past Medical History:  Diagnosis Date    Cancer (HCC)     prostate    History of pulmonary embolus (PE)     Hypercholesterolemia      Hypertension     Personal history of DVT (deep vein thrombosis)     Pneumonia     Stroke (HCC)    [5]   Past Surgical History:  Procedure Laterality Date    KNEE SURGERY Left     PROSTATECTOMY      TOTAL SHOULDER REPLACEMENT

## 2025-06-09 NOTE — QUICK NOTE
Reviewed CT venogram of brain that shows no evidence of dural venous sinus thrombosis.  Transthoracic echocardiogram showing EF of 70%.  Suspect symptomatic left ICA stenosis versus Eliquis noncompliance.  Continue Eliquis 5 mg every 12 hours, aspirin 81 mg daily, and atorvastatin 80 mg daily.  If patient develops severe headache or any new or worsening neurological symptoms, please repeat STAT CT head noncontrast and contact neurology. Patient will follow-up with outpatient stroke clinic in 6 weeks, our staff will contact patient for scheduling.  He is to follow-up with outpatient vascular surgery in 4-6 weeks for left carotid stenosis.     No further recommendations for neurologic inpatient work up at this time. Please contact neurology team with any questions or concerns.    Alberto Sterling DO  [facility]  Neurology Residency PGY-II

## 2025-06-09 NOTE — PLAN OF CARE
Problem: Potential for Falls  Goal: Patient will remain free of falls  Description: INTERVENTIONS:  - Educate patient/family on patient safety including physical limitations  - Instruct patient to call for assistance with activity   - Consider consulting OT/PT to assist with strengthening/mobility based on AM PAC & JH-HLM score  - Consult OT/PT to assist with strengthening/mobility   - Keep Call bell within reach  - Keep bed low and locked with side rails adjusted as appropriate  - Keep care items and personal belongings within reach  - Initiate and maintain comfort rounds  - Make Fall Risk Sign visible to staff  - Offer Toileting every 2 Hours, in advance of need  - Initiate/Maintain bed/chair alarm  - Obtain necessary fall risk management equipment.  - Apply yellow socks and bracelet for high fall risk patients  - Consider moving patient to room near nurses station  6/9/2025 1524 by Bhavna Torres RN  Outcome: Progressing  6/9/2025 1523 by Bhavna Torres, RN  Outcome: Progressing     Problem: Nutrition/Hydration-ADULT  Goal: Nutrient/Hydration intake appropriate for improving, restoring or maintaining nutritional needs  Description: Monitor and assess patient's nutrition/hydration status for malnutrition. Collaborate with interdisciplinary team and initiate plan and interventions as ordered.  Monitor patient's weight and dietary intake as ordered or per policy. Utilize nutrition screening tool and intervene as necessary. Determine patient's food preferences and provide high-protein, high-caloric foods as appropriate.     INTERVENTIONS:  - Monitor oral intake, urinary output, labs, and treatment plans  - Assess nutrition and hydration status and recommend course of action  - Evaluate amount of meals eaten  - Assist patient with eating if necessary   - Allow adequate time for meals  - Recommend/ encourage appropriate diets, oral nutritional supplements, and vitamin/mineral supplements  - Order, calculate,  and assess calorie counts as needed  - Assess need for intravenous fluids  - Provide specific nutrition/hydration education as appropriate  - Include patient/family/caregiver in decisions related to nutrition  6/9/2025 1524 by Bhavna Torres RN  Outcome: Progressing  6/9/2025 1523 by Bhanva Torres RN  Outcome: Progressing     Problem: Neurological Deficit  Goal: Neurological status is stable or improving  Description: Interventions:  - Monitor and assess patient's level of consciousness, motor function, sensory function, and level of assistance needed for ADLs.   - Monitor and report changes from baseline. Collaborate with interdisciplinary team to initiate plan and implement interventions as ordered.   - Provide and maintain a safe environment.  - Consider seizure precautions.  - Consider fall precautions.  - Consider aspiration precautions.  - Consider bleeding precautions.  6/9/2025 1524 by Bhavna Torres RN  Outcome: Progressing  6/9/2025 1523 by Bhavna Torres RN  Outcome: Progressing     Problem: Activity Intolerance/Impaired Mobility  Goal: Mobility/activity is maintained at optimum level for patient  Description: Interventions:  - Assess and monitor patient  barriers to mobility and need for assistive/adaptive devices.  - Assess patient's emotional response to limitations.  - Collaborate with interdisciplinary team and initiate plans and interventions as ordered.  - Encourage independent activity per ability.  - Maintain proper body alignment.  - Perform active/passive rom as tolerated/ordered.  - Plan activities to conserve energy.  - Turn patient as appropriate  6/9/2025 1524 by Bhavna Torres RN  Outcome: Progressing  6/9/2025 1523 by Bhavna Torres RN  Outcome: Progressing     Problem: Communication Impairment  Goal: Ability to express needs and understand communication  Description: Assess patient's communication skills and ability to understand information.  Patient will  demonstrate use of effective communication techniques, alternative methods of communication and understanding even if not able to speak.     - Encourage communication and provide alternate methods of communication as needed.  - Collaborate with case management/ for discharge needs.  - Include patient/family/caregiver in decisions related to communication.  6/9/2025 1524 by Bhavna Torres RN  Outcome: Progressing  6/9/2025 1523 by Bhavna Torres RN  Outcome: Progressing     Problem: Potential for Aspiration  Goal: Non-ventilated patient's risk of aspiration is minimized  Description: Assess and monitor vital signs, respiratory status, and labs (WBC).  Monitor for signs of aspiration (tachypnea, cough, rales, wheezing, cyanosis, fever).    - Assess and monitor patient's ability to swallow.  - Place patient up in chair to eat if possible.  - HOB up at 90 degrees to eat if unable to get patient up into chair.  - Supervise patient during oral intake.   - Instruct patient/ family to take small bites.  - Instruct patient/ family to take small single sips when taking liquids.  - Follow patient-specific strategies generated by speech pathologist.  6/9/2025 1524 by Bhavna Torres RN  Outcome: Progressing  6/9/2025 1523 by Bhavna Torres RN  Outcome: Progressing     Problem: Nutrition  Goal: Nutrition/Hydration status is improving  Description: Monitor and assess patient's nutrition/hydration status for malnutrition (ex- brittle hair, bruises, dry skin, pale skin and conjunctiva, muscle wasting, smooth red tongue, and disorientation). Collaborate with interdisciplinary team and initiate plan and interventions as ordered.  Monitor patient's weight and dietary intake as ordered or per policy. Utilize nutrition screening tool and intervene per policy. Determine patient's food preferences and provide high-protein, high-caloric foods as appropriate.     - Assist patient with eating.  - Allow  adequate time for meals.  - Encourage patient to take dietary supplement as ordered.  - Collaborate with clinical nutritionist.  - Include patient/family/caregiver in decisions related to nutrition.  6/9/2025 1524 by Bhavna Torres RN  Outcome: Progressing  6/9/2025 1523 by Bhavna Torres RN  Outcome: Progressing     Problem: PAIN - ADULT  Goal: Verbalizes/displays adequate comfort level or baseline comfort level  Description: Interventions:  - Encourage patient to monitor pain and request assistance  - Assess pain using appropriate pain scale  - Administer analgesics as ordered based on type and severity of pain and evaluate response  - Implement non-pharmacological measures as appropriate and evaluate response  - Notify physician/advanced practitioner if interventions unsuccessful or patient reports new pain  - Educate patient/family on pain management process including their role and importance of  reporting pain   - Provide non-pharmacologic/complimentary pain relief interventions  Outcome: Progressing     Problem: INFECTION - ADULT  Goal: Absence or prevention of progression during hospitalization  Description: INTERVENTIONS:  - Assess and monitor for signs and symptoms of infection  - Monitor lab/diagnostic results  - Monitor all insertion sites, i.e. indwelling lines, tubes, and drains  - [place] appropriate cooling/warming therapies per order  - Administer medications as ordered  - Instruct and encourage patient and family to use good hand hygiene technique  - Identify and instruct in appropriate isolation precautions for identified infection/condition  Outcome: Progressing     Problem: SAFETY ADULT  Goal: Patient will remain free of falls  Description: INTERVENTIONS:  - Educate patient/family on patient safety including physical limitations  - Instruct patient to call for assistance with activity   - Consider consulting OT/PT to assist with strengthening/mobility based on AM PAC & JH-HLM  score  - Consult OT/PT to assist with strengthening/mobility   - Keep Call bell within reach  - Keep bed low and locked with side rails adjusted as appropriate  - Keep care items and personal belongings within reach  - Initiate and maintain comfort rounds  - Make Fall Risk Sign visible to staff  - Offer Toileting every 2 Hours, in advance of need  - Initiate/Maintain bed/chair alarm  - Obtain necessary fall risk management equipment.  - Apply yellow socks and bracelet for high fall risk patients  - Consider moving patient to room near nurses station  6/9/2025 1524 by Bhavna Torres RN  Outcome: Progressing  6/9/2025 1523 by Bhavna Torres, RN  Outcome: Progressing     Problem: DISCHARGE PLANNING  Goal: Discharge to home or other facility with appropriate resources  Description: INTERVENTIONS:  - Identify barriers to discharge w/patient and caregiver  - Arrange for needed discharge resources and transportation as appropriate  - Identify discharge learning needs (meds, wound care, etc.)  - Refer to Case Management Department for coordinating discharge planning if the patient needs post-hospital services based on physician/advanced practitioner order or complex needs related to functional status, cognitive ability, or social support system  Outcome: Progressing     Problem: Knowledge Deficit  Goal: Patient/family/caregiver demonstrates understanding of disease process, treatment plan, medications, and discharge instructions  Description: Complete learning assessment and assess knowledge base.  Interventions:  - Provide teaching at level of understanding  - Provide teaching via preferred learning methods  Outcome: Progressing

## 2025-06-09 NOTE — TELEPHONE ENCOUNTER
STILL ADMITTED:6/7/2025 - present (2 days)  [facility]      LOV WITH , 6/4/2025    1ST ATTEMPT,     VIA [place]     Thank you,     Monae RECIO/ NILDA CARDOZO/ STROKE    ----- Message from Alberto Sterling DO sent at 6/9/2025  2:22 PM EDT -----  Regarding: ALMITA Mccauley will need follow-up in in 6 weeks with neurovascular team for Stroke secondary to symptomatic carotid stenosis in 60 minute appointment. They will not require outpatient neurological testing

## 2025-06-09 NOTE — ASSESSMENT & PLAN NOTE
Bigeminy/trigeminy noted on telemetry monitoring, patient denying any chest pain, dizziness/lightheadedness, or other associated symptoms  Uncertain significance of finding, will monitor on telemetry for now.  2D echo EF 70%

## 2025-06-09 NOTE — ASSESSMENT & PLAN NOTE
MRI with multifocal recent infarcts scattered throughout the left cerebral hemisphere  Had a two week period of missing Eliquis due to medication error.  Continued on Eliquis and lipitor  Continue PT/OT while on acute care  The patient may benefit from acute inpatient rehabilitation at this time pending progress.

## 2025-06-09 NOTE — PROGRESS NOTES
Progress Note - Hospitalist   Name: Obdulio Mccauley 84 y.o. male I MRN: [id number]  Unit/Bed#: [facility] 712-01 I Date of Admission: 6/7/2025   Date of Service: 6/9/2025 I Hospital Day: 2     Assessment & Plan  Stroke (cerebrum) ([facility])  With recent diagnosis of CVA 4/2025 at an outside health system with MRI at that time revealing multiple embolic strokes in the left hemisphere with CTA head/neck demonstrating left ICA thrombus  Patient presented with right-sided facial droop slurred speech  Patient was a stroke alert on arrival  CT head negative for acute intracranial pathology, CTA head/neck negative for LVO/aneurysm/dissection or AVMs.  Previous left ICA thrombus again noted  Patient is placed on stroke pathway  MRI brain reveals multifocal recent infarcts scattered throughout the left cerebral hemisphere  2D echo EF 70%  Continue aspirin, atorvastatin  Discussed with neurology  Physical therapy recommends rehab at discharge  Disposition planning case management following  Cerebrovascular accident (CVA) ([facility])  Patient recently hospitalized 4/2025 at an outside hospital with acute CVA and right-sided weakness/speech changes with facial droop and aphasia.  MRI brain confirmed multiple embolic strokes in the left hemisphere with CT head/neck revealing left ICA thrombus.    Continue aspirin, statin  Essential thrombocytosis ([facility])  Longstanding history of this following with [facility] hematology,   continue PTA ASA and hydroxyurea  Monitor counts  CTEPH (chronic thromboembolic pulmonary hypertension) ([facility])  With history of acute pulmonary [place] February 2023, follows with [facility] heart failure/pulmonary hypertension team  Continue Eliquis for anticoagulation  Primary hypertension  Blood pressures reviewed, acceptable  Monitor blood pressures  Avoid hypotension  Bigeminy  Bigeminy/trigeminy noted on telemetry monitoring, patient denying any chest pain, dizziness/lightheadedness, or other associated symptoms  Uncertain  significance of finding, will monitor on telemetry for now.  2D echo EF 70%  Carotid stenosis, left  Patient eval by vascular surgeon and is recommended medical management with plans for outpatient follow-up  Continue aspirin statin  Outpatient vascular surgery follow-up  Abnormal finding on MRI of brain  MRI brain reveals abnormal flow in the left transverse sinus  CT venogram no evidence of dural venous sinus thrombosis                    VTE Pharmacologic Prophylaxis: VTE Score: 9 High Risk (Score >/= 5) - Pharmacological DVT Prophylaxis Ordered: apixaban (Eliquis). Sequential Compression Devices Ordered.    Mobility:   Basic Mobility Inpatient Raw Score: 18  JH-HLM Goal: 6: Walk 10 steps or more  JH-HLM Achieved: 8: Walk 250 feet ot more  JH-HLM Goal NOT achieved. Continue with multidisciplinary rounding and encourage appropriate mobility to improve upon JH-HLM goals.    Patient Centered Rounds: I performed bedside rounds with nursing staff today.   Discussions with Specialists or Other Care Team Provider: Neurology, case management    Education and Discussions with Family / Patient: Patient, call left message to bushra Iverson.     Current Length of Stay: 2 day(s)  Current Patient Status: Inpatient   Certification Statement: The patient will continue to require additional inpatient hospital stay due to disposition planning case management following  Discharge Plan: Disposition planning case management following    Code Status: Level 1 - Full Code    Subjective     Sitting up in chair  Reports feeling okay  Encourage incentive spirometer  Discussed with RN      Objective :  Temp:  [97.8 °F (36.6 °C)-98.3 °F (36.8 °C)] 97.8 °F (36.6 °C)  HR:  [68-94] 84  BP: (113-167)/(63-96) 124/85  Resp:  [16-20] 16  SpO2:  [93 %-94 %] 94 %    Body mass index is 30.49 kg/m².     Input and Output Summary (last 24 hours):     Intake/Output Summary (Last 24 hours) at 6/9/2025 1442  Last data filed at 6/9/2025 1143  Gross per 24 hour    Intake 60 ml   Output --   Net 60 ml       Physical Exam    Comfortably in chair  Neck supple  Obese  Lungs diminished breath sounds  Heart sounds S1-S2 noted  Abdomen soft  Awake follows commands  Aphasia noted  No rash      Lines/Drains:              Lab Results: I have reviewed the following results:   Results from last 7 days   Lab Units 06/07/25  0300   WBC Thousand/uL 14.43*   HEMOGLOBIN g/dL 13.2   HEMATOCRIT % 41.8   PLATELETS Thousands/uL 643*     Results from last 7 days   Lab Units 06/09/25  0505   SODIUM mmol/L 140   POTASSIUM mmol/L 4.2   CHLORIDE mmol/L 106   CO2 mmol/L 27   BUN mg/dL 22   CREATININE mg/dL 1.06   ANION GAP mmol/L 7   CALCIUM mg/dL 9.9   GLUCOSE RANDOM mg/dL 77     Results from last 7 days   Lab Units 06/07/25  0300   INR  1.10     Results from last 7 days   Lab Units 06/07/25  0259   POC GLUCOSE mg/dl 161*     Results from last 7 days   Lab Units 06/07/25  0300   HEMOGLOBIN A1C % 5.6           Recent Cultures (last 7 days):         Imaging Results Review: I personally reviewed the following image studies/reports in PACS and discussed pertinent findings with Radiology: CT venogram, 2D echo and MRI brain. My interpretation of the radiology images/reports is: Lab results reviewed.      Last 24 Hours Medication List:     Current Facility-Administered Medications:     acetaminophen (TYLENOL) tablet 650 mg, Q6H PRN    ALPRAZolam (XANAX) tablet 0.25 mg, Daily PRN    apixaban (ELIQUIS) tablet 5 mg, BID    aspirin chewable tablet 81 mg, Daily    atorvastatin (LIPITOR) tablet 80 mg, QPM    cyanocobalamin (VITAMIN B-12) tablet 500 mcg, Daily    famotidine (PEPCID) tablet 40 mg, Daily    hydroxyurea (HYDREA) capsule 500 mg, BID    multivitamin-minerals (CENTRUM) tablet 1 tablet, Daily    ondansetron (ZOFRAN) injection 4 mg, Q6H PRN    sertraline (ZOLOFT) tablet 50 mg, Daily    Administrative Statements   Today, Patient Was Seen By: Fernando Calixto MD  I have spent a total time of 32  minutes in caring for this patient on the day of the visit/encounter including Diagnostic results, Risks and benefits of tx options, Instructions for management, Patient and family education, Importance of tx compliance, Risk factor reductions, Impressions, Counseling / Coordination of care, Documenting in the medical record, Reviewing/placing orders in the medical record (including tests, medications, and/or procedures), Obtaining or reviewing history  , and Communicating with other healthcare professionals .    **Please Note: This note may have been constructed using a voice recognition system.**

## 2025-06-09 NOTE — CASE MANAGEMENT
Case Management Discharge Planning Note    Patient name Obdulio Mccauley  Location [facility] 712/[facility] 712-01 MRN [id number]  : 1940 Date 2025       Current Admission Date: 2025  Current Admission Diagnosis:Stroke (cerebrum) (formerly [facility])   Patient Active Problem List    Diagnosis Date Noted    Abnormal finding on MRI of brain 2025    Stroke (cerebrum) (formerly [facility]) 2025    Primary hypertension 2025    Bigeminy 2025    Carotid stenosis, left 2025    CTEPH (chronic thromboembolic pulmonary hypertension) (formerly [facility]) 2024    Obstructive sleep apnea 2024    ELODIA (obstructive sleep apnea) 2024    Obesity, morbid (formerly [facility]) 2023    Other pulmonary embolism without acute cor pulmonale (formerly [facility]) 2023    Essential thrombocytosis (formerly [facility]) 2023    Basal cell carcinoma of skin of other parts of face 2022    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 [place] - [address]  [address] [facility] [address]  Phone: [phone] Fax: [phone]    Primary Care Provider: Ky Rubi MD    Primary Insurance: PRIYA HUANG  Secondary Insurance:     DISCHARGE DETAILS:    Discharge planning discussed with:: Patient, patient's daughterDesiree        Other Referral/Resources/Interventions Provided:  Referral Comments: CM spoke to patient's daughterDesiree regarding PM+R and PT/OT recommendations of Level I rehab.   Daughter expressed desire for referals be placed for acute rehab.- placed in [facility]IN.

## 2025-06-09 NOTE — PHYSICAL THERAPY NOTE
PHYSICAL THERAPY NOTE          Patient Name: Obdulio Mccauley  Today's Date: 6/9/2025 06/09/25 1356   PT Last Visit   PT Visit Date 06/09/25   Note Type   Note Type Treatment   Pain Assessment   Pain Assessment Tool 0-10   Pain Score No Pain   Restrictions/Precautions   Weight Bearing Precautions Per Order No   Other Precautions Cognitive;Bed Alarm;Chair Alarm;Multiple lines;Telemetry;Fall Risk  (very dysarthric, R hemiparesis)   General   Chart Reviewed Yes   Family/Caregiver Present No   Cognition   Overall Cognitive Status WFL   Arousal/Participation Cooperative   Attention Attends with cues to redirect   Orientation Level Oriented to person;Oriented to place;Oriented to time  (word finding difficulty for situation, name of hospital with choice cues)   Following Commands Follows one step commands without difficulty   Comments very pleasant. extremely dysarthric and difficult to understand. pt reports worsening dysarthria with fatigue as day progresses   Subjective   Subjective pt states he is suppose to go on vacation to [place] soon   Bed Mobility   Supine to Sit Unable to assess   Sit to Supine Unable to assess   Transfers   Sit to Stand 4  Minimal assistance   Additional items Assist x 1;Increased time required;Other;Verbal cues  (CGA)   Stand to Sit 5  Supervision   Additional items Increased time required;Verbal cues   Additional Comments c RW vs no AD   Ambulation/Elevation   Gait pattern R Foot drag;Improper Weight shift;Decreased foot clearance;Short stride;Excessively slow  (decreased R step length)   Gait Assistance 4  Minimal assist   Additional items Assist x 1;Verbal cues  (CGA)   Assistive Device Rolling walker;None   Distance 10' c RW+100'x3 no AD   Stair Management Assistance 4  Minimal assist   Additional items Assist x 1;Verbal cues;Increased time required   Stair Management Technique One rail L;Step to  pattern;Foreward;Nonreciprocal   Number of Stairs 7   Ambulation/Elevation Additional Comments (S)  R foot drag with ambulation, R foot caught going up steps leading with LLE and when alternating pattern, LOB when leading with RLE due to toe catching on step. gait speed: 0.69 m/s. FGA score: 14/30 (fall risk <22)   Balance   Static Sitting Fair   Dynamic Sitting Fair -   Static Standing Poor +   Dynamic Standing Poor +   Ambulatory Poor +   Endurance Deficit   Endurance Deficit Yes   Activity Tolerance   Activity Tolerance Patient limited by fatigue   Medical Staff Made Aware OT and CM for dc planning   Nurse Made Aware yes-cleared   Assessment   Prognosis Good   Problem List Decreased strength;Decreased endurance;Impaired balance;Decreased mobility;Decreased coordination;Decreased cognition   Assessment Pt agreeable to participate in PT session. Pt performed functional mobility as outlined above. Pt with slow gait speed, R foot drag, and R foot caught going up steps each repetition. Very dysarthric speech and difficult to understand. Fall risk with score of 14/30. Spoke to pt regarding acute rehab vs OP neuro day program. He would like to talk to wife about decision. Changing rec at this time to acute rehab as pt would benefit from all 3 disciplines and frequently to decrease fall risk, improve speech, and optimize return to PLOF. Pt left seated in chair with chair alarm, call bell, phone, and all personal needs within reach. Pt will continue to benefit from skilled acute care PT to further address their functional mobility limitations.   Barriers to Discharge Inaccessible home environment;Decreased caregiver support   Goals   Patient Goals to get better   STG Expiration Date 06/18/25   PT Treatment Day 1   Plan   Treatment/Interventions Functional transfer training;LE strengthening/ROM;Therapeutic exercise;Endurance training;Cognitive reorientation;Patient/family training;Equipment eval/education;Bed mobility;Gait  training;Spoke to nursing;Spoke to case management;OT;Elevations   Progress Progressing toward goals   PT Frequency 3-5x/wk   Discharge Recommendation   Rehab Resource Intensity Level, PT (S)  I (Maximum Resource Intensity)   Equipment Recommended Walker   Walker Package Recommended Wheeled walker   AM-PAC Basic Mobility Inpatient   Turning in Flat Bed Without Bedrails 3   Lying on Back to Sitting on Edge of Flat Bed Without Bedrails 3   Moving Bed to Chair 3   Standing Up From Chair Using Arms 3   Walk in Room 3   Climb 3-5 Stairs With Railing 3   Basic Mobility Inpatient Raw Score 18   Basic Mobility Standardized Score 41.05   [facility] Highest Level Of Mobility   -HLM Goal 6: Walk 10 steps or more   JH-HLM Achieved 8: Walk 250 feet ot more   Nikita Rob, PT, DPT, NCS

## 2025-06-09 NOTE — ASSESSMENT & PLAN NOTE
With recent diagnosis of CVA 4/2025 at an outside health system with MRI at that time revealing multiple embolic strokes in the left hemisphere with CTA head/neck demonstrating left ICA thrombus  Patient presented with right-sided facial droop slurred speech  Patient was a stroke alert on arrival  CT head negative for acute intracranial pathology, CTA head/neck negative for LVO/aneurysm/dissection or AVMs.  Previous left ICA thrombus again noted  Patient is placed on stroke pathway  MRI brain reveals multifocal recent infarcts scattered throughout the left cerebral hemisphere  2D echo EF 70%  Continue aspirin, atorvastatin  Discussed with neurology  Physical therapy recommends rehab at discharge  Disposition planning case management following

## 2025-06-09 NOTE — ASSESSMENT & PLAN NOTE
Recently hospitalized at an outside hospital on 4/2025 for acute CVA and right sided weakness/speech changes with facial droop and aphasia.   CTA with left ICA thrombus

## 2025-06-09 NOTE — PLAN OF CARE
Problem: PHYSICAL THERAPY ADULT  Goal: Performs mobility at highest level of function for planned discharge setting.  See evaluation for individualized goals.  Description: Treatment/Interventions: Functional transfer training, LE strengthening/ROM, Elevations, Therapeutic exercise, Endurance training, Cognitive reorientation, Equipment eval/education, Bed mobility, Gait training, Spoke to case management, OT  Equipment Recommended:  (TBD)       See flowsheet documentation for full assessment, interventions and recommendations.  Outcome: Progressing  Note: Prognosis: Good  Problem List: Decreased strength, Decreased endurance, Impaired balance, Decreased mobility, Decreased coordination, Decreased cognition  Assessment: Pt agreeable to participate in PT session. Pt performed functional mobility as outlined above. Pt with slow gait speed, R foot drag, and R foot caught going up steps each repetition. Very dysarthric speech and difficult to understand. Fall risk with score of 14/30. Spoke to pt regarding acute rehab vs OP neuro day program. He would like to talk to wife about decision. Changing rec at this time to acute rehab as pt would benefit from all 3 disciplines and frequently to decrease fall risk, improve speech, and optimize return to PLOF. Pt left seated in chair with chair alarm, call bell, phone, and all personal needs within reach. Pt will continue to benefit from skilled acute care PT to further address their functional mobility limitations.  Barriers to Discharge: Inaccessible home environment, Decreased caregiver support     Rehab Resource Intensity Level, PT: (S) I (Maximum Resource Intensity)    See flowsheet documentation for full assessment.

## 2025-06-10 PROBLEM — D72.829 LEUKOCYTOSIS: Status: ACTIVE | Noted: 2025-06-10

## 2025-06-10 PROBLEM — G93.5 BRAIN COMPRESSION (HCC): Status: ACTIVE | Noted: 2025-06-10

## 2025-06-10 PROCEDURE — 97535 SELF CARE MNGMENT TRAINING: CPT

## 2025-06-10 PROCEDURE — 99232 SBSQ HOSP IP/OBS MODERATE 35: CPT | Performed by: STUDENT IN AN ORGANIZED HEALTH CARE EDUCATION/TRAINING PROGRAM

## 2025-06-10 RX ADMIN — APIXABAN 5 MG: 5 TABLET, FILM COATED ORAL at 18:16

## 2025-06-10 RX ADMIN — CYANOCOBALAMIN TAB 500 MCG 500 MCG: 500 TAB at 09:18

## 2025-06-10 RX ADMIN — SERTRALINE HYDROCHLORIDE 50 MG: 50 TABLET ORAL at 09:18

## 2025-06-10 RX ADMIN — HYDROXYUREA 500 MG: 500 CAPSULE ORAL at 09:19

## 2025-06-10 RX ADMIN — ATORVASTATIN CALCIUM 80 MG: 80 TABLET, FILM COATED ORAL at 18:16

## 2025-06-10 RX ADMIN — HYDROXYUREA 500 MG: 500 CAPSULE ORAL at 18:16

## 2025-06-10 RX ADMIN — FAMOTIDINE 40 MG: 20 TABLET, FILM COATED ORAL at 09:18

## 2025-06-10 RX ADMIN — APIXABAN 5 MG: 5 TABLET, FILM COATED ORAL at 09:18

## 2025-06-10 RX ADMIN — Medication 1 TABLET: at 09:18

## 2025-06-10 RX ADMIN — ASPIRIN 81 MG CHEWABLE TABLET 81 MG: 81 TABLET CHEWABLE at 09:18

## 2025-06-10 NOTE — OCCUPATIONAL THERAPY NOTE
Occupational Therapy Progress Note     Patient Name: Obdulio Mccauley  Today's Date: 6/10/2025  Problem List  Principal Problem:    Stroke (cerebrum) ([facility])  Active Problems:    Cerebrovascular accident (CVA) ([facility])    Essential thrombocytosis ([facility])    CTEPH (chronic thromboembolic pulmonary hypertension) ([facility])    Primary hypertension    Bigeminy    Carotid stenosis, left    Abnormal finding on MRI of brain        06/10/25 1400   OT Last Visit   OT Visit Date 06/10/25   Note Type   Note Type Treatment for insurance authorization   Pain Assessment   Pain Assessment Tool 0-10   Pain Score No Pain   Restrictions/Precautions   Weight Bearing Precautions Per Order No   Other Precautions Cognitive;Chair Alarm;Bed Alarm;Telemetry;Fall Risk;Hard of hearing  (+right facial droop,  dysarthria, expressive aphasia, right FMC deficits +Right>left tremor)   Lifestyle   Autonomy Pta pt reports being independent in functional mobility, receiving assistance from wife w/ ADLs and IADLs, (+) .   Reciprocal Relationships spouse   Service to Others retired    Intrinsic Gratification boating   ADL   Grooming Assistance 5  Supervision/Setup   Grooming Deficit Wash/dry hands   UB Bathing Assistance 5  Supervision/Setup   UB Bathing Deficit Setup   LB Dressing Assistance 4  Minimal Assistance   LB Dressing Deficit Don/doff R sock;Don/doff L sock;Steadying   Toileting Assistance  4  Minimal Assistance   Toileting Deficit Perineal hygiene;Clothing management down;Clothing management up;Steadying;Setup   Bed Mobility   Supine to Sit Unable to assess   Sit to Supine Unable to assess   Additional Comments pt in chair when received.   Transfers   Sit to Stand 4  Minimal assistance   Additional items Assist x 1   Stand to Sit 4  Minimal assistance   Additional items Assist x 1   Additional Comments +RW min a for CG and increased time   Functional Mobility   Functional Mobility 4  Minimal assistance   Additional Comments min a x 1 with  "RW short distance functional mobiltiy while recalling three one step directives given-pt able to recall 1/3 s/p one minute +impaired STM/delayed recall, one episode of ataxia with pivot turning, cues for RW safety.   Additional items Rolling walker   Coordination   Fine Motor +right >left tremor & right hand FMC deficits -attempted writing college attended~unable to legibly write college or spell correctly.   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80\"   Cognition   Overall Cognitive Status (S)  Impaired   Arousal/Participation Alert;Responsive;Arousable   Attention Attends with cues to redirect   Orientation Level Oriented X4   Memory Decreased recall of precautions;Decreased recall of recent events;Decreased short term memory   Following Commands Follows multistep commands with increased time or repetition   Comments pt pleasant and cooperative, smiling with session, dysarthric (difficult to comprehend at times), right facial droop,  impaired attention, STM (recalling correct room number, 3 directives given with mobility tasks -recalled 1/3), impaired problem solving, safety awareness with session.   Activity Tolerance   Activity Tolerance Patient tolerated treatment well   Assessment   Assessment Patient participated in Skilled OT session this date with interventions consisting of self care tasks, functional transfers, functional mobility, cognitive functional tasks., . Patient agreeable to OT treatment session, upon arrival patient was found seated OOB to Chair.  In comparison to previous session, patient with improvements in endurance . Patient requiring verbal cues for safety, verbal cues for correct technique, verbal cues for pacing thru activity steps, and cognitive assistance to anticipate next step. Patient continues to be functioning below baseline level, occupational performance remains limited secondary to factors listed above and increased risk for falls and injury.   From OT " standpoint, recommendation at time of d/c would be max level I. The patient's raw score on the AM-PAC Daily Activity Inpatient Short Form is 17. A raw score of less than 19 suggests the patient may benefit from discharge to post-acute rehabilitation services. Please refer to the   recommendation of the Occupational Therapist for safe discharge planning.   Patient to benefit from continued Occupational Therapy treatment while in the hospital to address deficits as defined above and maximize level of functional independence with ADLs and functional mobility.   Plan   Treatment Interventions ADL retraining;Functional transfer training;Endurance training;Cognitive reorientation;Patient/family training;Equipment evaluation/education;Fine motor coordination activities;Compensatory technique education;Energy conservation;Activityengagement;Cardiac education   Goal Expiration Date 06/22/25   OT Treatment Day 1   OT Frequency 2-3x/wk   Discharge Recommendation   Rehab Resource Intensity Level, OT I (Maximum Resource Intensity)   AM-PAC Daily Activity Inpatient   Lower Body Dressing 2   Bathing 3   Toileting 3   Upper Body Dressing 3   Grooming 3   Eating 3   Daily Activity Raw Score 17   Daily Activity Standardized Score (Calc for Raw Score >=11) 37.26   AM-PAC Applied Cognition Inpatient   Following a Speech/Presentation 2   Understanding Ordinary Conversation 4   Taking Medications 3   Remembering Where Things Are Placed or Put Away 3   Remembering List of 4-5 Errands 2   Taking Care of Complicated Tasks 2   Applied Cognition Raw Score 16   Applied Cognition Standardized Score 35.03   Barthel Index   Feeding 5   Bathing 0   Grooming Score 0   Dressing Score 5   Bladder Score 10   Bowels Score 10   Toilet Use Score 5   Transfers (Bed/Chair) Score 10   Mobility (Level Surface) Score 10   Stairs Score 5   Barthel Index Score 60   Modified Rosangela Scale   Modified [place] Scale 4   End of Consult   Patient Position at End of  Consult Bedside chair;Bed/Chair alarm activated;All needs within reach   Nurse Communication Nurse aware of consult   Brittany Rousseau OTR/L

## 2025-06-10 NOTE — DISCHARGE SUPPORT
Case Management Assessment & Discharge Planning Note    Patient name Obdulio Mccauley  Location [facility]-01 MRN [id number]  : 1940 Date 6/10/2025       Current Admission Date: 2025  Current Admission Diagnosis:Stroke (cerebrum) (Formerly [facility])   Patient Active Problem List    Diagnosis Date Noted    Leukocytosis 06/10/2025    Abnormal finding on MRI of brain 2025    Stroke (cerebrum) (Formerly [facility]) 2025    Primary hypertension 2025    Bigeminy 2025    Carotid stenosis, left 2025    CTEPH (chronic thromboembolic pulmonary hypertension) (Formerly [facility]) 2024    Obstructive sleep apnea 2024    ELODIA (obstructive sleep apnea) 2024    Obesity, morbid (Formerly [facility]) 2023    Other pulmonary embolism without acute cor pulmonale (Formerly [facility]) 2023    Essential thrombocytosis (Formerly [facility]) 2023    Basal cell carcinoma of skin of other parts of face 2022    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Formerly [facility]) 2022    Urge incontinence 2022    Nocturia 2022    Bladder neck contracture 2019    Prostate cancer (Formerly [facility]) 2017    Cerebrovascular accident (CVA) (Formerly [facility]) 2015      LOS (days): 3  Geometric Mean LOS (GMLOS) (days): 2.8  Days to GMLOS:-0.7   Facility Authorization Initiated  DC Support Center received request for auth from : Eryn NULL  Authorization Request Submitted for: Acute Rehab  Requested Start of Care Date: 25  Facility Name: [facility]  Facility NPI: [id number]  Facility MD: Dr. Darci Briseno  Facility MD NPI: [id number]  Authorization initiated by contacting insurance: [employer]  Via: [employer]  Clinicals submitted via: Portal Attachment  Pending reference #: [id number]   notified: Eryn NULL     Updates to authorization status will be noted in chart.    Please reach out to CM for updates on any clinical information.

## 2025-06-10 NOTE — ASSESSMENT & PLAN NOTE
Longstanding history of this following with [facility] hematology,   continue PTA ASA and hydroxyurea  Monitor counts

## 2025-06-10 NOTE — PLAN OF CARE
Problem: Potential for Falls  Goal: Patient will remain free of falls  Description: INTERVENTIONS:  - Educate patient/family on patient safety including physical limitations  - Instruct patient to call for assistance with activity   - Consider consulting OT/PT to assist with strengthening/mobility based on AM PAC & JH-HLM score  - Consult OT/PT to assist with strengthening/mobility   - Keep Call bell within reach  - Keep bed low and locked with side rails adjusted as appropriate  - Keep care items and personal belongings within reach  - Initiate and maintain comfort rounds  - Make Fall Risk Sign visible to staff  - Offer Toileting every 2 Hours, in advance of need  - Initiate/Maintain bed/chair alarm  - Obtain necessary fall risk management equipment.  - Apply yellow socks and bracelet for high fall risk patients  - Consider moving patient to room near nurses station  Outcome: Progressing     Problem: Nutrition/Hydration-ADULT  Goal: Nutrient/Hydration intake appropriate for improving, restoring or maintaining nutritional needs  Description: Monitor and assess patient's nutrition/hydration status for malnutrition. Collaborate with interdisciplinary team and initiate plan and interventions as ordered.  Monitor patient's weight and dietary intake as ordered or per policy. Utilize nutrition screening tool and intervene as necessary. Determine patient's food preferences and provide high-protein, high-caloric foods as appropriate.     INTERVENTIONS:  - Monitor oral intake, urinary output, labs, and treatment plans  - Assess nutrition and hydration status and recommend course of action  - Evaluate amount of meals eaten  - Assist patient with eating if necessary   - Allow adequate time for meals  - Recommend/ encourage appropriate diets, oral nutritional supplements, and vitamin/mineral supplements  - Order, calculate, and assess calorie counts as needed  - Assess need for intravenous fluids  - Provide specific  nutrition/hydration education as appropriate  - Include patient/family/caregiver in decisions related to nutrition  Outcome: Progressing     Problem: Neurological Deficit  Goal: Neurological status is stable or improving  Description: Interventions:  - Monitor and assess patient's level of consciousness, motor function, sensory function, and level of assistance needed for ADLs.   - Monitor and report changes from baseline. Collaborate with interdisciplinary team to initiate plan and implement interventions as ordered.   - Provide and maintain a safe environment.  - Consider seizure precautions.  - Consider fall precautions.  - Consider aspiration precautions.  - Consider bleeding precautions.  Outcome: Progressing     Problem: Activity Intolerance/Impaired Mobility  Goal: Mobility/activity is maintained at optimum level for patient  Description: Interventions:  - Assess and monitor patient  barriers to mobility and need for assistive/adaptive devices.  - Assess patient's emotional response to limitations.  - Collaborate with interdisciplinary team and initiate plans and interventions as ordered.  - Encourage independent activity per ability.  - Maintain proper body alignment.  - Perform active/passive rom as tolerated/ordered.  - Plan activities to conserve energy.  - Turn patient as appropriate  Outcome: Progressing     Problem: Communication Impairment  Goal: Ability to express needs and understand communication  Description: Assess patient's communication skills and ability to understand information.  Patient will demonstrate use of effective communication techniques, alternative methods of communication and understanding even if not able to speak.     - Encourage communication and provide alternate methods of communication as needed.  - Collaborate with case management/ for discharge needs.  - Include patient/family/caregiver in decisions related to communication.  Outcome: Progressing      Problem: Potential for Aspiration  Goal: Non-ventilated patient's risk of aspiration is minimized  Description: Assess and monitor vital signs, respiratory status, and labs (WBC).  Monitor for signs of aspiration (tachypnea, cough, rales, wheezing, cyanosis, fever).    - Assess and monitor patient's ability to swallow.  - Place patient up in chair to eat if possible.  - HOB up at 90 degrees to eat if unable to get patient up into chair.  - Supervise patient during oral intake.   - Instruct patient/ family to take small bites.  - Instruct patient/ family to take small single sips when taking liquids.  - Follow patient-specific strategies generated by speech pathologist.  Outcome: Progressing     Problem: Nutrition  Goal: Nutrition/Hydration status is improving  Description: Monitor and assess patient's nutrition/hydration status for malnutrition (ex- brittle hair, bruises, dry skin, pale skin and conjunctiva, muscle wasting, smooth red tongue, and disorientation). Collaborate with interdisciplinary team and initiate plan and interventions as ordered.  Monitor patient's weight and dietary intake as ordered or per policy. Utilize nutrition screening tool and intervene per policy. Determine patient's food preferences and provide high-protein, high-caloric foods as appropriate.     - Assist patient with eating.  - Allow adequate time for meals.  - Encourage patient to take dietary supplement as ordered.  - Collaborate with clinical nutritionist.  - Include patient/family/caregiver in decisions related to nutrition.  Outcome: Progressing     Problem: PAIN - ADULT  Goal: Verbalizes/displays adequate comfort level or baseline comfort level  Description: Interventions:  - Encourage patient to monitor pain and request assistance  - Assess pain using appropriate pain scale  - Administer analgesics as ordered based on type and severity of pain and evaluate response  - Implement non-pharmacological measures as appropriate and  evaluate response  - Notify physician/advanced practitioner if interventions unsuccessful or patient reports new pain  - Educate patient/family on pain management process including their role and importance of  reporting pain   - Provide non-pharmacologic/complimentary pain relief interventions  Outcome: Progressing     Problem: INFECTION - ADULT  Goal: Absence or prevention of progression during hospitalization  Description: INTERVENTIONS:  - Assess and monitor for signs and symptoms of infection  - Monitor lab/diagnostic results  - Monitor all insertion sites, i.e. indwelling lines, tubes, and drains  - [place] appropriate cooling/warming therapies per order  - Administer medications as ordered  - Instruct and encourage patient and family to use good hand hygiene technique  - Identify and instruct in appropriate isolation precautions for identified infection/condition  Outcome: Progressing     Problem: SAFETY ADULT  Goal: Patient will remain free of falls  Description: INTERVENTIONS:  - Educate patient/family on patient safety including physical limitations  - Instruct patient to call for assistance with activity   - Consider consulting OT/PT to assist with strengthening/mobility based on AM PAC & JH-HLM score  - Consult OT/PT to assist with strengthening/mobility   - Keep Call bell within reach  - Keep bed low and locked with side rails adjusted as appropriate  - Keep care items and personal belongings within reach  - Initiate and maintain comfort rounds  - Make Fall Risk Sign visible to staff  - Offer Toileting every 2 Hours, in advance of need  - Initiate/Maintain bed/chair alarm  - Obtain necessary fall risk management equipment.  - Apply yellow socks and bracelet for high fall risk patients  - Consider moving patient to room near nurses station  Outcome: Progressing     Problem: DISCHARGE PLANNING  Goal: Discharge to home or other facility with appropriate resources  Description: INTERVENTIONS:  - Identify  barriers to discharge w/patient and caregiver  - Arrange for needed discharge resources and transportation as appropriate  - Identify discharge learning needs (meds, wound care, etc.)  - Refer to Case Management Department for coordinating discharge planning if the patient needs post-hospital services based on physician/advanced practitioner order or complex needs related to functional status, cognitive ability, or social support system  Outcome: Progressing     Problem: Knowledge Deficit  Goal: Patient/family/caregiver demonstrates understanding of disease process, treatment plan, medications, and discharge instructions  Description: Complete learning assessment and assess knowledge base.  Interventions:  - Provide teaching at level of understanding  - Provide teaching via preferred learning methods  Outcome: Progressing

## 2025-06-10 NOTE — PROGRESS NOTES
Progress Note - Hospitalist   Name: Obdulio Mccauley 84 y.o. male I MRN: [id number]  Unit/Bed#: [facility] 712-01 I Date of Admission: 6/7/2025   Date of Service: 6/10/2025 I Hospital Day: 3    Assessment & Plan  Stroke (cerebrum) ([facility])  With recent diagnosis of CVA 4/2025 at an outside health system with MRI at that time revealing multiple embolic strokes in the left hemisphere with CTA head/neck demonstrating left ICA thrombus  Patient presented with right-sided facial droop slurred speech  Patient was a stroke alert on arrival  CT head negative for acute intracranial pathology, CTA head/neck negative for LVO/aneurysm/dissection or AVMs.  Previous left ICA thrombus again noted  MRI brain reveals multifocal recent infarcts scattered throughout the left cerebral hemisphere  2D echo EF 70%  Continue aspirin, atorvastatin  Discussed with neurology  Physical therapy recommends rehab at discharge  Disposition planning case management following  Cerebrovascular accident (CVA) ([facility])  Patient recently hospitalized 4/2025 at an outside hospital with acute CVA and right-sided weakness/speech changes with facial droop and aphasia.  MRI brain confirmed multiple embolic strokes in the left hemisphere with CT head/neck revealing left ICA thrombus.    Continue aspirin, statin  Essential thrombocytosis (HCC)  Longstanding history of this following with [facility] hematology,   continue PTA ASA and hydroxyurea  Monitor counts  CTEPH (chronic thromboembolic pulmonary hypertension) ([facility])  With history of acute pulmonary [place] February 2023, follows with [facility] heart failure/pulmonary hypertension team  Continue Eliquis for anticoagulation  Primary hypertension  Blood pressures reviewed, acceptable  Monitor blood pressures  Avoid hypotension  Bigeminy  Bigeminy/trigeminy noted on telemetry monitoring, patient denying any chest pain, dizziness/lightheadedness, or other associated symptoms  Uncertain significance of finding, will  monitor on telemetry for now.  2D echo EF 70%  Carotid stenosis, left  Patient eval by vascular surgeon and is recommended medical management with plans for outpatient follow-up  Continue aspirin statin  Outpatient vascular surgery follow-up  Abnormal finding on MRI of brain  MRI brain reveals abnormal flow in the left transverse sinus  CT venogram no evidence of dural venous sinus thrombosis  Leukocytosis  Noted on labs from 6/7  Suspect reactive related to stroke  Monitor with CBC tomorrow  Brain compression (HCC)  Noted to have mild mass effect secondary to stroke  See management above    VTE Pharmacologic Prophylaxis: VTE Score: 9 High Risk (Score >/= 5) - Pharmacological DVT Prophylaxis Ordered: apixaban (Eliquis). Sequential Compression Devices Ordered.    Mobility:   Basic Mobility Inpatient Raw Score: 18  JH-HLM Goal: 6: Walk 10 steps or more  JH-HLM Achieved: 7: Walk 25 feet or more  JH-HLM Goal achieved. Continue to encourage appropriate mobility.    Patient Centered Rounds: I performed bedside rounds with nursing staff today.   Discussions with Specialists or Other Care Team Provider:     Education and Discussions with Family / Patient: Updated  (son) via phone.    Current Length of Stay: 3 day(s)  Current Patient Status: Inpatient   Certification Statement: The patient will continue to require additional inpatient hospital stay due to awaiting placement to rehab, insurance Auth pending  Discharge Plan: Medically stable, awaiting placement to rehab for safe discharge, insurance Auth pending    Code Status: Level 1 - Full Code    Subjective   Patient assessed at bedside.  No acute complaints.    Objective :  Temp:  [97.8 °F (36.6 °C)-98.5 °F (36.9 °C)] 98.5 °F (36.9 °C)  HR:  [69-78] 75  BP: ()/(54-83) 132/76  Resp:  [16-18] 18  SpO2:  [94 %-96 %] 94 %  O2 Device: None (Room air)    Body mass index is 30.49 kg/m².     Input and Output Summary (last 24 hours):      Intake/Output Summary (Last 24 hours) at 6/10/2025 1419  Last data filed at 6/9/2025 2000  Gross per 24 hour   Intake 240 ml   Output --   Net 240 ml       Physical Exam  Vitals reviewed.   Constitutional:       General: He is not in acute distress.     Appearance: Normal appearance. He is not ill-appearing.   HENT:      Head: Normocephalic and atraumatic.     Cardiovascular:      Rate and Rhythm: Normal rate and regular rhythm.      Heart sounds: Normal heart sounds.   Pulmonary:      Effort: Pulmonary effort is normal. No respiratory distress.      Breath sounds: No wheezing or rales.   Abdominal:      Palpations: Abdomen is soft.      Tenderness: There is no abdominal tenderness.     Musculoskeletal:      Right lower leg: No edema.      Left lower leg: No edema.     Neurological:      Mental Status: He is alert and oriented to person, place, and time.           Lines/Drains:              Lab Results: I have reviewed the following results:   Results from last 7 days   Lab Units 06/07/25  0300   WBC Thousand/uL 14.43*   HEMOGLOBIN g/dL 13.2   HEMATOCRIT % 41.8   PLATELETS Thousands/uL 643*     Results from last 7 days   Lab Units 06/09/25  0505   SODIUM mmol/L 140   POTASSIUM mmol/L 4.2   CHLORIDE mmol/L 106   CO2 mmol/L 27   BUN mg/dL 22   CREATININE mg/dL 1.06   ANION GAP mmol/L 7   CALCIUM mg/dL 9.9   GLUCOSE RANDOM mg/dL 77     Results from last 7 days   Lab Units 06/07/25  0300   INR  1.10     Results from last 7 days   Lab Units 06/07/25  0259   POC GLUCOSE mg/dl 161*     Results from last 7 days   Lab Units 06/07/25  0300   HEMOGLOBIN A1C % 5.6           Recent Cultures (last 7 days):         Imaging Results Review: I reviewed radiology reports from this admission including: MRI brain.  Other Study Results Review: No additional pertinent studies reviewed.    Last 24 Hours Medication List:     Current Facility-Administered Medications:     acetaminophen (TYLENOL) tablet 650 mg, Q6H PRN    ALPRAZolam  (XANAX) tablet 0.25 mg, Daily PRN    apixaban (ELIQUIS) tablet 5 mg, BID    aspirin chewable tablet 81 mg, Daily    atorvastatin (LIPITOR) tablet 80 mg, QPM    cyanocobalamin (VITAMIN B-12) tablet 500 mcg, Daily    famotidine (PEPCID) tablet 40 mg, Daily    hydroxyurea (HYDREA) capsule 500 mg, BID    multivitamin-minerals (CENTRUM) tablet 1 tablet, Daily    ondansetron (ZOFRAN) injection 4 mg, Q6H PRN    sertraline (ZOLOFT) tablet 50 mg, Daily    Administrative Statements   Today, Patient Was Seen By: Donavan Vasquez, DO  I have spent a total time of 35 minutes in caring for this patient on the day of the visit/encounter including Impressions, Counseling / Coordination of care, Documenting in the medical record, Reviewing/placing orders in the medical record (including tests, medications, and/or procedures), Obtaining or reviewing history  , and Communicating with other healthcare professionals .    **Please Note: This note may have been constructed using a voice recognition system.**

## 2025-06-10 NOTE — RESTORATIVE TECHNICIAN NOTE
Restorative Technician Note      Patient Name: Obdulio Mccauley     Note Type: Mobility  Patient Position Upon Consult: Bedside chair  Activity Performed: Ambulated; Dangled; Stood  Assistive Device: Roller walker  Education Provided: Yes  Patient Position at End of Consult: Bedside chair; All needs within reach; Bed/Chair alarm activated    Eleni RDZ, Restorative Technician,

## 2025-06-10 NOTE — ASSESSMENT & PLAN NOTE
With recent diagnosis of CVA 4/2025 at an outside health system with MRI at that time revealing multiple embolic strokes in the left hemisphere with CTA head/neck demonstrating left ICA thrombus  Patient presented with right-sided facial droop slurred speech  Patient was a stroke alert on arrival  CT head negative for acute intracranial pathology, CTA head/neck negative for LVO/aneurysm/dissection or AVMs.  Previous left ICA thrombus again noted  MRI brain reveals multifocal recent infarcts scattered throughout the left cerebral hemisphere  2D echo EF 70%  Continue aspirin, atorvastatin  Discussed with neurology  Physical therapy recommends rehab at discharge  Disposition planning case management following

## 2025-06-10 NOTE — DISCHARGE SUPPORT
Discharge Support received task from CM to submit Acute Rehab auth, however, therapy notes are required to be within 48 hours of requested SOC date. At this time, updated OT notes are needed prior to submitting auth request to insurance. CM advised to request updated therapy notes and notify Discharge Support via [employer] Secure Chat once requested therapy notes are in chart.     CM notified:  Shanthi NULL

## 2025-06-10 NOTE — PLAN OF CARE
Problem: Potential for Falls  Goal: Patient will remain free of falls  Description: INTERVENTIONS:  - Educate patient/family on patient safety including physical limitations  - Instruct patient to call for assistance with activity   - Consider consulting OT/PT to assist with strengthening/mobility based on AM PAC & JH-HLM score  - Consult OT/PT to assist with strengthening/mobility   - Keep Call bell within reach  - Keep bed low and locked with side rails adjusted as appropriate  - Keep care items and personal belongings within reach  - Initiate and maintain comfort rounds  - Make Fall Risk Sign visible to staff  - Offer Toileting every 2 Hours, in advance of need  - Initiate/Maintain bed/chair alarm  - Obtain necessary fall risk management equipment.  - Apply yellow socks and bracelet for high fall risk patients  - Consider moving patient to room near nurses station  Outcome: Progressing     Problem: Nutrition/Hydration-ADULT  Goal: Nutrient/Hydration intake appropriate for improving, restoring or maintaining nutritional needs  Description: Monitor and assess patient's nutrition/hydration status for malnutrition. Collaborate with interdisciplinary team and initiate plan and interventions as ordered.  Monitor patient's weight and dietary intake as ordered or per policy. Utilize nutrition screening tool and intervene as necessary. Determine patient's food preferences and provide high-protein, high-caloric foods as appropriate.     INTERVENTIONS:  - Monitor oral intake, urinary output, labs, and treatment plans  - Assess nutrition and hydration status and recommend course of action  - Evaluate amount of meals eaten  - Assist patient with eating if necessary   - Allow adequate time for meals  - Recommend/ encourage appropriate diets, oral nutritional supplements, and vitamin/mineral supplements  - Order, calculate, and assess calorie counts as needed  - Assess need for intravenous fluids  - Provide specific  nutrition/hydration education as appropriate  - Include patient/family/caregiver in decisions related to nutrition  Outcome: Progressing     Problem: Neurological Deficit  Goal: Neurological status is stable or improving  Description: Interventions:  - Monitor and assess patient's level of consciousness, motor function, sensory function, and level of assistance needed for ADLs.   - Monitor and report changes from baseline. Collaborate with interdisciplinary team to initiate plan and implement interventions as ordered.   - Provide and maintain a safe environment.  - Consider seizure precautions.  - Consider fall precautions.  - Consider aspiration precautions.  - Consider bleeding precautions.  Outcome: Progressing     Problem: Communication Impairment  Goal: Ability to express needs and understand communication  Description: Assess patient's communication skills and ability to understand information.  Patient will demonstrate use of effective communication techniques, alternative methods of communication and understanding even if not able to speak.     - Encourage communication and provide alternate methods of communication as needed.  - Collaborate with case management/ for discharge needs.  - Include patient/family/caregiver in decisions related to communication.  Outcome: Progressing     Problem: PAIN - ADULT  Goal: Verbalizes/displays adequate comfort level or baseline comfort level  Description: Interventions:  - Encourage patient to monitor pain and request assistance  - Assess pain using appropriate pain scale  - Administer analgesics as ordered based on type and severity of pain and evaluate response  - Implement non-pharmacological measures as appropriate and evaluate response  - Notify physician/advanced practitioner if interventions unsuccessful or patient reports new pain  - Educate patient/family on pain management process including their role and importance of  reporting pain   - Provide  non-pharmacologic/complimentary pain relief interventions  Outcome: Progressing     Problem: SAFETY ADULT  Goal: Patient will remain free of falls  Description: INTERVENTIONS:  - Educate patient/family on patient safety including physical limitations  - Instruct patient to call for assistance with activity   - Consider consulting OT/PT to assist with strengthening/mobility based on AM PAC & JH-HLM score  - Consult OT/PT to assist with strengthening/mobility   - Keep Call bell within reach  - Keep bed low and locked with side rails adjusted as appropriate  - Keep care items and personal belongings within reach  - Initiate and maintain comfort rounds  - Make Fall Risk Sign visible to staff  - Offer Toileting every 2 Hours, in advance of need  - Initiate/Maintain bed/chair alarm  - Obtain necessary fall risk management equipment.  - Apply yellow socks and bracelet for high fall risk patients  - Consider moving patient to room near nurses station  Outcome: Progressing

## 2025-06-10 NOTE — PLAN OF CARE
Problem: OCCUPATIONAL THERAPY ADULT  Goal: Performs self-care activities at highest level of function for planned discharge setting.  See evaluation for individualized goals.  Description: Treatment Interventions: ADL retraining, Functional transfer training, Endurance training, Cognitive reorientation, Compensatory technique education, Continued evaluation, Energy conservation          See flowsheet documentation for full assessment, interventions and recommendations.   Note: Limitation: Decreased ADL status, Decreased cognition, Decreased endurance, Decreased Safe judgement during ADL, Decreased self-care trans, Decreased high-level ADLs, Decreased UE strength  Prognosis: Fair  Assessment: Patient participated in Skilled OT session this date with interventions consisting of self care tasks, functional transfers, functional mobility, cognitive functional tasks., . Patient agreeable to OT treatment session, upon arrival patient was found seated OOB to Chair.  In comparison to previous session, patient with improvements in endurance . Patient requiring verbal cues for safety, verbal cues for correct technique, verbal cues for pacing thru activity steps, and cognitive assistance to anticipate next step. Patient continues to be functioning below baseline level, occupational performance remains limited secondary to factors listed above and increased risk for falls and injury.   From OT standpoint, recommendation at time of d/c would be max level I. The patient's raw score on the -PAC Daily Activity Inpatient Short Form is 17. A raw score of less than 19 suggests the patient may benefit from discharge to post-acute rehabilitation services. Please refer to the   recommendation of the Occupational Therapist for safe discharge planning.   Patient to benefit from continued Occupational Therapy treatment while in the hospital to address deficits as defined above and maximize level of functional independence with ADLs and  functional mobility.     Rehab Resource Intensity Level, OT: I (Maximum Resource Intensity)

## 2025-06-11 LAB
BASOPHILS # BLD AUTO: 0.13 THOUSANDS/ÂΜL (ref 0–0.1)
BASOPHILS NFR BLD AUTO: 1 % (ref 0–1)
EOSINOPHIL # BLD AUTO: 0.36 THOUSAND/ÂΜL (ref 0–0.61)
EOSINOPHIL NFR BLD AUTO: 3 % (ref 0–6)
ERYTHROCYTE [DISTWIDTH] IN BLOOD BY AUTOMATED COUNT: 18.6 % (ref 11.6–15.1)
HCT VFR BLD AUTO: 42.4 % (ref 36.5–49.3)
HGB BLD-MCNC: 13.6 G/DL (ref 12–17)
IMM GRANULOCYTES # BLD AUTO: 0.03 THOUSAND/UL (ref 0–0.2)
IMM GRANULOCYTES NFR BLD AUTO: 0 % (ref 0–2)
LYMPHOCYTES # BLD AUTO: 1.68 THOUSANDS/ÂΜL (ref 0.6–4.47)
LYMPHOCYTES NFR BLD AUTO: 16 % (ref 14–44)
MCH RBC QN AUTO: 26.7 PG (ref 26.8–34.3)
MCHC RBC AUTO-ENTMCNC: 32.1 G/DL (ref 31.4–37.4)
MCV RBC AUTO: 83 FL (ref 82–98)
MONOCYTES # BLD AUTO: 0.92 THOUSAND/ÂΜL (ref 0.17–1.22)
MONOCYTES NFR BLD AUTO: 9 % (ref 4–12)
NEUTROPHILS # BLD AUTO: 7.67 THOUSANDS/ÂΜL (ref 1.85–7.62)
NEUTS SEG NFR BLD AUTO: 71 % (ref 43–75)
NRBC BLD AUTO-RTO: 0 /100 WBCS
PLATELET # BLD AUTO: 744 THOUSANDS/UL (ref 149–390)
PMV BLD AUTO: 10 FL (ref 8.9–12.7)
RBC # BLD AUTO: 5.09 MILLION/UL (ref 3.88–5.62)
WBC # BLD AUTO: 10.79 THOUSAND/UL (ref 4.31–10.16)

## 2025-06-11 PROCEDURE — 85025 COMPLETE CBC W/AUTO DIFF WBC: CPT | Performed by: SURGERY

## 2025-06-11 PROCEDURE — 92507 TX SP LANG VOICE COMM INDIV: CPT

## 2025-06-11 PROCEDURE — 97530 THERAPEUTIC ACTIVITIES: CPT

## 2025-06-11 PROCEDURE — 97116 GAIT TRAINING THERAPY: CPT

## 2025-06-11 PROCEDURE — 99232 SBSQ HOSP IP/OBS MODERATE 35: CPT | Performed by: STUDENT IN AN ORGANIZED HEALTH CARE EDUCATION/TRAINING PROGRAM

## 2025-06-11 RX ORDER — FUROSEMIDE 40 MG/1
40 TABLET ORAL DAILY
Status: DISCONTINUED | OUTPATIENT
Start: 2025-06-12 | End: 2025-06-12 | Stop reason: HOSPADM

## 2025-06-11 RX ADMIN — HYDROXYUREA 500 MG: 500 CAPSULE ORAL at 18:31

## 2025-06-11 RX ADMIN — ATORVASTATIN CALCIUM 80 MG: 80 TABLET, FILM COATED ORAL at 18:37

## 2025-06-11 RX ADMIN — APIXABAN 5 MG: 5 TABLET, FILM COATED ORAL at 18:37

## 2025-06-11 RX ADMIN — Medication 1 TABLET: at 09:27

## 2025-06-11 RX ADMIN — ASPIRIN 81 MG CHEWABLE TABLET 81 MG: 81 TABLET CHEWABLE at 09:27

## 2025-06-11 RX ADMIN — SERTRALINE HYDROCHLORIDE 50 MG: 50 TABLET ORAL at 09:27

## 2025-06-11 RX ADMIN — CYANOCOBALAMIN TAB 500 MCG 500 MCG: 500 TAB at 09:27

## 2025-06-11 RX ADMIN — APIXABAN 5 MG: 5 TABLET, FILM COATED ORAL at 09:27

## 2025-06-11 RX ADMIN — FAMOTIDINE 40 MG: 20 TABLET, FILM COATED ORAL at 09:27

## 2025-06-11 RX ADMIN — HYDROXYUREA 500 MG: 500 CAPSULE ORAL at 09:27

## 2025-06-11 NOTE — PROGRESS NOTES
Progress Note - Hospitalist   Name: Obdulio Mccauley 84 y.o. male I MRN: [id number]  Unit/Bed#: [facility] 712-01 I Date of Admission: 6/7/2025   Date of Service: 6/11/2025 I Hospital Day: 4    Assessment & Plan  Stroke (cerebrum) ([facility])  With recent diagnosis of CVA 4/2025 at an outside health system with MRI at that time revealing multiple embolic strokes in the left hemisphere with CTA head/neck demonstrating left ICA thrombus  Patient presented with right-sided facial droop slurred speech  Patient was a stroke alert on arrival  CT head negative for acute intracranial pathology, CTA head/neck negative for LVO/aneurysm/dissection or AVMs.  Previous left ICA thrombus again noted  MRI brain reveals multifocal recent infarcts scattered throughout the left cerebral hemisphere  2D echo EF 70%  Neurology suspects stroke secondary to symptomatic ICA on the left versus Eliquis noncompliance  Continue aspirin, atorvastatin  Continue Eliquis  Physical therapy recommends rehab at discharge  Disposition planning case management following  Cerebrovascular accident (CVA) ([facility])  Patient recently hospitalized 4/2025 at an outside hospital with acute CVA and right-sided weakness/speech changes with facial droop and aphasia.  MRI brain confirmed multiple embolic strokes in the left hemisphere with CT head/neck revealing left ICA thrombus.    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[facility])  With history of acute pulmonary [place] February 2023, follows with [facility] heart failure/pulmonary hypertension team  Continue Eliquis for anticoagulation  Primary hypertension  Blood pressures reviewed, acceptable  Monitor blood pressures  Avoid hypotension  Resume Lasix tomorrow  Bigeminy  Bigeminy/trigeminy noted on telemetry monitoring, patient denying any  chest pain, dizziness/lightheadedness, or other associated symptoms  Likely insignificant finding  2D echo EF 70%  Carotid stenosis, left  Patient eval by vascular surgeon and is recommended medical management with plans for outpatient follow-up  Continue aspirin statin  Outpatient vascular surgery follow-up as scheduled in 6/20  Abnormal finding on MRI of brain  MRI brain reveals abnormal flow in the left transverse sinus  CT venogram no evidence of dural venous sinus thrombosis  Leukocytosis  Noted on labs from 6/7  Suspect reactive related to stroke  Improved  No signs of infection, monitor off antibiotics  Brain compression (HCC)  Noted to have mild mass effect secondary to stroke  See management above    VTE Pharmacologic Prophylaxis: VTE Score: 9 High Risk (Score >/= 5) - Pharmacological DVT Prophylaxis Ordered: apixaban (Eliquis). Sequential Compression Devices Ordered.    Mobility:   Basic Mobility Inpatient Raw Score: 17  JH-HLM Goal: 5: Stand one or more mins  JH-HLM Achieved: 8: Walk 250 feet ot more  JH-HLM Goal achieved. Continue to encourage appropriate mobility.    Patient Centered Rounds: I performed bedside rounds with nursing staff today.   Discussions with Specialists or Other Care Team Provider: RN    Education and Discussions with Family / Patient: Updated  (son) via phone.    Current Length of Stay: 4 day(s)  Current Patient Status: Inpatient   Certification Statement: The patient will continue to require additional inpatient hospital stay due to awaiting placement to rehab for safe discharge  Discharge Plan: Medically stable, awaiting placement to rehab for safe discharge    Code Status: Level 1 - Full Code    Subjective   Patient assessed at bedside.  No acute complaints.  Reports some mild weakness in the right face/mouth.  Updated son over the phone regarding MRI, carotid ultrasound and CT venogram findings.    Objective :  Temp:  [97.9 °F (36.6 °C)-99.2 °F (37.3 °C)] 97.9 °F  (36.6 °C)  HR:  [69-84] 69  BP: (110-130)/(62-73) 127/62  Resp:  [16-18] 18  SpO2:  [93 %-95 %] 94 %  O2 Device: None (Room air)    Body mass index is 30.49 kg/m².     Input and Output Summary (last 24 hours):     Intake/Output Summary (Last 24 hours) at 6/11/2025 1527  Last data filed at 6/11/2025 0700  Gross per 24 hour   Intake 500 ml   Output 525 ml   Net -25 ml       Physical Exam  Vitals reviewed.   Constitutional:       General: He is not in acute distress.     Appearance: Normal appearance. He is not ill-appearing.   HENT:      Head: Normocephalic and atraumatic.     Cardiovascular:      Rate and Rhythm: Normal rate and regular rhythm.      Heart sounds: Normal heart sounds.   Pulmonary:      Effort: Pulmonary effort is normal. No respiratory distress.      Breath sounds: No wheezing or rales.   Abdominal:      Palpations: Abdomen is soft.      Tenderness: There is no abdominal tenderness.     Musculoskeletal:      Right lower leg: No edema.      Left lower leg: No edema.     Neurological:      Mental Status: He is alert and oriented to person, place, and time. Mental status is at baseline.      Comments: Facial droop         Lines/Drains:              Lab Results: I have reviewed the following results:   Results from last 7 days   Lab Units 06/11/25  0435   WBC Thousand/uL 10.79*   HEMOGLOBIN g/dL 13.6   HEMATOCRIT % 42.4   PLATELETS Thousands/uL 744*   SEGS PCT % 71   LYMPHO PCT % 16   MONO PCT % 9   EOS PCT % 3     Results from last 7 days   Lab Units 06/09/25  0505   SODIUM mmol/L 140   POTASSIUM mmol/L 4.2   CHLORIDE mmol/L 106   CO2 mmol/L 27   BUN mg/dL 22   CREATININE mg/dL 1.06   ANION GAP mmol/L 7   CALCIUM mg/dL 9.9   GLUCOSE RANDOM mg/dL 77     Results from last 7 days   Lab Units 06/07/25  0300   INR  1.10     Results from last 7 days   Lab Units 06/07/25  0259   POC GLUCOSE mg/dl 161*     Results from last 7 days   Lab Units 06/07/25  0300   HEMOGLOBIN A1C % 5.6           Recent Cultures (last  7 days):         Imaging Results Review: No pertinent imaging studies reviewed.  Other Study Results Review: No additional pertinent studies reviewed.    Last 24 Hours Medication List:     Current Facility-Administered Medications:     acetaminophen (TYLENOL) tablet 650 mg, Q6H PRN    ALPRAZolam (XANAX) tablet 0.25 mg, Daily PRN    apixaban (ELIQUIS) tablet 5 mg, BID    aspirin chewable tablet 81 mg, Daily    atorvastatin (LIPITOR) tablet 80 mg, QPM    cyanocobalamin (VITAMIN B-12) tablet 500 mcg, Daily    famotidine (PEPCID) tablet 40 mg, Daily    hydroxyurea (HYDREA) capsule 500 mg, BID    multivitamin-minerals (CENTRUM) tablet 1 tablet, Daily    ondansetron (ZOFRAN) injection 4 mg, Q6H PRN    sertraline (ZOLOFT) tablet 50 mg, Daily    Administrative Statements   Today, Patient Was Seen By: Donavan Vasquez, DO  I have spent a total time of 35 minutes in caring for this patient on the day of the visit/encounter including Instructions for management, Patient and family education, Impressions, Counseling / Coordination of care, Documenting in the medical record, Reviewing/placing orders in the medical record (including tests, medications, and/or procedures), Obtaining or reviewing history  , and Communicating with other healthcare professionals .    **Please Note: This note may have been constructed using a voice recognition system.**

## 2025-06-11 NOTE — ASSESSMENT & PLAN NOTE
Patient eval by vascular surgeon and is recommended medical management with plans for outpatient follow-up  Continue aspirin statin  Outpatient vascular surgery follow-up as scheduled in 6/20

## 2025-06-11 NOTE — ASSESSMENT & PLAN NOTE
Noted on labs from 6/7  Suspect reactive related to stroke  Improved  No signs of infection, monitor off antibiotics

## 2025-06-11 NOTE — DISCHARGE SUPPORT
"10:19 am-   Per [employer], Acute Rehab auth still pending.     Escalation email sent to [facility] access hospital Escalation Team requesting expedite of determination.     CM notified: Eryn NULL     10:38 am -   Rec'd response from [employer] \"Case Fast Tracked to RN\"    "

## 2025-06-11 NOTE — PLAN OF CARE
Problem: PHYSICAL THERAPY ADULT  Goal: Performs mobility at highest level of function for planned discharge setting.  See evaluation for individualized goals.  Description: Treatment/Interventions: Functional transfer training, LE strengthening/ROM, Elevations, Therapeutic exercise, Endurance training, Cognitive reorientation, Equipment eval/education, Bed mobility, Gait training, Spoke to case management, OT  Equipment Recommended:  (TBD)       See flowsheet documentation for full assessment, interventions and recommendations.  Outcome: Progressing  Note: Prognosis: Good  Problem List: Decreased strength, Decreased endurance, Impaired balance, Decreased mobility, Decreased coordination, Decreased safety awareness, Impaired judgement, Decreased cognition, Impaired hearing, Impaired tone  Assessment: Pt very pleasant and agreeable to participate in PT session. Completes mobility and therapeutic activity as outlined above. Pt able to complete LE exercises without difficulty. Pt complete transfers with CGA initially progressing to CS by end of session. Pt able to ambulate with RW vs without AD with min A and cues for improving R step length and R hip flexion to prevent R toe drag. Pt able to clear RLE throughout session and avoid tripping. Pt reports feeling more upright during ambulation while ambulating without RW. Pt demonstrates one mild LOB when completing turn on hallway with narrow GLORIA, requires min-mod A to correct. During stair negotiation pt able to focus on clearing R toe when ascending the steps in a reciprocal pattern. Provided cues for safety and technique. Pt using step to pattern during descent with min A. Pt is very motivated and is progressing well towards his goals. Pt will continue to benefit from skilled acute PT services to address deficits and promote mobility. Pt left upright in chair with chair alarm donned, call bell and personal items within reach and all needs met.  Barriers to Discharge:  Inaccessible home environment, Decreased caregiver support     Rehab Resource Intensity Level, PT: I (Maximum Resource Intensity)    See flowsheet documentation for full assessment.

## 2025-06-11 NOTE — ASSESSMENT & PLAN NOTE
With recent diagnosis of CVA 4/2025 at an outside health system with MRI at that time revealing multiple embolic strokes in the left hemisphere with CTA head/neck demonstrating left ICA thrombus  Patient presented with right-sided facial droop slurred speech  Patient was a stroke alert on arrival  CT head negative for acute intracranial pathology, CTA head/neck negative for LVO/aneurysm/dissection or AVMs.  Previous left ICA thrombus again noted  MRI brain reveals multifocal recent infarcts scattered throughout the left cerebral hemisphere  2D echo EF 70%  Neurology suspects stroke secondary to symptomatic ICA on the left versus Eliquis noncompliance  Continue aspirin, atorvastatin  Continue Eliquis  Physical therapy recommends rehab at discharge  Disposition planning case management following

## 2025-06-11 NOTE — ARC ADMISSION
[facility]  met with patient at bedside. Reviewed [facility] program, acute rehab criteria and approval process, medicare benefit/criteria/days, as well as [facility] locations and preferences. Patient's preferred [facility] location is [facility].  [facility] Rehab folder left with patient and all questions answered. Patient was made aware that [facility]C Reviewer will keep their  updated regarding referral status.

## 2025-06-11 NOTE — DISCHARGE SUPPORT
Case Management Assessment & Discharge Planning Note    Patient name Obdulio Mccauley  Location [facility] 712/[facility] 712-01 MRN [id number]  : 1940 Date 2025       Current Admission Date: 2025  Current Admission Diagnosis:Stroke (cerebrum) ([facility])   Patient Active Problem List    Diagnosis Date Noted    Leukocytosis 06/10/2025    Brain compression ([facility]) 06/10/2025    Abnormal finding on MRI of brain 2025    Stroke (cerebrum) ([facility]) 2025    Primary hypertension 2025    Bigeminy 2025    Carotid stenosis, left 2025    CTEPH (chronic thromboembolic pulmonary hypertension) ([facility]) 2024    Obstructive sleep apnea 2024    ELODIA (obstructive sleep apnea) 2024    Obesity, morbid ([facility]) 2023    Other pulmonary embolism without acute cor pulmonale ([facility]) 2023    Essential thrombocytosis ([facility]) 2023    Basal cell carcinoma of skin of other parts of face 2022    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    Urge incontinence 2022    Nocturia 2022    Bladder neck contracture 2019    Prostate cancer ([facility]) 2017    Cerebrovascular accident (CVA) ([facility]) 2015      LOS (days): 4  Geometric Mean LOS (GMLOS) (days): 2.8  Days to GMLOS:-1.6   Facility Auth Adverse Decision  DC Support Center has received: Denial  For Level of Care: Acute Rehab  Insurance: [employer]  Information obtained via : Phone  Name/Phone # of Rep who called in information: Kyung BRENNAN# [phone]  Reason for Adverse Decision:  (Not provided)  Reference Number: [id number]  Facility Name: [facility]  Peer to Peer?: Offered  Peer to Peer Phone #: [phone]  P2P Deadline: 25 12pm EST  For P2P Completion:: CM to task P2P to [facility] to complete if desired   notified: Eryn NULL     Updates to authorization status will be noted in chart.    Please reach out to CM for updates on any clinical information.

## 2025-06-11 NOTE — SPEECH THERAPY NOTE
Speech Language/Pathology    Speech/Language Pathology Progress Note    Patient Name: Obdulio Mccauley  Today's Date: 6/11/2025     Problem List  Principal Problem:    Stroke (cerebrum) ([facility])  Active Problems:    Cerebrovascular accident (CVA) (HCC)    Essential thrombocytosis (HCC)    CTEPH (chronic thromboembolic pulmonary hypertension) (HCC)    Primary hypertension    Bigeminy    Carotid stenosis, left    Abnormal finding on MRI of brain    Leukocytosis    Brain compression (HCC)       Past Medical History  Past Medical History[1]     Past Surgical History  Past Surgical History[2]      Subjective:  Patient awake and alert.     Objective:  Wife is at bedside. Speech is much cleared and more intelligible today. The patient is assessed in informal conversation. Speech has improved intelligibility in unknown contexts. He continues with right facial droop, but also appears improved from previous session. Reviewed handouts of dysarthria practice. Patient hopes to get to acute rehab soon to continue to work on speech skills.     Assessment:  Improved intelligibility and right facial strength during therapy.    Plan/Recommendations:  Continue ST as needed in acute care. Follow up in rehab.           [1]   Past Medical History:  Diagnosis Date    Cancer (HCC)     prostate    History of pulmonary embolus (PE)     Hypercholesterolemia     Hypertension     Personal history of DVT (deep vein thrombosis)     Pneumonia     Stroke ([facility])    [2]   Past Surgical History:  Procedure Laterality Date    KNEE SURGERY Left     PROSTATECTOMY      TOTAL SHOULDER REPLACEMENT

## 2025-06-11 NOTE — PLAN OF CARE
Problem: Potential for Falls  Goal: Patient will remain free of falls  Description: INTERVENTIONS:  - Educate patient/family on patient safety including physical limitations  - Instruct patient to call for assistance with activity   - Consider consulting OT/PT to assist with strengthening/mobility based on AM PAC & JH-HLM score  - Consult OT/PT to assist with strengthening/mobility   - Keep Call bell within reach  - Keep bed low and locked with side rails adjusted as appropriate  - Keep care items and personal belongings within reach  - Initiate and maintain comfort rounds  - Make Fall Risk Sign visible to staff  - Offer Toileting every 2 Hours, in advance of need  - Initiate/Maintain bed/chair alarm  - Obtain necessary fall risk management equipment.  - Apply yellow socks and bracelet for high fall risk patients  - Consider moving patient to room near nurses station  Outcome: Progressing     Problem: Nutrition/Hydration-ADULT  Goal: Nutrient/Hydration intake appropriate for improving, restoring or maintaining nutritional needs  Description: Monitor and assess patient's nutrition/hydration status for malnutrition. Collaborate with interdisciplinary team and initiate plan and interventions as ordered.  Monitor patient's weight and dietary intake as ordered or per policy. Utilize nutrition screening tool and intervene as necessary. Determine patient's food preferences and provide high-protein, high-caloric foods as appropriate.     INTERVENTIONS:  - Monitor oral intake, urinary output, labs, and treatment plans  - Assess nutrition and hydration status and recommend course of action  - Evaluate amount of meals eaten  - Assist patient with eating if necessary   - Allow adequate time for meals  - Recommend/ encourage appropriate diets, oral nutritional supplements, and vitamin/mineral supplements  - Order, calculate, and assess calorie counts as needed  - Assess need for intravenous fluids  - Provide specific  nutrition/hydration education as appropriate  - Include patient/family/caregiver in decisions related to nutrition  Outcome: Progressing     Problem: Neurological Deficit  Goal: Neurological status is stable or improving  Description: Interventions:  - Monitor and assess patient's level of consciousness, motor function, sensory function, and level of assistance needed for ADLs.   - Monitor and report changes from baseline. Collaborate with interdisciplinary team to initiate plan and implement interventions as ordered.   - Provide and maintain a safe environment.  - Consider seizure precautions.  - Consider fall precautions.  - Consider aspiration precautions.  - Consider bleeding precautions.  Outcome: Progressing     Problem: Activity Intolerance/Impaired Mobility  Goal: Mobility/activity is maintained at optimum level for patient  Description: Interventions:  - Assess and monitor patient  barriers to mobility and need for assistive/adaptive devices.  - Assess patient's emotional response to limitations.  - Collaborate with interdisciplinary team and initiate plans and interventions as ordered.  - Encourage independent activity per ability.  - Maintain proper body alignment.  - Perform active/passive rom as tolerated/ordered.  - Plan activities to conserve energy.  - Turn patient as appropriate  Outcome: Progressing     Problem: Communication Impairment  Goal: Ability to express needs and understand communication  Description: Assess patient's communication skills and ability to understand information.  Patient will demonstrate use of effective communication techniques, alternative methods of communication and understanding even if not able to speak.     - Encourage communication and provide alternate methods of communication as needed.  - Collaborate with case management/ for discharge needs.  - Include patient/family/caregiver in decisions related to communication.  Outcome: Progressing      Problem: Potential for Aspiration  Goal: Non-ventilated patient's risk of aspiration is minimized  Description: Assess and monitor vital signs, respiratory status, and labs (WBC).  Monitor for signs of aspiration (tachypnea, cough, rales, wheezing, cyanosis, fever).    - Assess and monitor patient's ability to swallow.  - Place patient up in chair to eat if possible.  - HOB up at 90 degrees to eat if unable to get patient up into chair.  - Supervise patient during oral intake.   - Instruct patient/ family to take small bites.  - Instruct patient/ family to take small single sips when taking liquids.  - Follow patient-specific strategies generated by speech pathologist.  Outcome: Progressing     Problem: Nutrition  Goal: Nutrition/Hydration status is improving  Description: Monitor and assess patient's nutrition/hydration status for malnutrition (ex- brittle hair, bruises, dry skin, pale skin and conjunctiva, muscle wasting, smooth red tongue, and disorientation). Collaborate with interdisciplinary team and initiate plan and interventions as ordered.  Monitor patient's weight and dietary intake as ordered or per policy. Utilize nutrition screening tool and intervene per policy. Determine patient's food preferences and provide high-protein, high-caloric foods as appropriate.     - Assist patient with eating.  - Allow adequate time for meals.  - Encourage patient to take dietary supplement as ordered.  - Collaborate with clinical nutritionist.  - Include patient/family/caregiver in decisions related to nutrition.  Outcome: Progressing     Problem: PAIN - ADULT  Goal: Verbalizes/displays adequate comfort level or baseline comfort level  Description: Interventions:  - Encourage patient to monitor pain and request assistance  - Assess pain using appropriate pain scale  - Administer analgesics as ordered based on type and severity of pain and evaluate response  - Implement non-pharmacological measures as appropriate and  evaluate response  - Notify physician/advanced practitioner if interventions unsuccessful or patient reports new pain  - Educate patient/family on pain management process including their role and importance of  reporting pain   - Provide non-pharmacologic/complimentary pain relief interventions  Outcome: Progressing     Problem: INFECTION - ADULT  Goal: Absence or prevention of progression during hospitalization  Description: INTERVENTIONS:  - Assess and monitor for signs and symptoms of infection  - Monitor lab/diagnostic results  - Monitor all insertion sites, i.e. indwelling lines, tubes, and drains  - [place] appropriate cooling/warming therapies per order  - Administer medications as ordered  - Instruct and encourage patient and family to use good hand hygiene technique  - Identify and instruct in appropriate isolation precautions for identified infection/condition  Outcome: Progressing     Problem: SAFETY ADULT  Goal: Patient will remain free of falls  Description: INTERVENTIONS:  - Educate patient/family on patient safety including physical limitations  - Instruct patient to call for assistance with activity   - Consider consulting OT/PT to assist with strengthening/mobility based on AM PAC & JH-HLM score  - Consult OT/PT to assist with strengthening/mobility   - Keep Call bell within reach  - Keep bed low and locked with side rails adjusted as appropriate  - Keep care items and personal belongings within reach  - Initiate and maintain comfort rounds  - Make Fall Risk Sign visible to staff  - Offer Toileting every 2 Hours, in advance of need  - Initiate/Maintain bed/chair alarm  - Obtain necessary fall risk management equipment.  - Apply yellow socks and bracelet for high fall risk patients  - Consider moving patient to room near nurses station  Outcome: Progressing     Problem: DISCHARGE PLANNING  Goal: Discharge to home or other facility with appropriate resources  Description: INTERVENTIONS:  - Identify  barriers to discharge w/patient and caregiver  - Arrange for needed discharge resources and transportation as appropriate  - Identify discharge learning needs (meds, wound care, etc.)  - Refer to Case Management Department for coordinating discharge planning if the patient needs post-hospital services based on physician/advanced practitioner order or complex needs related to functional status, cognitive ability, or social support system  Outcome: Progressing     Problem: Knowledge Deficit  Goal: Patient/family/caregiver demonstrates understanding of disease process, treatment plan, medications, and discharge instructions  Description: Complete learning assessment and assess knowledge base.  Interventions:  - Provide teaching at level of understanding  - Provide teaching via preferred learning methods  Outcome: Progressing

## 2025-06-11 NOTE — PLAN OF CARE
Problem: Potential for Falls  Goal: Patient will remain free of falls  Description: INTERVENTIONS:  - Educate patient/family on patient safety including physical limitations  - Instruct patient to call for assistance with activity   - Consider consulting OT/PT to assist with strengthening/mobility based on AM PAC & JH-HLM score  - Consult OT/PT to assist with strengthening/mobility   - Keep Call bell within reach  - Keep bed low and locked with side rails adjusted as appropriate  - Keep care items and personal belongings within reach  - Initiate and maintain comfort rounds  - Make Fall Risk Sign visible to staff  - Offer Toileting every 2 Hours, in advance of need  - Initiate/Maintain bed/chair alarm  - Obtain necessary fall risk management equipment.  - Apply yellow socks and bracelet for high fall risk patients  - Consider moving patient to room near nurses station  Outcome: Progressing     Problem: Nutrition/Hydration-ADULT  Goal: Nutrient/Hydration intake appropriate for improving, restoring or maintaining nutritional needs  Description: Monitor and assess patient's nutrition/hydration status for malnutrition. Collaborate with interdisciplinary team and initiate plan and interventions as ordered.  Monitor patient's weight and dietary intake as ordered or per policy. Utilize nutrition screening tool and intervene as necessary. Determine patient's food preferences and provide high-protein, high-caloric foods as appropriate.     INTERVENTIONS:  - Monitor oral intake, urinary output, labs, and treatment plans  - Assess nutrition and hydration status and recommend course of action  - Evaluate amount of meals eaten  - Assist patient with eating if necessary   - Allow adequate time for meals  - Recommend/ encourage appropriate diets, oral nutritional supplements, and vitamin/mineral supplements  - Order, calculate, and assess calorie counts as needed  - Assess need for intravenous fluids  - Provide specific  nutrition/hydration education as appropriate  - Include patient/family/caregiver in decisions related to nutrition  Outcome: Progressing     Problem: Neurological Deficit  Goal: Neurological status is stable or improving  Description: Interventions:  - Monitor and assess patient's level of consciousness, motor function, sensory function, and level of assistance needed for ADLs.   - Monitor and report changes from baseline. Collaborate with interdisciplinary team to initiate plan and implement interventions as ordered.   - Provide and maintain a safe environment.  - Consider seizure precautions.  - Consider fall precautions.  - Consider aspiration precautions.  - Consider bleeding precautions.  Outcome: Progressing     Problem: Activity Intolerance/Impaired Mobility  Goal: Mobility/activity is maintained at optimum level for patient  Description: Interventions:  - Assess and monitor patient  barriers to mobility and need for assistive/adaptive devices.  - Assess patient's emotional response to limitations.  - Collaborate with interdisciplinary team and initiate plans and interventions as ordered.  - Encourage independent activity per ability.  - Maintain proper body alignment.  - Perform active/passive rom as tolerated/ordered.  - Plan activities to conserve energy.  - Turn patient as appropriate  Outcome: Progressing     Problem: Communication Impairment  Goal: Ability to express needs and understand communication  Description: Assess patient's communication skills and ability to understand information.  Patient will demonstrate use of effective communication techniques, alternative methods of communication and understanding even if not able to speak.     - Encourage communication and provide alternate methods of communication as needed.  - Collaborate with case management/ for discharge needs.  - Include patient/family/caregiver in decisions related to communication.  Outcome: Progressing      Problem: Potential for Aspiration  Goal: Non-ventilated patient's risk of aspiration is minimized  Description: Assess and monitor vital signs, respiratory status, and labs (WBC).  Monitor for signs of aspiration (tachypnea, cough, rales, wheezing, cyanosis, fever).    - Assess and monitor patient's ability to swallow.  - Place patient up in chair to eat if possible.  - HOB up at 90 degrees to eat if unable to get patient up into chair.  - Supervise patient during oral intake.   - Instruct patient/ family to take small bites.  - Instruct patient/ family to take small single sips when taking liquids.  - Follow patient-specific strategies generated by speech pathologist.  Outcome: Progressing     Problem: Nutrition  Goal: Nutrition/Hydration status is improving  Description: Monitor and assess patient's nutrition/hydration status for malnutrition (ex- brittle hair, bruises, dry skin, pale skin and conjunctiva, muscle wasting, smooth red tongue, and disorientation). Collaborate with interdisciplinary team and initiate plan and interventions as ordered.  Monitor patient's weight and dietary intake as ordered or per policy. Utilize nutrition screening tool and intervene per policy. Determine patient's food preferences and provide high-protein, high-caloric foods as appropriate.     - Assist patient with eating.  - Allow adequate time for meals.  - Encourage patient to take dietary supplement as ordered.  - Collaborate with clinical nutritionist.  - Include patient/family/caregiver in decisions related to nutrition.  Outcome: Progressing     Problem: PAIN - ADULT  Goal: Verbalizes/displays adequate comfort level or baseline comfort level  Description: Interventions:  - Encourage patient to monitor pain and request assistance  - Assess pain using appropriate pain scale  - Administer analgesics as ordered based on type and severity of pain and evaluate response  - Implement non-pharmacological measures as appropriate and  evaluate response  - Notify physician/advanced practitioner if interventions unsuccessful or patient reports new pain  - Educate patient/family on pain management process including their role and importance of  reporting pain   - Provide non-pharmacologic/complimentary pain relief interventions  Outcome: Progressing     Problem: INFECTION - ADULT  Goal: Absence or prevention of progression during hospitalization  Description: INTERVENTIONS:  - Assess and monitor for signs and symptoms of infection  - Monitor lab/diagnostic results  - Monitor all insertion sites, i.e. indwelling lines, tubes, and drains  - [place] appropriate cooling/warming therapies per order  - Administer medications as ordered  - Instruct and encourage patient and family to use good hand hygiene technique  - Identify and instruct in appropriate isolation precautions for identified infection/condition  Outcome: Progressing     Problem: SAFETY ADULT  Goal: Patient will remain free of falls  Description: INTERVENTIONS:  - Educate patient/family on patient safety including physical limitations  - Instruct patient to call for assistance with activity   - Consider consulting OT/PT to assist with strengthening/mobility based on AM PAC & JH-HLM score  - Consult OT/PT to assist with strengthening/mobility   - Keep Call bell within reach  - Keep bed low and locked with side rails adjusted as appropriate  - Keep care items and personal belongings within reach  - Initiate and maintain comfort rounds  - Make Fall Risk Sign visible to staff  - Offer Toileting every 2 Hours, in advance of need  - Initiate/Maintain bed/chair alarm  - Obtain necessary fall risk management equipment.  - Apply yellow socks and bracelet for high fall risk patients  - Consider moving patient to room near nurses station  Outcome: Progressing     Problem: DISCHARGE PLANNING  Goal: Discharge to home or other facility with appropriate resources  Description: INTERVENTIONS:  - Identify  barriers to discharge w/patient and caregiver  - Arrange for needed discharge resources and transportation as appropriate  - Identify discharge learning needs (meds, wound care, etc.)  - Refer to Case Management Department for coordinating discharge planning if the patient needs post-hospital services based on physician/advanced practitioner order or complex needs related to functional status, cognitive ability, or social support system  Outcome: Progressing     Problem: Knowledge Deficit  Goal: Patient/family/caregiver demonstrates understanding of disease process, treatment plan, medications, and discharge instructions  Description: Complete learning assessment and assess knowledge base.  Interventions:  - Provide teaching at level of understanding  - Provide teaching via preferred learning methods  Outcome: Progressing

## 2025-06-11 NOTE — PHYSICAL THERAPY NOTE
"                                                                                  PHYSICAL THERAPY NOTE          Patient Name: Obdulio Mccauley  Today's Date: 6/11/2025 06/11/25 1049   PT Last Visit   PT Visit Date 06/11/25   Note Type   Note Type Treatment   Pain Assessment   Pain Assessment Tool 0-10   Pain Score No Pain  (arm soreness)   Restrictions/Precautions   Weight Bearing Precautions Per Order No   Other Precautions Cognitive;Chair Alarm;Bed Alarm;Fall Risk;Hard of hearing  (R sided weakness, dysarthric)   General   Chart Reviewed Yes   Response to Previous Treatment Patient with no complaints from previous session.   Family/Caregiver Present No   Cognition   Overall Cognitive Status Impaired   Arousal/Participation Alert;Cooperative;Responsive   Attention Attends with cues to redirect   Orientation Level Oriented X4   Memory Decreased recall of precautions   Following Commands Follows multistep commands with increased time or repetition   Comments pt very pleasant and cooperative   Subjective   Subjective \"I'm bored\"   Bed Mobility   Supine to Sit Unable to assess   Sit to Supine Unable to assess   Additional Comments pt greeted upright in chair and left in chair at end of session   Transfers   Sit to Stand 4  Minimal assistance  (CGA)   Additional items Assist x 1;Increased time required;Verbal cues   Stand to Sit 5  Supervision   Additional items Armrests;Increased time required;Verbal cues   Additional Comments c RW vs no AD. x6 STS throughout session   Ambulation/Elevation   Gait pattern R Hemiparesis;Decreased foot clearance;R Foot drag;Inconsistent kiana;Short stride   Gait Assistance 4  Minimal assist   Additional items Assist x 1;Verbal cues   Assistive Device Rolling walker;None   Distance 120'+200'(RW)+15'x4+60'x2+20'x2(no AD)   Stair Management Assistance 4  Minimal assist   Additional items Assist x 1;Verbal cues;Increased time required   Stair Management Technique One rail " L;Alternating pattern   Number of Stairs 7   Ambulation/Elevation Additional Comments one minor LOB when completing turn with narrow GLORIA requiring min-mod A to correct   Balance   Static Sitting Fair +   Dynamic Sitting Fair   Static Standing Fair -   Dynamic Standing Poor +   Ambulatory Poor +   Endurance Deficit   Endurance Deficit Yes   Activity Tolerance   Activity Tolerance Patient tolerated treatment well   Nurse Made Aware yes-RN cleared   Exercises   Knee AROM Long Arc Quad Sitting;10 reps;AROM;Bilateral   Ankle Pumps Sitting;10 reps;AROM;Bilateral   Marching Sitting;10 reps;Standing;15 reps;AROM;Bilateral   Neuro re-ed forwards marching through ladder (taped to floor)15'x2+ lateral stepping through ladder 15'x2 with min A   Assessment   Prognosis Good   Problem List Decreased strength;Decreased endurance;Impaired balance;Decreased mobility;Decreased coordination;Decreased safety awareness;Impaired judgement;Decreased cognition;Impaired hearing;Impaired tone   Assessment Pt very pleasant and agreeable to participate in PT session. Completes mobility and therapeutic activity as outlined above. Pt able to complete LE exercises without difficulty. Pt complete transfers with CGA initially progressing to CS by end of session. Pt able to ambulate with RW vs without AD with min A and cues for improving R step length and R hip flexion to prevent R toe drag. Pt able to clear RLE throughout session and avoid tripping. Pt reports feeling more upright during ambulation while ambulating without RW. Pt demonstrates one mild LOB when completing turn on hallway with narrow GLORIA, requires min-mod A to correct. During stair negotiation pt able to focus on clearing R toe when ascending the steps in a reciprocal pattern. Provided cues for safety and technique. Pt using step to pattern during descent with min A. At times during mobility pt bumping into door frame on R side and requires cues for RW management with obstacles on R  side.  Pt is very motivated and is progressing well towards his goals. Pt will continue to benefit from skilled acute PT services to address deficits and promote mobility. Pt left upright in chair with chair alarm donned, call bell and personal items within reach and all needs met.   Barriers to Discharge Inaccessible home environment;Decreased caregiver support   Goals   Patient Goals to get out of the hospital   STG Expiration Date 06/18/25   PT Treatment Day 2   Plan   Treatment/Interventions Functional transfer training;LE strengthening/ROM;Elevations;Therapeutic exercise;Endurance training;Patient/family training;Equipment eval/education;Bed mobility;Compensatory technique education;Gait training;Continued evaluation;Spoke to nursing   Progress Progressing toward goals   PT Frequency 3-5x/wk   Discharge Recommendation   Rehab Resource Intensity Level, PT I (Maximum Resource Intensity)   Equipment Recommended Walker   Walker Package Recommended Wheeled walker   AM-PAC Basic Mobility Inpatient   Turning in Flat Bed Without Bedrails 3   Lying on Back to Sitting on Edge of Flat Bed Without Bedrails 3   Moving Bed to Chair 3   Standing Up From Chair Using Arms 3   Walk in Room 3   Climb 3-5 Stairs With Railing 2   Basic Mobility Inpatient Raw Score 17   Basic Mobility Standardized Score 39.67   [facility] Highest Level Of Mobility   -HL Goal 5: Stand one or more mins   -HLM Achieved 8: Walk 250 feet ot more   Education   Education Provided Mobility training;Home exercise program;Assistive device   Patient Demonstrates acceptance/verbal understanding   End of Consult   Patient Position at End of Consult Bedside chair;Bed/Chair alarm activated;All needs within reach   GLENDA WoodsT

## 2025-06-11 NOTE — ASSESSMENT & PLAN NOTE
Blood pressures reviewed, acceptable  Monitor blood pressures  Avoid hypotension  Resume Lasix tomorrow

## 2025-06-12 VITALS
HEART RATE: 79 BPM | HEIGHT: 67 IN | DIASTOLIC BLOOD PRESSURE: 73 MMHG | OXYGEN SATURATION: 95 % | WEIGHT: 194.67 LBS | TEMPERATURE: 98 F | BODY MASS INDEX: 30.55 KG/M2 | SYSTOLIC BLOOD PRESSURE: 132 MMHG | RESPIRATION RATE: 18 BRPM

## 2025-06-12 PROCEDURE — 99239 HOSP IP/OBS DSCHRG MGMT >30: CPT | Performed by: STUDENT IN AN ORGANIZED HEALTH CARE EDUCATION/TRAINING PROGRAM

## 2025-06-12 RX ORDER — APIXABAN 5 MG/1
5 TABLET, FILM COATED ORAL 2 TIMES DAILY
Qty: 60 TABLET | Refills: 0 | Status: SHIPPED | OUTPATIENT
Start: 2025-06-12

## 2025-06-12 RX ORDER — AMOXICILLIN 250 MG
1 CAPSULE ORAL 2 TIMES DAILY
Status: DISCONTINUED | OUTPATIENT
Start: 2025-06-12 | End: 2025-06-12 | Stop reason: HOSPADM

## 2025-06-12 RX ORDER — POLYETHYLENE GLYCOL 3350 17 G/17G
17 POWDER, FOR SOLUTION ORAL DAILY
Status: DISCONTINUED | OUTPATIENT
Start: 2025-06-12 | End: 2025-06-12 | Stop reason: HOSPADM

## 2025-06-12 RX ORDER — POLYETHYLENE GLYCOL 3350 17 G/17G
17 POWDER, FOR SOLUTION ORAL DAILY
Qty: 510 G | Refills: 0 | Status: SHIPPED | OUTPATIENT
Start: 2025-06-13

## 2025-06-12 RX ORDER — DILTIAZEM HYDROCHLORIDE 120 MG/1
120 CAPSULE, COATED, EXTENDED RELEASE ORAL DAILY
Status: DISCONTINUED | OUTPATIENT
Start: 2025-06-13 | End: 2025-06-12 | Stop reason: HOSPADM

## 2025-06-12 RX ADMIN — SERTRALINE HYDROCHLORIDE 50 MG: 50 TABLET ORAL at 09:36

## 2025-06-12 RX ADMIN — FAMOTIDINE 40 MG: 20 TABLET, FILM COATED ORAL at 09:35

## 2025-06-12 RX ADMIN — SENNOSIDES AND DOCUSATE SODIUM 1 TABLET: 50; 8.6 TABLET ORAL at 09:46

## 2025-06-12 RX ADMIN — CYANOCOBALAMIN TAB 500 MCG 500 MCG: 500 TAB at 09:35

## 2025-06-12 RX ADMIN — FUROSEMIDE 40 MG: 40 TABLET ORAL at 09:35

## 2025-06-12 RX ADMIN — POLYETHYLENE GLYCOL 3350 17 G: 17 POWDER, FOR SOLUTION ORAL at 09:46

## 2025-06-12 RX ADMIN — HYDROXYUREA 500 MG: 500 CAPSULE ORAL at 09:37

## 2025-06-12 RX ADMIN — APIXABAN 5 MG: 5 TABLET, FILM COATED ORAL at 09:35

## 2025-06-12 RX ADMIN — ASPIRIN 81 MG CHEWABLE TABLET 81 MG: 81 TABLET CHEWABLE at 09:35

## 2025-06-12 RX ADMIN — Medication 1 TABLET: at 09:36

## 2025-06-12 NOTE — ASSESSMENT & PLAN NOTE
Blood pressures reviewed, acceptable  Monitor blood pressures  Avoid hypotension  Continue Cardizem and Lasix

## 2025-06-12 NOTE — RESTORATIVE TECHNICIAN NOTE
Restorative Technician Note      Patient Name: Obdulio Mccauley     Note Type: Mobility  Patient Position Upon Consult: Bedside chair  Activity Performed: Ambulated; Dangled; Stood  Assistive Device: Roller walker  Education Provided: Yes  Patient Position at End of Consult: All needs within reach; Bed/Chair alarm activated; Bedside chair    Eleni RDZ, Restorative Technician,

## 2025-06-12 NOTE — CASE MANAGEMENT
Case Management Discharge Planning Note    Patient name Obdulio Mccauley  Location [facility]12/[facility]12-01 MRN [id number]  : 1940 Date 2025       Current Admission Date: 2025  Current Admission Diagnosis:Stroke (cerebrum) (Formerly [facility])   Patient Active Problem List    Diagnosis Date Noted    Leukocytosis 06/10/2025    Brain compression (Formerly [facility]) 06/10/2025    Abnormal finding on MRI of brain 2025    Stroke (cerebrum) (Formerly [facility]) 2025    Primary hypertension 2025    Bigeminy 2025    Carotid stenosis, left 2025    CTEPH (chronic thromboembolic pulmonary hypertension) (Formerly [facility]) 2024    Obstructive sleep apnea 2024    ELODIA (obstructive sleep apnea) 2024    Obesity, morbid (Formerly [facility]) 2023    Other pulmonary embolism without acute cor pulmonale (Formerly [facility]) 2023    Essential thrombocytosis (Formerly [facility]) 2023    Basal cell carcinoma of skin of other parts of face 2022    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/pharmacy #[id number] - [place] - [address] [address]  [address] [address]  Phone: [phone] Fax: [phone]    [facility] SPECIALTY [place]  MANUELA Fuentes - [address]  [address]  [place] [address]  Phone: [phone] Fax: [phone]    Primary Care Provider: Ky Rubi MD    Primary Insurance: [facility]  Secondary Insurance:     DISCHARGE DETAILS:         Requested Home Health Care         Is the patient interested in HHC at discharge?: Yes  Home Health Discipline requested:: Occupational Therapy, Physical Therapy, Speech  Language Pathology  Home Health Agency Name:: [facility]  Home Health Follow-Up Provider:: PCP  Home Health Services Needed:: Evaluate Functional Status and Safety, Gait/ADL Training, Strengthening/Theraputic Exercises to Improve Function, Other (comment) (needs speech also)  Homebound Criteria Met:: Requires the Assistance of Another Person for Safe Ambulation or to Leave the Home  Supporting Clincal Findings:: Limited Endurance, Fatigues Easliy in Short Distances

## 2025-06-12 NOTE — ASSESSMENT & PLAN NOTE
Longstanding history of this following with [facility] hematology,   continue PTA ASA and hydroxyurea  Monitor counts

## 2025-06-12 NOTE — ASSESSMENT & PLAN NOTE
With history of acute pulmonary [place] February 2023, follows with [facility] heart failure/pulmonary hypertension team  Continue Eliquis for anticoagulation  Resume Adempas at discharge

## 2025-06-12 NOTE — ASSESSMENT & PLAN NOTE
Bigeminy/trigeminy noted on telemetry monitoring, patient denying any chest pain, dizziness/lightheadedness, or other associated symptoms  Likely insignificant finding  2D echo EF 70%

## 2025-06-12 NOTE — DISCHARGE SUMMARY
Discharge Summary - Hospitalist   Name: Obdulio Mccauley 84 y.o. male I MRN: [id number]  [facility] I Date of Admission: 6/7/2025   Date of Service: 6/12/2025 I Hospital Day: 5     Assessment & Plan  Stroke (cerebrum) (HCC)  With recent diagnosis of CVA 4/2025 at an outside health system with MRI at that time revealing multiple embolic strokes in the left hemisphere with CTA head/neck demonstrating left ICA thrombus  Patient presented with right-sided facial droop slurred speech  Patient was a stroke alert on arrival  CT head negative for acute intracranial pathology, CTA head/neck negative for LVO/aneurysm/dissection or AVMs.  Previous left ICA thrombus again noted  MRI brain reveals multifocal recent infarcts scattered throughout the left cerebral hemisphere  2D echo EF 70%  Neurology suspects stroke secondary to symptomatic ICA on the left versus Eliquis noncompliance  Continue aspirin, atorvastatin  Continue Eliquis  Rehab insurance authorization denial upheld even after peer to peer.  Plan for discharge home with VNA with PT/OT.   Cerebrovascular accident (CVA) (HCC)  Patient recently hospitalized 4/2025 at an outside hospital with acute CVA and right-sided weakness/speech changes with facial droop and aphasia.  MRI brain confirmed multiple embolic strokes in the left hemisphere with CT head/neck revealing left ICA thrombus.    Continue aspirin, statin  Essential thrombocytosis (HCC)  Longstanding history of this following with [facility] hematology,   continue PTA ASA and hydroxyurea  Monitor counts  CTEPH (chronic thromboembolic pulmonary hypertension) ([facility])  With history of acute pulmonary [place] February 2023, follows with [facility] heart failure/pulmonary hypertension team  Continue Eliquis for anticoagulation  Resume Adempas at discharge  Primary hypertension  Blood pressures reviewed, acceptable  Monitor blood pressures  Avoid hypotension  Continue Cardizem and  Lasix  Bigeminy  Bigeminy/trigeminy noted on telemetry monitoring, patient denying any chest pain, dizziness/lightheadedness, or other associated symptoms  Likely insignificant finding  2D echo EF 70%  Carotid stenosis, left  Patient eval by vascular surgeon and is recommended medical management with plans for outpatient follow-up  Continue aspirin statin  Outpatient vascular surgery follow-up as scheduled in 6/20  Abnormal finding on MRI of brain  MRI brain reveals abnormal flow in the left transverse sinus  CT venogram no evidence of dural venous sinus thrombosis  Leukocytosis  Noted on labs from 6/7  Suspect reactive related to stroke  Improved  No signs of infection, monitor off antibiotics  Brain compression (HCC)  Noted to have mild mass effect secondary to stroke  See management above     Medical Problems       Resolved Problems  Date Reviewed: 6/9/2025   None       Discharging Physician / Practitioner: Donavan Vasquez DO  PCP: Ky Rubi MD  Admission Date:   Admission Orders (From admission, onward)       Ordered        06/07/25 0347  INPATIENT ADMISSION  Once                          Discharge Date: 06/12/25    Next Steps for Physician/AP Assuming Care:  Follow-up with vascular surgery and neurology outpatient  Encourage compliance with Eliquis    Test Results Pending at Discharge (will require follow up):  None    Medication Changes for Discharge & Rationale:   Start atorvastatin 80 mg daily  Continue MiraLAX daily  Stop lisinopril 10 mg daily  See after visit summary for reconciled discharge medications provided to patient and/or family.     Consultations During Hospital Stay:  Vascular surgery  Neurology  PMR    Procedures Performed:   None     Significant Findings / Test Results:   CT VENOGRAM BRAIN  Result Date: 6/9/2025  Impression: Redemonstrated multifocal acute left cerebral cortical infarctions. No hemorrhagic transformation. Chronic microangiopathic changes and numerous chronic  bilateral cerebellar infarctions. No evidence of dural venous sinus thrombosis. Workstation performed: [id number]     Echo complete w/ contrast if indicated  Result Date: 6/9/2025  Narrative:   Left Ventricle: The left ventricular ejection fraction is 70%. Systolic function is hyperdynamic. Wall motion is normal.   Aortic Valve: There is aortic valve sclerosis.     VAS carotid complete study  Result Date: 6/8/2025  CONCLUSION:  RIGHT: There is <50% stenosis noted in the internal carotid artery.  Plaque is heterogenous and irregular. Vertebral artery flow is antegrade.  There is no significant subclavian artery disease. LEFT: There is <50% stenosis noted in the internal carotid artery.  Plaque is heterogenous and irregular. Vertebral artery flow is antegrade.  There is no significant subclavian artery disease.   There are no previous studies available for comparison. However, in comparison to the CTA on 4/9/25, there is no gross evidence of left ICA thrombosis noted.  Technical findings posted in Epic.   SIGNATURE Signed by PEDRO LUIS BLOOM DO on 2025-06-08 15:36:46 [email or web address]    MRI brain wo contrast  Addendum Date: 6/8/2025  Addendum: ADDENDUM: I personally discussed this study with MORIAH MAHAJAN on 6/8/2025 8:46 AM.     Result Date: 6/8/2025  Impression: 1.  Multifocal recent infarcts scattered throughout the left cerebral hemisphere, with mild associated mass effect and evidence of recent hemorrhage, as above. 2.  Abnormal flow void of the left transverse sinus extending into the sigmoid sinus and left internal jugular vein, though noting these areas were patent on the recent CTA. While findings could be related to slow flow, dural venous sinus thrombosis is not excluded. Recommend further evaluation with CT venogram. The study was marked in EPIC for immediate notification. Workstation performed:  [id number]     CT stroke alert brain  Result Date: 6/7/2025  Impression: No acute intracranial abnormality.  Chronic microangiopathic changes. I personally discussed this study with Dr. Palacio on 6/7/2025 3:33 AM. Workstation performed: [id number]     CTA stroke alert (head/neck)  Result Date: 6/7/2025  Impression: No intracranial large vessel occlusion, aneurysm, dissection, or high-grade stenosis. Moderate atherosclerotic plaque left proximal cervical ICA resulting in 60% stenosis. This plaque was noted on prior examinations with few areas of eccentric thrombus/loose plaque. No central thrombus identified at this time, however, this may be a source of emboli I personally discussed this study with Dr. Palacio on 6/7/2025 3:43 AM. Workstation performed: [id number]     Incidental Findings:   See above      Hospital Course:   Obdulio Mccauley is a 84 y.o. male patient who originally presented to the hospital on 6/7/2025 due to right facial droop, mild aphasia and slurred speech.  Patient was deemed to not a candidate for TNK/thrombectomy.  Of note, patient reportedly ran out of Eliquis for a few weeks prior to resuming at the beginning of this month.  Neurology was consulted who suspected possible acute stroke secondary to symptomatic left ICA stenosis versus Eliquis noncompliance.  MRI brain was obtained which showed multifocal recent infarcts scattered throughout the left cerebral hemisphere.  Patient was recommended compliance with aspirin and Eliquis and was started on atorvastatin.  Patient was recommended follow-up with vascular surgery outpatient for management of left carotid artery stenosis.  Patient was also noted to have possible abnormal flow in the left transverse venous sinus with CT venogram demonstrating no dural venous sinus thrombosis.  Patient was initially planned for rehab placement however insurance denial was upheld even after peer to peer.  [facility]  ".    Please see above list of diagnoses and related plan for additional information.     Discharge Day Visit / Exam:   Subjective: Patient assessed at bedside.  No acute complaints.  Vitals: Blood Pressure: 132/73 (06/12/25 0937)  Pulse: 79 (06/12/25 0937)  Temperature: 98 °F (36.7 °C) (06/12/25 0648)  Temp Source: Oral (06/11/25 0800)  Respirations: 18 (06/12/25 0648)  Height: 5' 7\" (170.2 cm) (06/08/25 1450)  Weight - Scale: 88.3 kg (194 lb 10.7 oz) (06/08/25 1450)  SpO2: 95 % (06/12/25 0937)  Physical Exam  Vitals reviewed.   Constitutional:       General: He is not in acute distress.     Appearance: Normal appearance. He is not ill-appearing.   HENT:      Head: Normocephalic and atraumatic.     Cardiovascular:      Rate and Rhythm: Normal rate and regular rhythm.      Heart sounds: Normal heart sounds.   Pulmonary:      Effort: Pulmonary effort is normal. No respiratory distress.      Breath sounds: No wheezing or rales.   Abdominal:      Palpations: Abdomen is soft.      Tenderness: There is no abdominal tenderness.     Musculoskeletal:      Right lower leg: No edema.      Left lower leg: No edema.     Neurological:      Mental Status: He is alert. Mental status is at baseline.      Comments: Right facial droop        Discussion with Family: Updated  (son) via phone.    Discharge instructions/Information to patient and family:   See after visit summary for information provided to patient and family.      Provisions for Follow-Up Care:  See after visit summary for information related to follow-up care and any pertinent home health orders.      Mobility at time of Discharge:   Basic Mobility Inpatient Raw Score: 17  JH-HLM Goal: 5: Stand one or more mins  JH-HLM Achieved: 8: Walk 250 feet ot more  HLM Goal achieved. Continue to encourage appropriate mobility.     Disposition:   Home with VNA Services (Reminder: Complete face to face encounter)    Planned Readmission: none "     Administrative Statements   Discharge Statement:  I have spent a total time of 45 minutes in caring for this patient on the day of the visit/encounter. >30 minutes of time was spent on: Impressions, Counseling / Coordination of care, Documenting in the medical record, Reviewing / ordering tests, medicine, procedures  , and Communicating with other healthcare professionals .    **Please Note: This note may have been constructed using a voice recognition system**

## 2025-06-12 NOTE — PLAN OF CARE
Problem: Potential for Falls  Goal: Patient will remain free of falls  Description: INTERVENTIONS:  - Educate patient/family on patient safety including physical limitations  - Instruct patient to call for assistance with activity   - Consider consulting OT/PT to assist with strengthening/mobility based on AM PAC & JH-HLM score  - Consult OT/PT to assist with strengthening/mobility   - Keep Call bell within reach  - Keep bed low and locked with side rails adjusted as appropriate  - Keep care items and personal belongings within reach  - Initiate and maintain comfort rounds  - Make Fall Risk Sign visible to staff  - Offer Toileting every 2 Hours, in advance of need  - Initiate/Maintain bed/chair alarm  - Obtain necessary fall risk management equipment.  - Apply yellow socks and bracelet for high fall risk patients  - Consider moving patient to room near nurses station  Outcome: Progressing     Problem: Nutrition/Hydration-ADULT  Goal: Nutrient/Hydration intake appropriate for improving, restoring or maintaining nutritional needs  Description: Monitor and assess patient's nutrition/hydration status for malnutrition. Collaborate with interdisciplinary team and initiate plan and interventions as ordered.  Monitor patient's weight and dietary intake as ordered or per policy. Utilize nutrition screening tool and intervene as necessary. Determine patient's food preferences and provide high-protein, high-caloric foods as appropriate.     INTERVENTIONS:  - Monitor oral intake, urinary output, labs, and treatment plans  - Assess nutrition and hydration status and recommend course of action  - Evaluate amount of meals eaten  - Assist patient with eating if necessary   - Allow adequate time for meals  - Recommend/ encourage appropriate diets, oral nutritional supplements, and vitamin/mineral supplements  - Order, calculate, and assess calorie counts as needed  - Assess need for intravenous fluids  - Provide specific  nutrition/hydration education as appropriate  - Include patient/family/caregiver in decisions related to nutrition  Outcome: Progressing     Problem: Neurological Deficit  Goal: Neurological status is stable or improving  Description: Interventions:  - Monitor and assess patient's level of consciousness, motor function, sensory function, and level of assistance needed for ADLs.   - Monitor and report changes from baseline. Collaborate with interdisciplinary team to initiate plan and implement interventions as ordered.   - Provide and maintain a safe environment.  - Consider seizure precautions.  - Consider fall precautions.  - Consider aspiration precautions.  - Consider bleeding precautions.  Outcome: Progressing     Problem: Activity Intolerance/Impaired Mobility  Goal: Mobility/activity is maintained at optimum level for patient  Description: Interventions:  - Assess and monitor patient  barriers to mobility and need for assistive/adaptive devices.  - Assess patient's emotional response to limitations.  - Collaborate with interdisciplinary team and initiate plans and interventions as ordered.  - Encourage independent activity per ability.  - Maintain proper body alignment.  - Perform active/passive rom as tolerated/ordered.  - Plan activities to conserve energy.  - Turn patient as appropriate  Outcome: Progressing     Problem: Communication Impairment  Goal: Ability to express needs and understand communication  Description: Assess patient's communication skills and ability to understand information.  Patient will demonstrate use of effective communication techniques, alternative methods of communication and understanding even if not able to speak.     - Encourage communication and provide alternate methods of communication as needed.  - Collaborate with case management/ for discharge needs.  - Include patient/family/caregiver in decisions related to communication.  Outcome: Progressing      Problem: Potential for Aspiration  Goal: Non-ventilated patient's risk of aspiration is minimized  Description: Assess and monitor vital signs, respiratory status, and labs (WBC).  Monitor for signs of aspiration (tachypnea, cough, rales, wheezing, cyanosis, fever).    - Assess and monitor patient's ability to swallow.  - Place patient up in chair to eat if possible.  - HOB up at 90 degrees to eat if unable to get patient up into chair.  - Supervise patient during oral intake.   - Instruct patient/ family to take small bites.  - Instruct patient/ family to take small single sips when taking liquids.  - Follow patient-specific strategies generated by speech pathologist.  Outcome: Progressing     Problem: Nutrition  Goal: Nutrition/Hydration status is improving  Description: Monitor and assess patient's nutrition/hydration status for malnutrition (ex- brittle hair, bruises, dry skin, pale skin and conjunctiva, muscle wasting, smooth red tongue, and disorientation). Collaborate with interdisciplinary team and initiate plan and interventions as ordered.  Monitor patient's weight and dietary intake as ordered or per policy. Utilize nutrition screening tool and intervene per policy. Determine patient's food preferences and provide high-protein, high-caloric foods as appropriate.     - Assist patient with eating.  - Allow adequate time for meals.  - Encourage patient to take dietary supplement as ordered.  - Collaborate with clinical nutritionist.  - Include patient/family/caregiver in decisions related to nutrition.  Outcome: Progressing     Problem: PAIN - ADULT  Goal: Verbalizes/displays adequate comfort level or baseline comfort level  Description: Interventions:  - Encourage patient to monitor pain and request assistance  - Assess pain using appropriate pain scale  - Administer analgesics as ordered based on type and severity of pain and evaluate response  - Implement non-pharmacological measures as appropriate and  evaluate response  - Notify physician/advanced practitioner if interventions unsuccessful or patient reports new pain  - Educate patient/family on pain management process including their role and importance of  reporting pain   - Provide non-pharmacologic/complimentary pain relief interventions  Outcome: Progressing     Problem: INFECTION - ADULT  Goal: Absence or prevention of progression during hospitalization  Description: INTERVENTIONS:  - Assess and monitor for signs and symptoms of infection  - Monitor lab/diagnostic results  - Monitor all insertion sites, i.e. indwelling lines, tubes, and drains  - Lyons appropriate cooling/warming therapies per order  - Administer medications as ordered  - Instruct and encourage patient and family to use good hand hygiene technique  - Identify and instruct in appropriate isolation precautions for identified infection/condition  Outcome: Progressing     Problem: SAFETY ADULT  Goal: Patient will remain free of falls  Description: INTERVENTIONS:  - Educate patient/family on patient safety including physical limitations  - Instruct patient to call for assistance with activity   - Consider consulting OT/PT to assist with strengthening/mobility based on AM PAC & JH-HLM score  - Consult OT/PT to assist with strengthening/mobility   - Keep Call bell within reach  - Keep bed low and locked with side rails adjusted as appropriate  - Keep care items and personal belongings within reach  - Initiate and maintain comfort rounds  - Make Fall Risk Sign visible to staff  - Offer Toileting every 2 Hours, in advance of need  - Initiate/Maintain bed/chair alarm  - Obtain necessary fall risk management equipment.  - Apply yellow socks and bracelet for high fall risk patients  - Consider moving patient to room near nurses station  Outcome: Progressing     Problem: DISCHARGE PLANNING  Goal: Discharge to home or other facility with appropriate resources  Description: INTERVENTIONS:  - Identify  barriers to discharge w/patient and caregiver  - Arrange for needed discharge resources and transportation as appropriate  - Identify discharge learning needs (meds, wound care, etc.)  - Refer to Case Management Department for coordinating discharge planning if the patient needs post-hospital services based on physician/advanced practitioner order or complex needs related to functional status, cognitive ability, or social support system  Outcome: Progressing     Problem: Knowledge Deficit  Goal: Patient/family/caregiver demonstrates understanding of disease process, treatment plan, medications, and discharge instructions  Description: Complete learning assessment and assess knowledge base.  Interventions:  - Provide teaching at level of understanding  - Provide teaching via preferred learning methods  Outcome: Progressing     Problem: Prexisting or High Potential for Compromised Skin Integrity  Goal: Skin integrity is maintained or improved  Description: INTERVENTIONS:  - Identify patients at risk for skin breakdown  - Assess and monitor skin integrity including under and around medical devices   - Assess and monitor nutrition and hydration status  - Monitor labs  - Assess for incontinence   - Turn and reposition patient  - Assist with mobility/ambulation  - Relieve pressure over deanna prominences   - Avoid friction and shearing  - Provide appropriate hygiene as needed including keeping skin clean and dry  - Evaluate need for skin moisturizer/barrier cream  - Collaborate with interdisciplinary team  - Patient/family teaching  - Consider wound care consult

## 2025-06-12 NOTE — ASSESSMENT & PLAN NOTE
With recent diagnosis of CVA 4/2025 at an outside health system with MRI at that time revealing multiple embolic strokes in the left hemisphere with CTA head/neck demonstrating left ICA thrombus  Patient presented with right-sided facial droop slurred speech  Patient was a stroke alert on arrival  CT head negative for acute intracranial pathology, CTA head/neck negative for LVO/aneurysm/dissection or AVMs.  Previous left ICA thrombus again noted  MRI brain reveals multifocal recent infarcts scattered throughout the left cerebral hemisphere  2D echo EF 70%  Neurology suspects stroke secondary to symptomatic ICA on the left versus Eliquis noncompliance  Continue aspirin, atorvastatin  Continue Eliquis  Rehab insurance authorization denial upheld even after peer to peer.  Plan for discharge home with VNA with PT/OT.

## 2025-06-12 NOTE — CASE MANAGEMENT
Case Management Discharge Planning Note    Patient name Obdulio Mccauley  Location [facility] 712/[facility] 712-01 MRN [id number]  : 1940 Date 2025       Current Admission Date: 2025  Current Admission Diagnosis:Stroke (cerebrum) ([facility])   Patient Active Problem List    Diagnosis Date Noted    Leukocytosis 06/10/2025    Brain compression ([facility]) 06/10/2025    Abnormal finding on MRI of brain 2025    Stroke (cerebrum) ([facility]) 2025    Primary hypertension 2025    Bigeminy 2025    Carotid stenosis, left 2025    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    Obstructive sleep apnea 2024    ELODIA (obstructive sleep apnea) 2024    Obesity, morbid ([facility]) 2023    Other pulmonary embolism without acute cor pulmonale ([facility]) 2023    Essential thrombocytosis ([facility]) 2023    Basal cell carcinoma of skin of other parts of face 2022    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    Urge incontinence 2022    Nocturia 2022    Bladder neck contracture 2019    Prostate cancer ([facility]) 2017    Cerebrovascular accident (CVA) ([facility]ly [facility]) 2015      LOS (days): 5  Geometric Mean LOS (GMLOS) (days): 4.5  Days to GMLOS:-1     OBJECTIVE:  Risk of Unplanned Readmission Score: 13.2         Current admission status: Inpatient   Preferred Pharmacy:   [facility]/pharmacy #[id number] - [place] - [address] [address]  [address] [address]  Phone: [phone] Fax: [phone]    [facility] [place]  MANUELA Fuentes - [address] [place]  [address] [place] [place] [address]  Phone: [phone] Fax: [phone]    Primary Care Provider: Ky Rubi MD    Primary Insurance: [facility]  Secondary Insurance:     DISCHARGE DETAILS:    Discharge planning discussed with:: Patient's daughter           Requested Home Health Care         Is the patient interested in HHC at discharge?: Yes  Home Health Discipline  requested:: Occupational Therapy, Physical Therapy, Speech Language Pathology  Home Health Agency Name:: [facility]  Home Health Follow-Up Provider:: PCP  Home Health Services Needed:: Evaluate Functional Status and Safety, Gait/ADL Training, Strengthening/Theraputic Exercises to Improve Function, Other (comment) (needs speech also)  Homebound Criteria Met:: Requires the Assistance of Another Person for Safe Ambulation or to Leave the Home  Supporting Clincal Findings:: Limited Endurance, Fatigues Easliy in Short Distances         Other Referral/Resources/Interventions Provided:  Referral Comments: Auth. denial upheld even after peer to peer.  Patient and his daughter made aware.   VNA referral placed and accepted- instructed to call daughter Fabiola at [phone] to arrange schedule. Patient for discharge today.

## 2025-06-13 ENCOUNTER — TELEPHONE (OUTPATIENT)
Dept: NEUROLOGY | Facility: CLINIC | Age: 85
End: 2025-06-13

## 2025-06-13 ENCOUNTER — HOME CARE VISIT (OUTPATIENT)
Dept: HOME HEALTH SERVICES | Facility: HOME HEALTHCARE | Age: 85
End: 2025-06-13

## 2025-06-13 NOTE — UTILIZATION REVIEW
NOTIFICATION OF ADMISSION DISCHARGE   This is a Notification of Discharge from [facility]. Please be advised that this patient has been discharge from our facility. Below you will find the admission and discharge date and time including the patient’s disposition.   UTILIZATION REVIEW CONTACT:  Utilization Review Assistants  Network Utilization Review Department  Phone: [phone] x carefully listen to the prompts. All voicemails are confidential.  Email: [email or web address]     ADMISSION INFORMATION  PRESENTATION DATE: 6/7/2025  2:53 AM  OBERVATION ADMISSION DATE: N/A  INPATIENT ADMISSION DATE: 6/7/25  3:47 AM   DISCHARGE DATE: 6/12/2025  4:34 PM   DISPOSITION:Home with Home Health Care    [facility] Utilization Review Department  ATTENTION: Please call with any questions or concerns to [phone] and carefully listen to the prompts so that you are directed to the right person. All voicemails are confidential.   For Discharge needs, contact Care Management DC Support Team at [phone] opt. 2  Send all requests for admission clinical reviews, approved or denied determinations and any other requests to dedicated fax number below belonging to the campus where the patient is receiving treatment. List of dedicated fax numbers for the Facilities:  FACILITY NAME UR FAX NUMBER   ADMISSION DENIALS (Administrative/Medical Necessity) [phone]   DISCHARGE SUPPORT TEAM ([facility]) [phone]   PARENT CHILD HEALTH (Maternity/NICU/Pediatrics) [phone]   [facility] [phone]   [facility] [phone]   [facility] [phone]   [facility] [phone]   [facility] [phone]   [facility] [phone]   [facility] [phone]   [facility] [phone]   [facility]  [facility] [phone]   [facility] [phone]   [facility] [phone]   [facility] [phone]

## 2025-06-13 NOTE — DISCHARGE SUPPORT
Case Management Assessment & Discharge Planning Note    Patient name Obdulio Mccauley  Location [facility] 712/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    Obstructive sleep apnea 2024    ELODIA (obstructive sleep apnea) 2024    Obesity, morbid ([facility]) 2023    Other pulmonary embolism without acute cor pulmonale ([facility]) 2023    Essential thrombocytosis ([facility]) 2023    Basal cell carcinoma of skin of other parts of face 2022    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    Urge incontinence 2022    Nocturia 2022    Bladder neck contracture 2019    Prostate cancer ([facility]) 2017    Cerebrovascular accident (CVA) ([facility]) 2015      LOS (days): 5  Geometric Mean LOS (GMLOS) (days): 4.5  Days to GMLOS:-1   Facility Auth Adverse Decision  DC Support Center has received: Denial  For Level of Care: Acute Rehab  Insurance: [employer]  Information obtained via : Portal  Reason for Adverse Decision: Does not meet criteria  Reference Number: [id number]  Facility Name: [facility]  Peer to Peer?: Completed    Other Notes: Per Chart, patient DC'ed to home w/ hhc

## 2025-06-13 NOTE — TELEPHONE ENCOUNTER
Andrea Hicks who reports Don lost his cell phone and to contact his wife Alma Delia if we need to contact him.    Post CVA Discharge Follow Up  Hospitalization: 06/07/25-06/12/2025    Called patient and s/w his wife Alma Delia. She reports he is home and doing well with no new or worsening stroke like symptoms.     She reports don other then being tired has returned to baseline .     He ambulates with roller walker and performs own ADLS    They do not monitor bp at home but would like to start.     Medications: were reviewed and no questions. No signs of bleeding or negative side effects to report.     Appts: reviewed.     All questions answered.

## 2025-06-16 ENCOUNTER — HOME CARE VISIT (OUTPATIENT)
Dept: HOME HEALTH SERVICES | Facility: HOME HEALTHCARE | Age: 85
End: 2025-06-16
Attending: STUDENT IN AN ORGANIZED HEALTH CARE EDUCATION/TRAINING PROGRAM

## 2025-06-16 NOTE — CASE COMMUNICATION
Notification of Assess not Admit    [facility] has assessed your patient for Home Health services and has determined the patient is not eligible for service due to the following  No eligible Home Health skill. Patient and family were educated on patient attending outpatient therapy for speech and OT. Patient and family in agreement with plan for outpatient.

## 2025-06-20 ENCOUNTER — TELEPHONE (OUTPATIENT)
Age: 85
End: 2025-06-20

## 2025-06-20 ENCOUNTER — TELEPHONE (OUTPATIENT)
Dept: VASCULAR SURGERY | Facility: CLINIC | Age: 85
End: 2025-06-20

## 2025-06-20 ENCOUNTER — TELEPHONE (OUTPATIENT)
Dept: CARDIOLOGY CLINIC | Facility: CLINIC | Age: 85
End: 2025-06-20

## 2025-06-20 NOTE — TELEPHONE ENCOUNTER
[facility] has been notified.  Looks like a high copay so will discuss foundation if needed once the new Rx is processed.

## 2025-06-20 NOTE — TELEPHONE ENCOUNTER
Spoke with patient's wife. Patient has not been taking any Adempas since 12/2024.  Patient is to restart at 1mg tid.      Just discharged from hospital post CVA.    Spoke with [facility] enrollment.  They are reinstating his enrollment as he was cancelled.  Gave [employer] new address and phone number for patient.      Van from [employer] will notify me once enrollment is activated and sent back over to [facility] pharmacy.

## 2025-06-20 NOTE — TELEPHONE ENCOUNTER
Pt's daughter Fabiola and pt's son Kishor called to speak to Liza from HF. Warm transferred call to Rayna.

## 2025-06-20 NOTE — TELEPHONE ENCOUNTER
Spoke to daughter and son on speaker phone.  Patient recently admitted for stroke, discharged 6/12/25.  Requesting to review patient's medication list.  Reviewed discharge medication list.      Patient saw PCP Dr. San today.  Reports patient's pulse today at office visit was 38, BP 96/60.  Asking if they should hold the diltiazem.      Patient does not have Adempas 1 mg pills, they only have 1.5 mg.  Report patient's current address is [address] [place] [address].  Patient had missed calls from [facility] and [facility].

## 2025-06-24 NOTE — TELEPHONE ENCOUNTER
Called Fabiola, no answer, LMOM for her to call back. I do not see her name on the communications form signed this June.     Need to know if they have a BP monitor at home and are they checking daily vitals?  Does he have VN's coming in to the home? Was the 38 HR once or on going?

## 2025-06-24 NOTE — TELEPHONE ENCOUNTER
Received a call from Pt's daughter Fabiola. She is asking to please call her back to review instructions instead of the spouse.   She was asking what to do in regard to the Diltiazem 120 mg daily due to the low heart rate and blood pressure. Most recent readings were 38 and 96/60. There were no instructions provided in relation to this from the previous messages. They had asked if this should be held.   Please call Fabiola back # [phone]

## 2025-06-25 ENCOUNTER — OFFICE VISIT (OUTPATIENT)
Dept: CARDIOLOGY CLINIC | Facility: CLINIC | Age: 85
End: 2025-06-25
Payer: COMMERCIAL

## 2025-06-25 ENCOUNTER — TELEPHONE (OUTPATIENT)
Dept: CARDIOLOGY CLINIC | Facility: CLINIC | Age: 85
End: 2025-06-25

## 2025-06-25 VITALS
HEIGHT: 67 IN | DIASTOLIC BLOOD PRESSURE: 62 MMHG | HEART RATE: 86 BPM | OXYGEN SATURATION: 96 % | BODY MASS INDEX: 28.41 KG/M2 | WEIGHT: 181 LBS | SYSTOLIC BLOOD PRESSURE: 112 MMHG

## 2025-06-25 DIAGNOSIS — I50.32 CHRONIC HEART FAILURE WITH PRESERVED EJECTION FRACTION (HFPEF) (HCC): ICD-10-CM

## 2025-06-25 DIAGNOSIS — I27.21 PAH (PULMONARY ARTERY HYPERTENSION) (HCC): ICD-10-CM

## 2025-06-25 DIAGNOSIS — I10 PRIMARY HYPERTENSION: ICD-10-CM

## 2025-06-25 DIAGNOSIS — I26.99 OTHER ACUTE PULMONARY EMBOLISM WITHOUT ACUTE COR PULMONALE (HCC): ICD-10-CM

## 2025-06-25 DIAGNOSIS — G47.33 OSA (OBSTRUCTIVE SLEEP APNEA): ICD-10-CM

## 2025-06-25 DIAGNOSIS — G47.33 OBSTRUCTIVE SLEEP APNEA: ICD-10-CM

## 2025-06-25 DIAGNOSIS — I27.24 CTEPH (CHRONIC THROMBOEMBOLIC PULMONARY HYPERTENSION) (HCC): Primary | ICD-10-CM

## 2025-06-25 DIAGNOSIS — Z15.89 JAK-2 GENE MUTATION: ICD-10-CM

## 2025-06-25 PROCEDURE — 99214 OFFICE O/P EST MOD 30 MIN: CPT | Performed by: INTERNAL MEDICINE

## 2025-06-25 RX ORDER — RIOCIGUAT 1 MG/1
1 TABLET, FILM COATED ORAL 3 TIMES DAILY
Qty: 90 TABLET | Refills: 3 | Status: SHIPPED | OUTPATIENT
Start: 2025-06-25

## 2025-06-25 NOTE — TELEPHONE ENCOUNTER
Would you be able to call patient and provide # to [facility]. I believe he will need for his Adempas copay. I did not have the opportunity to call patient as of yet.      [phone].      Thank you.

## 2025-06-25 NOTE — PROGRESS NOTES
Outpatient Cardiology Note - Obdulio Mccauley 85 y.o. male MRN: [id number]    @ Encounter: [id number]        Patient Active Problem List    Diagnosis Date Noted    Obstructive sleep apnea 02/08/2024    ELODIA (obstructive sleep apnea) 01/03/2024    Obesity, morbid ([facility]) 11/30/2023    Other pulmonary embolism without acute cor pulmonale ([facility]) 09/26/2023    Essential thrombocytosis ([facility]) 09/26/2023    Basal cell carcinoma of skin of other parts of face 03/11/2022    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[facility]) 03/11/2022    Urge incontinence 03/11/2022    Nocturia 03/11/2022    Bladder neck contracture 03/14/2019    Prostate cancer ([facility]) 12/22/2017    Cerebrovascular accident (CVA) ([facility]) 01/06/2015    Leukocytosis 06/10/2025    Brain compression ([facility]) 06/10/2025    Abnormal finding on MRI of brain 06/08/2025    Stroke (cerebrum) ([facility]) 06/07/2025    Primary hypertension 06/07/2025    Bigeminy 06/07/2025    Carotid stenosis, left 06/07/2025    CTEPH (chronic thromboembolic pulmonary hypertension) ([facility]) 02/27/2024       Assessment:  # 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  Impression: Acute PE Feb 2023- VQ c/w CTEPH  PAH Rx: Adempas has been off  Side effects: GERD, taking tums- will start omeprazole  Diuretic: lasix 40 mg daily  Weight: 226 lbs--> 216 lbs--> 198 lbs--> 199 lbs  BNP: 5/23/24: 72    Studies- personally reviewed by me  EKG- SR 87 bpm, RBBB, LAFB    Echo 6/8/25:  LVEF: 70%  RV: normal  PASP: normal    Echo 9/10/24:  LVEF: > 70%, LVH  Outflow obstruction at rest with peak gradient of 58 mmHg  RV: dilated, normal function  PASP: 28 mmHg    VQ Scan 12/20/23: bilateral perfusion defects suspicious for pulmonary emboli, likely chronic    Echo 12/8/23  LVEF: 60%  LVEDd: 3.9 cm  RV: dilated  PASP: 38 mmHg  RVOT:   Moderate TR    Echo 2/14/23: LVHN  LVEF: 65%  RV: moderately dilated  PASP: 74 mmHg  RVOT:   Moderate TR    CTA 2/13/23:Large filling defects are noted within the main pulmonary arteries bilaterally   extending  into lobar arteries of all 5 lobes of the lungs. This is compatible   with extensive bilateral pulmonary emboli     # Diagnosed with CVA 4/2025-- then again in June   multiple embolic strokes in the left hemisphere with CTA head/neck demonstrating left ICA thrombus  Presented with right sided facial droop  MRI brain 6/7/25: 1. Multifocal recent infarcts scattered throughout the left cerebral hemisphere, with mild associated mass effect and evidence of recent hemorrhage, as above.   # Chest pain admit Nov 2024- [place]  [facility] 11/11/24: no obstructive lesions, could not exclude bridging  # Bilateral PE Feb 2023  CTA as above  AC: Eliquis  # Acute DVT  LE duplex 2/2023 with DVT  # essential thrombocytosis  Positive JAK2 mutation  Hydroxyurea and asa  # dyslipidemia  6/7/25: , HDL 32  5/23/24: , HDL 36  # right osteoarthritis knee  Considering TKR  Consider IVC filter  # basal cell skin cancer    Today's Plan:  omeprazole for GERD from Adempas  Lifelong AC given essential thrombocytosis/ myeloproliferative disorder  BNP for risk eval  Continue lasix to 40 mg daily  Restart Adempas at 1 mg TID- may park it at 1 mg given low blood pressure  No lisinopril, no cardizem    HPI:      83 yo male with acute bilateral PE Feb 2023 referred for evaluation of CTEPH. Echo done on PE admit showed indirect PA pressures around 75 mmHg. Has been on AC for 9 months. He had presented with chest pressure.     He has been maintained on Eliquis. Follow up echo 12/8 PASP: 38 mmHg down from PASP: 74 mmHg. He is back to his baseline breathing. He does get some LOVE with stairs but had this before his PE. No prior hx of pulmonary embolism. No smoking. No recent orthopedic injuries.   Pt had a VQ scan 12/2023 with bilateral perfusion defects suspicious for pulmonary emboli, likely chronic    Interim:  Diagnosed with CVA 4/2025- multiple embolic strokes in the left hemisphere with CTA head/neck demonstrating left ICA  thrombus  Presented with right sided facial droop    Eliquis non adhrence at that time    Had another stroke  beginning of June,   Past Medical History:   Diagnosis Date    Cancer (HCC)     prostate    History of pulmonary embolus (PE)     Hypercholesterolemia     Hypertension     Personal history of DVT (deep vein thrombosis)     Pneumonia     Stroke (HCC)        Review of Systems   Constitutional:  Negative for activity change, appetite change, fatigue and unexpected weight change (down 10 lbs).   HENT:  Negative for congestion and nosebleeds.    Eyes: Negative.    Respiratory:  Positive for shortness of breath. Negative for cough and chest tightness.    Cardiovascular:  Positive for leg swelling. Negative for chest pain and palpitations.   Gastrointestinal:  Negative for abdominal distention.   Endocrine: Negative.    Genitourinary: Negative.    Musculoskeletal: Negative.    Skin: Negative.    Neurological:  Negative for dizziness, syncope and weakness.   Hematological: Negative.    Psychiatric/Behavioral: Negative.         Allergies   Allergen Reactions    Misc. Sulfonamide Containing Compounds Rash    Penicillins Rash    Sulfa Antibiotics Rash     .    Current Outpatient Medications:     acetaminophen (TYLENOL) 500 mg tablet, Take 500 mg by mouth every 4 (four) hours, Disp: , Rfl:     ALPRAZolam (XANAX) 0.25 mg tablet, , Disp: , Rfl:     aspirin 81 MG tablet, Take by mouth, Disp: , Rfl:     atorvastatin (LIPITOR) 80 mg tablet, Take 1 tablet (80 mg total) by mouth every evening, Disp: 30 tablet, Rfl: 0    cyanocobalamin (VITAMIN B-12) 500 MCG tablet, Take 1 tablet by mouth in the morning., Disp: , Rfl:     Eliquis 5 MG, Take 1 tablet (5 mg total) by mouth 2 (two) times a day, Disp: 60 tablet, Rfl: 0    famotidine (PEPCID) 40 MG tablet, Take 40 mg by mouth every 12 (twelve) hours, Disp: , Rfl:     furosemide (LASIX) 40 mg tablet, Take 1 tablet (40 mg total) by mouth daily, Disp: 30 tablet, Rfl: 4    hydroxyurea  (HYDREA) 500 mg capsule, Take 1 capsule (500 mg total) by mouth 2 (two) times a day Take 500 mg in AM and 1000 mg in PM (Patient taking differently: Take 500 mg by mouth in the morning and 500 mg in the evening. Take 500 mg in AM and 500mg in PM.), Disp: 180 capsule, Rfl: 1    meclizine (ANTIVERT) 25 mg tablet, Take 1 tablet (25 mg total) by mouth 3 (three) times a day as needed for dizziness, Disp: 30 tablet, Rfl: 0    Multiple Vitamin tablet, Take 1 tablet by mouth in the morning., Disp: , Rfl:     ondansetron (ZOFRAN) 4 mg tablet, Take 1 tablet (4 mg total) by mouth every 8 (eight) hours as needed for nausea or vomiting, Disp: 15 tablet, Rfl: 0    polyethylene glycol (MIRALAX) 17 g packet, Take 17 g by mouth daily, Disp: 510 g, Rfl: 0    sertraline (ZOLOFT) 50 mg tablet, TAKE 1 TABLET BY MOUTH EVERY DAY, Disp: 90 tablet, Rfl: 2    diltiazem (CARDIZEM CD) 120 mg 24 hr capsule, Take 120 mg by mouth in the morning. (Patient not taking: Reported on 6/25/2025), Disp: , Rfl:     omeprazole (PriLOSEC) 20 mg delayed release capsule, TAKE 1 CAPSULE BY MOUTH EVERY DAY (Patient not taking: Reported on 6/4/2025), Disp: 90 capsule, Rfl: 1    Riociguat (Adempas) 1 MG TABS, Take 1 tablet (1 mg total) by mouth 3 (three) times a day (Patient not taking: Reported on 6/25/2025), Disp: 90 tablet, Rfl: 3    Social History     Socioeconomic History    Marital status: /Civil Union     Spouse name: Not on file    Number of children: Not on file    Years of education: Not on file    Highest education level: Not on file   Occupational History    Not on file   Tobacco Use    Smoking status: Never    Smokeless tobacco: Never   Vaping Use    Vaping status: Never Used   Substance and Sexual Activity    Alcohol use: No    Drug use: No    Sexual activity: Not on file   Other Topics Concern    Not on file   Social History Narrative    ** Merged History Encounter **          Social Drivers of Health     Financial Resource Strain: Not on  "file   Food Insecurity: No Food Insecurity (6/9/2025)    Nursing - Inadequate Food Risk Classification     Worried About Running Out of Food in the Last Year: Not on file     Ran Out of Food in the Last Year: Not on file     Ran Out of Food in the Last Year: Never true   Transportation Needs: No Transportation Needs (6/9/2025)    Nursing - Transportation Risk Classification     Lack of Transportation: Not on file     Lack of Transportation: No   Physical Activity: Not on file   Stress: Not on file   Social Connections: Not on file   Intimate Partner Violence: Unknown (6/9/2025)    Nursing IPS     Feels Physically and Emotionally Safe: Not on file     Physically Hurt by Someone: Not on file     Humiliated or Emotionally Abused by Someone: Not on file     Physically Hurt by Someone: No     Hurt or Threatened by Someone: No   Housing Stability: Unknown (6/9/2025)    Nursing: Inadequate Housing Risk Classification     Has Housing: Not on file     Worried About Losing Housing: Not on file     Unable to Get Utilities: Not on file     Unable to Pay for Housing in the Last Year: No     Has Housing: No       No family history on file.    Physical Exam:    Vitals: Blood pressure 112/62, pulse 86, height 5' 7\" (1.702 m), weight 82.1 kg (181 lb), SpO2 96%., Body mass index is 28.35 kg/m².,   Wt Readings from Last 3 Encounters:   06/25/25 82.1 kg (181 lb)   06/08/25 88.3 kg (194 lb 10.7 oz)   06/07/25 88 kg (194 lb)       Physical Exam:  Vitals:    06/25/25 1414   BP: 112/62   BP Location: Left arm   Patient Position: Sitting   Cuff Size: Standard   Pulse: 86   SpO2: 96%   Weight: 82.1 kg (181 lb)   Height: 5' 7\" (1.702 m)         Physical Exam  Constitutional:       Appearance: He is well-developed.   HENT:      Head: Normocephalic and atraumatic.     Eyes:      Pupils: Pupils are equal, round, and reactive to light.     Neck:      Vascular: No JVD.     Cardiovascular:      Rate and Rhythm: Normal rate and regular rhythm.      " Heart sounds: No murmur heard.  Pulmonary:      Effort: Pulmonary effort is normal. No respiratory distress.      Breath sounds: Normal breath sounds.   Abdominal:      General: There is no distension.      Palpations: Abdomen is soft.      Tenderness: There is no abdominal tenderness.     Musculoskeletal:         General: Normal range of motion.      Cervical back: Normal range of motion.     Skin:     General: Skin is warm and dry.      Findings: No rash.     Neurological:      Mental Status: He is alert and oriented to person, place, and time.         Labs & Results:    Lab Results   Component Value Date    WBC 10.79 (H) 06/11/2025    HGB 13.6 06/11/2025    HCT 42.4 06/11/2025    MCV 83 06/11/2025     (H) 06/11/2025     Lab Results   Component Value Date    SODIUM 140 06/09/2025    K 4.2 06/09/2025     06/09/2025    CO2 27 06/09/2025    BUN 22 06/09/2025    CREATININE 1.06 06/09/2025    GLUC 77 06/09/2025    CALCIUM 9.9 06/09/2025     Lab Results   Component Value Date     (H) 11/29/2024      Lab Results   Component Value Date    CHOLESTEROL 182 06/07/2025    CHOLESTEROL 202 (H) 05/23/2024    CHOLESTEROL 213 (H) 08/09/2022     Lab Results   Component Value Date    HDL 32 (L) 06/07/2025    HDL 36 (L) 05/23/2024    HDL 44 08/09/2022     Lab Results   Component Value Date    TRIG 227 (H) 06/07/2025    TRIG 163 (H) 05/23/2024    TRIG 135 08/09/2022     Lab Results   Component Value Date    NONHDLC 166 05/23/2024    NONHDLC 169 08/09/2022    NONHDLC 178 02/07/2020       EKG personally reviewed by Johnathon Damon DO.     Counseling / Coordination of Care  Time spent today 25 minutes.  Greater than 50% of total time was spent with the patient and / or family counseling and / or coordination of care. We discussed diagnoses, most recent studies and any changes in treatment  Thank you for the opportunity to participate in the care of this patient.    JOHNATHON DAMON D.O.  DIRECTOR OF HEART FAILURE/ PULMONARY  HYPERTENSION  MEDICAL DIRECTOR OF LVAD PROGRAM  [facility]

## 2025-06-26 ENCOUNTER — TELEPHONE (OUTPATIENT)
Age: 85
End: 2025-06-26

## 2025-06-26 DIAGNOSIS — Z15.89 JAK-2 GENE MUTATION: ICD-10-CM

## 2025-06-26 RX ORDER — HYDROXYUREA 500 MG/1
500 CAPSULE ORAL 2 TIMES DAILY
Qty: 60 CAPSULE | Refills: 0 | Status: SHIPPED | OUTPATIENT
Start: 2025-06-26 | End: 2025-06-27 | Stop reason: SDUPTHER

## 2025-06-26 RX ORDER — HYDROXYUREA 500 MG/1
500 CAPSULE ORAL 2 TIMES DAILY
Qty: 60 CAPSULE | Refills: 0 | OUTPATIENT
Start: 2025-06-26

## 2025-06-26 NOTE — PROGRESS NOTES
Name: Obdulio Mccauley      : 1940      MRN: [id number]  Encounter Provider: LUZ MARIA Pablo  Encounter Date: 2025   Encounter department: [facility]  :  Assessment & Plan  Essential thrombocytosis (HCC)  Patient is 85-year-old gentleman with JAK2 positive essential thrombocytosis.  Previously maintained on Hydrea 500 mg in a.m. and 1000 mg in p.m.  Over the past year, he has been taking Hydrea 500 mg twice daily  Most recent blood work is showing rise in platelet count and due to recent CVAs I have recommended increasing Hydrea back to previous dose of 500 mg in a.m. and 1000 mg p.m.  Patient tolerated this dose well in the past with good control of his counts.    I will see him back in 3 months with repeat blood work for ongoing evaluation and assessment.  He will also continue on Eliquis 5 mg twice daily    He is aware to call anytime with questions or concerns    Orders:    hydroxyurea (HYDREA) 500 mg capsule; Take 500 mg in AM and 1000 mg in PM    CBC and differential; Future    Comprehensive metabolic panel; Future    DARIO-2 gene mutation    Orders:    hydroxyurea (HYDREA) 500 mg capsule; Take 500 mg in AM and 1000 mg in PM        History of Present Illness     Obdulio Mccauley is a 85 y.o. male who follows with hematology for history of JAK2 positive essential thrombocytosis on Hydrea.  He also has a history of DVT and PE on Eliquis.  He was last seen on 4/3/2024 and his dose of Hydrea was adjusted from 1500 mg daily to 1000 mg daily due to stability in his blood counts.  He has been lost to follow-up.  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  He had a CVA in April while in [place] with MRI at that time revealing multiple embolic strokes in the left hemisphere with CTA head/neck demonstrating left ICA thrombus.  More recently, he was hospitalized at [facility] earlier this month with right sided facial droop  and slurred speech.  Neurology suspected stroke secondary to symptomatic ICA on the left versus Eliquis noncompliance.  He had run out of Eliquis a few weeks prior.  MRI completed showed multifocal recent infarcts scattered throughout the left cerebral hemisphere.    Patient had called for refill of his Hydrea, he was recommended to come in for follow-up as he has not been seen for over a year.  Most recent blood work from 6/11/2025 shows increased in platelet count up to 744.  He has been taking Hydrea twice a day   Patient reports he had been living in [place] with his daughter but he is now back home living in [place]     He is working with Rehab and is recovering well from his stroke. Still has some speech difficulty but denies any dysphagia.  No residual weakness      History obtained from: patient      Oncology History Overview Note   2015 - essential thrombocythemia, JAK2 positive    Current hydrea dose - 500 mg in AM and 1000 mg in PA    2/2023 - DVT/PE - eliquis 5 mg BID     Basal cell carcinoma of skin of other parts of face   3/11/2022 Initial Diagnosis    Basal cell carcinoma of skin of other parts of face            Review of Systems   Neurological:  Positive for speech difficulty.   All other systems reviewed and are negative.    Medical History Reviewed by provider this encounter:     .     Objective   There were no vitals taken for this visit.     Physical Exam  Constitutional:       General: He is not in acute distress.  HENT:      Head: Normocephalic and atraumatic.      Mouth/Throat:      Pharynx: No oropharyngeal exudate.     Eyes:      General: No scleral icterus.      Cardiovascular:      Rate and Rhythm: Normal rate and regular rhythm.   Pulmonary:      Effort: Pulmonary effort is normal.      Breath sounds: Normal breath sounds.     Musculoskeletal:         General: Normal range of motion.      Cervical back: Normal range of motion.     Skin:     General: Skin is warm and dry.      Neurological:      General: No focal deficit present.      Mental Status: He is alert and oriented to person, place, and time.     Psychiatric:         Mood and Affect: Mood normal.         Behavior: Behavior normal.         Administrative Statements   I have spent a total time of 30 minutes in caring for this patient on the day of the visit/encounter including Diagnostic results, Instructions for management, Impressions, Documenting in the medical record, Reviewing/placing orders in the medical record (including tests, medications, and/or procedures), and Obtaining or reviewing history  .

## 2025-06-26 NOTE — TELEPHONE ENCOUNTER
Called daughter due to not being able to reach Don.  Scheduled appt with Fabiola for tomorrow 6/27 930 am since nurse advised labs have been completed and patient needs to discuss med script with Iram. Advised that patient had to be seen to get the script refilled.

## 2025-06-26 NOTE — ASSESSMENT & PLAN NOTE
Patient is 85-year-old gentleman with JAK2 positive essential thrombocytosis.  Previously maintained on Hydrea 500 mg in a.m. and 1000 mg in p.m.  Over the past year, he has been taking Hydrea 500 mg twice daily  Most recent blood work is showing rise in platelet count and due to recent CVAs I have recommended increasing Hydrea back to previous dose of 500 mg in a.m. and 1000 mg p.m.  Patient tolerated this dose well in the past with good control of his counts.    I will see him back in 3 months with repeat blood work for ongoing evaluation and assessment.  He will also continue on Eliquis 5 mg twice daily    He is aware to call anytime with questions or concerns    Orders:    hydroxyurea (HYDREA) 500 mg capsule; Take 500 mg in AM and 1000 mg in PM    CBC and differential; Future    Comprehensive metabolic panel; Future

## 2025-06-26 NOTE — TELEPHONE ENCOUNTER
Called  patient & gave him & the phone number [facility] & advise d him to call them to inquire about assistance for Adempas.  Patient copied the number & verbalized understanding.

## 2025-06-26 NOTE — TELEPHONE ENCOUNTER
Received a call from the patients son for this refill however was unable to relay the message due to the son not being on the communication form.

## 2025-06-27 ENCOUNTER — OFFICE VISIT (OUTPATIENT)
Age: 85
End: 2025-06-27
Payer: COMMERCIAL

## 2025-06-27 VITALS
SYSTOLIC BLOOD PRESSURE: 122 MMHG | RESPIRATION RATE: 17 BRPM | WEIGHT: 182 LBS | TEMPERATURE: 97.7 F | HEIGHT: 67 IN | HEART RATE: 88 BPM | DIASTOLIC BLOOD PRESSURE: 70 MMHG | OXYGEN SATURATION: 96 % | BODY MASS INDEX: 28.56 KG/M2

## 2025-06-27 DIAGNOSIS — Z15.89 JAK-2 GENE MUTATION: ICD-10-CM

## 2025-06-27 DIAGNOSIS — D47.3 ESSENTIAL THROMBOCYTOSIS (HCC): Primary | ICD-10-CM

## 2025-06-27 PROCEDURE — 99214 OFFICE O/P EST MOD 30 MIN: CPT | Performed by: NURSE PRACTITIONER

## 2025-06-27 RX ORDER — HYDROXYUREA 500 MG/1
CAPSULE ORAL
Qty: 90 CAPSULE | Refills: 2 | Status: SHIPPED | OUTPATIENT
Start: 2025-06-27

## 2025-07-01 ENCOUNTER — TELEPHONE (OUTPATIENT)
Dept: NEUROLOGY | Facility: CLINIC | Age: 85
End: 2025-07-01

## 2025-07-02 NOTE — TELEPHONE ENCOUNTER
Pt's wife Aurea returned call (pt was present as well). Advised both Nurse Navigator was trying to reach them to get updated status on patient. At this time, wife accidentally disconnected arabella.     Best time to call is 10 - 11am    Hoa peterson

## 2025-07-02 NOTE — TELEPHONE ENCOUNTER
Received message from [facility] as they cannot reach patient for copay assistance or shipping.     I spoke with patient's daughter, Fabiola.  She is going to assist patient with foundation application and then contact [facility] with approval and to arrange shipping of Adempas.

## 2025-07-02 NOTE — TELEPHONE ENCOUNTER
Received call from pt's daughter, Fabiola, stating she will contact [facility] and call back with any updates.

## 2025-07-03 NOTE — TELEPHONE ENCOUNTER
21 day follow up call attempt x 3         S/w patient who reports no new or worsening stroke like symptoms . Patient reports he is doing well and ambulating and performing ADLS baseline pre stroke. He continues with a little bit of speech difficulties and continues to work with therapy. Patient monitors BP from home regularly with last read of :122/73  Medications reviewed with no questions or concerns. Appointments scheduled with no questions or concerns.

## 2025-07-07 ENCOUNTER — TELEPHONE (OUTPATIENT)
Dept: CARDIOLOGY CLINIC | Facility: CLINIC | Age: 85
End: 2025-07-07

## 2025-07-07 NOTE — TELEPHONE ENCOUNTER
Hi Team,     JAYLYNTOMMY  :1940    Can you please confirm with MD if Adempas 1mg is patient's maintenance dose or if MD wants the patient to get to a higher goal dose? Also, please confirm if [facility] is needed.    Additionally, [facility] spoke to patient's daughter, Fabiola, for Adempas start of care restart and she informed us per the Rx on file that the MD is wanting to restat and maintain the patient at 1 mg TID. She is concerned about the patient's age and blood pressure for fall risk and was going to call Liza RN to see if a 0.5 mg dose is preferred restart and weather they will titrate to the 1 mg dose if started at 0.5 mg. We have an open order pending delivery on  that Fabiola wanted us to ship with the 1 mg and we could always ship 0.5 mg if needed. Please advise.    Thank you,  Faith Lionitis    Your Practice Liaison Team!  Practice Liaisons:  Princess Greenberg ?Riya Hong ? Marian Gross ?

## 2025-07-08 DIAGNOSIS — I27.21 PAH (PULMONARY ARTERY HYPERTENSION) (HCC): Primary | ICD-10-CM

## 2025-07-08 NOTE — TELEPHONE ENCOUNTER
Messaged [facility] pharmacy with Dr. Lees's instructions for restarting Adempas. Also notified patient's daughter Fabiola.

## 2025-07-15 ENCOUNTER — TELEPHONE (OUTPATIENT)
Dept: CARDIOLOGY CLINIC | Facility: CLINIC | Age: 85
End: 2025-07-15

## 2025-07-15 DIAGNOSIS — I63.9 STROKE (HCC): ICD-10-CM

## 2025-07-15 RX ORDER — ATORVASTATIN CALCIUM 80 MG/1
80 TABLET, FILM COATED ORAL EVERY EVENING
Qty: 90 TABLET | Refills: 2 | Status: SHIPPED | OUTPATIENT
Start: 2025-07-15 | End: 2025-08-14

## 2025-07-19 DIAGNOSIS — Z15.89 JAK-2 GENE MUTATION: ICD-10-CM

## 2025-07-19 DIAGNOSIS — D47.3 ESSENTIAL THROMBOCYTOSIS (HCC): ICD-10-CM

## 2025-07-22 ENCOUNTER — HOSPITAL ENCOUNTER (EMERGENCY)
Facility: HOSPITAL | Age: 85
Discharge: HOME | End: 2025-07-22
Attending: EMERGENCY MEDICINE | Admitting: EMERGENCY MEDICINE
Payer: COMMERCIAL

## 2025-07-22 VITALS
HEART RATE: 92 BPM | OXYGEN SATURATION: 96 % | BODY MASS INDEX: 27.97 KG/M2 | RESPIRATION RATE: 18 BRPM | SYSTOLIC BLOOD PRESSURE: 142 MMHG | WEIGHT: 178.57 LBS | TEMPERATURE: 97.2 F | DIASTOLIC BLOOD PRESSURE: 87 MMHG

## 2025-07-22 DIAGNOSIS — R09.A2 FOREIGN BODY SENSATION IN THROAT: Primary | ICD-10-CM

## 2025-07-22 PROCEDURE — 99283 EMERGENCY DEPT VISIT LOW MDM: CPT

## 2025-07-22 RX ORDER — HYDROXYUREA 500 MG/1
CAPSULE ORAL
Qty: 90 CAPSULE | Refills: 1 | Status: SHIPPED | OUTPATIENT
Start: 2025-07-22

## 2025-08-13 ENCOUNTER — TELEPHONE (OUTPATIENT)
Dept: CARDIOLOGY CLINIC | Facility: CLINIC | Age: 85
End: 2025-08-13

## 2025-08-18 DIAGNOSIS — D47.3 ESSENTIAL THROMBOCYTOSIS (HCC): ICD-10-CM

## 2025-08-18 DIAGNOSIS — Z15.89 JAK-2 GENE MUTATION: ICD-10-CM

## 2025-08-18 RX ORDER — HYDROXYUREA 500 MG/1
CAPSULE ORAL
Qty: 270 CAPSULE | Refills: 1 | Status: SHIPPED | OUTPATIENT
Start: 2025-08-18